# Patient Record
Sex: MALE | Race: WHITE | Employment: OTHER | URBAN - METROPOLITAN AREA
[De-identification: names, ages, dates, MRNs, and addresses within clinical notes are randomized per-mention and may not be internally consistent; named-entity substitution may affect disease eponyms.]

---

## 2021-12-07 ENCOUNTER — APPOINTMENT (OUTPATIENT)
Dept: GENERAL RADIOLOGY | Age: 56
DRG: 177 | End: 2021-12-07
Payer: MEDICARE

## 2021-12-07 ENCOUNTER — APPOINTMENT (OUTPATIENT)
Dept: NUCLEAR MEDICINE | Age: 56
DRG: 177 | End: 2021-12-07
Payer: MEDICARE

## 2021-12-07 ENCOUNTER — HOSPITAL ENCOUNTER (INPATIENT)
Age: 56
LOS: 5 days | Discharge: LEFT BEFORE DISCHARGE | DRG: 177 | End: 2021-12-18
Attending: EMERGENCY MEDICINE | Admitting: FAMILY MEDICINE
Payer: MEDICARE

## 2021-12-07 DIAGNOSIS — R79.89 ELEVATED BRAIN NATRIURETIC PEPTIDE (BNP) LEVEL: ICD-10-CM

## 2021-12-07 DIAGNOSIS — R77.8 ELEVATED TROPONIN: ICD-10-CM

## 2021-12-07 DIAGNOSIS — R07.9 CHEST PAIN, UNSPECIFIED TYPE: Primary | ICD-10-CM

## 2021-12-07 DIAGNOSIS — R79.89 ELEVATED D-DIMER: ICD-10-CM

## 2021-12-07 LAB
-: NORMAL
ANION GAP SERPL CALCULATED.3IONS-SCNC: 9 MMOL/L (ref 9–17)
BNP INTERPRETATION: ABNORMAL
BUN BLDV-MCNC: 35 MG/DL (ref 6–20)
BUN/CREAT BLD: 18 (ref 9–20)
CALCIUM SERPL-MCNC: 8.3 MG/DL (ref 8.6–10.4)
CHLORIDE BLD-SCNC: 107 MMOL/L (ref 98–107)
CO2: 21 MMOL/L (ref 20–31)
CREAT SERPL-MCNC: 1.99 MG/DL (ref 0.7–1.2)
D-DIMER QUANTITATIVE: 1.19 MG/L FEU (ref 0–0.59)
EKG ATRIAL RATE: 87 BPM
EKG Q-T INTERVAL: 476 MS
EKG QRS DURATION: 174 MS
EKG QTC CALCULATION (BAZETT): 524 MS
EKG R AXIS: -102 DEGREES
EKG T AXIS: 87 DEGREES
EKG VENTRICULAR RATE: 73 BPM
FERRITIN: 700 UG/L (ref 30–400)
FIBRINOGEN: 438 MG/DL (ref 179–518)
GFR AFRICAN AMERICAN: 42 ML/MIN
GFR NON-AFRICAN AMERICAN: 35 ML/MIN
GFR SERPL CREATININE-BSD FRML MDRD: ABNORMAL ML/MIN/{1.73_M2}
GFR SERPL CREATININE-BSD FRML MDRD: ABNORMAL ML/MIN/{1.73_M2}
GLUCOSE BLD-MCNC: 84 MG/DL (ref 70–99)
LACTATE DEHYDROGENASE: 264 U/L (ref 135–225)
LACTIC ACID: 1 MMOL/L (ref 0.5–2.2)
POTASSIUM SERPL-SCNC: 3.9 MMOL/L (ref 3.7–5.3)
PRO-BNP: ABNORMAL PG/ML
REASON FOR REJECTION: NORMAL
SODIUM BLD-SCNC: 137 MMOL/L (ref 135–144)
TROPONIN INTERP: ABNORMAL
TROPONIN INTERP: ABNORMAL
TROPONIN T: ABNORMAL NG/ML
TROPONIN T: ABNORMAL NG/ML
TROPONIN, HIGH SENSITIVITY: 63 NG/L (ref 0–22)
TROPONIN, HIGH SENSITIVITY: 69 NG/L (ref 0–22)
ZZ NTE CLEAN UP: ORDERED TEST: NORMAL
ZZ NTE WITH NAME CLEAN UP: SPECIMEN SOURCE: NORMAL

## 2021-12-07 PROCEDURE — 78580 LUNG PERFUSION IMAGING: CPT

## 2021-12-07 PROCEDURE — 83615 LACTATE (LD) (LDH) ENZYME: CPT

## 2021-12-07 PROCEDURE — 3430000000 HC RX DIAGNOSTIC RADIOPHARMACEUTICAL: Performed by: EMERGENCY MEDICINE

## 2021-12-07 PROCEDURE — 84484 ASSAY OF TROPONIN QUANT: CPT

## 2021-12-07 PROCEDURE — 71045 X-RAY EXAM CHEST 1 VIEW: CPT

## 2021-12-07 PROCEDURE — 85379 FIBRIN DEGRADATION QUANT: CPT

## 2021-12-07 PROCEDURE — 6370000000 HC RX 637 (ALT 250 FOR IP): Performed by: EMERGENCY MEDICINE

## 2021-12-07 PROCEDURE — 99285 EMERGENCY DEPT VISIT HI MDM: CPT

## 2021-12-07 PROCEDURE — 83880 ASSAY OF NATRIURETIC PEPTIDE: CPT

## 2021-12-07 PROCEDURE — 83605 ASSAY OF LACTIC ACID: CPT

## 2021-12-07 PROCEDURE — 84145 PROCALCITONIN (PCT): CPT

## 2021-12-07 PROCEDURE — 96376 TX/PRO/DX INJ SAME DRUG ADON: CPT

## 2021-12-07 PROCEDURE — 96374 THER/PROPH/DIAG INJ IV PUSH: CPT

## 2021-12-07 PROCEDURE — 96375 TX/PRO/DX INJ NEW DRUG ADDON: CPT

## 2021-12-07 PROCEDURE — 99222 1ST HOSP IP/OBS MODERATE 55: CPT | Performed by: NURSE PRACTITIONER

## 2021-12-07 PROCEDURE — 6360000002 HC RX W HCPCS: Performed by: EMERGENCY MEDICINE

## 2021-12-07 PROCEDURE — 85384 FIBRINOGEN ACTIVITY: CPT

## 2021-12-07 PROCEDURE — 80048 BASIC METABOLIC PNL TOTAL CA: CPT

## 2021-12-07 PROCEDURE — 82728 ASSAY OF FERRITIN: CPT

## 2021-12-07 PROCEDURE — A9540 TC99M MAA: HCPCS | Performed by: EMERGENCY MEDICINE

## 2021-12-07 PROCEDURE — 93005 ELECTROCARDIOGRAM TRACING: CPT | Performed by: EMERGENCY MEDICINE

## 2021-12-07 RX ORDER — DEXAMETHASONE SODIUM PHOSPHATE 10 MG/ML
10 INJECTION, SOLUTION INTRAMUSCULAR; INTRAVENOUS ONCE
Status: COMPLETED | OUTPATIENT
Start: 2021-12-07 | End: 2021-12-07

## 2021-12-07 RX ORDER — MORPHINE SULFATE 4 MG/ML
4 INJECTION, SOLUTION INTRAMUSCULAR; INTRAVENOUS ONCE
Status: DISCONTINUED | OUTPATIENT
Start: 2021-12-07 | End: 2021-12-07

## 2021-12-07 RX ORDER — CLOPIDOGREL BISULFATE 75 MG/1
75 TABLET ORAL ONCE
Status: DISCONTINUED | OUTPATIENT
Start: 2021-12-07 | End: 2021-12-08

## 2021-12-07 RX ORDER — DIPHENHYDRAMINE HCL 25 MG
50 TABLET ORAL ONCE
Status: COMPLETED | OUTPATIENT
Start: 2021-12-07 | End: 2021-12-07

## 2021-12-07 RX ORDER — MORPHINE SULFATE 4 MG/ML
4 INJECTION, SOLUTION INTRAMUSCULAR; INTRAVENOUS ONCE
Status: COMPLETED | OUTPATIENT
Start: 2021-12-07 | End: 2021-12-07

## 2021-12-07 RX ORDER — MORPHINE SULFATE 4 MG/ML
4 INJECTION, SOLUTION INTRAMUSCULAR; INTRAVENOUS ONCE
Status: COMPLETED | OUTPATIENT
Start: 2021-12-07 | End: 2021-12-08

## 2021-12-07 RX ADMIN — MORPHINE SULFATE 4 MG: 4 INJECTION INTRAVENOUS at 21:14

## 2021-12-07 RX ADMIN — DIPHENHYDRAMINE HCL 50 MG: 25 TABLET ORAL at 20:21

## 2021-12-07 RX ADMIN — MORPHINE SULFATE 4 MG: 4 INJECTION INTRAVENOUS at 19:03

## 2021-12-07 RX ADMIN — Medication 7.5 MILLICURIE: at 22:15

## 2021-12-07 RX ADMIN — DEXAMETHASONE SODIUM PHOSPHATE 10 MG: 10 INJECTION, SOLUTION INTRAMUSCULAR; INTRAVENOUS at 21:14

## 2021-12-07 ASSESSMENT — ENCOUNTER SYMPTOMS
ABDOMINAL PAIN: 0
VOMITING: 0
NAUSEA: 1
SHORTNESS OF BREATH: 1

## 2021-12-07 ASSESSMENT — PAIN SCALES - GENERAL
PAINLEVEL_OUTOF10: 9
PAINLEVEL_OUTOF10: 8
PAINLEVEL_OUTOF10: 9

## 2021-12-07 ASSESSMENT — PAIN DESCRIPTION - ORIENTATION: ORIENTATION: LEFT;MID

## 2021-12-07 ASSESSMENT — PAIN DESCRIPTION - DESCRIPTORS: DESCRIPTORS: PRESSURE

## 2021-12-07 ASSESSMENT — PAIN DESCRIPTION - LOCATION: LOCATION: CHEST

## 2021-12-07 NOTE — ED NOTES
Writer to bedside to administer morphine IM. Pt informed that the house supervisor will be coming to start his IV with US when he is available, but uncertain of how long that will be. Pt refuses for morphine to be administered IM because it will hurt. Pt asks writer \"can't you just start the IV now? \" Previous IV attempt unsuccessful. Writer unable to visualize or palpate any other veins for IV insertion - pt aware. Will await house supervisor to attempt IV insertion.       Catrachita Rdz RN  12/07/21 [id number]

## 2021-12-07 NOTE — ED NOTES
Pt removed self from telemetry. Refuses to leave in place. Pt states he is allergic to the adhesive.       Yuni Salazar RN  12/07/21 1609

## 2021-12-07 NOTE — ED NOTES
Pt arrives ambulatory via EMS with c/o chest pain for >7 days and being unable to catch his breath for the past 4-5 days. Pt reports he has been admitted to [facility] for the last 10 days, was tested for COVID this morning and told he is positive. Pt states after that they would not tell him what was going on, he felt disrespected so he \"ripped out my IV\" and left AMA. Pt reports prior to admission, he was quarantined with his girlfriend in the motel room they stay in and that she was diagnosed 3 weeks ago with COVID. Reports after leaving [facility] AMA, he took a bus to a mall, was sitting on a bench when he was approached by security. Pt states  told him he didn't look like he was feeling well and called 911 - pt states he didn't have a choice in the matter, he was just brought to the hospital. When asked, pt states \"of course I want medical treatment\". Pt c/o mid to left sided chest pressure for >7 days. Also c/o pain in the back of his neck and back of his left shoulder - denies injury to those sites. C/o intermittently feeling as though he cannot catch his breath over past 4 days. Right arm and leg weakness present from previous CVA. Pt able to speak clearly, in full continuous sentences without difficulty or change in SpO2 level.       Елена Andrade RN  12/07/21 Micheal Billings RN  12/07/21 6706

## 2021-12-07 NOTE — ED NOTES
Bed: 12  Expected date:   Expected time:   Means of arrival:   Comments:  squad Luverne Gowers, RN  12/07/21 3765

## 2021-12-07 NOTE — ED PROVIDER NOTES
Occupational History    Not on file   Tobacco Use    Smoking status: Not on file    Smokeless tobacco: Not on file   Substance and Sexual Activity    Alcohol use: Not on file    Drug use: Not on file    Sexual activity: Not on file   Other Topics Concern    Not on file   Social History Narrative    Not on file     Social Determinants of Health     Financial Resource Strain:     Difficulty of Paying Living Expenses: Not on file   Food Insecurity:     Worried About Running Out of Food in the Last Year: Not on file    [place] of Food in the Last Year: Not on file   Transportation Needs:     Lack of Transportation (Medical): Not on file    Lack of Transportation (Non-Medical): Not on file   Physical Activity:     Days of Exercise per Week: Not on file    Minutes of Exercise per Session: Not on file   Stress:     Feeling of Stress : Not on file   Social Connections:     Frequency of Communication with Friends and Family: Not on file    Frequency of Social Gatherings with Friends and Family: Not on file    Attends Orthodoxy Services: Not on file    Active Member of [address] [place] [address] or Organizations: Not on file    Attends Club or Organization Meetings: Not on file    Marital Status: Not on file   Intimate Partner Violence:     Fear of Current or Ex-Partner: Not on file    Emotionally Abused: Not on file    Physically Abused: Not on file    Sexually Abused: Not on file   Housing Stability:     Unable to Pay for Housing in the Last Year: Not on file    Number of [place] in the Last Year: Not on file    Unstable Housing in the Last Year: Not on file       History reviewed. No pertinent family history. Allergies:    Asa [aspirin], Nsaids, and Oxycodone    Home Medications:  Prior to Admission medications    Not on File       REVIEW OF SYSTEMS    (2-9 systems for level 4, 10 or more for level 5)    Review of Systems   Constitutional: Negative for diaphoresis.    Respiratory: Positive for shortness of breath. Cardiovascular: Positive for chest pain. Negative for palpitations and leg swelling. Gastrointestinal: Positive for nausea. Negative for abdominal pain and vomiting. Genitourinary: Negative for flank pain. Musculoskeletal: Positive for arthralgias and neck pain. Neurological: Positive for dizziness and light-headedness. Negative for syncope and weakness. Hematological: Does not bruise/bleed easily. PHYSICAL EXAM   (up to 7 for level 4, 8 or more for level 5)    VITALS:   Vitals:    12/07/21 1550 12/07/21 1608 12/07/21 1842 12/07/21 2109   BP: (!) 132/97   (!) 139/111   Pulse: 70   60   Resp:    21   Temp:       TempSrc:       SpO2:  97%  97%   Weight:   170 lb (77.1 kg)    Height:   5' 10\" (1.778 m)        Physical Exam  Vitals and nursing note reviewed. Constitutional:       General: He is not in acute distress. Appearance: He is well-developed. He is not diaphoretic. HENT:      Head: Normocephalic and atraumatic. Eyes:      Conjunctiva/sclera: Conjunctivae normal.   Cardiovascular:      Rate and Rhythm: Normal rate and regular rhythm. Heart sounds: Normal heart sounds. Pulmonary:      Effort: Pulmonary effort is normal. No respiratory distress. Breath sounds: Normal breath sounds. No wheezing, rhonchi or rales. Abdominal:      General: There is no distension. Palpations: Abdomen is soft. Tenderness: There is no abdominal tenderness. Musculoskeletal:         General: Normal range of motion. Cervical back: Normal range of motion. Right lower leg: Edema present. Left lower leg: Edema present. Skin:     General: Skin is warm and dry. Coloration: Skin is not pale. Neurological:      General: No focal deficit present. Mental Status: He is alert.    Psychiatric:         Behavior: Behavior normal.         DIFFERENTIAL  DIAGNOSIS   PLAN (LABS / IMAGING / EKG):  Orders Placed This Encounter   Procedures    COVID-19, Rapid    XR CHEST 1 VIEW    NM LUNG SCAN PERFUSION ONLY    SPECIMEN REJECTION    Basic Metabolic Panel    Brain Natriuretic Peptide    D-Dimer, Quantitative    Ferritin    Fibrinogen    Lactic Acid    Lactate Dehydrogenase    Procalcitonin    Troponin    Troponin    Telemetry monitoring - continuous duration    Insert indwelling urinary catheter    Inpatient consult to Cardiology    Inpatient consult to Hospitalist    Pacer Interrogate    EKG 12 Lead    Insert peripheral IV       MEDICATIONS ORDERED:  Orders Placed This Encounter   Medications    DISCONTD: morphine sulfate (PF) injection 4 mg    DISCONTD: morphine sulfate (PF) injection 4 mg    morphine sulfate (PF) injection 4 mg    diphenhydrAMINE (BENADRYL) tablet 50 mg    dexamethasone (PF) (DECADRON) injection 10 mg    morphine sulfate (PF) injection 4 mg    technetium albumin aggregated (MAA) solution 7.5 millicurie    morphine sulfate (PF) injection 4 mg    clopidogrel (PLAVIX) tablet 75 mg     DIAGNOSTIC RESULTS / EMERGENCYDEPARTMENT COURSE / MDM   LABS:  Labs Reviewed   BASIC METABOLIC PANEL - Abnormal; Notable for the following components:       Result Value    BUN 35 (*)     CREATININE 1.99 (*)     Calcium 8.3 (*)     GFR Non- 35 (*)     GFR  42 (*)     All other components within normal limits   BRAIN NATRIURETIC PEPTIDE - Abnormal; Notable for the following components:    Pro-BNP 22,281 (*)     All other components within normal limits   D-DIMER, QUANTITATIVE - Abnormal; Notable for the following components:    D-Dimer, Quant 1.19 (*)     All other components within normal limits   FERRITIN - Abnormal; Notable for the following components:    Ferritin 700 (*)     All other components within normal limits   LACTATE DEHYDROGENASE - Abnormal; Notable for the following components:     (*)     All other components within normal limits   TROPONIN - Abnormal; Notable for the following components: Troponin, High Sensitivity 63 (*)     All other components within normal limits   TROPONIN - Abnormal; Notable for the following components:    Troponin, High Sensitivity 69 (*)     All other components within normal limits   COVID-19, RAPID   SPECIMEN REJECTION   FIBRINOGEN   LACTIC ACID   PROCALCITONIN       RADIOLOGY:  NM LUNG SCAN PERFUSION ONLY    Result Date: 12/7/2021  EXAMINATION: NUCLEAR MEDICINE PERFUSION SCAN. 12/7/2021 TECHNIQUE: Ventilation not performed as part of COVID-19 safety precautions. 7.5 millicuries of Tc 09J MAA was administered intravenously prior to planar imaging of the lungs in multiple projections. COMPARISON: Chest radiograph December 7, 2021. HISTORY: ORDERING SYSTEM PROVIDED HISTORY: CP SOB COVID+ +ddimer borederline GFR TECHNOLOGIST PROVIDED HISTORY: CP SOB COVID+ +ddimer borederline GFR Reason for Exam: CAD, SOB, elevated d dimer, Covid + ? Acuity: Unknown Type of Exam: Unknown FINDINGS: PERFUSION: Distribution of radiotracer is homogeneous. No segmental defects identified. CHEST RADIOGRAPH: No focal areas of consolidation or significant effusions on recent chest radiograph. Low Probability for Pulmonary Embolus. XR CHEST 1 VIEW    Result Date: 12/7/2021  EXAMINATION: ONE XRAY VIEW OF THE CHEST 12/7/2021 4:07 pm COMPARISON: None. HISTORY: ORDERING SYSTEM PROVIDED HISTORY: CP/SOB known covid + TECHNOLOGIST PROVIDED HISTORY: CP/SOB known covid + Reason for Exam: CP/SOB covid + Acuity: Unknown Type of Exam: Unknown FINDINGS: Left-sided subclavian approach AICD with atrial and ventricular leads; midline sternotomy wires and clips. Overlying ECG monitor leads. Enlarged cardiac silhouette. Mediastinal structures midline with some elongation thoracic aorta. Mild cephalization of blood flow but no Kerley lines. No clear-cut GGO radiographically. Slight blunting left costophrenic angle but no sizable pleural effusion. No pneumothorax. DJD spine. No prior study available. No definite pneumonia. Slight blunting left CP angle. Cardiomegaly; venous hypertension without radiographic CHF. EKG    EKG Interpretation    Interpreted by emergency department physician    Rhythm: paced  Rate: normal  Axis: normal  Ectopy: premature ventricular contractions (infrequent)  Conduction: QTc 524  ST Segments: no acute change  T Waves: inversion in  v2 and aVl  Q Waves: none    Clinical Impression: non-specific EKG    All EKG's are interpreted by the Emergency Department Physician whoeither signs or Co-signs this chart in the absence of a cardiologist.    EMERGENCY DEPARTMENT COURSE:  ED Course as of 12/07/21 2318   Tue Dec 07, 2021   1638 Patient difficult IV. Had initially ordered IV morphine at request due to pain. However he does not have an IV. Switch the medication to IM. Patient states that he does not want the IM shot and will wait for the IV. [AO]   1703 XR CHEST 1 VIEW [AO]   1817 Patient frustrated that multiple nurses have attempted IV draws. He is requesting that I place an IV. I explained that it would be more difficult for me that our experience nurses to place an IV. He is even offering his ankles and feet. Explained that we will try with the nurse who can do ultrasound-guided.   Again offered to switch IV to IM morphine, patient politely declined at this time. [AO]   1951 Lactic Acid: 1.0 [AO]   3308 Basic Metabolic Panel(!):    Glucose 84   BUN 35(!)   Creatinine 1.99(!)   Bun/Cre Ratio 18   Calcium 8.3(!)   Sodium 137   Potassium 3.9   Chloride 107   CO2 21   Anion Gap 9   GFR Non- 35(!)   GFR  42(!)   GFR Comment        GFR Staging NOT REPORTED [AO]   1954 Lactate Dehydrogenase(!):    (!) [AO]   2016 D-Dimer, Quant(!): 1.19 [AO]   2016 Fibrinogen: 438 [AO]   2018 Pt allergic to adhesive and scratching self from adhesive pads, requesting something for itching  [AO]   2039 Ferritin(!): 700 [AO]   2039 Troponin, High Sensitivity(!!): 63 [AO]   2039 Pro-BNP(!): 22,281 [AO]   2056 Pt allergic to ASA [AO]   2158 Troponin, High Sensitivity(!!): 69 [AO]   2258 Spoke to Dr. Connor Mills. No indication for heparin right now. Give plavix. Do not have nurse call him with elevated troponins last there is a significant change [AO]      ED Course User Index  [AO] Maicol Dawson,        MDM  Number of Diagnoses or Management Options  Chest pain, unspecified type  Elevated brain natriuretic peptide (BNP) level  Elevated d-dimer  Elevated troponin  Diagnosis management comments: 41-year-old male with significant cardiac past medical history presents emergency department chest pain shortness of breath. Initial evaluation he is no acute distress. Hypertension noted but other vital signs stable. Heart regular rate and rhythm, lungs clear. No peripheral edema. EKG shows paced rhythm with some PVCs. Patient states that he was recently hospitalized at [facility] for the past 10 days and left AMA this morning because he found out that he was Covid positive and did not like the way he is being treated. However I cannot see any of this in care everywhere. I even had our hot call both [facility] as well as [facility] and there is no record of him. On his care everywhere the only thing I can see are different facilities up in [place] and this mostly contains a home medication list and internal medicine doctors names. Patient is adamant that he was at [facility].  He states he also has known history of chronic kidney disease and believes his creatinine is normally around 1.4 however states that when he left [facility] that his creatinine was 2.6. He does not know what his baseline troponin is or what his baseline BNP is. Here his troponins went from 63-69. Requiring multiple doses of morphine. Requesting something to drink.   Spoke with cardiology, will admit to hospitalist.  Patient unaware of any of the names of his specialist that he is seen in the past.  Cards: History of prior A. fib, V. tach/V. Fib with pacemaker AICD in place, known coronary artery disease, PCI with stent placement, thoracic aortic aneurysm with endograft placed at [facility] multiple years ago. Troponins elevated and trending up from 63-69. BNP significantly elevated. Is on Bumex at home from one of the medication list that I have seen. Allergic to aspirin so cannot provide this. Spoke to Dr. Ally Crawford reviewed case. Recommended loading with Plavix. No indication for heparin at this time. Unfortunately unable to get CTA to adequately assess the status of his endograft due to renal function. However has intact and equal distal pulses with no significant hypertension that would be concerning for an abrupt dissection. Patient is unaware of the make and model of his pacemaker/defibrillator so we are unable to interrogate unfortunately. Upon review of care everywhere I can see her medication list from [place] [id number] as patient is from [place]. There are multiple cardiac medications, multiple psychiatric medications, multiple controlled substances as well. His OARRS score is 590. Most recent narcotic prescription that I can see is from September of this year in [place]. Of note patient is allergic to adhesive and had a skin reaction to the telemetry pads causing him to scratch himself bloody. Was given Decadron as well as Benadryl for this. Pulm: Supposedly Covid positive, rapid here will be sent. Chest x-ray did not reveal any pneumonia or fluid overload. BNP elevated, however this does not correlate with physical exam.  Not hypoxic. Not tachypneic. VQ scan low probability for pulmonary embolism  Renal: History of chronic kidney disease, patient states that baseline creatinine is 1.4. Today is 2. Borderline GFR which is why chose not to do CT with contrast.  Given IV fluids conservatively. ID: Inflammatory markers sent to assess status of COVID-19.   Patient states that he is positive, will reconfirm this year upon admission         Amount and/or Complexity of Data Reviewed  Clinical lab tests: ordered and reviewed  Tests in the radiology section of CPT®: ordered and reviewed  Review and summarize past medical records: yes  Discuss the patient with other providers: yes  Independent visualization of images, tracings, or specimens: yes    Patient Progress  Patient progress: stable      PROCEDURES:  Procedures     CONSULTS:  IP CONSULT TO CARDIOLOGY  IP CONSULT TO HOSPITALIST    CRITICAL CARE:  NONE    FINAL IMPRESSION     1. Chest pain, unspecified type    2. Elevated troponin    3. Elevated brain natriuretic peptide (BNP) level    4. Elevated d-dimer      DISPOSITION / PLAN   DISPOSITION Decision To Admit 12/07/2021 10:15:28 PM    ADMIT    Cleopatra Mauro April DO  Emergency Medicine Physician    (Please note that portions of this note were completed with a voice recognition program.  Efforts were made to edit the dictations but occasionally words are mis-transcribed.)        Ynes Aleman 1721,   12/07/21 4880

## 2021-12-08 ENCOUNTER — APPOINTMENT (OUTPATIENT)
Dept: ULTRASOUND IMAGING | Age: 56
DRG: 177 | End: 2021-12-08
Payer: MEDICARE

## 2021-12-08 PROBLEM — I25.2 HISTORY OF MI (MYOCARDIAL INFARCTION): Status: ACTIVE | Noted: 2021-12-08

## 2021-12-08 PROBLEM — Z95.810 AICD (AUTOMATIC CARDIOVERTER/DEFIBRILLATOR) PRESENT: Status: ACTIVE | Noted: 2021-12-08

## 2021-12-08 PROBLEM — N18.30 STAGE 3 CHRONIC KIDNEY DISEASE (HCC): Status: ACTIVE | Noted: 2021-12-08

## 2021-12-08 PROBLEM — I21.4 NSTEMI (NON-ST ELEVATED MYOCARDIAL INFARCTION) (HCC): Status: ACTIVE | Noted: 2021-12-08

## 2021-12-08 PROBLEM — I50.22 CHRONIC SYSTOLIC HEART FAILURE (HCC): Status: ACTIVE | Noted: 2021-12-08

## 2021-12-08 PROBLEM — Z95.1 HX OF CABG: Status: ACTIVE | Noted: 2021-12-08

## 2021-12-08 PROBLEM — R07.89 OTHER CHEST PAIN: Status: ACTIVE | Noted: 2021-12-08

## 2021-12-08 PROBLEM — R79.89 ELEVATED BRAIN NATRIURETIC PEPTIDE (BNP) LEVEL: Status: ACTIVE | Noted: 2021-12-08

## 2021-12-08 PROBLEM — N17.9 AKI (ACUTE KIDNEY INJURY) (HCC): Status: ACTIVE | Noted: 2021-12-08

## 2021-12-08 PROBLEM — U07.1 COVID-19 VIRUS INFECTION: Status: ACTIVE | Noted: 2021-12-08

## 2021-12-08 PROBLEM — R77.8 ELEVATED TROPONIN: Status: ACTIVE | Noted: 2021-12-08

## 2021-12-08 LAB
ANION GAP SERPL CALCULATED.3IONS-SCNC: 12 MMOL/L (ref 9–17)
BUN BLDV-MCNC: 37 MG/DL (ref 6–20)
BUN/CREAT BLD: 16 (ref 9–20)
CALCIUM SERPL-MCNC: 8.3 MG/DL (ref 8.6–10.4)
CHLORIDE BLD-SCNC: 107 MMOL/L (ref 98–107)
CO2: 19 MMOL/L (ref 20–31)
CREAT SERPL-MCNC: 2.26 MG/DL (ref 0.7–1.2)
GFR AFRICAN AMERICAN: 37 ML/MIN
GFR NON-AFRICAN AMERICAN: 30 ML/MIN
GFR SERPL CREATININE-BSD FRML MDRD: ABNORMAL ML/MIN/{1.73_M2}
GFR SERPL CREATININE-BSD FRML MDRD: ABNORMAL ML/MIN/{1.73_M2}
GLUCOSE BLD-MCNC: 177 MG/DL (ref 70–99)
MAGNESIUM: 1.8 MG/DL (ref 1.6–2.6)
POTASSIUM SERPL-SCNC: 4.4 MMOL/L (ref 3.7–5.3)
PROCALCITONIN: 0.09 NG/ML
SARS-COV-2, RAPID: DETECTED
SODIUM BLD-SCNC: 138 MMOL/L (ref 135–144)
SPECIMEN DESCRIPTION: ABNORMAL
TROPONIN INTERP: ABNORMAL
TROPONIN INTERP: ABNORMAL
TROPONIN T: ABNORMAL NG/ML
TROPONIN T: ABNORMAL NG/ML
TROPONIN, HIGH SENSITIVITY: 41 NG/L (ref 0–22)
TROPONIN, HIGH SENSITIVITY: 62 NG/L (ref 0–22)

## 2021-12-08 PROCEDURE — 2580000003 HC RX 258: Performed by: NURSE PRACTITIONER

## 2021-12-08 PROCEDURE — 6370000000 HC RX 637 (ALT 250 FOR IP): Performed by: INTERNAL MEDICINE

## 2021-12-08 PROCEDURE — 83735 ASSAY OF MAGNESIUM: CPT

## 2021-12-08 PROCEDURE — 84484 ASSAY OF TROPONIN QUANT: CPT

## 2021-12-08 PROCEDURE — 6360000002 HC RX W HCPCS: Performed by: INTERNAL MEDICINE

## 2021-12-08 PROCEDURE — 99223 1ST HOSP IP/OBS HIGH 75: CPT | Performed by: INTERNAL MEDICINE

## 2021-12-08 PROCEDURE — 76770 US EXAM ABDO BACK WALL COMP: CPT

## 2021-12-08 PROCEDURE — G0378 HOSPITAL OBSERVATION PER HR: HCPCS

## 2021-12-08 PROCEDURE — 6370000000 HC RX 637 (ALT 250 FOR IP): Performed by: NURSE PRACTITIONER

## 2021-12-08 PROCEDURE — 96376 TX/PRO/DX INJ SAME DRUG ADON: CPT

## 2021-12-08 PROCEDURE — 87635 SARS-COV-2 COVID-19 AMP PRB: CPT

## 2021-12-08 PROCEDURE — 96372 THER/PROPH/DIAG INJ SC/IM: CPT

## 2021-12-08 PROCEDURE — 6370000000 HC RX 637 (ALT 250 FOR IP): Performed by: EMERGENCY MEDICINE

## 2021-12-08 PROCEDURE — 36415 COLL VENOUS BLD VENIPUNCTURE: CPT

## 2021-12-08 PROCEDURE — 6360000002 HC RX W HCPCS: Performed by: EMERGENCY MEDICINE

## 2021-12-08 PROCEDURE — 80048 BASIC METABOLIC PNL TOTAL CA: CPT

## 2021-12-08 RX ORDER — SODIUM POLYSTYRENE SULFONATE 15 G/60ML
15 SUSPENSION ORAL; RECTAL
Status: ACTIVE | OUTPATIENT
Start: 2021-12-08 | End: 2021-12-08

## 2021-12-08 RX ORDER — DIPHENHYDRAMINE HCL 25 MG
50 TABLET ORAL EVERY 6 HOURS PRN
Status: DISCONTINUED | OUTPATIENT
Start: 2021-12-08 | End: 2021-12-09

## 2021-12-08 RX ORDER — POTASSIUM CHLORIDE 7.45 MG/ML
10 INJECTION INTRAVENOUS PRN
Status: DISCONTINUED | OUTPATIENT
Start: 2021-12-08 | End: 2021-12-19 | Stop reason: HOSPADM

## 2021-12-08 RX ORDER — MORPHINE SULFATE 2 MG/ML
2 INJECTION, SOLUTION INTRAMUSCULAR; INTRAVENOUS
Status: DISCONTINUED | OUTPATIENT
Start: 2021-12-08 | End: 2021-12-11

## 2021-12-08 RX ORDER — MORPHINE SULFATE 4 MG/ML
4 INJECTION, SOLUTION INTRAMUSCULAR; INTRAVENOUS
Status: DISCONTINUED | OUTPATIENT
Start: 2021-12-08 | End: 2021-12-11

## 2021-12-08 RX ORDER — ATORVASTATIN CALCIUM 40 MG/1
40 TABLET, FILM COATED ORAL NIGHTLY
Status: DISCONTINUED | OUTPATIENT
Start: 2021-12-08 | End: 2021-12-19 | Stop reason: HOSPADM

## 2021-12-08 RX ORDER — CLOPIDOGREL BISULFATE 75 MG/1
75 TABLET ORAL DAILY
Status: DISCONTINUED | OUTPATIENT
Start: 2021-12-08 | End: 2021-12-19 | Stop reason: HOSPADM

## 2021-12-08 RX ORDER — FUROSEMIDE 20 MG/1
20 TABLET ORAL PRN
Status: ON HOLD | COMMUNITY
End: 2021-12-17 | Stop reason: HOSPADM

## 2021-12-08 RX ORDER — ONDANSETRON 4 MG/1
4 TABLET, ORALLY DISINTEGRATING ORAL EVERY 8 HOURS PRN
Status: DISCONTINUED | OUTPATIENT
Start: 2021-12-08 | End: 2021-12-19 | Stop reason: HOSPADM

## 2021-12-08 RX ORDER — TAMSULOSIN HYDROCHLORIDE 0.4 MG/1
0.4 CAPSULE ORAL DAILY
COMMUNITY

## 2021-12-08 RX ORDER — POTASSIUM CHLORIDE 20 MEQ/1
40 TABLET, EXTENDED RELEASE ORAL PRN
Status: DISCONTINUED | OUTPATIENT
Start: 2021-12-08 | End: 2021-12-19 | Stop reason: HOSPADM

## 2021-12-08 RX ORDER — SODIUM CHLORIDE 9 MG/ML
INJECTION, SOLUTION INTRAVENOUS CONTINUOUS
Status: DISCONTINUED | OUTPATIENT
Start: 2021-12-08 | End: 2021-12-08

## 2021-12-08 RX ORDER — TAMSULOSIN HYDROCHLORIDE 0.4 MG/1
0.4 CAPSULE ORAL DAILY
Status: DISCONTINUED | OUTPATIENT
Start: 2021-12-08 | End: 2021-12-19 | Stop reason: HOSPADM

## 2021-12-08 RX ORDER — MAGNESIUM SULFATE 1 G/100ML
1000 INJECTION INTRAVENOUS PRN
Status: DISCONTINUED | OUTPATIENT
Start: 2021-12-08 | End: 2021-12-19 | Stop reason: HOSPADM

## 2021-12-08 RX ORDER — NITROGLYCERIN 0.4 MG/1
0.4 TABLET SUBLINGUAL EVERY 5 MIN PRN
Status: DISCONTINUED | OUTPATIENT
Start: 2021-12-08 | End: 2021-12-19 | Stop reason: HOSPADM

## 2021-12-08 RX ORDER — ONDANSETRON 2 MG/ML
4 INJECTION INTRAMUSCULAR; INTRAVENOUS EVERY 6 HOURS PRN
Status: DISCONTINUED | OUTPATIENT
Start: 2021-12-08 | End: 2021-12-19 | Stop reason: HOSPADM

## 2021-12-08 RX ORDER — AMLODIPINE BESYLATE 10 MG/1
0.5 TABLET ORAL DAILY
Status: ON HOLD | COMMUNITY
End: 2021-12-17 | Stop reason: HOSPADM

## 2021-12-08 RX ORDER — DIPHENHYDRAMINE HCL 25 MG
25 TABLET ORAL EVERY 6 HOURS PRN
Status: DISCONTINUED | OUTPATIENT
Start: 2021-12-08 | End: 2021-12-08 | Stop reason: SDUPTHER

## 2021-12-08 RX ORDER — SODIUM POLYSTYRENE SULFONATE 15 G/60ML
30 SUSPENSION ORAL; RECTAL
Status: ACTIVE | OUTPATIENT
Start: 2021-12-08 | End: 2021-12-08

## 2021-12-08 RX ORDER — ATORVASTATIN CALCIUM 40 MG/1
1 TABLET, FILM COATED ORAL DAILY
Status: ON HOLD | COMMUNITY
End: 2022-01-13 | Stop reason: HOSPADM

## 2021-12-08 RX ORDER — CARVEDILOL 6.25 MG/1
1 TABLET ORAL 2 TIMES DAILY
Status: ON HOLD | COMMUNITY
End: 2021-12-17 | Stop reason: HOSPADM

## 2021-12-08 RX ORDER — CARVEDILOL 3.12 MG/1
3.12 TABLET ORAL 2 TIMES DAILY WITH MEALS
Status: COMPLETED | OUTPATIENT
Start: 2021-12-08 | End: 2021-12-13

## 2021-12-08 RX ORDER — CLOPIDOGREL BISULFATE 75 MG/1
1 TABLET ORAL DAILY
COMMUNITY

## 2021-12-08 RX ADMIN — DIPHENHYDRAMINE HCL 25 MG: 25 TABLET ORAL at 02:46

## 2021-12-08 RX ADMIN — ATORVASTATIN CALCIUM 40 MG: 40 TABLET, FILM COATED ORAL at 23:42

## 2021-12-08 RX ADMIN — MORPHINE SULFATE 4 MG: 4 INJECTION, SOLUTION INTRAMUSCULAR; INTRAVENOUS at 20:32

## 2021-12-08 RX ADMIN — CLOPIDOGREL BISULFATE 75 MG: 75 TABLET ORAL at 14:23

## 2021-12-08 RX ADMIN — MORPHINE SULFATE 4 MG: 4 INJECTION INTRAVENOUS at 00:29

## 2021-12-08 RX ADMIN — SODIUM CHLORIDE: 9 INJECTION, SOLUTION INTRAVENOUS at 02:13

## 2021-12-08 RX ADMIN — APIXABAN 5 MG: 5 TABLET, FILM COATED ORAL at 16:13

## 2021-12-08 RX ADMIN — MORPHINE SULFATE 4 MG: 4 INJECTION, SOLUTION INTRAMUSCULAR; INTRAVENOUS at 16:13

## 2021-12-08 RX ADMIN — CARVEDILOL 3.12 MG: 3.12 TABLET, FILM COATED ORAL at 18:17

## 2021-12-08 RX ADMIN — TAMSULOSIN HYDROCHLORIDE 0.4 MG: 0.4 CAPSULE ORAL at 23:42

## 2021-12-08 RX ADMIN — ENOXAPARIN SODIUM 80 MG: 100 INJECTION SUBCUTANEOUS at 23:42

## 2021-12-08 RX ADMIN — MORPHINE SULFATE 4 MG: 4 INJECTION, SOLUTION INTRAMUSCULAR; INTRAVENOUS at 18:17

## 2021-12-08 RX ADMIN — DIPHENHYDRAMINE HCL 50 MG: 25 TABLET ORAL at 14:23

## 2021-12-08 RX ADMIN — MORPHINE SULFATE 4 MG: 4 INJECTION, SOLUTION INTRAMUSCULAR; INTRAVENOUS at 23:42

## 2021-12-08 ASSESSMENT — PAIN SCALES - GENERAL
PAINLEVEL_OUTOF10: 9

## 2021-12-08 ASSESSMENT — ENCOUNTER SYMPTOMS
NAUSEA: 0
SHORTNESS OF BREATH: 1
SORE THROAT: 0
VOMITING: 0
SINUS PRESSURE: 0
COUGH: 0

## 2021-12-08 NOTE — H&P
[facility]  Office: [address], DO, Indy Riojas, DO, Jonna Soto, DO, Rambo Carolina Piedra, DO, Laura Diaz MD, Colton Evans MD, Beatrice Chun MD, Jarrell Lerner MD, Sharee Nur MD, Sarah Mccoy MD, Misael Tate MD, Kevin Tijerina, DO, Antonio Baires, DO, Jadiel Payan MD,  Alex Monte, DO, Marty Kevin MD, Aurora Townsend MD, Anish Kirkland MD, Kojo Murray MD, Chapin Wakefield MD, Laura Oviedo MD, Fabiana Rudolph MD, Alferd Felty, CNP, [facility], CNP, Karen Wright, CNP, Clau Owen, CNS, Juan Ring, CNP, Kasandra Holbrook, CNP, Zenai Bangura, CNP, Ethyl Mercury, CNP, Shabnam Schilling, CNP, Lenard Abreu PA-C, Susana Love, [facility] ([place], [place]), Dinesh Campbell, CNP, Mony Hooper, CNP, Gaudencio Ken, CNP, Mikki Rainey, CNP, Tasia Ramirez, CNP, Mary Rogers, CNP, Jose Mason, CNP         [facility]    HISTORY AND PHYSICAL EXAMINATION            Date:   12/8/2021  Patient name:  Miguel Casas  Date of admission:  12/7/2021  3:24 PM  MRN:   [id number]  Account:  [de-identified]  YOB: 1965  PCP:    No primary care provider on file. Room:   [place] [address]  Code Status:    Full Code    Chief Complaint:     CP  Was at [facility] for 10 days left AMA this morning    History Obtained From:     patient, electronic medical record    History of Present Illness:     Miguel Casas is a 63 y.o. Non- / non  male who presents with Chest Pain (pt reports onset at least 1 week ago. has been admitted to [facility] for past 10 days, left AMA from there this afternoon.) and Shortness of Breath (ongoing and unchanged x past 4-5 days.)   and is admitted to the hospital for the management of JACKSON (acute kidney injury) ([address] 9year-old male presented to the emergency room with 1 week history of chest pain. He states that chest pain was initially intermittent but has been constant for the past 2 to 3 days.   He does admit to a nonproductive cough.  No body aches or fevers. He states that he was at the [facility] for the past 10 days and tested positive for Covid this morning. He states he left AMA this morning. There is no record of his visit at the [facility] in care everywhere. Patient states that he does have a history of CAD. He has a history of AICD and defibrillator in place. He states he has a history of MI. He states that he \" has been bouncing around\" and has not had a PCP or cardiologist in the past 3 years. Most recent cardiologist was Dr. Yaneth Long in [place] 3 years ago. According to ER records he told staff that he is homeless. VQ scan is low probability for pulmonary embolus. There is concern for JACKSON. BUN is 35. Creatinine 1.99. There were no previous labs available for comparison in EHR or in care everywhere. proBNP is over 22,000. Covid is negative. Ferritin is elevated at 700. , troponin 63 followed by 69. Past Medical History:     Past Medical History:   Diagnosis Date    AICD (automatic cardioverter/defibrillator) present     Myocardial infarction [facility])         Past Surgical History:     History reviewed. No pertinent surgical history. Medications Prior to Admission:     Prior to Admission medications    Not on File        Allergies:     Asa [aspirin], Nsaids, and Oxycodone    Social History:     Tobacco:    reports that he has been smoking. He does not have any smokeless tobacco history on file. Alcohol:      reports previous alcohol use. Drug Use:  reports previous drug use. Family History:     Family History   Problem Relation Age of Onset   [facility] Cancer Mother     Diabetes Father     Heart Disease Father     Diabetes Paternal Grandmother        Review of Systems:     Positive and Negative as described in HPI. Review of Systems   Constitutional: Negative for activity change, fatigue and fever. HENT: Negative for congestion, sinus pressure and sore throat. Respiratory: Positive for shortness of breath. Negative for cough. Cardiovascular: Positive for chest pain. Negative for palpitations. Gastrointestinal: Negative for nausea and vomiting. Musculoskeletal: Positive for neck pain. Negative for myalgias and neck stiffness. Neurological: Positive for dizziness and light-headedness. Negative for headaches. Physical Exam:   BP (!) 129/96   Pulse 70   Temp 99 °F (37.2 °C) (Oral)   Resp 16   Ht 5' 10\" (1.778 m)   Wt 170 lb (77.1 kg)   SpO2 96%   BMI 24.39 kg/m²   Temp (24hrs), Av °F (37.2 °C), Min:99 °F (37.2 °C), Max:99 °F (37.2 °C)    No results for input(s): POCGLU in the last 72 hours. Intake/Output Summary (Last 24 hours) at 2021 0651  Last data filed at 2021 0038  Gross per 24 hour   Intake --   Output 525 ml   Net -525 ml       Physical Exam  Constitutional:       Appearance: He is not ill-appearing or toxic-appearing. HENT:      Right Ear: External ear normal.      Left Ear: External ear normal.   Eyes:      General:         Right eye: No discharge. Left eye: No discharge. Conjunctiva/sclera: Conjunctivae normal.   Cardiovascular:      Rate and Rhythm: Normal rate and regular rhythm. Pulmonary:      Effort: No respiratory distress. Breath sounds: Normal breath sounds. Abdominal:      General: There is no distension. Palpations: Abdomen is soft. Tenderness: There is no abdominal tenderness. Musculoskeletal:      Right lower leg: Edema present. Left lower leg: Edema present. Skin:     General: Skin is warm and dry. Neurological:      General: No focal deficit present. Mental Status: He is alert and oriented to person, place, and time.    Psychiatric:         Mood and Affect: Mood normal.         Behavior: Behavior normal.         Investigations:      Laboratory Testing:  Recent Results (from the past 24 hour(s))   EKG 12 Lead    Collection Time: 21  3:30 PM   Result Value Ref Range    Ventricular Rate 73 BPM    Atrial Rate 87 BPM    QRS Duration 174 ms    Q-T Interval 476 ms    QTc Calculation (Bazett) 524 ms    R Axis -102 degrees    T Axis 87 degrees   SPECIMEN REJECTION    Collection Time: 12/07/21  6:28 PM   Result Value Ref Range    Specimen Source . BLOOD     Ordered Test DIME FIB LAC CDP BMP BNP FERI LD TROPI     Reason for Rejection Unable to perform testing: Specimen hemolyzed.      - NOT REPORTED    Basic Metabolic Panel    Collection Time: 12/07/21  6:46 PM   Result Value Ref Range    Glucose 84 70 - 99 mg/dL    BUN 35 (H) 6 - 20 mg/dL    CREATININE 1.99 (H) 0.70 - 1.20 mg/dL    Bun/Cre Ratio 18 9 - 20    Calcium 8.3 (L) 8.6 - 10.4 mg/dL    Sodium 137 135 - 144 mmol/L    Potassium 3.9 3.7 - 5.3 mmol/L    Chloride 107 98 - 107 mmol/L    CO2 21 20 - 31 mmol/L    Anion Gap 9 9 - 17 mmol/L    GFR Non-African American 35 (L) >60 mL/min    GFR  42 (L) >60 mL/min    GFR Comment          GFR Staging NOT REPORTED    Brain Natriuretic Peptide    Collection Time: 12/07/21  6:46 PM   Result Value Ref Range    Pro-BNP 22,281 (H) <300 pg/mL    BNP Interpretation NOT REPORTED    D-Dimer, Quantitative    Collection Time: 12/07/21  6:46 PM   Result Value Ref Range    D-Dimer, Quant 1.19 (H) 0.00 - 0.59 mg/L FEU   Ferritin    Collection Time: 12/07/21  6:46 PM   Result Value Ref Range    Ferritin 700 (H) 30 - 400 ug/L   Fibrinogen    Collection Time: 12/07/21  6:46 PM   Result Value Ref Range    Fibrinogen 438 179 - 518 mg/dL   Lactic Acid    Collection Time: 12/07/21  6:46 PM   Result Value Ref Range    Lactic Acid 1.0 0.5 - 2.2 mmol/L   Lactate Dehydrogenase    Collection Time: 12/07/21  6:46 PM   Result Value Ref Range     (H) 135 - 225 U/L   Troponin    Collection Time: 12/07/21  6:46 PM   Result Value Ref Range    Troponin, High Sensitivity 63 (HH) 0 - 22 ng/L    Troponin T NOT REPORTED <0.03 ng/mL    Troponin Interp NOT REPORTED    Procalcitonin    Collection Time: 12/07/21  6:53 PM   Result Value Ref Range    Procalcitonin 0.09 (H) <0.09 ng/mL   Troponin    Collection Time: 12/07/21  9:05 PM   Result Value Ref Range    Troponin, High Sensitivity 69 (HH) 0 - 22 ng/L    Troponin T NOT REPORTED <0.03 ng/mL    Troponin Interp NOT REPORTED    COVID-19, Rapid    Collection Time: 12/08/21 12:45 AM    Specimen: Nasopharyngeal Swab   Result Value Ref Range    Specimen Description . NASOPHARYNGEAL SWAB     SARS-CoV-2, Rapid DETECTED (A) Not Detected   Troponin    Collection Time: 12/08/21  2:45 AM   Result Value Ref Range    Troponin, High Sensitivity 62 (HH) 0 - 22 ng/L    Troponin T NOT REPORTED <0.03 ng/mL    Troponin Interp NOT REPORTED        Imaging/Diagnostics:  XR CHEST 1 VIEW    Result Date: 12/7/2021  No prior study available. No definite pneumonia. Slight blunting left CP angle. Cardiomegaly; venous hypertension without radiographic CHF. Assessment :      Hospital Problems           Last Modified POA    * (Principal) JACKSON (acute kidney injury) ([facility] [address] 12/8/2021 Yes    Other chest pain 12/8/2021 Yes    AICD (automatic cardioverter/defibrillator) present 12/8/2021 Yes    History of MI (myocardial infarction) 12/8/2021 Yes    Elevated brain natriuretic peptide (BNP) level 12/8/2021 Yes          Plan:     Patient status observation in the  Progressive Unit/Step down    Observation admission on telemetry  Cardiology consult  Serial troponins  Echo  Hold IV fluids due to elevated BNP  Monitor renal function closely  Avoid nephrotoxic agents  Trend labs  DVT prophylaxis    Consultations:   IP CONSULT TO CARDIOLOGY  IP CONSULT TO HOSPITALIST  IP CONSULT TO CARDIOLOGY      VERONICA AMEZCUA CNP  12/8/2021  6:51 AM    Copy sent to Dr. Baez Rutledge primary care provider on file.

## 2021-12-08 NOTE — H&P
[facility]  Office: [address], DO, Kyliedelano Iverson, DO, Jenice Duane, DO, Tyree Castaneda [facility], DO, Mateo Smith MD, Prince Mckeon MD, Paul Vance MD, Dayron Ramires MD, Leighann Toscano MD, Shreyas Santoyo MD, Stacia Ramírez MD, Suni Caballero, DO, Vernell Rodríguez, DO, Ariana Lucia MD,  Ehlam Madsen DO, Rigoberto Murray MD, Tisha Murillo MD, Adrianne Cadena MD, Luis Bosch MD, Beatrice Alfaro MD, Franchesca Mccabe MD, Pennie Becerra MD, Anayeli Carballo [facility], [facility]ie, CNP, Sallie Orr, CNP, Tanda Lesches, CNS, Jessy Clements, CNP, Snow Duque, CNP, Lakeisha Cooper, CNP, Marilou Barkley, CNP, Delbert Aase, CNP, Chip Brock PA-C, Vivi Potts, [facility], Gael Sanchez, CNP, Nasrin Mendoza, CNP, Sumi Dove, CNP, Trent Strange, CNP, Abeba Gibson, [facility], Queen Gustavonce, [facility], Maryanne Andres, [address]    HISTORY AND PHYSICAL EXAMINATION            Date:   12/8/2021  Patient name:  Bautista James  Date of admission:  12/7/2021  3:24 PM  MRN:   [id number]  Account:  [de-identified]  YOB: 1965  PCP:    No primary care provider on file. Room:   [place] [address]  Code Status:    Full Code    Chief Complaint:     Chief Complaint   Patient presents with    Chest Pain     pt reports onset at least 1 week ago. has been admitted to [facility] for past 10 days, left AMA from there this afternoon.  Shortness of Breath     ongoing and unchanged x past 4-5 days. History Obtained From:     patient, electronic medical record    History of Present Illness:     Bautista James is a 63 y.o.  gentleman who presented to [facility] on 12/7/2021 for evaluation of chest pain and feeling unwell. Patient reports that he left [facility] approximately 3 days ago after being treated poorly. He states he was discovered to be COVID-19 positive during that time and was placed in isolation.   He states that staff is treating him poorly. He decided to leave [address] It appears that he was therefore chest pain and feeling unwell as well. He is homeless and has been bouncing between hotels. However, he cannot afford this. He states that he [place] the mall where security noted that he looked unwell. He was subsequently transferred to our facility for further evaluation and treatment. He states that he has a history of coronary artery disease status post CABG (twice - 2011, 2018), prior CVA, solitary kidney, and BPH. He notes he is on carvedilol, Eliquis, Plavix, Flomax. He states that he has not taken his medications in about 3 days. He states that he is having a significant bout of chest pain which is centrally located on his chest.  He states that this is constant. Relieved with nitro but abruptly returns. He is not requiring supplemental oxygen. He is very anxious on exam and reports that he is significantly itchy as well. He is requesting IV Benadryl and IV narcotics. He is very fixated on receiving IV narcotics. Troponin noted to be elevated in the emergency department assess his creatinine. He notes that his baseline creatinine is 1.4. He will be admitted for further work-up and treatment of elevated troponin along with JACKSON. Past Medical History:     Past Medical History:   Diagnosis Date    AICD (automatic cardioverter/defibrillator) present     CKD (chronic kidney disease), stage III (HCC)     [facility] ([facility] [address]     S/P CABG (coronary artery bypass graft)     x2 separate occasions    Solitary kidney, congenital         Past Surgical History:     Past Surgical History:   Procedure Laterality Date    CORONARY ARTERY BYPASS GRAFT      CORONARY ARTERY BYPASS GRAFT          Medications Prior to Admission:     Prior to Admission medications    Not on File        Allergies:     Asa [aspirin], Nsaids, and Oxycodone    Social History:     Tobacco:    reports that he has been smoking. He has been smoking about 0.25 packs per day. He has never used smokeless tobacco.  Alcohol:      reports previous alcohol use. Drug Use:  reports previous drug use. Family History:     Family History   Problem Relation Age of Onset   [facility] Cancer Mother     Diabetes Father     Heart Disease Father     Diabetes Paternal Grandmother        Review of Systems:     Positive and Negative as described in HPI. CONSTITUTIONAL: Reports a generalized feeling of unwellness; negative for fevers, chills, sweats, fatigue, weight loss  HEENT:  negative for vision, hearing changes, runny nose, throat pain  RESPIRATORY:  negative for shortness of breath, cough, congestion, wheezing  CARDIOVASCULAR: Reports centrally located chest pain; negative for palpitations  GASTROINTESTINAL: negative for nausea, vomiting, diarrhea, constipation, change in bowel habits, abdominal pain    GENITOURINARY:  negative for difficulty of urination, burning with urination, frequency   INTEGUMENT:  negative for rash, skin lesions, easy bruising   HEMATOLOGIC/LYMPHATIC:  negative for swelling/edema   ALLERGIC/IMMUNOLOGIC:  negative for urticaria , itching  ENDOCRINE:  negative increase in drinking, increase in urination, hot or cold intolerance  MUSCULOSKELETAL:  negative joint pains, muscle aches, swelling of joints  NEUROLOGICAL:  negative for headaches, dizziness, lightheadedness, numbness, pain, tingling extremities  BEHAVIOR/PSYCH:  negative for depression, anxiety    Physical Exam:   BP (!) 153/102   Pulse 70   Temp 99 °F (37.2 °C) (Oral)   Resp 16   Ht 5' 10\" (1.778 m)   Wt 170 lb (77.1 kg)   SpO2 96%   BMI 24.39 kg/m²   No data recorded. No results for input(s): POCGLU in the last 72 hours.     Intake/Output Summary (Last 24 hours) at 12/8/2021 1836  Last data filed at 12/8/2021 0038  Gross per 24 hour   Intake --   Output 525 ml   Net -525 ml     General Appearance:  alert, well appearing, disheveled, and in no acute distress  Mental status: oriented to person, place, and time  Head:  normocephalic, atraumatic  Eye: no icterus, redness, pupils equal and reactive, extraocular eye movements intact, conjunctiva clear  Ear: normal external ear, no discharge, hearing intact  Nose:  no drainage noted  Mouth: mucous membranes moist  Neck: supple, no carotid bruits, thyroid not palpable  Lungs: Bilateral equal air entry, clear to auscultation, no wheezing, rales or rhonchi, normal effort  Cardiovascular: normal rate, regular rhythm, no murmur, gallop, rub.   Abdomen: Soft, nontender, nondistended, normal bowel sounds, no hepatomegaly or splenomegaly  Neurologic: There are no new focal motor or sensory deficits, normal muscle tone and bulk, no abnormal sensation, normal speech, cranial nerves II through XII grossly intact  Skin: No gross lesions, rashes, bruising or bleeding on exposed skin area, well-healed cicartrix on anterior chest wall  Extremities:  peripheral pulses palpable, no pedal edema or calf pain with palpation  Psych: anxious affect     Investigations:      Laboratory Testing:  Recent Results (from the past 24 hour(s))   Basic Metabolic Panel    Collection Time: 12/07/21  6:46 PM   Result Value Ref Range    Glucose 84 70 - 99 mg/dL    BUN 35 (H) 6 - 20 mg/dL    CREATININE 1.99 (H) 0.70 - 1.20 mg/dL    Bun/Cre Ratio 18 9 - 20    Calcium 8.3 (L) 8.6 - 10.4 mg/dL    Sodium 137 135 - 144 mmol/L    Potassium 3.9 3.7 - 5.3 mmol/L    Chloride 107 98 - 107 mmol/L    CO2 21 20 - 31 mmol/L    Anion Gap 9 9 - 17 mmol/L    GFR Non-African American 35 (L) >60 mL/min    GFR  42 (L) >60 mL/min    GFR Comment          GFR Staging NOT REPORTED    Brain Natriuretic Peptide    Collection Time: 12/07/21  6:46 PM   Result Value Ref Range    Pro-BNP 22,281 (H) <300 pg/mL    BNP Interpretation NOT REPORTED    D-Dimer, Quantitative    Collection Time: 12/07/21  6:46 PM   Result Value Ref Range    D-Dimer, Quant 1.19 (H) 0.00 - 0.59 mg/L FEU   Ferritin    Collection Time: 12/07/21  6:46 PM   Result Value Ref Range    Ferritin 700 (H) 30 - 400 ug/L   Fibrinogen    Collection Time: 12/07/21  6:46 PM   Result Value Ref Range    Fibrinogen 438 179 - 518 mg/dL   Lactic Acid    Collection Time: 12/07/21  6:46 PM   Result Value Ref Range    Lactic Acid 1.0 0.5 - 2.2 mmol/L   Lactate Dehydrogenase    Collection Time: 12/07/21  6:46 PM   Result Value Ref Range     (H) 135 - 225 U/L   Troponin    Collection Time: 12/07/21  6:46 PM   Result Value Ref Range    Troponin, High Sensitivity 63 (HH) 0 - 22 ng/L    Troponin T NOT REPORTED <0.03 ng/mL    Troponin Interp NOT REPORTED    Procalcitonin    Collection Time: 12/07/21  6:53 PM   Result Value Ref Range    Procalcitonin 0.09 (H) <0.09 ng/mL   Troponin    Collection Time: 12/07/21  9:05 PM   Result Value Ref Range    Troponin, High Sensitivity 69 (HH) 0 - 22 ng/L    Troponin T NOT REPORTED <0.03 ng/mL    Troponin Interp NOT REPORTED    COVID-19, Rapid    Collection Time: 12/08/21 12:45 AM    Specimen: Nasopharyngeal Swab   Result Value Ref Range    Specimen Description . NASOPHARYNGEAL SWAB     SARS-CoV-2, Rapid DETECTED (A) Not Detected   Troponin    Collection Time: 12/08/21  2:45 AM   Result Value Ref Range    Troponin, High Sensitivity 62 (HH) 0 - 22 ng/L    Troponin T NOT REPORTED <0.03 ng/mL    Troponin Interp NOT REPORTED    Basic Metabolic Panel    Collection Time: 12/08/21  8:18 AM   Result Value Ref Range    Glucose 177 (H) 70 - 99 mg/dL    BUN 37 (H) 6 - 20 mg/dL    CREATININE 2.26 (H) 0.70 - 1.20 mg/dL    Bun/Cre Ratio 16 9 - 20    Calcium 8.3 (L) 8.6 - 10.4 mg/dL    Sodium 138 135 - 144 mmol/L    Potassium 4.4 3.7 - 5.3 mmol/L    Chloride 107 98 - 107 mmol/L    CO2 19 (L) 20 - 31 mmol/L    Anion Gap 12 9 - 17 mmol/L    GFR Non-African American 30 (L) >60 mL/min    GFR  37 (L) >60 mL/min    GFR Comment          GFR Staging NOT REPORTED    MAGNESIUM    Collection Time: 12/08/21  8:18 AM   Result Value Ref Range    Magnesium 1.8 1.6 - 2.6 mg/dL   Troponin    Collection Time: 12/08/21  8:18 AM   Result Value Ref Range    Troponin, High Sensitivity 41 (H) 0 - 22 ng/L    Troponin T NOT REPORTED <0.03 ng/mL    Troponin Interp NOT REPORTED        Imaging/Diagnostics:  NM LUNG SCAN PERFUSION ONLY    Result Date: 12/7/2021  Low Probability for Pulmonary Embolus. XR CHEST 1 VIEW    Result Date: 12/7/2021  No prior study available. No definite pneumonia. Slight blunting left CP angle. Cardiomegaly; venous hypertension without radiographic CHF. US RETROPERITONEAL COMPLETE    Result Date: 12/8/2021  1. Nonvisualization of the right kidney. 2. Mild-to-moderate left-sided hydronephrosis. 3. Exam limited due to patient's bowel gas and body habitus. 4. Nonvisualization of ureteral jets. Urinary bladder grossly unremarkable. Assessment :      Hospital Problems           Last Modified POA    * (Principal) NSTEMI (non-ST elevated myocardial infarction) ([address] 12/8/2021 Yes    JACKSON (acute kidney injury) ([address] 12/8/2021 Yes    Elevated troponin 12/8/2021 Yes    AICD (automatic cardioverter/defibrillator) present 12/8/2021 Yes    History of MI (myocardial infarction) 12/8/2021 Yes    Elevated brain natriuretic peptide (BNP) level 12/8/2021 Yes    Chronic systolic heart failure ([address] 12/8/2021 Yes    Hx of CABG 12/8/2021 Yes    Stage 3 chronic kidney disease ([address] 12/8/2021 Yes    COVID-19 virus infection 12/8/2021 Yes          Plan:     Patient status inpatient in the Progressive Unit/Step down    Admit to [facility]  Patient [facility] and underwent JOYCE on 12/3/2021. EF was noted to be 25-30% with reduced right ventricle, mild-mod MV and TV. Elevated troponin - await cardiology evaluation. Stop Eliquis. Start therapeutic lovenox until cardiology evaluates. CAD s/p 2x CABG - Restart Plavix and Eliquis. Start lipitior. Continue carvedilol.   Morphine for pain control. Patient states that he is intolerant of Ranexa. HFrEF - JOYCE performed earlier this month. On BB. Can't do ace/arb due to CKD/solitary kidney. Would benefit from aldactone. Uncertain as to what his baseline EF is. JACKSON on CKD in the setting of a solitary kidney - Nephrology consult. Continue IVF, as I believe the patient is currently volume down as he has been homeless. Monitor respiratory status given his HFrEF. Check renal ultrasound, CK, uric acid. Recent COVID-19 infection - not hypoxic. Need to obtain records, as he may be beyond the 10 day window. Check CRP, ferritin, LDH for completeness. Maintain droplet plus isolation at present. No novel therapies to begin. Need to obtain records from [address] They are unable to be obtained in the \"care everywhere\" tab. Will call to have records faxed in AM.  Benadryl for itching  Complex case. Need records. Consultations:   IP CONSULT TO CARDIOLOGY  IP CONSULT TO HOSPITALIST  IP CONSULT TO CARDIOLOGY  IP CONSULT TO NEPHROLOGY    Patient is admitted as inpatient status because of co-morbidities listed above, severity of signs and symptoms as outlined, requirement for current medical therapies and most importantly because of direct risk to patient if care not provided in a hospital setting. Expected length of stay > 48 hours. Wili Cao DO  12/8/2021  6:36 PM    Copy sent to Dr. Jus Castellon primary care provider on file.

## 2021-12-08 NOTE — ED NOTES
Pt is calling out many times for medications. I am trying to calm pt down. No new orders.       Landen Kent RN  12/08/21 5240

## 2021-12-08 NOTE — ED NOTES
Pt educated on the need for IV fluids and monitor. He states \"I dont feel good\"  He is reporting chest, neck pain and \"rash\" which appears to be scabs that he is picking with his fingers. Message sent to jacobo Palomino.       Dax Keller RN  12/08/21 2855

## 2021-12-08 NOTE — ED NOTES
Pt is anxious and crying,  I notified him the doctor would be down but there is a lot of sick people. I asked him if there was anything I can do for him. I offered him a warm blanket, ice pack, or a drink.      Nicki Zambrano RN  12/08/21 2455

## 2021-12-08 NOTE — PROGRESS NOTES
The patient is a known patient to [facility] cardiology and was seen by them yesterday at [facility].  I asked that the consult please be changed to them. Thank you.     VERONICA Bacon - CNP

## 2021-12-09 ENCOUNTER — APPOINTMENT (OUTPATIENT)
Dept: CT IMAGING | Age: 56
DRG: 177 | End: 2021-12-09
Payer: MEDICARE

## 2021-12-09 PROBLEM — N13.30 HYDRONEPHROSIS OF LEFT KIDNEY: Status: ACTIVE | Noted: 2021-12-09

## 2021-12-09 PROBLEM — R33.8 ACUTE URINARY RETENTION: Status: ACTIVE | Noted: 2021-12-09

## 2021-12-09 PROBLEM — B19.20 HEPATITIS C: Status: ACTIVE | Noted: 2021-12-09

## 2021-12-09 LAB
ABSOLUTE EOS #: 0 K/UL (ref 0–0.4)
ABSOLUTE IMMATURE GRANULOCYTE: 0 K/UL (ref 0–0.3)
ABSOLUTE LYMPH #: 0.39 K/UL (ref 1–4.8)
ABSOLUTE MONO #: 0.34 K/UL (ref 0.2–0.8)
ALBUMIN SERPL-MCNC: 3.4 G/DL (ref 3.5–5.2)
ALBUMIN/GLOBULIN RATIO: ABNORMAL (ref 1–2.5)
ALP BLD-CCNC: 89 U/L (ref 40–129)
ALT SERPL-CCNC: 43 U/L (ref 5–41)
ANION GAP SERPL CALCULATED.3IONS-SCNC: 13 MMOL/L (ref 9–17)
AST SERPL-CCNC: 30 U/L
BASOPHILS # BLD: 0 %
BASOPHILS ABSOLUTE: 0 K/UL (ref 0–0.2)
BILIRUB SERPL-MCNC: 0.29 MG/DL (ref 0.3–1.2)
BNP INTERPRETATION: ABNORMAL
BUN BLDV-MCNC: 42 MG/DL (ref 6–20)
BUN/CREAT BLD: 18 (ref 9–20)
C-REACTIVE PROTEIN: 3.3 MG/L (ref 0–5)
CALCIUM SERPL-MCNC: 8.5 MG/DL (ref 8.6–10.4)
CHLORIDE BLD-SCNC: 103 MMOL/L (ref 98–107)
CHOLESTEROL/HDL RATIO: 3.4
CHOLESTEROL: 161 MG/DL
CO2: 21 MMOL/L (ref 20–31)
CREAT SERPL-MCNC: 2.29 MG/DL (ref 0.7–1.2)
CREATININE URINE: 95.6 MG/DL (ref 39–259)
DIFFERENTIAL TYPE: ABNORMAL
EKG ATRIAL RATE: 75 BPM
EKG Q-T INTERVAL: 460 MS
EKG QRS DURATION: 172 MS
EKG QTC CALCULATION (BAZETT): 534 MS
EKG R AXIS: 172 DEGREES
EKG T AXIS: 16 DEGREES
EKG VENTRICULAR RATE: 81 BPM
EOSINOPHILS RELATIVE PERCENT: 0 % (ref 1–4)
FREE KAPPA/LAMBDA RATIO: 1.53 (ref 0.26–1.65)
GFR AFRICAN AMERICAN: 36 ML/MIN
GFR NON-AFRICAN AMERICAN: 30 ML/MIN
GFR SERPL CREATININE-BSD FRML MDRD: ABNORMAL ML/MIN/{1.73_M2}
GFR SERPL CREATININE-BSD FRML MDRD: ABNORMAL ML/MIN/{1.73_M2}
GLUCOSE BLD-MCNC: 107 MG/DL (ref 70–99)
HAV IGM SER IA-ACNC: NONREACTIVE
HCT VFR BLD CALC: 34.7 % (ref 40.7–50.3)
HDLC SERPL-MCNC: 48 MG/DL
HEMOGLOBIN: 10.9 G/DL (ref 13–17)
HEPATITIS B CORE IGM ANTIBODY: NONREACTIVE
HEPATITIS B SURFACE ANTIGEN: NONREACTIVE
HEPATITIS C ANTIBODY: REACTIVE
IMMATURE GRANULOCYTES: 0 %
INR BLD: 1.1
KAPPA FREE LIGHT CHAINS QNT: 8.68 MG/DL (ref 0.37–1.94)
LAMBDA FREE LIGHT CHAINS QNT: 5.66 MG/DL (ref 0.57–2.63)
LDL CHOLESTEROL: 96 MG/DL (ref 0–130)
LYMPHOCYTES # BLD: 8 % (ref 24–44)
MCH RBC QN AUTO: 31 PG (ref 25.2–33.5)
MCHC RBC AUTO-ENTMCNC: 31.4 G/DL (ref 28.4–34.8)
MCV RBC AUTO: 98.6 FL (ref 82.6–102.9)
MONOCYTES # BLD: 7 % (ref 1–7)
NRBC AUTOMATED: 0 PER 100 WBC
PDW BLD-RTO: 16.5 % (ref 11.8–14.4)
PLATELET # BLD: 185 K/UL (ref 138–453)
PLATELET ESTIMATE: ABNORMAL
PMV BLD AUTO: 10.3 FL (ref 8.1–13.5)
POTASSIUM SERPL-SCNC: 4.2 MMOL/L (ref 3.7–5.3)
PRO-BNP: ABNORMAL PG/ML
PROTHROMBIN TIME: 14.3 SEC (ref 11.5–14.2)
RBC # BLD: 3.52 M/UL (ref 4.21–5.77)
RBC # BLD: ABNORMAL 10*6/UL
SEG NEUTROPHILS: 85 % (ref 36–66)
SEGMENTED NEUTROPHILS ABSOLUTE COUNT: 4.17 K/UL (ref 1.8–7.7)
SODIUM BLD-SCNC: 137 MMOL/L (ref 135–144)
SODIUM,UR: <20 MMOL/L
TOTAL PROTEIN, URINE: 100 MG/DL
TOTAL PROTEIN: 6.3 G/DL (ref 6.4–8.3)
TRIGL SERPL-MCNC: 83 MG/DL
VLDLC SERPL CALC-MCNC: NORMAL MG/DL (ref 1–30)
WBC # BLD: 4.9 K/UL (ref 3.5–11.3)
WBC # BLD: ABNORMAL 10*3/UL

## 2021-12-09 PROCEDURE — 6370000000 HC RX 637 (ALT 250 FOR IP): Performed by: NURSE PRACTITIONER

## 2021-12-09 PROCEDURE — 6370000000 HC RX 637 (ALT 250 FOR IP): Performed by: INTERNAL MEDICINE

## 2021-12-09 PROCEDURE — 86140 C-REACTIVE PROTEIN: CPT

## 2021-12-09 PROCEDURE — 96375 TX/PRO/DX INJ NEW DRUG ADDON: CPT

## 2021-12-09 PROCEDURE — 93005 ELECTROCARDIOGRAM TRACING: CPT | Performed by: NURSE PRACTITIONER

## 2021-12-09 PROCEDURE — 51798 US URINE CAPACITY MEASURE: CPT

## 2021-12-09 PROCEDURE — 86334 IMMUNOFIX E-PHORESIS SERUM: CPT

## 2021-12-09 PROCEDURE — 6360000002 HC RX W HCPCS: Performed by: NURSE PRACTITIONER

## 2021-12-09 PROCEDURE — G0378 HOSPITAL OBSERVATION PER HR: HCPCS

## 2021-12-09 PROCEDURE — 83880 ASSAY OF NATRIURETIC PEPTIDE: CPT

## 2021-12-09 PROCEDURE — 6360000002 HC RX W HCPCS: Performed by: INTERNAL MEDICINE

## 2021-12-09 PROCEDURE — 99233 SBSQ HOSP IP/OBS HIGH 50: CPT | Performed by: INTERNAL MEDICINE

## 2021-12-09 PROCEDURE — 96372 THER/PROPH/DIAG INJ SC/IM: CPT

## 2021-12-09 PROCEDURE — 83883 ASSAY NEPHELOMETRY NOT SPEC: CPT

## 2021-12-09 PROCEDURE — 80061 LIPID PANEL: CPT

## 2021-12-09 PROCEDURE — 84156 ASSAY OF PROTEIN URINE: CPT

## 2021-12-09 PROCEDURE — 80074 ACUTE HEPATITIS PANEL: CPT

## 2021-12-09 PROCEDURE — 82570 ASSAY OF URINE CREATININE: CPT

## 2021-12-09 PROCEDURE — 36415 COLL VENOUS BLD VENIPUNCTURE: CPT

## 2021-12-09 PROCEDURE — 84300 ASSAY OF URINE SODIUM: CPT

## 2021-12-09 PROCEDURE — 85025 COMPLETE CBC W/AUTO DIFF WBC: CPT

## 2021-12-09 PROCEDURE — 96376 TX/PRO/DX INJ SAME DRUG ADON: CPT

## 2021-12-09 PROCEDURE — 85610 PROTHROMBIN TIME: CPT

## 2021-12-09 PROCEDURE — 80053 COMPREHEN METABOLIC PANEL: CPT

## 2021-12-09 PROCEDURE — 74176 CT ABD & PELVIS W/O CONTRAST: CPT

## 2021-12-09 RX ORDER — BENZOCAINE/MENTHOL 6 MG-10 MG
LOZENGE MUCOUS MEMBRANE 3 TIMES DAILY PRN
Status: DISCONTINUED | OUTPATIENT
Start: 2021-12-09 | End: 2021-12-19 | Stop reason: HOSPADM

## 2021-12-09 RX ORDER — FUROSEMIDE 10 MG/ML
40 INJECTION INTRAMUSCULAR; INTRAVENOUS 2 TIMES DAILY
Status: DISCONTINUED | OUTPATIENT
Start: 2021-12-09 | End: 2021-12-09

## 2021-12-09 RX ORDER — FUROSEMIDE 10 MG/ML
40 INJECTION INTRAMUSCULAR; INTRAVENOUS DAILY
Status: DISCONTINUED | OUTPATIENT
Start: 2021-12-09 | End: 2021-12-14

## 2021-12-09 RX ORDER — DIPHENHYDRAMINE HYDROCHLORIDE 50 MG/ML
25 INJECTION INTRAMUSCULAR; INTRAVENOUS EVERY 6 HOURS PRN
Status: DISCONTINUED | OUTPATIENT
Start: 2021-12-09 | End: 2021-12-18

## 2021-12-09 RX ADMIN — ENOXAPARIN SODIUM 80 MG: 100 INJECTION SUBCUTANEOUS at 08:39

## 2021-12-09 RX ADMIN — ENOXAPARIN SODIUM 80 MG: 100 INJECTION SUBCUTANEOUS at 20:30

## 2021-12-09 RX ADMIN — CARVEDILOL 3.12 MG: 3.12 TABLET, FILM COATED ORAL at 17:15

## 2021-12-09 RX ADMIN — DIPHENHYDRAMINE HCL 50 MG: 25 TABLET ORAL at 00:41

## 2021-12-09 RX ADMIN — ONDANSETRON 4 MG: 2 INJECTION INTRAMUSCULAR; INTRAVENOUS at 20:55

## 2021-12-09 RX ADMIN — MORPHINE SULFATE 4 MG: 4 INJECTION, SOLUTION INTRAMUSCULAR; INTRAVENOUS at 06:22

## 2021-12-09 RX ADMIN — CARVEDILOL 3.12 MG: 3.12 TABLET, FILM COATED ORAL at 08:39

## 2021-12-09 RX ADMIN — ATORVASTATIN CALCIUM 40 MG: 40 TABLET, FILM COATED ORAL at 20:29

## 2021-12-09 RX ADMIN — MORPHINE SULFATE 4 MG: 4 INJECTION, SOLUTION INTRAMUSCULAR; INTRAVENOUS at 10:53

## 2021-12-09 RX ADMIN — MORPHINE SULFATE 4 MG: 4 INJECTION, SOLUTION INTRAMUSCULAR; INTRAVENOUS at 02:00

## 2021-12-09 RX ADMIN — MORPHINE SULFATE 4 MG: 4 INJECTION, SOLUTION INTRAMUSCULAR; INTRAVENOUS at 20:29

## 2021-12-09 RX ADMIN — DIPHENHYDRAMINE HYDROCHLORIDE 25 MG: 50 INJECTION, SOLUTION INTRAMUSCULAR; INTRAVENOUS at 17:15

## 2021-12-09 RX ADMIN — MORPHINE SULFATE 4 MG: 4 INJECTION, SOLUTION INTRAMUSCULAR; INTRAVENOUS at 17:15

## 2021-12-09 RX ADMIN — MORPHINE SULFATE 4 MG: 4 INJECTION, SOLUTION INTRAMUSCULAR; INTRAVENOUS at 13:16

## 2021-12-09 RX ADMIN — FUROSEMIDE 40 MG: 10 INJECTION, SOLUTION INTRAMUSCULAR; INTRAVENOUS at 10:53

## 2021-12-09 RX ADMIN — CLOPIDOGREL BISULFATE 75 MG: 75 TABLET ORAL at 08:39

## 2021-12-09 RX ADMIN — MORPHINE SULFATE 4 MG: 4 INJECTION, SOLUTION INTRAMUSCULAR; INTRAVENOUS at 23:40

## 2021-12-09 RX ADMIN — MORPHINE SULFATE 4 MG: 4 INJECTION, SOLUTION INTRAMUSCULAR; INTRAVENOUS at 15:36

## 2021-12-09 RX ADMIN — TAMSULOSIN HYDROCHLORIDE 0.4 MG: 0.4 CAPSULE ORAL at 08:39

## 2021-12-09 RX ADMIN — MORPHINE SULFATE 4 MG: 4 INJECTION, SOLUTION INTRAMUSCULAR; INTRAVENOUS at 08:39

## 2021-12-09 ASSESSMENT — PAIN SCALES - GENERAL
PAINLEVEL_OUTOF10: 9
PAINLEVEL_OUTOF10: 10
PAINLEVEL_OUTOF10: 10
PAINLEVEL_OUTOF10: 8
PAINLEVEL_OUTOF10: 9
PAINLEVEL_OUTOF10: 8
PAINLEVEL_OUTOF10: 9
PAINLEVEL_OUTOF10: 8
PAINLEVEL_OUTOF10: 9

## 2021-12-09 ASSESSMENT — PAIN DESCRIPTION - DESCRIPTORS
DESCRIPTORS: PRESSURE
DESCRIPTORS: PRESSURE

## 2021-12-09 ASSESSMENT — PAIN DESCRIPTION - ORIENTATION
ORIENTATION: LEFT

## 2021-12-09 NOTE — CARE COORDINATION
Case Management Initial Discharge Plan  Antonio Vega,         Readmission Risk              Risk of Unplanned Readmission:  0             Met with:patient to discuss discharge plans. Information verified: address, contacts, phone number, , insurance Yes  PCP: No primary care provider on file. Date of last visit: na     Insurance Provider: Aliza Sidhu and medicaid [place]     Discharge Planning  Current Residence:  Homeless   Living Arrangements:  Alone       Patient is homeless   Support Systems:  None       Current Services PTA:  None   Agency: none      Patient able to perform ADL's:Independent  DME in home:  None   DME used to aid ambulation prior to admission:   None   DME used during admission:  None     Potential Assistance Needed:  Other (Comment) (housing)    Pharmacy: none    Potential Assistance Purchasing Medications:  Yes  Does patient want to participate in local refill/ meds to beds program?  No    Patient agreeable to home care: No  [place] of choice provided:  n/a      Type of Home Care Services:  None  Patient expects to be discharged to:   home     Prior SNF/Rehab Placement and Facility: none   Agreeable to SNF/Rehab: No  [place] of choice provided: n/a   Evaluation: no    Expected Discharge date:       Follow Up Appointment: Best Day/ Time:   any     Transportation provider: unsure   Transportation arrangements needed for discharge: Yes    Discharge Plan:   Met with patient to complete a discharge plan of care. Patient is homeless. Was staying at [facility]. Phone is [phone]. He stated his belongings are still there. The  stated they would hold him. He can no longer afford the motel itself. Discussed 211. Medicaid ambulette back to motel vs shelter guide here. He became quite angry with writer stating he is not going back to shelter. He does not want to go to motel to collect his belongings and expects them to be delivered to [facility].  Did offer to call brenda from room ( have  connect to [facility] ) but he refused that also. Unsure what final plan will be as patient is unwilling to discuss his situation. Explained avoiding the plan of care is not the answer. Offered again 80 and social work to come see patient and he again declined.  Will let patient rest for today and attempt to see again tomorrow to assist.      Electronically signed by Aldair Soto RN on 12/9/21 at 4:50 PM EST

## 2021-12-09 NOTE — PROGRESS NOTES
Transitions of Care Pharmacy Service   Medication Review    The patient's list of current home medications has been reviewed. Source(s) of information: patient    Based on information provided by the above source(s), no changes to the patient's home medication list were necessary. Other Notes Patient medication list updated per patient - unable to verify strengths with a pharmacy. Patient states he has been in and out of hospitals for the past few months in multiple states and usually fills his medications at the hospital before discharge, unable to state which hospital he was most recently at            Please feel free to call me with any questions about this encounter. Thank you. This note will be reviewed and co-signed by the [facility] Pharmacist. The pharmacist will review inpatient orders and contact the physician about any discrepancies. Rebekah Terry, pharmacy technician  [facility]  Phone:  [phone]  Fax: [phone]      Electronically signed by Rebekah Terry on 12/9/2021 at 3:26 PM       Prior to Admission medications    Medication Sig Start Date End Date Taking?  Authorizing Provider   clopidogrel (PLAVIX) 75 MG tablet Take 1 tablet by mouth daily       atorvastatin (LIPITOR) 40 MG tablet Take 1 tablet by mouth daily       tamsulosin (FLOMAX) 0.4 MG capsule Take 0.4 mg by mouth daily       apixaban (ELIQUIS) 5 MG TABS tablet Take 5 mg by mouth 2 times daily       furosemide (LASIX) 20 MG tablet Take 20 mg by mouth as needed (swelling)        amLODIPine (NORVASC) 10 MG tablet Take 0.5 tablets by mouth daily       carvedilol (COREG) 6.25 MG tablet Take 1 tablet by mouth 2 times daily

## 2021-12-09 NOTE — CONSULTS
Grzegorz Dyson  Urology Consultation    Patient:  Rocio Avalos  MRN: [id number]  YOB: 1965    CHIEF COMPLAINT:  Urinary retention, hydronephrosis    HISTORY OF PRESENT ILLNESS:   The patient is a 63 y.o. male who presents with Chest pain and shortness of breath, patient wasn't [facility] for 10 days and he left AGAINST MEDICAL ADVICE  The patient is being managed for severe chest pain and shortness of breath, now Covid +2 days ago documented  CT imaging demonstrates hydronephrosis without obstructing calculus    Bladder scan demonstrates a high postvoid residual greater than 300. The patient has a complicated urologic history, he previously was under the care for urology at [facility], and he has had multiple procedures. He is very adamant against any intervention or attempt at inserting a catheter or cystoscopy or stent placement    The patient has refused Card catheter  Patient's old records, notes and chart reviewed and summarized above. Past Medical History:    Past Medical History:   Diagnosis Date    AICD (automatic cardioverter/defibrillator) present     CKD (chronic kidney disease), stage III (HCC)     [facility] infarction ([facility]     S/P CABG (coronary artery bypass graft)     x2 separate occasions    Solitary kidney, congenital        Past Surgical History:    Past Surgical History:   Procedure Laterality Date    CORONARY ARTERY BYPASS GRAFT      CORONARY ARTERY BYPASS GRAFT       Previous  surgery:  All records at [facility] in [facility]     Medications:    Scheduled Meds:   furosemide  40 mg IntraVENous Daily    atorvastatin  40 mg Oral Nightly    carvedilol  3.125 mg Oral BID WC    clopidogrel  75 mg Oral Daily    [Held by provider] apixaban  5 mg Oral BID    enoxaparin  1 mg/kg SubCUTAneous BID    tamsulosin  0.4 mg Oral Daily     Continuous Infusions:  PRN Meds:.hydrocortisone-aloe, ondansetron **OR** ondansetron, magnesium hydroxide, potassium chloride **OR** potassium alternative oral replacement **OR** potassium chloride, potassium chloride, magnesium sulfate, nitroGLYCERIN, perflutren lipid microspheres, morphine **OR** morphine, diphenhydrAMINE    Allergies:  Asa [aspirin], Nsaids, and Oxycodone    Social History:    Social History     Socioeconomic History    Marital status: Single     Spouse name: Not on file    Number of children: Not on file    Years of education: Not on file    Highest education level: Not on file   Occupational History    Not on file   Tobacco Use    Smoking status: Current Every Day Smoker     Packs/day: 0.25    Smokeless tobacco: Never Used   Substance and Sexual Activity    Alcohol use: Not Currently     Comment: rarely    Drug use: Not Currently     Comment: not in over 27 years    Sexual activity: Not on file   Other Topics Concern    Not on file   Social History Narrative    Not on file     Social Determinants of Health     Financial Resource Strain:     Difficulty of Paying Living Expenses: Not on file   Food Insecurity:     Worried About Running Out of Food in the Last Year: Not on file    [place] of Food in the Last Year: Not on file   Transportation Needs:     Lack of Transportation (Medical): Not on file    Lack of Transportation (Non-Medical):  Not on file   Physical Activity:     Days of Exercise per Week: Not on file    Minutes of Exercise per Session: Not on file   Stress:     Feeling of Stress : Not on file   Social Connections:     Frequency of Communication with Friends and Family: Not on file    Frequency of Social Gatherings with Friends and Family: Not on file    Attends Christian Services: Not on file    Active Member of Clubs or Organizations: Not on file    Attends Club or Organization Meetings: Not on file    Marital Status: Not on file   Intimate Partner Violence:     Fear of Current or Ex-Partner: Not on file    Emotionally Abused: Not on file   Janki Bland Physically Abused: Not on file    Sexually Abused: Not on file   Housing Stability:     Unable to Pay for Housing in the Last Year: Not on file    Number of Places Lived in the Last Year: Not on file    Unstable Housing in the Last Year: Not on file       Family History:    Family History   Problem Relation Age of Onset    Cancer Mother     Diabetes Father     Heart Disease Father     Diabetes Paternal Grandmother      Previous Urologic Family history: none    REVIEW OF SYSTEMS:  Constitutional: negative  Eyes: negative  Respiratory: COVID-19 positive  Cardiovascular: negative  Gastrointestinal: negative  Genitourinary: see HPI  Musculoskeletal: negative  Skin: negative   Neurological: negative  Hematological/Lymphatic: negative  Psychological: negative    Physical Exam:    This a 63 y.o. male   Patient Vitals for the past 24 hrs:   BP Temp Temp src Pulse Resp SpO2   12/09/21 1139 (!) 138/90 98.2 °F (36.8 °C) Oral 72 18 97 %   12/09/21 0729 (!) 140/85 98.1 °F (36.7 °C) Oral 87 18 96 %   12/09/21 0345 (!) 161/102 97.9 °F (36.6 °C) -- 73 18 93 %   12/09/21 0009 (!) 147/121 98.1 °F (36.7 °C) Oral 87 14 96 %   12/08/21 1426 (!) 153/102 -- -- -- -- --     Constitutional: Patient in no acute distress; Neuro: alert and oriented to person place and time. Psych: Mood and affect normal.  Skin: Normal  Lungs: Respiratory effort normal  Cardiovascular:  Normal peripheral pulses  Abdomen: Soft, non-tender, non-distended with no CVA, flank pain, hepatosplenomegaly or hernia. Kidneys normal.  Bladder non-tender and not distended.   Lymphatics: no palpable lymphadenopathy  Penis normal and circumcised  Urethral meatus normal  Scrotal exam normal  Bladder scan revealed 300 mL residual patient refused Card catheter  LABS:  Recent Labs     12/09/21  0557   WBC 4.9   HGB 10.9*   HCT 34.7*   MCV 98.6        Recent Labs     12/07/21  1846 12/08/21  0818 12/09/21  0557    138 137   K 3.9 4.4 4.2    107 103 CO2 21 19* 21   BUN 35* 37* 42*   CREATININE 1.99* 2.26* 2.29*     No results found for: PSA    Additional Lab/culture results:    Urinalysis: No results for input(s): COLORU, PHUR, LABCAST, WBCUA, RBCUA, MUCUS, TRICHOMONAS, YEAST, BACTERIA, CLARITYU, SPECGRAV, LEUKOCYTESUR, UROBILINOGEN, BILIRUBINUR, BLOODU in the last 72 hours. Invalid input(s): NITRATE, GLUCOSEUKETONESUAMORPHOUS     -----------------------------------------------------------------  Imaging Results:    Assessment and Plan   Impression: Hydronephrosis, urinary retention, patient refusing any urologic intervention at this time  Patient Active Problem List   Diagnosis    [facility]    Other chest pain    AICD (automatic cardioverter/defibrillator) present    History of MI (myocardial infarction)    Elevated brain natriuretic peptide (BNP) level    Elevated troponin    [facility]STEM[facility] (non-ST elevated [facility] infarction) [facility]    Chronic systolic heart failure (HCC)    Hx of CABG    Stage 3 chronic kidney disease ([facility]    COVID-19 virus infection       Plan:  We clearly explained to the patient our concern about the findings on the CT scan and we strongly advised him to seek a follow-up with his urologist upon discharge    Natan Ramsey MD  12:57 PM 12/9/2021

## 2021-12-09 NOTE — CONSULTS
NEPHROLOGY     REASON FOR CONSULT:     JACKSON (BUN/Creat 2.29           with baseline creatinine   unknown             )    Risk Factors for JACKSON:  Left hydronephrosis and solitary kidney  CHF  Underlying CKD stage III    HISTORY OF PRESENTING ILLNESS                 This is a 63 y.o. male who presented to [facility] on seventh December for evaluation of chest pain and shortness of breath. Patient also reports not feeling well. Patient states that he left [facility] approximately 3 days ago after being treated poorly. He was admitted with COVID-19 positivity and was placed on isolation. At the staff was not treating him right he decided to leave [address] The patient is homeless and bounces between hotels. After he left the hospital he went to the mall when the security noted that he looked unwell. Complained of chest pain as well as left flank pain and shortness of breath. Patient has a history of CABG in the past with ejection ration 25%. He reported no history of fever cough or hemoptysis. Initial assessment in the ER showed stable vitals. Investigations revealed elevated creatinine/troponin. Covid testing was positive. He was placed in isolation. Nephrology was consultation. Patient baseline creatinine is unknown. He has congenital absence of right kidney. Ultrasound on this admission shows left hydronephrosis. Patient stated that he follows up with urologist at [facility] and had multiple surgical procedures to his ureter. Details unknown. No prior history of prostate problem or cancers. Urine output decent does not have any Card catheter in place.   No history of contrast exposure  No history of hypotension  No history of NSAID/ACE/ARB/nephrotoxic agents  No history suggestive of obstruction  No history of nausea/vomiting/diarrhoea  Present history of JACKSON in past  No history of recurrent UTI infection          PAST MEDICAL HISTORY Diagnosis Date    AICD (automatic cardioverter/defibrillator) present     CKD (chronic kidney disease), stage III (HCC)     [facility] ([facility]     S/P CABG (coronary artery bypass graft)     x2 separate occasions    Solitary kidney, congenital        PAST SURGICAL HISTORY          Procedure Laterality Date    CORONARY ARTERY BYPASS GRAFT      CORONARY ARTERY BYPASS GRAFT         MEDICATIONS     Home Meds:                Medications Prior to Admission: tamsulosin (FLOMAX) 0.4 MG capsule, Take 0.4 mg by mouth daily  apixaban (ELIQUIS) 5 MG TABS tablet, Take 5 mg by mouth 2 times daily  furosemide (LASIX) 20 MG tablet, Take 20 mg by mouth 2 times daily  amLODIPine (NORVASC) 10 MG tablet, Take 0.5 tablets by mouth daily  clopidogrel (PLAVIX) 75 MG tablet, Take 1 tablet by mouth daily  atorvastatin (LIPITOR) 40 MG tablet, Take 1 tablet by mouth daily  carvedilol (COREG) 6.25 MG tablet, Take 1 tablet by mouth 2 times daily  Scheduled Meds:    furosemide  40 mg IntraVENous Daily    atorvastatin  40 mg Oral Nightly    carvedilol  3.125 mg Oral BID WC    clopidogrel  75 mg Oral Daily    [Held by provider] apixaban  5 mg Oral BID    enoxaparin  1 mg/kg SubCUTAneous BID    tamsulosin  0.4 mg Oral Daily     Continuous Infusions:   PRN Meds:  hydrocortisone-aloe, ondansetron **OR** ondansetron, magnesium hydroxide, potassium chloride **OR** potassium alternative oral replacement **OR** potassium chloride, potassium chloride, magnesium sulfate, nitroGLYCERIN, perflutren lipid microspheres, morphine **OR** morphine, diphenhydrAMINE    ALLERGY     Asa [aspirin], Nsaids, and Oxycodone       SOCIAL HISTORY     Social History     Socioeconomic History    Marital status: Single     Spouse name: Not on file    Number of children: Not on file    Years of education: Not on file    Highest education level: Not on file   Occupational History    Not on file   Tobacco Use    Smoking status: Current Every Day Smoker     Packs/day: 0.25    Smokeless tobacco: Never Used   Substance and Sexual Activity    Alcohol use: Not Currently     Comment: rarely    Drug use: Not Currently     Comment: not in over 27 years    Sexual activity: Not on file   Other Topics Concern    Not on file   Social History Narrative    Not on file     Social Determinants of Health     Financial Resource Strain:     Difficulty of Paying Living Expenses: Not on file   Food Insecurity:     Worried About Running Out of Food in the Last Year: Not on file    [place] of Food in the Last Year: Not on file   Transportation Needs:     Lack of Transportation (Medical): Not on file    Lack of Transportation (Non-Medical):  Not on file   Physical Activity:     Days of Exercise per Week: Not on file    Minutes of Exercise per Session: Not on file   Stress:     Feeling of Stress : Not on file   Social Connections:     Frequency of Communication with Friends and Family: Not on file    Frequency of Social Gatherings with Friends and Family: Not on file    Attends Episcopalian Services: Not on file    Active Member of [address] [place] [address] or Organizations: Not on file    Attends Club or Organization Meetings: Not on file    Marital Status: Not on file   Intimate Partner Violence:     Fear of Current or Ex-Partner: Not on file    Emotionally Abused: Not on file    Physically Abused: Not on file    Sexually Abused: Not on file   Housing Stability:     Unable to Pay for Housing in the Last Year: Not on file    Number of [place] in the Last Year: Not on file    Unstable Housing in the Last Year: Not on file       FAMILY HISTORY     Family History   Problem Relation Age of Onset    Cancer Mother     Diabetes Father     Heart Disease Father     Diabetes Paternal Grandmother           REVIEW OF SYSTEM     Constitutional: No asthenia/weight loss/anorexia    HEENT : No epistaxis/visual blurriness/rhinorrhoea/sorethroat/trauma  Cardiovascular: Present chest pain//SOB  Respiratory: No cough/fever/SOB/Wheezing    Gastrointestinal: No abdominal pain/nausea/vomiting/diarrhoea/constipation  Genitourinary: No dysuria/pyuria/hematuria/incomplete emptying of bladder  Musculoskeletal:  No gait disturbance/weakness or joint complaints  Integumentary: No rash or pruritis. Neurological: No headache/diplopia/change in muscle strength/numbness or tingling. No change in gait, balance, coordination, mood, affect, memory, mentation, behavior. Psychiatric: No anxiety/depression. Endocrine: No temperature intolerance. No excessive thirst, fluid intake, or urination. No tremor. Hematologic/Lymphatic: No abnormal bruising or bleeding, blood clots or swolle lymph nodes. Allergic/Immunologic: No nasal congestion or hives      PHYSICAL EXAM     Vitals:    12/08/21 1426 12/09/21 0009 12/09/21 0345 12/09/21 0729   BP: (!) 153/102 (!) 147/121 (!) 161/102 (!) 140/85   Pulse:  87 73 87   Resp:  14 18 18   Temp:  98.1 °F (36.7 °C) 97.9 °F (36.6 °C) 98.1 °F (36.7 °C)   TempSrc:  Oral  Oral   SpO2:  96% 93% 96%   Weight:       Height:         24HR INTAKE/OUTPUT:      Intake/Output Summary (Last 24 hours) at 12/9/2021 0848  Last data filed at 12/9/2021 0537  Gross per 24 hour   Intake --   Output 300 ml   Net -300 ml       General appearance:Awake, alert, in no acute distress  Skin: warm and dry, no rash or erythema  Eyes: conjunctivae normal and sclera anicteric  ENT: no thrush no pharyngeal congestion  orodental hygiene   Neck: No JVD, Lymphadenopathty or thyromegaly  Respiratory: vesicular breath sounds, reduced air entry  Cardiovascular: S1 S2 normal,no gallop or organic murmur. No carotid bruit  Abdomen:Non tender/non distended. Bowel sounds present  Extremities: No Cyanosis or Clubbing, present lower extremity edema  Neurological:Alert and oriented. No abnormalities of mood, affect, memory, mentation, or behavior are noted    INVESTIGATIONS      CBC:   Recent Labs     12/09/21  0557   WBC 4.9 RBC 3.52*   HGB 10.9*   HCT 34.7*   MCV 98.6   MCH 31.0   MCHC 31.4   RDW 16.5*      MPV 10.3      BMP:   Recent Labs     12/07/21  1846 12/08/21  0818 12/09/21  0557    138 137   K 3.9 4.4 4.2    107 103   CO2 21 19* 21   BUN 35* 37* 42*   CREATININE 1.99* 2.26* 2.29*   GLUCOSE 84 177* 107*   CALCIUM 8.3* 8.3* 8.5*        Phosphorus:  No results for input(s): PHOS in the last 72 hours. Magnesium:   Recent Labs     12/08/21  0818   MG 1.8     Albumin:   Recent Labs     12/09/21  0557   LABALBU 3.4*       Radiology:  Reviewed as available. ASSESSMENT     #1 acute kidney injury nonoliguric secondary to unilateral hydronephrosis left in a solitary kidney completed further by decompensated systolic congestive heart failure -plateau  Creatinine peaked to 2.29  #2 CKD stage III. Left solitary kidney because of congenital absence of right kidney. Baseline creatinine yet to be established. #3 Admitted with chest pain/shortness of breath secondary to NSTEMI/CHF  #4 COVID-19 infection with no pneumonia or hypoxemia  #5 left hydronephrosis. Sees urologist at [facility] states that he had multiple surgeries to his left ureter  #6 history of CABG with ejection 25 to 30%  #7 history of hypertension     PLAN:     #1 fluid/salt restriction  #2 IV Lasix daily  #3 urology consultation  #4 check postvoid residual.  Card insertion at the discretion of urologist  #5 urine studies/serologies as ordered  #6 we will procure records from [facility]       Thank you for the consultation. Please do not hesitate to call with questions. This note is created with the assistance of a speech-recognition program. While intending to generate a document that actually reflects the content of the visit, no guarantees can be provided that every mistake has been identified and corrected by editing.     Tamara Larkin MD MD, [facility] Jean[facility] Vizcarra, FACP   12/9/2021 8:48 AM    [facility]

## 2021-12-09 NOTE — PROGRESS NOTES
[facility]  Office: [address], DO, Nick Paulson, DO, Aubree Cedeno, DO, Dwight Piedra, DO, Nicci Ivey MD, Nataly Bennett MD, Yolie Lobo MD, Aguila Mckinley MD, Sukhdev Patiño MD, Vida Demarco MD, Lynda Chua MD, Kamala Trujillo, DO, Griselda Wolfe, DO, Dennis Ellis MD,  Meli Boyle DO, Haritha Tanner MD, Brayan Tiwari MD, Albertina Simpson MD, Kaylynn Dixon MD, Brittney Watt MD, Raissa Prater MD, Leonela Leon MD, Arlene Magallon, [facility], [facility], [facility], Jaylyn Reyes, [facility], Wood Lott, CNS, Juan Antonio Logan, CNP, Johana Levin, CNP, Danna Lynne, CNP, Darien Ramsay, CNP, Chan Branch, CNP, Tracy Peralta PA-C, Jean-Claude Fu, [facility], Neo Huerta, CNP, Maria Luisa Fernandes, CNP, Martha Tovar, CNP, Pat Blevins, CNP, Alexa Wise, CNP, Hi Andres, [facility], Juan David Britton [id number]    Progress Note    12/9/2021    4:22 PM    Name:   Gabriel Ohara  MRN:     [id number]     Acct:      [de-identified]   Room:   [address] [place] [address] Day:  0  Admit Date:  12/7/2021  3:24 PM    PCP:   No primary care provider on file. Code Status:  Full Code    Subjective:     C/C:   Chief Complaint   Patient presents with    Chest Pain     pt reports onset at least 1 week ago. has been admitted to [facility] for past 10 days, left AMA from there this afternoon.  Shortness of Breath     ongoing and unchanged x past 4-5 days. Interval History Status: improved. Patient seen and examined this afternoon. No acute events overnight. Chest pain is improving. States that he has an intolerance to Isordil as well secondary to headache. States he follow-up with a cardiologist in [facility]. Having elevated postvoid residuals. Admits to orthopnea. Brief History:     Gabriel Ohara is a 63 y.o.  gentleman who presented to [place] [address] on 12/7/2021 for evaluation of chest pain and feeling unwell.   Patient reports that he left [facility] approximately 3 days ago after being treated poorly. He states he was discovered to be COVID-19 positive during that time and was placed in isolation. He states that staff is treating him poorly. He decided to leave [address] It appears that he was therefore chest pain and feeling unwell as well. He is homeless and has been bouncing between hotels. However, he cannot afford this. He states that he went to the mall where security noted that he looked unwell. He was subsequently transferred to our facility for further evaluation and treatment. He states that he has a history of coronary artery disease status post CABG (twice - 2011, 2018), prior CVA, solitary kidney, and BPH. He notes he is on carvedilol, Eliquis, Plavix, Flomax. He states that he has not taken his medications in about 3 days. He states that he is having a significant bout of chest pain which is centrally located on his chest.  He states that this is constant. Relieved with nitro but abruptly returns. He is not requiring supplemental oxygen. He is very anxious on exam and reports that he is significantly itchy as well. He is requesting IV Benadryl and IV narcotics. He is very fixated on receiving IV narcotics. Troponin noted to be elevated in the emergency department assess his creatinine. He notes that his baseline creatinine is 1.4. He will be admitted for further work-up and treatment of elevated troponin along with JACKSON.     Review of Systems:     Constitutional:  negative for chills, fevers, sweats  Respiratory:  negative for cough, dyspnea on exertion, shortness of breath, wheezing  Cardiovascular: Reports orthopnea; negative for chest pain, chest pressure/discomfort, lower extremity edema, palpitations  Gastrointestinal:  negative for abdominal pain, constipation, diarrhea, nausea, vomiting  Neurological:  negative for dizziness, headache  Integument: Reports slight improvement of itching    Medications: Allergies: Allergies   Allergen Reactions    Nsaids Swelling    Asa [Aspirin] Swelling    Oxycodone Hives       Current Meds:   Scheduled Meds:    furosemide  40 mg IntraVENous Daily    atorvastatin  40 mg Oral Nightly    carvedilol  3.125 mg Oral BID WC    clopidogrel  75 mg Oral Daily    [Held by provider] apixaban  5 mg Oral BID    enoxaparin  1 mg/kg SubCUTAneous BID    tamsulosin  0.4 mg Oral Daily     Continuous Infusions:   PRN Meds: hydrocortisone-aloe, ondansetron **OR** ondansetron, magnesium hydroxide, potassium chloride **OR** potassium alternative oral replacement **OR** potassium chloride, potassium chloride, magnesium sulfate, nitroGLYCERIN, perflutren lipid microspheres, morphine **OR** morphine, diphenhydrAMINE    Data:     Past Medical History:   has a past medical history of AICD (automatic cardioverter/defibrillator) present, CKD (chronic kidney disease), stage III ([address] Myocardial infarction ([address] S/P CABG (coronary artery bypass graft), and Solitary kidney, congenital.    Social History:   reports that he has been smoking. He has been smoking about 0.25 packs per day. He has never used smokeless tobacco. He reports previous alcohol use. He reports previous drug use. Family History:   Family History   Problem Relation Age of Onset   [place] Cancer Mother     Diabetes Father     Heart Disease Father     Diabetes Paternal Grandmother        Vitals:  BP (!) 138/90   Pulse 72   Temp 98.2 °F (36.8 °C) (Oral)   Resp 18   Ht 5' 10\" (1.778 m)   Wt 170 lb (77.1 kg)   SpO2 97%   BMI 24.39 kg/m²   Temp (24hrs), Av.1 °F (36.7 °C), Min:97.9 °F (36.6 °C), Max:98.2 °F (36.8 °C)    No results for input(s): POCGLU in the last 72 hours. I/O (24Hr):     Intake/Output Summary (Last 24 hours) at 2021 1622  Last data filed at 2021 1539  Gross per 24 hour   Intake --   Output 1200 ml   Net -1200 ml       Labs:  Hematology:  Recent Labs 12/07/21 1846 12/09/21  0557   WBC  --  4.9   RBC  --  3.52*   HGB  --  10.9*   HCT  --  34.7*   MCV  --  98.6   MCH  --  31.0   MCHC  --  31.4   RDW  --  16.5*   PLT  --  185   MPV  --  10.3   CRP  --  3.3   INR  --  1.1   DDIMER 1.19*  --      Chemistry:  Recent Labs     12/07/21  1846 12/07/21  1846 12/07/21  2105 12/08/21  0245 12/08/21 0818 12/09/21  0557     --   --   --  138 137   K 3.9  --   --   --  4.4 4.2     --   --   --  107 103   CO2 21  --   --   --  19* 21   GLUCOSE 84  --   --   --  177* 107*   BUN 35*  --   --   --  37* 42*   CREATININE 1.99*  --   --   --  2.26* 2.29*   MG  --   --   --   --  1.8  --    ANIONGAP 9  --   --   --  12 13   LABGLOM 35*  --   --   --  30* 30*   GFRAA 42*  --   --   --  37* 36*   CALCIUM 8.3*  --   --   --  8.3* 8.5*   PROBNP 22,281*  --   --   --   --  33,623*   TROPHS 63*   < > 69* 62* 41*  --     < > = values in this interval not displayed. Recent Labs     12/07/21 1846 12/09/21  0557   PROT  --  6.3*   LABALBU  --  3.4*   AST  --  30   ALT  --  43*   *  --    ALKPHOS  --  89   BILITOT  --  0.29*   CHOL  --  161   HDL  --  48   LDLCHOLESTEROL  --  96   CHOLHDLRATIO  --  3.4   TRIG  --  83   VLDL  --  NOT REPORTED     ABG:No results found for: POCPH, PHART, PH, POCPCO2, NZL6ERN, PCO2, POCPO2, PO2ART, PO2, POCHCO3, HAJ6HET, HCO3, NBEA, PBEA, BEART, BE, THGBART, THB, BNH7DQP, BTCR9SDB, V5KRSGCT, O2SAT, FIO2  No results found for: SPECIAL  No results found for: CULTURE    Radiology:  CT ABDOMEN PELVIS WO CONTRAST Additional Contrast? None    Result Date: 12/9/2021  1. Left-sided hydronephrosis/hydroureter with uroepithelial thickening and small amount of air seen within the collecting system. No obstructing stone or gross mass. Left UVJ stricture cannot be excluded. Correlation with urinalysis is suggested as underlying infectious process cannot be excluded. 2. 4 mm nonobstructing calculus left kidney. 3. Cardiomegaly.  4. Presumed post injection changes are seen involving the left anterior abdominal wall. 5. Body wall anasarca. 6. Small amount of free fluid. 7. Sigmoid diverticulosis. NM LUNG SCAN PERFUSION ONLY    Result Date: 12/7/2021  Low Probability for Pulmonary Embolus. XR CHEST 1 VIEW    Result Date: 12/7/2021  No prior study available. No definite pneumonia. Slight blunting left CP angle. Cardiomegaly; venous hypertension without radiographic CHF. US RETROPERITONEAL COMPLETE    Result Date: 12/8/2021  1. Nonvisualization of the right kidney. 2. Mild-to-moderate left-sided hydronephrosis. 3. Exam limited due to patient's bowel gas and body habitus. 4. Nonvisualization of ureteral jets. Urinary bladder grossly unremarkable.        Physical Examination:        General appearance:  alert, cooperative and no distress, somewhat disheveled appearing  gentleman lying supine in bed  Mental Status:  oriented to person, place and time and normal affect  Lungs: Globally diminished lung sounds, normal effort, no wheezing, rales, rhonchi  Heart:  regular rate and rhythm, no murmur  Abdomen:  soft, nontender, nondistended, normal bowel sounds, no masses, hepatomegaly, splenomegaly  Extremities:  no edema, redness, tenderness in the calves  Skin:  no gross lesions, rashes, induration, well-healed cicatrix on the anterior chest wall    Assessment:        Hospital Problems           Last Modified POA    * (Principal) NSTEMI (non-ST elevated myocardial infarction) [address] 12/8/2021 Yes    JACKSON (acute kidney injury) ([address] 12/8/2021 Yes    Elevated troponin 12/8/2021 Yes    Hydronephrosis of left kidney 12/9/2021 Yes    Acute urinary retention 12/9/2021 Yes    AICD (automatic cardioverter/defibrillator) present 12/8/2021 Yes    History of MI (myocardial infarction) 12/8/2021 Yes    Elevated brain natriuretic peptide (BNP) level 12/8/2021 Yes    Chronic systolic heart failure ([address] 12/8/2021 Yes    Hx of CABG 12/8/2021 Yes    Stage 3 chronic kidney disease ([facility] 12/8/2021 Yes    COVID-19 virus infection 12/8/2021 Yes    Hepatitis C 12/9/2021 Yes          Plan:        Angina/elevated troponin-continue Lovenox at therapeutic dosing until cardiology evaluates. Continue morphine for pain control. Patient reports that he is intolerant to Ranexa and nitrates  Heart failure with reduced ejection fraction-agree with giving IV Lasix at this time secondary to elevated BNP and orthopnea. EF noted be 25 to 30% with a reduced right ventricle ejection fraction, mild to moderate MV and TV on JOYCE earlier this month. Would benefit from Aldactone. Coronary artery disease status post CABG (two separate times) -continue Plavix. Continue therapeutic Lovenox. Restart Lipitor. Continue carvedilol. JACKSON on CKD-appreciate nephrology input. Agree with Lasix. Left-sided hydronephrosis/urinary retention-urology consulted. May need cystoscopy during this hospital stay. Hepatitis C antibody positive - uncertain as to the chronicity of this. Will discuss further with patient. Homelessness - CM assisting, but patient is not cooperating. COVID-19 infection - not requiring supplemental oxygen. Positive test on 12/7. Isolate through 12/17. Hold off on any novel therapies. Appreciate assistance of unit secretary in obtaining patient's records. Currently obtaining [facility] and [facility].     33 Lisa Paez DO  12/9/2021  4:22 PM

## 2021-12-09 NOTE — PROGRESS NOTES
Writer spoke with Dr. Sunshine Wolff regarding plan of care. Orders received to have CT of abdomen and pelvis without contrast and to notify urology with results; he would also like notified with Post void residual results.

## 2021-12-09 NOTE — PROGRESS NOTES
Dr. Nestor Cullen notified regarding results of CT Abd/pelvis and results of PVR. No new orders at this time. Dr. Adonay Acosta also notified as well. Patient explained the need for possible daniel catheter or straight caths if patient cannot empty bladder on his own. Patient refusing daniel placement if indicated. Both nephrology and urology notified of that as well.

## 2021-12-09 NOTE — PROGRESS NOTES
Patient admitted to room 1012 from ER. Patient appears very anxious asking for Morphine and scratching. Pt oriented to room and call light.

## 2021-12-09 NOTE — PLAN OF CARE
Problem: Gas Exchange - Impaired  Goal: Absence of hypoxia  Outcome: Ongoing     Problem: Breathing Pattern - Ineffective  Goal: Ability to achieve and maintain a regular respiratory rate  Outcome: Ongoing     Problem: Isolation Precautions - Risk of Spread of Infection  Goal: Prevent transmission of infection  Outcome: Ongoing     Problem: Risk for Fluid Volume Deficit  Goal: Maintain normal heart rhythm  Outcome: Ongoing     Problem: FLUID AND ELECTROLYTE IMBALANCE  Goal: Fluid and electrolyte balance are achieved/maintained  Outcome: Ongoing

## 2021-12-10 LAB
ABSOLUTE EOS #: 0.03 K/UL (ref 0–0.4)
ABSOLUTE IMMATURE GRANULOCYTE: 0 K/UL (ref 0–0.3)
ABSOLUTE LYMPH #: 0.6 K/UL (ref 1–4.8)
ABSOLUTE MONO #: 0.16 K/UL (ref 0.2–0.8)
ANION GAP SERPL CALCULATED.3IONS-SCNC: 13 MMOL/L (ref 9–17)
BASOPHILS # BLD: 0 %
BASOPHILS ABSOLUTE: 0 K/UL (ref 0–0.2)
BUN BLDV-MCNC: 47 MG/DL (ref 6–20)
BUN/CREAT BLD: 20 (ref 9–20)
CALCIUM SERPL-MCNC: 8.2 MG/DL (ref 8.6–10.4)
CHLORIDE BLD-SCNC: 105 MMOL/L (ref 98–107)
CO2: 21 MMOL/L (ref 20–31)
CREAT SERPL-MCNC: 2.34 MG/DL (ref 0.7–1.2)
DIFFERENTIAL TYPE: ABNORMAL
EOSINOPHILS RELATIVE PERCENT: 1 % (ref 1–4)
GFR AFRICAN AMERICAN: 35 ML/MIN
GFR NON-AFRICAN AMERICAN: 29 ML/MIN
GFR SERPL CREATININE-BSD FRML MDRD: ABNORMAL ML/MIN/{1.73_M2}
GFR SERPL CREATININE-BSD FRML MDRD: ABNORMAL ML/MIN/{1.73_M2}
GLUCOSE BLD-MCNC: 86 MG/DL (ref 70–99)
HCT VFR BLD CALC: 35.6 % (ref 40.7–50.3)
HEMOGLOBIN: 10.7 G/DL (ref 13–17)
IMMATURE GRANULOCYTES: 0 %
LYMPHOCYTES # BLD: 23 % (ref 24–44)
MCH RBC QN AUTO: 30.6 PG (ref 25.2–33.5)
MCHC RBC AUTO-ENTMCNC: 30.1 G/DL (ref 28–38)
MCV RBC AUTO: 101.7 FL (ref 82.6–102.9)
MONOCYTES # BLD: 6 % (ref 1–7)
NRBC AUTOMATED: ABNORMAL PER 100 WBC
PDW BLD-RTO: 17.1 % (ref 11.8–14.4)
PLATELET # BLD: 157 K/UL (ref 138–453)
PLATELET ESTIMATE: ABNORMAL
PMV BLD AUTO: 10.4 FL (ref 8.1–13.5)
POTASSIUM SERPL-SCNC: 4.3 MMOL/L (ref 3.7–5.3)
RBC # BLD: 3.5 M/UL (ref 4.21–5.77)
RBC # BLD: ABNORMAL 10*6/UL
SEG NEUTROPHILS: 70 % (ref 36–66)
SEGMENTED NEUTROPHILS ABSOLUTE COUNT: 1.81 K/UL (ref 1.8–7.7)
SODIUM BLD-SCNC: 139 MMOL/L (ref 135–144)
WBC # BLD: 2.6 K/UL (ref 3.5–11.3)
WBC # BLD: ABNORMAL 10*3/UL

## 2021-12-10 PROCEDURE — 94761 N-INVAS EAR/PLS OXIMETRY MLT: CPT

## 2021-12-10 PROCEDURE — G0378 HOSPITAL OBSERVATION PER HR: HCPCS

## 2021-12-10 PROCEDURE — 93320 DOPPLER ECHO COMPLETE: CPT

## 2021-12-10 PROCEDURE — 93325 DOPPLER ECHO COLOR FLOW MAPG: CPT

## 2021-12-10 PROCEDURE — 36415 COLL VENOUS BLD VENIPUNCTURE: CPT

## 2021-12-10 PROCEDURE — 93308 TTE F-UP OR LMTD: CPT

## 2021-12-10 PROCEDURE — 85025 COMPLETE CBC W/AUTO DIFF WBC: CPT

## 2021-12-10 PROCEDURE — 6370000000 HC RX 637 (ALT 250 FOR IP): Performed by: INTERNAL MEDICINE

## 2021-12-10 PROCEDURE — 6360000002 HC RX W HCPCS: Performed by: INTERNAL MEDICINE

## 2021-12-10 PROCEDURE — 96376 TX/PRO/DX INJ SAME DRUG ADON: CPT

## 2021-12-10 PROCEDURE — 80048 BASIC METABOLIC PNL TOTAL CA: CPT

## 2021-12-10 PROCEDURE — 2700000000 HC OXYGEN THERAPY PER DAY

## 2021-12-10 PROCEDURE — 99233 SBSQ HOSP IP/OBS HIGH 50: CPT | Performed by: INTERNAL MEDICINE

## 2021-12-10 PROCEDURE — 96372 THER/PROPH/DIAG INJ SC/IM: CPT

## 2021-12-10 RX ORDER — ALBUTEROL SULFATE 2.5 MG/3ML
2.5 SOLUTION RESPIRATORY (INHALATION) 2 TIMES DAILY
Status: DISCONTINUED | OUTPATIENT
Start: 2021-12-11 | End: 2021-12-16

## 2021-12-10 RX ORDER — ALBUTEROL SULFATE 90 UG/1
2 AEROSOL, METERED RESPIRATORY (INHALATION) EVERY 6 HOURS PRN
Status: DISCONTINUED | OUTPATIENT
Start: 2021-12-10 | End: 2021-12-19 | Stop reason: HOSPADM

## 2021-12-10 RX ADMIN — DIPHENHYDRAMINE HYDROCHLORIDE 25 MG: 50 INJECTION, SOLUTION INTRAMUSCULAR; INTRAVENOUS at 04:02

## 2021-12-10 RX ADMIN — MORPHINE SULFATE 4 MG: 4 INJECTION, SOLUTION INTRAMUSCULAR; INTRAVENOUS at 10:07

## 2021-12-10 RX ADMIN — MORPHINE SULFATE 4 MG: 4 INJECTION, SOLUTION INTRAMUSCULAR; INTRAVENOUS at 22:59

## 2021-12-10 RX ADMIN — MORPHINE SULFATE 4 MG: 4 INJECTION, SOLUTION INTRAMUSCULAR; INTRAVENOUS at 13:18

## 2021-12-10 RX ADMIN — MORPHINE SULFATE 4 MG: 4 INJECTION, SOLUTION INTRAMUSCULAR; INTRAVENOUS at 16:04

## 2021-12-10 RX ADMIN — DIPHENHYDRAMINE HYDROCHLORIDE 25 MG: 50 INJECTION, SOLUTION INTRAMUSCULAR; INTRAVENOUS at 16:04

## 2021-12-10 RX ADMIN — DIPHENHYDRAMINE HYDROCHLORIDE 25 MG: 50 INJECTION, SOLUTION INTRAMUSCULAR; INTRAVENOUS at 22:59

## 2021-12-10 RX ADMIN — ENOXAPARIN SODIUM 80 MG: 100 INJECTION SUBCUTANEOUS at 20:57

## 2021-12-10 RX ADMIN — CARVEDILOL 3.12 MG: 3.12 TABLET, FILM COATED ORAL at 18:15

## 2021-12-10 RX ADMIN — TAMSULOSIN HYDROCHLORIDE 0.4 MG: 0.4 CAPSULE ORAL at 07:54

## 2021-12-10 RX ADMIN — ENOXAPARIN SODIUM 80 MG: 100 INJECTION SUBCUTANEOUS at 07:55

## 2021-12-10 RX ADMIN — MORPHINE SULFATE 4 MG: 4 INJECTION, SOLUTION INTRAMUSCULAR; INTRAVENOUS at 03:06

## 2021-12-10 RX ADMIN — MORPHINE SULFATE 4 MG: 4 INJECTION, SOLUTION INTRAMUSCULAR; INTRAVENOUS at 21:06

## 2021-12-10 RX ADMIN — MORPHINE SULFATE 4 MG: 4 INJECTION, SOLUTION INTRAMUSCULAR; INTRAVENOUS at 07:56

## 2021-12-10 RX ADMIN — DIPHENHYDRAMINE HYDROCHLORIDE 25 MG: 50 INJECTION, SOLUTION INTRAMUSCULAR; INTRAVENOUS at 10:07

## 2021-12-10 RX ADMIN — CLOPIDOGREL BISULFATE 75 MG: 75 TABLET ORAL at 07:55

## 2021-12-10 RX ADMIN — FUROSEMIDE 40 MG: 10 INJECTION, SOLUTION INTRAMUSCULAR; INTRAVENOUS at 07:56

## 2021-12-10 RX ADMIN — CARVEDILOL 3.12 MG: 3.12 TABLET, FILM COATED ORAL at 07:55

## 2021-12-10 RX ADMIN — MORPHINE SULFATE 4 MG: 4 INJECTION, SOLUTION INTRAMUSCULAR; INTRAVENOUS at 18:16

## 2021-12-10 ASSESSMENT — PAIN SCALES - GENERAL
PAINLEVEL_OUTOF10: 9
PAINLEVEL_OUTOF10: 7
PAINLEVEL_OUTOF10: 8
PAINLEVEL_OUTOF10: 9
PAINLEVEL_OUTOF10: 8
PAINLEVEL_OUTOF10: 9
PAINLEVEL_OUTOF10: 8

## 2021-12-10 ASSESSMENT — PAIN DESCRIPTION - LOCATION
LOCATION: CHEST

## 2021-12-10 ASSESSMENT — PAIN DESCRIPTION - ORIENTATION: ORIENTATION: MID

## 2021-12-10 ASSESSMENT — PAIN DESCRIPTION - DESCRIPTORS
DESCRIPTORS: PRESSURE

## 2021-12-10 NOTE — PROGRESS NOTES
NEPHROLOGY     REASON FOR CONSULT:     JACKSON (BUN/Creat 2.29           with baseline creatinine   unknown             )    Risk Factors for JACKSON:  Left hydronephrosis and solitary kidney  CHF  Underlying CKD stage III    HISTORY OF PRESENTING ILLNESS        Patient feels better today with supportive care. He was diagnosed with a congenitally absent right kidney at age 1 when in [place] and spent a significant period of time in hospital as a young child. There is a bifid collecting system and what sounds like chronic hydronephrosis in the patient's solitary left kidney. Creatinine appeared to be about 2.8 mg/dl back on 111/30/2021 at [facility] then improved some by discharge it appears to about 2.2. He states he does have chronic kidney disease, although baseline unclear, but current creatinine may be about his new baseline. He is voiding on his own. Straight cath of the bladder was attempted but unsuccessful at admission, and the patient he states he has chronic urethral scarring issues. Labs are reviewed today. He denies any chest pain and shortness of breath and swelling are improving with IV Lasix ordered daily. This is a 63 y.o. male who presented to [facility] on 12/7/2021 for evaluation of chest pain and shortness of breath. Patient states that he left [facility] approximately 3 days ago after being treated poorly. He was admitted with COVID-19 positivity and was placed on isolation. At the staff was not treating him right he decided to leave [address] The patient is homeless and bounces between hotels. After he left the hospital, he went to the mall when the security noted that he looked unwell. Complained of chest pain as well as left flank pain and shortness of breath. Patient has a history of CABG in the past with an ejection fraction 25%. He reported no history of fever cough or hemoptysis.   Chest x-ray showed some venous congestion and costophrenic angle blunting. Initial assessment in the ER showed stable vitals. Investigations revealed elevated creatinine/troponin. COVID testing was positive. He was placed in isolation. Nephrology was consulted. Patient's baseline creatinine is unknown. He has congenital absence of right kidney. Ultrasound on this admission shows left hydronephrosis. Patient stated that he followed previously with a Urologist at [facility] who now practices in [place]. Details unknown. No prior history of prostate problem or cancers. No history of contrast exposure  No history of hypotension  No history of NSAID/ACE/ARB/nephrotoxic agents. He is very rigid about avoiding NSAIDS. Julieta Stands   No history suggestive of obstruction  No history of nausea/vomiting/diarrhoea     No documented history of recurrent Urinary tract infection    Positive history of atrial fibrillation and paced rhythm ongoing          PAST MEDICAL HISTORY         Diagnosis Date    AICD (automatic cardioverter/defibrillator) present     CKD (chronic kidney disease), stage III (HCC)     My[facility] infarction [address]     S/P CABG (coronary artery bypass graft)     x2 separate occasions    Solitary kidney, congenital        PAST SURGICAL HISTORY          Procedure Laterality Date    CORONARY ARTERY BYPASS GRAFT      CORONARY ARTERY BYPASS GRAFT         MEDICATIONS     Home Meds:                Medications Prior to Admission: clopidogrel (PLAVIX) 75 MG tablet, Take 1 tablet by mouth daily  atorvastatin (LIPITOR) 40 MG tablet, Take 1 tablet by mouth daily  tamsulosin (FLOMAX) 0.4 MG capsule, Take 0.4 mg by mouth daily  apixaban (ELIQUIS) 5 MG TABS tablet, Take 5 mg by mouth 2 times daily  furosemide (LASIX) 20 MG tablet, Take 20 mg by mouth as needed (swelling)   amLODIPine (NORVASC) 10 MG tablet, Take 0.5 tablets by mouth daily  carvedilol (COREG) 6.25 MG tablet, Take 1 tablet by mouth 2 times daily  Scheduled Meds:    furosemide  40 mg IntraVENous Daily    atorvastatin  40 mg Oral Nightly    carvedilol  3.125 mg Oral BID WC    clopidogrel  75 mg Oral Daily    [Held by provider] apixaban  5 mg Oral BID    enoxaparin  1 mg/kg SubCUTAneous BID    tamsulosin  0.4 mg Oral Daily     Continuous Infusions:   PRN Meds:  hydrocortisone-aloe, diphenhydrAMINE, ondansetron **OR** ondansetron, magnesium hydroxide, potassium chloride **OR** potassium alternative oral replacement **OR** potassium chloride, potassium chloride, magnesium sulfate, nitroGLYCERIN, perflutren lipid microspheres, morphine **OR** morphine    ALLERGY     Nsaids, Asa [aspirin], and Oxycodone       SOCIAL HISTORY     Social History     Socioeconomic History    Marital status: Single     Spouse name: Not on file    Number of children: Not on file    Years of education: Not on file    Highest education level: Not on file   Occupational History    Not on file   Tobacco Use    Smoking status: Current Every Day Smoker     Packs/day: 0.25    Smokeless tobacco: Never Used   Substance and Sexual Activity    Alcohol use: Not Currently     Comment: rarely    Drug use: Not Currently     Comment: not in over 27 years    Sexual activity: Not on file   Other Topics Concern    Not on file   Social History Narrative    Not on file     Social Determinants of Health     Financial Resource Strain:     Difficulty of Paying Living Expenses: Not on file   Food Insecurity:     Worried About Running Out of Food in the Last Year: Not on file    [place] of Food in the Last Year: Not on file   Transportation Needs:     Lack of Transportation (Medical): Not on file    Lack of Transportation (Non-Medical):  Not on file   Physical Activity:     Days of Exercise per Week: Not on file    Minutes of Exercise per Session: Not on file   Stress:     Feeling of Stress : Not on file   Social Connections:     Frequency of Communication with Friends and Family: Not on file    Frequency of Social Gatherings with Friends and Family: Not on file    Attends Yazidism Services: Not on file    Active Member of Clubs or Organizations: Not on file    Attends Club or Organization Meetings: Not on file    Marital Status: Not on file   Intimate Partner Violence:     Fear of Current or Ex-Partner: Not on file    Emotionally Abused: Not on file    Physically Abused: Not on file    Sexually Abused: Not on file   Housing Stability:     Unable to Pay for Housing in the Last Year: Not on file    Number of [place] in the Last Year: Not on file    Unstable Housing in the Last Year: Not on file       FAMILY HISTORY     Family History   Problem Relation Age of Onset    Cancer Mother     Diabetes Father     Heart Disease Father     Diabetes Paternal Grandmother           REVIEW OF SYSTEM     Constitutional: No asthenia/weight loss/anorexia    HEENT : No epistaxis/visual blurriness/rhinorrhoea/sorethroat/trauma  Cardiovascular: No chest pain and improving SOB and no significant orthopnea  Respiratory: No cough/fever/ or wheezing    Gastrointestinal: No abdominal pain/nausea/vomiting/diarrhea/constipation  Genitourinary: See HPI  Musculoskeletal:  No gait disturbance/weakness or joint complaints  Integumentary: He tends to dig at his skin and has overt bleeding as a result  Neurological: No headache/double vision/change in muscle strength/numbness or tingling. No change in gait, balance, coordination, mood, affect, memory, mentation, behavior. Psychiatric: Positive history of anxiety  Endocrine: No temperature intolerance. No excessive thirst, fluid intake, or urination. No tremor.        PHYSICAL EXAM     Vitals:    12/10/21 0354 12/10/21 0524 12/10/21 0752 12/10/21 0847   BP: 124/89  126/89 130/83   Pulse: 75  69 70   Resp: 16  18 20   Temp: 98.2 °F (36.8 °C)  99 °F (37.2 °C) 98.2 °F (36.8 °C)   TempSrc: Oral      SpO2: 98%  99% 100%   Weight:  170 lb 9.6 oz (77.4 kg)     Height: 24HR INTAKE/OUTPUT:      Intake/Output Summary (Last 24 hours) at 12/10/2021 1157  Last data filed at 12/10/2021 9741  Gross per 24 hour   Intake --   Output 1650 ml   Net -1650 ml       General appearance:Awake, alert, in no acute distress  Skin: warm and dry, positive bleeding over right posterior lower arm from self scratching/digging and other area of his skin with various scabbing/stage of healing  Eyes: conjunctivae normal and sclera anicteric  ENT: no thrush, moist mucus membranes    Neck: No JVD, trachea is midline and no accessory muscle use  Respiratory: clear bilaterally and without wheezes or rales or rhonchi Cardiovascular: S1 S2 normal,no gallop or organic murmur. No carotid bruit  Abdomen:Non tender/non distended. Bowel sounds present  Extremities: No Cyanosis or Clubbing, present lower extremity edema  Neurological:Alert and oriented. No abnormalities of mood, affect, memory, mentation, or behavior are noted    INVESTIGATIONS      CBC:   Recent Labs     12/09/21  0557 12/10/21  0603   WBC 4.9 2.6*   RBC 3.52* 3.50*   HGB 10.9* 10.7*   HCT 34.7* 35.6*   MCV 98.6 101.7   MCH 31.0 30.6   MCHC 31.4 30.1   RDW 16.5* 17.1*    157   MPV 10.3 10.4      BMP:   Recent Labs     12/08/21  0818 12/09/21  0557 12/10/21  0603    137 139   K 4.4 4.2 4.3    103 105   CO2 19* 21 21   BUN 37* 42* 47*   CREATININE 2.26* 2.29* 2.34*   GLUCOSE 177* 107* 86   CALCIUM 8.3* 8.5* 8.2*        Phosphorus:  No results for input(s): PHOS in the last 72 hours. Magnesium:   Recent Labs     12/08/21  0818   MG 1.8     Albumin:   Recent Labs     12/09/21  0557   LABALBU 3.4*       Radiology:  Reviewed as available. ASSESSMENT     1. Previous history of recent acute kidney injury nonoliguric secondary to unilateral hydronephrosis left in a solitary kidney completed further by decompensated systolic congestive heart failure.   11/30/21  Creatinine at [address] was about 2.8 mg/dl  2 Chronic kidney disease stage 4 likely. Left solitary kidney because of congenital absence of right kidney. Baseline creatinine likely about his current creatinine. #3 Admitted with chest pain/shortness of breath secondary to NSTEMI/CHF  #4 COVID-19 infection with no pneumonia or hypoxemia, in isolation through 12/17/21  5. Left hydronephrosis in a congenitally solitary kidney  6. Ischemic cardiomyopathy with a history of CABG with ejection 25 to 30%  7. Essential hypertension  8. Proteinuria about 1 gram a day based on the random urine protein to creatinine ratio  9. History of atrial fibrillation  10. History of PPM, currently in a paced rhythm  11. Normal serum free light chain ratio     PLAN:     1. Fluid/salt restriction  2. Continue Lasix 40 mg IV daily  3. Follow Urology recommendations    4. Follow labs as ordered        Thank you for the consultation. Please do not hesitate to call with questions. This note is created with the assistance of a speech-recognition program. While intending to generate a document that actually reflects the content of the visit, no guarantees can be provided that every mistake has been identified and corrected by editing.     Maryan Montiel MD   12/10/2021 11:57 AM    [facility]

## 2021-12-10 NOTE — PLAN OF CARE
Problem: Falls - Risk of:  Goal: Will remain free from falls  Description: Will remain free from falls  Outcome: Ongoing  Note: Hourly rounding. Bed locked in lowest position. Call light within reach. Side rails up x2.  Will continue to monitor

## 2021-12-10 NOTE — PLAN OF CARE
Problem: Airway Clearance - Ineffective  Goal: Achieve or maintain patent airway  Outcome: Ongoing  Note: Pt maintained patent airway for duration of shft     Problem: Gas Exchange - Impaired  Goal: Absence of hypoxia  Outcome: Ongoing  Note: Patient was absent of hypoxia for duration of my shift  Goal: Promote optimal lung function  Outcome: Ongoing  Note: Optimal lung function promoted through deep breathing and coughing exercises     Problem: Breathing Pattern - Ineffective  Goal: Ability to achieve and maintain a regular respiratory rate  Outcome: Ongoing  Note: Patient has remained in appropriate range for respiratory rate throughout my shift     Problem:  Body Temperature -  Risk of, Imbalanced  Goal: Ability to maintain a body temperature within defined limits  Outcome: Ongoing  Note: Ryan body temperature has been slightly elevated during my shift  Goal: Will regain or maintain usual level of consciousness  Outcome: Ongoing  Note: Aliyah Machuca has remained at an appropriate level of consciousness for my entire shift  Goal: Complications related to the disease process, condition or treatment will be avoided or minimized  Outcome: Ongoing  Note: Complications related to COVID-19 and the patients NSTEMI have been minimized throughout this shift     Problem: Isolation Precautions - Risk of Spread of Infection  Goal: Prevent transmission of infection  Outcome: Ongoing  Note: Precautions to prevent transmission of infection has been followed by all members of the team and includes appropriate use of PPE and handwashing     Problem: Nutrition Deficits  Goal: Optimize nutritional status  Outcome: Ongoing  Note: Patient has had a appropriate diet and appetite throughout shift     Problem: Risk for Fluid Volume Deficit  Goal: Maintain normal heart rhythm  Outcome: Ongoing  Goal: Maintain absence of muscle cramping  Outcome: Ongoing  Note: Pt has not had any cramps throughout shift  Goal: Maintain normal serum potassium, sodium, calcium, phosphorus, and pH  Outcome: Ongoing  Note: Pt has had interventions to return to an appropriate electrolyte balance     Problem: Loneliness or Risk for Loneliness  Goal: Demonstrate positive use of time alone when socialization is not possible  Outcome: Ongoing     Problem: Fatigue  Goal: Verbalize increase energy and improved vitality  Outcome: Ongoing     Problem: Patient Education: Go to Patient Education Activity  Goal: Patient/Family Education  Outcome: Ongoing     Problem: OXYGENATION/RESPIRATORY FUNCTION  Goal: Patient will maintain patent airway  Outcome: Ongoing  Goal: Patient will achieve/maintain normal respiratory rate/effort  Description: Respiratory rate and effort will be within normal limits for the patient  Outcome: Ongoing     Problem: HEMODYNAMIC STATUS  Goal: Patient has stable vital signs and fluid balance  Outcome: Ongoing     Problem: FLUID AND ELECTROLYTE IMBALANCE  Goal: Fluid and electrolyte balance are achieved/maintained  Outcome: Ongoing     Problem: ACTIVITY INTOLERANCE/IMPAIRED MOBILITY  Goal: Mobility/activity is maintained at optimum level for patient  Outcome: Ongoing     Problem: Cardiac:  Goal: Ability to maintain an adequate cardiac output will improve  Description: Ability to maintain an adequate cardiac output will improve  Outcome: Ongoing  Goal: Hemodynamic stability will improve  Description: Hemodynamic stability will improve  Outcome: Ongoing     Problem: Fluid Volume:  Goal: Ability to achieve and maintain adequate urine output will improve  Description: Ability to achieve and maintain adequate urine output will improve  Outcome: Ongoing     Problem: Respiratory:  Goal: Respiratory status will improve  Description: Respiratory status will improve  Outcome: Ongoing     Problem: Falls - Risk of:  Goal: Will remain free from falls  Description: Will remain free from falls  Outcome: Ongoing  Note: Pt remained free of falls for shift  Goal: Absence of physical injury  Description: Absence of physical injury  Outcome: Ongoing

## 2021-12-10 NOTE — PROGRESS NOTES
nausea, vomiting  Neurological:  negative for dizziness, headache  Integument: Reports slight improvement of itching    Medications: Allergies: Allergies   Allergen Reactions    Nsaids Swelling    Asa [Aspirin] Swelling    Oxycodone Hives       Current Meds:   Scheduled Meds:    furosemide  40 mg IntraVENous Daily    atorvastatin  40 mg Oral Nightly    carvedilol  3.125 mg Oral BID WC    clopidogrel  75 mg Oral Daily    [Held by provider] apixaban  5 mg Oral BID    enoxaparin  1 mg/kg SubCUTAneous BID    tamsulosin  0.4 mg Oral Daily     Continuous Infusions:   PRN Meds: hydrocortisone-aloe, diphenhydrAMINE, ondansetron **OR** ondansetron, magnesium hydroxide, potassium chloride **OR** potassium alternative oral replacement **OR** potassium chloride, potassium chloride, magnesium sulfate, nitroGLYCERIN, perflutren lipid microspheres, morphine **OR** morphine    Data:     Past Medical History:   has a past medical history of AICD (automatic cardioverter/defibrillator) present, CKD (chronic kidney disease), stage III ([address] Myocardial infarction ([facility] [address] [facility] S/P CABG (coronary artery bypass graft), and Solitary kidney, congenital.    Social History:   reports that he has been smoking. He has been smoking about 0.25 packs per day. He has never used smokeless tobacco. He reports previous alcohol use. He reports previous drug use. Family History:   Family History   Problem Relation Age of Onset   [place] [facility] Mother     Diabetes Father     Heart Disease Father     Diabetes Paternal Grandmother        Vitals:  /83   Pulse 70   Temp 98.2 °F (36.8 °C)   Resp 20   Ht 5' 10\" (1.778 m)   Wt 170 lb 9.6 oz (77.4 kg)   SpO2 100%   BMI 24.48 kg/m²   Temp (24hrs), Av.6 °F (37 °C), Min:98.2 °F (36.8 °C), Max:99 °F (37.2 °C)    No results for input(s): POCGLU in the last 72 hours. I/O (24Hr):     Intake/Output Summary (Last 24 hours) at 12/10/2021 1015  Last data filed at 12/10/2021 0811  Gross per 24 hour   Intake --   Output 1800 ml   Net -1800 ml       Labs:  Hematology:  Recent Labs     12/07/21 1846 12/09/21  0557 12/10/21  0603   WBC  --  4.9 2.6*   RBC  --  3.52* 3.50*   HGB  --  10.9* 10.7*   HCT  --  34.7* 35.6*   MCV  --  98.6 101.7   MCH  --  31.0 30.6   MCHC  --  31.4 30.1   RDW  --  16.5* 17.1*   PLT  --  185 157   MPV  --  10.3 10.4   CRP  --  3.3  --    INR  --  1.1  --    DDIMER 1.19*  --   --      Chemistry:  Recent Labs     12/07/21 1846 12/07/21 1846 12/07/21 2105 12/08/21 0245 12/08/21 0818 12/09/21  0557 12/10/21  0603      < >  --   --  138 137 139   K 3.9   < >  --   --  4.4 4.2 4.3      < >  --   --  107 103 105   CO2 21   < >  --   --  19* 21 21   GLUCOSE 84   < >  --   --  177* 107* 86   BUN 35*   < >  --   --  37* 42* 47*   CREATININE 1.99*   < >  --   --  2.26* 2.29* 2.34*   MG  --   --   --   --  1.8  --   --    ANIONGAP 9   < >  --   --  12 13 13   LABGLOM 35*   < >  --   --  30* 30* 29*   GFRAA 42*   < >  --   --  37* 36* 35*   CALCIUM 8.3*   < >  --   --  8.3* 8.5* 8.2*   PROBNP 22,281*  --   --   --   --  38,655*  --    TROPHS 63*   < > 69* 62* 41*  --   --     < > = values in this interval not displayed. Recent Labs     12/07/21 1846 12/09/21 0557   PROT  --  6.3*   LABALBU  --  3.4*   AST  --  30   ALT  --  43*   *  --    ALKPHOS  --  89   BILITOT  --  0.29*   CHOL  --  161   HDL  --  48   LDLCHOLESTEROL  --  96   CHOLHDLRATIO  --  3.4   TRIG  --  83   VLDL  --  NOT REPORTED     ABG:No results found for: POCPH, PHART, PH, POCPCO2, JKU3BBC, PCO2, POCPO2, PO2ART, PO2, POCHCO3, ZQL1SAI, HCO3, NBEA, PBEA, BEART, BE, THGBART, THB, KRV7AUW, PUNI8BBP, M3MLEFRJ, O2SAT, FIO2  No results found for: SPECIAL  No results found for: CULTURE    Radiology:  CT ABDOMEN PELVIS WO CONTRAST Additional Contrast? None    Result Date: 12/9/2021  1.  Left-sided hydronephrosis/hydroureter with uroepithelial thickening and small amount of air seen within the collecting system. No obstructing stone or gross mass. Left UVJ stricture cannot be excluded. Correlation with urinalysis is suggested as underlying infectious process cannot be excluded. 2. 4 mm nonobstructing calculus left kidney. 3. Cardiomegaly. 4. Presumed post injection changes are seen involving the left anterior abdominal wall. 5. Body wall anasarca. 6. Small amount of free fluid. 7. Sigmoid diverticulosis. NM LUNG SCAN PERFUSION ONLY    Result Date: 12/7/2021  Low Probability for Pulmonary Embolus. XR CHEST 1 VIEW    Result Date: 12/7/2021  No prior study available. No definite pneumonia. Slight blunting left CP angle. Cardiomegaly; venous hypertension without radiographic CHF. US RETROPERITONEAL COMPLETE    Result Date: 12/8/2021  1. Nonvisualization of the right kidney. 2. Mild-to-moderate left-sided hydronephrosis. 3. Exam limited due to patient's bowel gas and body habitus. 4. Nonvisualization of ureteral jets. Urinary bladder grossly unremarkable.        Physical Examination:        General appearance:  alert, cooperative and no distress, somewhat disheveled appearing  gentleman lying supine in bed  Mental Status:  oriented to person, place and time and normal affect  Lungs: Globally diminished lung sounds, normal effort, no wheezing, rales, rhonchi  Heart:  regular rate and rhythm, no murmur  Abdomen:  soft, nontender, nondistended, normal bowel sounds, no masses, hepatomegaly, splenomegaly  Extremities:  no edema, redness, tenderness in the calves  Skin:  no gross lesions, rashes, induration, well-healed cicatrix on the anterior chest wall    Assessment:        Hospital Problems           Last Modified POA    * (Principal) NSTEMI (non-ST elevated myocardial infarction) ([address] 12/8/2021 Yes    JACKSON (acute kidney injury) ([address] 12/8/2021 Yes    Elevated troponin 12/8/2021 Yes    Hydronephrosis of left kidney 12/9/2021 Yes    Acute urinary retention 12/9/2021 Yes    AICD (automatic Hold off on Decadron, as I believe his hypoxia secondary to volume overload and sleep-related breathing disorder. Bridget Marsh Positive test on 12/7. Isolate through 12/17. Hold off on any novel therapies. Patient's records have been loaded into our system. Actively looking through.     33 Lisa Paez DO  12/10/2021  10:15 AM

## 2021-12-10 NOTE — PROGRESS NOTES
Writer spoke with Dr. Traci Cai regarding new consult. Patient was previously at [facility] and had signed out AMA. Dr. Traci Cai was consulted but patient had refused all care. Writer informed that Dr. Traci Cai would not follow patient and to have internal medicine consult Dr. Adelia fontaine. Dr. Lori Lyles phoned unit and spoke with writer. Stated we would see patient unless there was another cardiology group that would be able to see the patient sooner. New orders received for 2D echo. Nursing supervisor informed.

## 2021-12-11 LAB
-: NORMAL
ABSOLUTE EOS #: 0.02 K/UL (ref 0–0.4)
ABSOLUTE IMMATURE GRANULOCYTE: 0 K/UL (ref 0–0.3)
ABSOLUTE LYMPH #: 0.25 K/UL (ref 1–4.8)
ABSOLUTE MONO #: 0.15 K/UL (ref 0.2–0.8)
AMORPHOUS: NORMAL
ANION GAP SERPL CALCULATED.3IONS-SCNC: 13 MMOL/L (ref 9–17)
BACTERIA: NORMAL
BASOPHILS # BLD: 1 %
BASOPHILS ABSOLUTE: 0.02 K/UL (ref 0–0.2)
BILIRUBIN URINE: NEGATIVE
BNP INTERPRETATION: ABNORMAL
BUN BLDV-MCNC: 43 MG/DL (ref 6–20)
BUN/CREAT BLD: 19 (ref 9–20)
C-REACTIVE PROTEIN: 33.2 MG/L (ref 0–5)
CALCIUM SERPL-MCNC: 7.9 MG/DL (ref 8.6–10.4)
CASTS UA: NORMAL /LPF
CHLORIDE BLD-SCNC: 105 MMOL/L (ref 98–107)
CO2: 23 MMOL/L (ref 20–31)
COLOR: YELLOW
COMMENT UA: ABNORMAL
CREAT SERPL-MCNC: 2.27 MG/DL (ref 0.7–1.2)
CRYSTALS, UA: NORMAL /HPF
DIFFERENTIAL TYPE: ABNORMAL
EOSINOPHILS RELATIVE PERCENT: 1 % (ref 1–4)
EPITHELIAL CELLS UA: NORMAL /HPF (ref 0–5)
GFR AFRICAN AMERICAN: 36 ML/MIN
GFR NON-AFRICAN AMERICAN: 30 ML/MIN
GFR SERPL CREATININE-BSD FRML MDRD: ABNORMAL ML/MIN/{1.73_M2}
GFR SERPL CREATININE-BSD FRML MDRD: ABNORMAL ML/MIN/{1.73_M2}
GLUCOSE BLD-MCNC: 85 MG/DL (ref 70–99)
GLUCOSE URINE: NEGATIVE
HCT VFR BLD CALC: 33.2 % (ref 40.7–50.3)
HEMOGLOBIN: 10.4 G/DL (ref 13–17)
IMMATURE GRANULOCYTES: 0 %
KETONES, URINE: NEGATIVE
LEUKOCYTE ESTERASE, URINE: NEGATIVE
LYMPHOCYTES # BLD: 13 % (ref 24–44)
MAGNESIUM: 1.8 MG/DL (ref 1.6–2.6)
MCH RBC QN AUTO: 31.3 PG (ref 25.2–33.5)
MCHC RBC AUTO-ENTMCNC: 31.3 G/DL (ref 28.4–34.8)
MCV RBC AUTO: 100 FL (ref 82.6–102.9)
MONOCYTES # BLD: 8 % (ref 1–7)
MUCUS: NORMAL
NITRITE, URINE: NEGATIVE
NRBC AUTOMATED: 0 PER 100 WBC
OTHER OBSERVATIONS UA: NORMAL
PATHOLOGIST: NORMAL
PDW BLD-RTO: 16.5 % (ref 11.8–14.4)
PH UA: 7 (ref 5–8)
PHOSPHORUS: 3.3 MG/DL (ref 2.5–4.5)
PLATELET # BLD: 123 K/UL (ref 138–453)
PLATELET ESTIMATE: ABNORMAL
PMV BLD AUTO: 10.2 FL (ref 8.1–13.5)
POTASSIUM SERPL-SCNC: 3.8 MMOL/L (ref 3.7–5.3)
PRO-BNP: ABNORMAL PG/ML
PROCALCITONIN: 0.1 NG/ML
PROTEIN UA: ABNORMAL
RBC # BLD: 3.32 M/UL (ref 4.21–5.77)
RBC # BLD: ABNORMAL 10*6/UL
RBC UA: NORMAL /HPF (ref 0–2)
RENAL EPITHELIAL, UA: NORMAL /HPF
SEG NEUTROPHILS: 77 % (ref 36–66)
SEGMENTED NEUTROPHILS ABSOLUTE COUNT: 1.46 K/UL (ref 1.8–7.7)
SERUM IFX INTERP: NORMAL
SODIUM BLD-SCNC: 141 MMOL/L (ref 135–144)
SPECIFIC GRAVITY UA: 1.01 (ref 1–1.03)
TRICHOMONAS: NORMAL
TURBIDITY: CLEAR
URINE HGB: ABNORMAL
UROBILINOGEN, URINE: NORMAL
WBC # BLD: 1.9 K/UL (ref 3.5–11.3)
WBC # BLD: ABNORMAL 10*3/UL
WBC UA: NORMAL /HPF (ref 0–5)
YEAST: NORMAL

## 2021-12-11 PROCEDURE — 86140 C-REACTIVE PROTEIN: CPT

## 2021-12-11 PROCEDURE — 6360000002 HC RX W HCPCS: Performed by: INTERNAL MEDICINE

## 2021-12-11 PROCEDURE — 83880 ASSAY OF NATRIURETIC PEPTIDE: CPT

## 2021-12-11 PROCEDURE — 96376 TX/PRO/DX INJ SAME DRUG ADON: CPT

## 2021-12-11 PROCEDURE — 99233 SBSQ HOSP IP/OBS HIGH 50: CPT | Performed by: INTERNAL MEDICINE

## 2021-12-11 PROCEDURE — 6370000000 HC RX 637 (ALT 250 FOR IP): Performed by: INTERNAL MEDICINE

## 2021-12-11 PROCEDURE — 85025 COMPLETE CBC W/AUTO DIFF WBC: CPT

## 2021-12-11 PROCEDURE — 83735 ASSAY OF MAGNESIUM: CPT

## 2021-12-11 PROCEDURE — 2580000003 HC RX 258: Performed by: UROLOGY

## 2021-12-11 PROCEDURE — 87522 HEPATITIS C REVRS TRNSCRPJ: CPT

## 2021-12-11 PROCEDURE — G0378 HOSPITAL OBSERVATION PER HR: HCPCS

## 2021-12-11 PROCEDURE — 51798 US URINE CAPACITY MEASURE: CPT

## 2021-12-11 PROCEDURE — 84145 PROCALCITONIN (PCT): CPT

## 2021-12-11 PROCEDURE — 96372 THER/PROPH/DIAG INJ SC/IM: CPT

## 2021-12-11 PROCEDURE — 36415 COLL VENOUS BLD VENIPUNCTURE: CPT

## 2021-12-11 PROCEDURE — 80048 BASIC METABOLIC PNL TOTAL CA: CPT

## 2021-12-11 PROCEDURE — 6360000002 HC RX W HCPCS: Performed by: UROLOGY

## 2021-12-11 PROCEDURE — 84100 ASSAY OF PHOSPHORUS: CPT

## 2021-12-11 PROCEDURE — 81001 URINALYSIS AUTO W/SCOPE: CPT

## 2021-12-11 RX ORDER — DEXAMETHASONE 4 MG/1
6 TABLET ORAL DAILY
Status: DISCONTINUED | OUTPATIENT
Start: 2021-12-11 | End: 2021-12-19 | Stop reason: HOSPADM

## 2021-12-11 RX ORDER — SODIUM CHLORIDE 9 MG/ML
25 INJECTION, SOLUTION INTRAVENOUS PRN
Status: DISCONTINUED | OUTPATIENT
Start: 2021-12-11 | End: 2021-12-19 | Stop reason: HOSPADM

## 2021-12-11 RX ORDER — OXYCODONE HYDROCHLORIDE 15 MG/1
15 TABLET ORAL
Status: DISCONTINUED | OUTPATIENT
Start: 2021-12-11 | End: 2021-12-12

## 2021-12-11 RX ADMIN — MORPHINE SULFATE 4 MG: 4 INJECTION, SOLUTION INTRAMUSCULAR; INTRAVENOUS at 05:28

## 2021-12-11 RX ADMIN — MORPHINE SULFATE 4 MG: 4 INJECTION, SOLUTION INTRAMUSCULAR; INTRAVENOUS at 03:12

## 2021-12-11 RX ADMIN — OXYCODONE HYDROCHLORIDE 15 MG: 15 TABLET ORAL at 22:55

## 2021-12-11 RX ADMIN — TAMSULOSIN HYDROCHLORIDE 0.4 MG: 0.4 CAPSULE ORAL at 09:03

## 2021-12-11 RX ADMIN — CARVEDILOL 3.12 MG: 3.12 TABLET, FILM COATED ORAL at 16:26

## 2021-12-11 RX ADMIN — CARVEDILOL 3.12 MG: 3.12 TABLET, FILM COATED ORAL at 09:03

## 2021-12-11 RX ADMIN — MORPHINE SULFATE 2 MG: 2 INJECTION, SOLUTION INTRAMUSCULAR; INTRAVENOUS at 16:26

## 2021-12-11 RX ADMIN — ENOXAPARIN SODIUM 80 MG: 100 INJECTION SUBCUTANEOUS at 09:04

## 2021-12-11 RX ADMIN — DIPHENHYDRAMINE HYDROCHLORIDE 25 MG: 50 INJECTION, SOLUTION INTRAMUSCULAR; INTRAVENOUS at 23:13

## 2021-12-11 RX ADMIN — MORPHINE SULFATE 4 MG: 4 INJECTION, SOLUTION INTRAMUSCULAR; INTRAVENOUS at 01:10

## 2021-12-11 RX ADMIN — CEFTRIAXONE SODIUM 1000 MG: 1 INJECTION, POWDER, FOR SOLUTION INTRAMUSCULAR; INTRAVENOUS at 00:31

## 2021-12-11 RX ADMIN — CLOPIDOGREL BISULFATE 75 MG: 75 TABLET ORAL at 09:03

## 2021-12-11 RX ADMIN — MORPHINE SULFATE 2 MG: 2 INJECTION, SOLUTION INTRAMUSCULAR; INTRAVENOUS at 12:08

## 2021-12-11 RX ADMIN — MORPHINE SULFATE 2 MG: 2 INJECTION, SOLUTION INTRAMUSCULAR; INTRAVENOUS at 14:20

## 2021-12-11 RX ADMIN — DIPHENHYDRAMINE HYDROCHLORIDE 25 MG: 50 INJECTION, SOLUTION INTRAMUSCULAR; INTRAVENOUS at 12:25

## 2021-12-11 RX ADMIN — MORPHINE SULFATE 2 MG: 2 INJECTION, SOLUTION INTRAMUSCULAR; INTRAVENOUS at 09:10

## 2021-12-11 RX ADMIN — DIPHENHYDRAMINE HYDROCHLORIDE 25 MG: 50 INJECTION, SOLUTION INTRAMUSCULAR; INTRAVENOUS at 05:29

## 2021-12-11 RX ADMIN — FUROSEMIDE 40 MG: 10 INJECTION, SOLUTION INTRAMUSCULAR; INTRAVENOUS at 09:03

## 2021-12-11 RX ADMIN — SODIUM CHLORIDE 25 ML: 9 INJECTION, SOLUTION INTRAVENOUS at 00:29

## 2021-12-11 ASSESSMENT — PAIN DESCRIPTION - PAIN TYPE
TYPE: ACUTE PAIN
TYPE: ACUTE PAIN

## 2021-12-11 ASSESSMENT — PAIN SCALES - GENERAL
PAINLEVEL_OUTOF10: 8
PAINLEVEL_OUTOF10: 9
PAINLEVEL_OUTOF10: 4
PAINLEVEL_OUTOF10: 9
PAINLEVEL_OUTOF10: 10
PAINLEVEL_OUTOF10: 7
PAINLEVEL_OUTOF10: 6
PAINLEVEL_OUTOF10: 8
PAINLEVEL_OUTOF10: 7

## 2021-12-11 ASSESSMENT — PAIN DESCRIPTION - PROGRESSION
CLINICAL_PROGRESSION: NOT CHANGED
CLINICAL_PROGRESSION: NOT CHANGED

## 2021-12-11 ASSESSMENT — PAIN DESCRIPTION - DESCRIPTORS
DESCRIPTORS: ACHING;PRESSURE
DESCRIPTORS: ACHING;PRESSURE

## 2021-12-11 ASSESSMENT — PAIN DESCRIPTION - LOCATION
LOCATION: CHEST
LOCATION: CHEST

## 2021-12-11 ASSESSMENT — PAIN DESCRIPTION - ORIENTATION
ORIENTATION: MID
ORIENTATION: MID

## 2021-12-11 ASSESSMENT — PAIN DESCRIPTION - FREQUENCY
FREQUENCY: CONTINUOUS
FREQUENCY: CONTINUOUS

## 2021-12-11 ASSESSMENT — PAIN DESCRIPTION - ONSET: ONSET: ON-GOING

## 2021-12-11 NOTE — FLOWSHEET NOTE
Pt given Incentive spirometer, educated on use et benefits, encouraged to prone, states he is unable to lay on stomach, encouraged to turn side-to-side and reposition freq.  Shrugged shoulders and stated \"whatever\" writer plans to continue to offer encouragement

## 2021-12-11 NOTE — PROGRESS NOTES
[facility]  Office: [address], DO, Patel Izabella, DO, Rigoberto Iraheta, DO, Isiah Simon Blood, DO, Nathanael Quiroz MD, Balta Lopez MD, Kirk Iglesias MD, Hanny Cope MD, Erich Umana MD, Padmini Murrell MD, Shabnam Wick MD, Starr Magallon, DO, Em Rhoades, DO, Jarad Sotelo MD,  Katiuska Zamora, DO, Sushma Frederick MD, Angelo Gallegos MD, Myla Munoz MD, George Ulloa MD, Opal Johns MD, Rebecca Cunningham MD, Kishor Roberts MD, Daisha Tran [facility], [facility], CNP, Wayne Ross, CNP, Kary Roberts, CNS, Era Castro, CNP, Rachid Burkett, CNP, Griselda Solares, CNP, Devyn Luevano, CNP, Beverly Stallworth, CNP, Eda Fonseca PA-C, Charmel Osgood, [facility], Cheryl Pena, CNP, Zhanna Tinoco, CNP, Hattie Zamora, CNP, Tiney Schilder, CNP, Heather Carpenter CNP, Marlyne Osgood, CNP, [facility]    Progress Note    12/11/2021    5:14 PM    Name:   Callie Silva  MRN:     [id number]     Acct:      [de-identified]   Room:   [address] [place] [address] Day:  0  Admit Date:  12/7/2021  3:24 PM    PCP:   No primary care provider on file. Code Status:  Full Code    Subjective:     C/C:   Chief Complaint   Patient presents with    Chest Pain     pt reports onset at least 1 week ago. has been admitted to [facility] for past 10 days, left AMA from there this afternoon.  Shortness of Breath     ongoing and unchanged x past 4-5 days. Interval History Status: improved. Patient seen and examined this afternoon. No acute events overnight. Requesting methadone IV pain medications. Becoming more hypoxic. Been aggressive with staff at times. Vitals are stable. Brief History:     Callie Silva is a 63 y.o.  gentleman who [facility] for evaluation of chest pain and feeling unwell. Patient reports that he left [facility] approximately 3 days ago after being treated poorly.   He states he was discovered to be COVID-19 positive during that time and was placed in isolation. He states that staff is treating him poorly. He decided to leave [address] It appears that he was therefore chest pain and feeling unwell as well. He is homeless and has been bouncing between hotels. However, he cannot afford this. He states that he [place] the mall where security noted that he looked unwell. He was subsequently transferred to our facility for further evaluation and treatment. He states that he has a history of coronary artery disease status post CABG (twice - 2011, 2018), prior CVA, solitary kidney, and BPH. He notes he is on carvedilol, Eliquis, Plavix, Flomax. He states that he has not taken his medications in about 3 days. He states that he is having a significant bout of chest pain which is centrally located on his chest.  He states that this is constant. Relieved with nitro but abruptly returns. He is not requiring supplemental oxygen. He is very anxious on exam and reports that he is significantly itchy as well. He is requesting IV Benadryl and IV narcotics. He is very fixated on receiving IV narcotics. Troponin noted to be elevated in the emergency department assess his creatinine. He notes that his baseline creatinine is 1.4. He will be admitted for further work-up and treatment of elevated troponin along with JACKSON. Review of Systems:     Constitutional:  negative for chills, fevers, sweats  Respiratory:  negative for cough, dyspnea on exertion, shortness of breath, wheezing  Cardiovascular: Reports orthopnea; reports persistent chest pain which is centrally located. Negative for lower extremity edema, palpitations  Gastrointestinal:  negative for abdominal pain, constipation, diarrhea, nausea, vomiting  Neurological:  negative for dizziness, headache  Integument: Reports slight improvement of itching    Medications: Allergies:     Allergies   Allergen Reactions  Nsaids Swelling    Asa [Aspirin] Swelling    Oxycodone Hives       Current Meds:   Scheduled Meds:    albuterol  2.5 mg Nebulization BID    furosemide  40 mg IntraVENous Daily    atorvastatin  40 mg Oral Nightly    carvedilol  3.125 mg Oral BID WC    clopidogrel  75 mg Oral Daily    [Held by provider] apixaban  5 mg Oral BID    enoxaparin  1 mg/kg SubCUTAneous BID    tamsulosin  0.4 mg Oral Daily     Continuous Infusions:    sodium chloride Stopped (21 0139)     PRN Meds: sodium chloride, albuterol sulfate HFA, hydrocortisone-aloe, diphenhydrAMINE, ondansetron **OR** ondansetron, magnesium hydroxide, potassium chloride **OR** potassium alternative oral replacement **OR** potassium chloride, potassium chloride, magnesium sulfate, nitroGLYCERIN, perflutren lipid microspheres, morphine **OR** morphine    Data:     Past Medical History:   has a past medical history of AICD (automatic cardioverter/defibrillator) present, CKD (chronic kidney disease), stage III ([address] Myocardial infarction ([address] S/P CABG (coronary artery bypass graft), and Solitary kidney, congenital.    Social History:   reports that he has been smoking. He has been smoking about 0.25 packs per day. He has never used smokeless tobacco. He reports previous alcohol use. He reports previous drug use. Family History:   Family History   Problem Relation Age of Onset   [facility] Cancer Mother     Diabetes Father     Heart Disease Father     Diabetes Paternal Grandmother        Vitals:  /77   Pulse 70   Temp 99.7 °F (37.6 °C) (Oral)   Resp 16   Ht 5' 10\" (1.778 m)   Wt 175 lb 9.6 oz (79.7 kg)   SpO2 97%   BMI 25.20 kg/m²   Temp (24hrs), Av.7 °F (37.6 °C), Min:98.4 °F (36.9 °C), Max:102 °F (38.9 °C)    No results for input(s): POCGLU in the last 72 hours. I/O (24Hr):     Intake/Output Summary (Last 24 hours) at 2021 1714  Last data filed at 2021 1340  Gross per 24 hour   Intake 471.63 ml   Output 1420 ml   Net -948.37 ml       Labs:  Hematology:  Recent Labs     12/09/21  0557 12/10/21  0603 12/11/21  0658   WBC 4.9 2.6* 1.9*   RBC 3.52* 3.50* 3.32*   HGB 10.9* 10.7* 10.4*   HCT 34.7* 35.6* 33.2*   MCV 98.6 101.7 100.0   MCH 31.0 30.6 31.3   MCHC 31.4 30.1 31.3   RDW 16.5* 17.1* 16.5*    157 123*   MPV 10.3 10.4 10.2   CRP 3.3  --  33.2*   INR 1.1  --   --      Chemistry:  Recent Labs     12/09/21  0557 12/10/21  0603 12/11/21  0658    139 141   K 4.2 4.3 3.8    105 105   CO2 21 21 23   GLUCOSE 107* 86 85   BUN 42* 47* 43*   CREATININE 2.29* 2.34* 2.27*   MG  --   --  1.8   ANIONGAP 13 13 13   LABGLOM 30* 29* 30*   GFRAA 36* 35* 36*   CALCIUM 8.5* 8.2* 7.9*   PHOS  --   --  3.3   PROBNP 33,623*  --  23,071*     Recent Labs     12/09/21  0557   PROT 6.3*   LABALBU 3.4*   AST 30   ALT 43*   ALKPHOS 89   BILITOT 0.29*   CHOL 161   HDL 48   LDLCHOLESTEROL 96   CHOLHDLRATIO 3.4   TRIG 83   VLDL NOT REPORTED     ABG:No results found for: POCPH, PHART, PH, POCPCO2, PFA6RQN, PCO2, POCPO2, PO2ART, PO2, POCHCO3, CCH5ANB, HCO3, NBEA, PBEA, BEART, BE, THGBART, THB, CLT3YEA, YCMZ7KZS, Q7TEMVIG, O2SAT, FIO2  No results found for: SPECIAL  No results found for: CULTURE    Radiology:  CT ABDOMEN PELVIS WO CONTRAST Additional Contrast? None    Result Date: 12/9/2021  1. Left-sided hydronephrosis/hydroureter with uroepithelial thickening and small amount of air seen within the collecting system. No obstructing stone or gross mass. Left UVJ stricture cannot be excluded. Correlation with urinalysis is suggested as underlying infectious process cannot be excluded. 2. 4 mm nonobstructing calculus left kidney. 3. Cardiomegaly. 4. Presumed post injection changes are seen involving the left anterior abdominal wall. 5. Body wall anasarca. 6. Small amount of free fluid. 7. Sigmoid diverticulosis. NM LUNG SCAN PERFUSION ONLY    Result Date: 12/7/2021  Low Probability for Pulmonary Embolus.      XR CHEST 1 VIEW    Result Date: 12/7/2021  No prior study available. No definite pneumonia. Slight blunting left CP angle. Cardiomegaly; venous hypertension without radiographic CHF. US RETROPERITONEAL COMPLETE    Result Date: 12/8/2021  1. Nonvisualization of the right kidney. 2. Mild-to-moderate left-sided hydronephrosis. 3. Exam limited due to patient's bowel gas and body habitus. 4. Nonvisualization of ureteral jets. Urinary bladder grossly unremarkable. Physical Examination:        General appearance:  alert, cooperative and no distress, somewhat disheveled appearing  gentleman lying supine in bed  Mental Status:  oriented to person, place and time and normal affect  Lungs: Globally diminished lung sounds, normal effort, no wheezing, rales, rhonchi  Heart:  regular rate and rhythm, no murmur  Abdomen:  soft, nontender, nondistended, normal bowel sounds, no masses, hepatomegaly, splenomegaly  Extremities:  no edema, redness, tenderness in the calves  Skin:  no gross lesions, rashes, induration, well-healed cicatrix on the anterior chest wall    Assessment:        Hospital Problems           Last Modified POA    * (Principal) NSTEMI (non-ST elevated myocardial infarction) ([address] 12/8/2021 Yes    JACKSON (acute kidney injury) ([address] 12/8/2021 Yes    Elevated troponin 12/8/2021 Yes    Hydronephrosis of left kidney 12/9/2021 Yes    Acute urinary retention 12/9/2021 Yes    AICD (automatic cardioverter/defibrillator) present 12/8/2021 Yes    History of MI (myocardial infarction) 12/8/2021 Yes    Elevated brain natriuretic peptide (BNP) level 12/8/2021 Yes    Chronic systolic heart failure ([address] 12/8/2021 Yes    Hx of CABG 12/8/2021 Yes    Stage 3 chronic kidney disease ([address] 12/8/2021 Yes    COVID-19 virus infection 12/8/2021 Yes    Hepatitis C 12/9/2021 Yes          Plan:        Angina/elevated troponin-continue Lovenox at therapeutic dosing until cardiology evaluates. Change morphine to Dilaudid. Start Roxicodone. Alternate. Patient reports that he is intolerant to Ranexa and nitrates   He recently had a JOYCE at [facility] which revealed an EF of 25 to 30%. He did not have any evidence for endocarditis to explain his bilateral occipital infarcts. He previously has an AICD implanted 2 years ago. Heart failure with reduced ejection fraction-continue IV Lasix. Would benefit from Aldactone. Coronary artery disease status post CABG (two separate times) - continue Plavix. Likely needs further ischemia work-up. Continue therapeutic Lovenox. Continue Lipitor, carvedilol. Appreciate cardiology input. JACKSON on CKD-appreciate nephrology input. Agree with Lasix. COVID-19 infection - not initially requiring supplemental oxygen. Now requiring supplemental oxygen. CRP is uptrending. Start Decadron. Positive test on 12/7. Left-sided hydronephrosis/urinary retention-urology consulted. Appreciate input. Not wanting to pursue further work-up at this time. Monitor renal function. Cystoscopy if renal function worsens. Recent CVA, bilateral occipital lobes, status post TPA-at [facility].  Recent hemoptysis-EGD and colonoscopy were emergently performed [facility] to the hospital.  Hepatitis C antibody positive - patient does not know of this history. Will need followed up in the outpatient setting. Checking quant hep C  Homelessness - CM assisting, but patient is not cooperating. Patient's records have been loaded into our system. Actively looking through.     33 Lisa Paez DO  12/11/2021  5:14 PM

## 2021-12-11 NOTE — CONSULTS
Urology Consultation    Patient:  Anali Jay  MRN: [id number]  YOB: 1965    CHIEF COMPLAINT:  Left hydronephrosis    HISTORY OF PRESENT ILLNESS:   The patient is a 63 y.o. male with left hydro. He has a complex urologic history. He had a left ureteral re implant done at [facility] several years ago. He has a solitary left kidney. He recently tested positive for COVID 19. He was seen at [facility] last week, and left AMA. While there, he was seen by [facility], who did a cysto and left retrograde pyelogram.  They removed his stent because his system did not appear obstructed. He is not having any flank pain at this time. He feels like he is voiding well. Patient's old records, notes and chart reviewed and summarized above. Past Medical History:    Past Medical History:   Diagnosis Date    AICD (automatic cardioverter/defibrillator) present     CKD [facility]chronic kidney disease), stage III ([facility])     Myocardial infarction ([facility] [address]     S/P CABG (coronary artery bypass graft)     x2 separate occasions    Solitary kidney, congenital        Past Surgical History:    Past Surgical History:   Procedure Laterality Date    CORONARY ARTERY BYPASS GRAFT      CORONARY ARTERY BYPASS GRAFT       Previous  surgery: left ureteral re-implant.       Medications:    Scheduled Meds:   albuterol  2.5 mg Nebulization BID    furosemide  40 mg IntraVENous Daily    atorvastatin  40 mg Oral Nightly    carvedilol  3.125 mg Oral BID WC    clopidogrel  75 mg Oral Daily    [Held by provider] apixaban  5 mg Oral BID    enoxaparin  1 mg/kg SubCUTAneous BID    tamsulosin  0.4 mg Oral Daily     Continuous Infusions:   sodium chloride Stopped (12/11/21 0139)     PRN Meds:.sodium chloride, albuterol sulfate HFA, hydrocortisone-aloe, diphenhydrAMINE, ondansetron **OR** ondansetron, magnesium hydroxide, potassium chloride **OR** potassium alternative oral replacement **OR** potassium chloride, potassium chloride, magnesium sulfate, nitroGLYCERIN, perflutren lipid microspheres, morphine **OR** morphine    Allergies:  Nsaids, Asa [aspirin], and Oxycodone    Social History:    Social History     Socioeconomic History    Marital status: Single     Spouse name: Not on file    Number of children: Not on file    Years of education: Not on file    Highest education level: Not on file   Occupational History    Not on file   Tobacco Use    Smoking status: Current Every Day Smoker     Packs/day: 0.25    Smokeless tobacco: Never Used   Substance and Sexual Activity    Alcohol use: Not Currently     Comment: rarely    Drug use: Not Currently     Comment: not in over 27 years    Sexual activity: Not on file   Other Topics Concern    Not on file   Social History Narrative    Not on file     Social Determinants of Health     Financial Resource Strain:     Difficulty of Paying Living Expenses: Not on file   Food Insecurity:     Worried About Running Out of Food in the Last Year: Not on file    [place] of Food in the Last Year: Not on file   Transportation Needs:     Lack of Transportation (Medical): Not on file    Lack of Transportation (Non-Medical):  Not on file   Physical Activity:     Days of Exercise per Week: Not on file    Minutes of Exercise per Session: Not on file   Stress:     Feeling of Stress : Not on file   Social Connections:     Frequency of Communication with Friends and Family: Not on file    Frequency of Social Gatherings with Friends and Family: Not on file    Attends Cheondoism Services: Not on file    Active Member of Clubs or Organizations: Not on file    Attends Club or Organization Meetings: Not on file    Marital Status: Not on file   Intimate Partner Violence:     Fear of Current or Ex-Partner: Not on file    Emotionally Abused: Not on file    Physically Abused: Not on file    Sexually Abused: Not on file   Housing Stability:     Unable to Pay for Housing in the Last Year: Not on file  Number of Places Lived in the Last Year: Not on file    Unstable Housing in the Last Year: Not on file       Family History:    Family History   Problem Relation Age of Onset    Cancer Mother     Diabetes Father     Heart Disease Father     Diabetes Paternal Grandmother      Previous Urologic Family history: none    REVIEW OF SYSTEMS:  Constitutional: negative  Eyes: negative  Respiratory: cough, chest pain  Cardiovascular: negative  Gastrointestinal: negative  Genitourinary: see HPI  Musculoskeletal: negative  Skin: negative   Neurological: negative  Hematological/Lymphatic: negative  Psychological: negative    Physical Exam:    This a 63 y.o. male   Patient Vitals for the past 24 hrs:   BP Temp Temp src Pulse Resp SpO2 Weight   12/11/21 1249 126/80 99.5 °F (37.5 °C) Oral 73 16 96 % --   12/11/21 0739 129/87 99.7 °F (37.6 °C) Oral 70 16 95 % --   12/11/21 0410 (!) 144/89 98.4 °F (36.9 °C) Oral 74 18 (!) 85 % --   12/11/21 0000 (!) 140/89 99 °F (37.2 °C) Oral 73 16 94 % 175 lb 9.6 oz (79.7 kg)   12/10/21 2313 -- -- -- -- -- 98 % --   12/10/21 2014 114/64 102 °F (38.9 °C) Oral 75 18 92 % --   12/10/21 1601 125/82 99 °F (37.2 °C) Oral 70 16 97 % --     Constitutional: Patient in no acute distress; Neuro: alert and oriented to person place and time. Psych: Mood and affect normal.  Skin: Normal  Lungs: Respiratory effort normal  Cardiovascular:  Normal peripheral pulses  Abdomen: Soft, non-tender, non-distended with no CVA, flank pain, hepatosplenomegaly or hernia. Kidneys normal.  Bladder non-tender and not distended.   Lymphatics: no palpable lymphadenopathy  Penis normal and circumcised  Urethral meatus normal  LABS:  Recent Labs     12/09/21  0557 12/10/21  0603 12/11/21  0658   WBC 4.9 2.6* 1.9*   HGB 10.9* 10.7* 10.4*   HCT 34.7* 35.6* 33.2*   MCV 98.6 101.7 100.0    157 123*     Recent Labs     12/09/21  0557 12/10/21  0603 12/11/21  0658    139 141   K 4.2 4.3 3.8    105 105 CO2 21 21 23   PHOS  --   --  3.3   BUN 42* 47* 43*   CREATININE 2.29* 2.34* 2.27*     No results found for: PSA    Additional Lab/culture results:    Urinalysis:   Recent Labs     12/11/21  0005   COLORU Yellow   PHUR 7.0   WBCUA 0 TO 2   RBCUA 0 TO 2   MUCUS NOT REPORTED   TRICHOMONAS NOT REPORTED   YEAST NOT REPORTED   BACTERIA NOT REPORTED   SPECGRAV 1.015   LEUKOCYTESUR NEGATIVE   UROBILINOGEN Normal   BILIRUBINUR NEGATIVE        -----------------------------------------------------------------  Imaging Results:    CT images reviewed, hydro noted in solitary left kidney all the way down to the bladder. Assessment and Plan   Impression:    Patient Active Problem List   Diagnosis    JACKSON (acute kidney injury) ([facility] [address]    Other chest pain    AICD (automatic cardioverter/defibrillator) present    History of MI (myocardial infarction)    Elevated brain natriuretic peptide (BNP) level    Elevated troponin    NSTEMI (non-ST elevated myocardial infarction) ([address]    Chronic systolic heart failure ([address]    Hx of CABG    Stage 3 chronic kidney disease ([address]    COVID-19 virus infection    Hydronephrosis of left kidney    Acute urinary retention    Hepatitis C       Plan:     63year-old male admitted with COVID 19. He has chronic left hydro, and a solitary left kidney. He is not having any flank pain at this time. Retrograde done last week showed a non obstructed system, which drained contrast well. His stent was left out as a result. Will follow for now. Would replace stent if his renal function worsens or he develops severe flank pain.       Mak Cox MD  2:20 PM 12/11/2021

## 2021-12-11 NOTE — CONSULTS
Reason for Consult:  CP, elevated troponin  Requesting Physician: Josh Marr DO    CHIEF COMPLAINT:  Chest pain  History Obtained From:  patient, electronic medical record    HISTORY OF PRESENT ILLNESS:      The patient is a 63 y.o. male with significant past medical history of 7 MI about 15 years ago, 2 open heart surgery in 2018 by Dr. Roma Arboleda in [place], [place] and in 2010 Dr. Sunny Salas at the [facility], had 5 stents many years ago, ischemic cardiomyopathy,chronic a. Fib, cKD( born with one kidney ICD in [place], hepatitis who was at [place] last week for 7-10 days for chest pain then he left the hospital but did not  signed AMA but came to ER at [facility] with CP and was tested positive for COVID 19 on 2 liters, patient's cp is constant for many days and has been requiring morphine sulfate. And he smokes 1 pack of cigarette every 3 days. No ICD shocks. I called the patient's phone and obtain above information as well as the information available in the review of system over the phone. He sounded comfortable      Past Medical History:    Past Medical History:   Diagnosis Date    AICD (automatic cardioverter/defibrillator) present     CKD (chronic kidney disease), stage III (HCC)     Myocardial infarction ([facility] [address]     S/P CABG (coronary artery bypass graft)     x2 separate occasions    Solitary kidney, congenital      Past Surgical History:    Past Surgical History:   Procedure Laterality Date    CORONARY ARTERY BYPASS GRAFT      CORONARY ARTERY BYPASS GRAFT       Home Medications:  Prior to Admission medications    Medication Sig Start Date End Date Taking?  Authorizing Provider   clopidogrel (PLAVIX) 75 MG tablet Take 1 tablet by mouth daily   Yes Historical Provider, MD   atorvastatin (LIPITOR) 40 MG tablet Take 1 tablet by mouth daily   Yes Historical Provider, MD   tamsulosin (FLOMAX) 0.4 MG capsule Take 0.4 mg by mouth daily   Yes Historical Provider, MD   apixaban (ELIQUIS) 5 MG TABS tablet Take 5 mg by mouth 2 times daily   Yes Historical Provider, MD   furosemide (LASIX) 20 MG tablet Take 20 mg by mouth as needed (swelling)    Yes Historical Provider, MD   amLODIPine (NORVASC) 10 MG tablet Take 0.5 tablets by mouth daily    Historical Provider, MD   carvedilol (COREG) 6.25 MG tablet Take 1 tablet by mouth 2 times daily    Historical Provider, MD     Current Medications:    Current Facility-Administered Medications   Medication Dose Route Frequency Provider Last Rate Last Admin    0.9 % sodium chloride infusion  25 mL IntraVENous PRN Jhonatan Ceron MD   Stopped at 12/11/21 0139    albuterol sulfate  (90 Base) MCG/ACT inhaler 2 puff  2 puff Inhalation Q6H PRN Tuan Jewell DO        albuterol (PROVENTIL) nebulizer solution 2.5 mg  2.5 mg Nebulization BID Johnathan Jewell DO        hydrocortisone-aloe 1 % cream   Topical TID PRN Marium Jaffe APRN - JOCELYN        furosemide (LASIX) injection 40 mg  40 mg IntraVENous Daily Hever Mendoza MD   40 mg at 12/11/21 0903    diphenhydrAMINE (BENADRYL) injection 25 mg  25 mg IntraVENous Q6H PRN Tuan Jewell DO   25 mg at 12/11/21 1225    ondansetron (ZOFRAN-ODT) disintegrating tablet 4 mg  4 mg Oral Q8H PRN Milo Guess, APRN - CNP        Or    ondansetron [facility]) injection 4 mg  4 mg IntraVENous Q6H PRN Milo Guess, APRN - CNP   4 mg at 12/09/21 2055    magnesium hydroxide (MILK OF MAGNESIA) 400 MG/5ML suspension 30 mL  30 mL Oral Daily PRN Milo Guess, APRN - CNP        potassium chloride (KLOR-CON M) extended release tablet 40 mEq  40 mEq Oral PRN Milo Guess, APRN - CNP        Or    potassium bicarb-citric acid (EFFER-K) effervescent tablet 40 mEq  40 mEq Oral PRN Milo Guess, APRN - CNP        Or    potassium chloride 10 mEq/100 mL IVPB (Peripheral Line)  10 mEq IntraVENous PRN Milo Guess, APRN - CNP        potassium chloride 10 mEq/100 mL IVPB (Peripheral Line)  10 mEq IntraVENous PRN Aldona Lacy, APRN - CNP        magnesium sulfate 1000 mg in dextrose 5% 100 mL IVPB  1,000 mg IntraVENous PRN Aldona Lacy, APRN - CNP        atorvastatin (LIPITOR) tablet 40 mg  40 mg Oral Nightly Aldona Lacy, APRN - CNP   40 mg at 12/09/21 2029    nitroGLYCERIN (NITROSTAT) SL tablet 0.4 mg  0.4 mg SubLINGual Q5 Min PRN Aldona Lacy, APRN - CNP        perflutren lipid microspheres (DEFINITY) injection 1.65 mg  1.5 mL IntraVENous ONCE PRN Aldona Lacy, APRN - CNP        morphine (PF) injection 2 mg  2 mg IntraVENous Q2H PRN Dedrick Hanna Hauger, DO   2 mg at 12/11/21 1420    Or    morphine sulfate (PF) injection 4 mg  4 mg IntraVENous Q2H PRN Dedrick Hanna Hauger, DO   4 mg at 12/11/21 9833    carvedilol (COREG) tablet 3.125 mg  3.125 mg Oral BID  Dedrick Hanna Hauger, DO   3.125 mg at 12/11/21 3673    clopidogrel (PLAVIX) tablet 75 mg  75 mg Oral Daily Dedrick Hanna Hauger, DO   75 mg at 12/11/21 0454    [Held by provider] apixaban (ELIQUIS) tablet 5 mg  5 mg Oral BID Dedrick Hanna Hauger, DO   5 mg at 12/08/21 1613    enoxaparin (LOVENOX) injection 80 mg  1 mg/kg SubCUTAneous BID Dedrick Hanna Hauger, DO   80 mg at 12/11/21 1488    tamsulosin (FLOMAX) capsule 0.4 mg  0.4 mg Oral Daily Dedrick Hanna Hauger, DO   0.4 mg at 12/11/21 0190     Allergies:  Nsaids, Asa [aspirin], and Oxycodone    Social History:    Social History     Socioeconomic History    Marital status: Single     Spouse name: Not on file    Number of children: Not on file    Years of education: Not on file    Highest education level: Not on file   Occupational History    Not on file   Tobacco Use    Smoking status: Current Every Day Smoker     Packs/day: 0.25    Smokeless tobacco: Never Used   Substance and Sexual Activity    Alcohol use: Not Currently     Comment: rarely    Drug use: Not Currently     Comment: not in over 30 years    Sexual activity: Not on file   Other Topics Concern    Not on file   Social History Narrative    Not on file     Social Determinants of Health     Financial Resource Strain:     Difficulty of Paying Living Expenses: Not on file   Food Insecurity:     Worried About Running Out of Food in the Last Year: Not on file    [place]a of Food in the Last Year: Not on file   Transportation Needs:     Lack of Transportation (Medical): Not on file    Lack of Transportation (Non-Medical):  Not on file   Physical Activity:     Days of Exercise per Week: Not on file    Minutes of Exercise per Session: Not on file   Stress:     Feeling of Stress : Not on file   Social Connections:     Frequency of Communication with Friends and Family: Not on file    Frequency of Social Gatherings with Friends and Family: Not on file    Attends Congregation Services: Not on file    Active Member of [address] [place] [address] [employer] or Organizations: Not on file    Attends Club or Organization Meetings: Not on file    Marital Status: Not on file   Intimate Partner Violence:     Fear of Current or Ex-Partner: Not on file    Emotionally Abused: Not on file    Physically Abused: Not on file    Sexually Abused: Not on file   Housing Stability:     Unable to Pay for Housing in the Last Year: Not on file    Number of [place] in the Last Year: Not on file    Unstable Housing in the Last Year: Not on file     Family History:   Family History   Problem Relation Age of Onset    Cancer Mother     Diabetes Father     Heart Disease Father     Diabetes Paternal Grandmother        · REVIEW OF SYSTEMS   CONSTITUTIONAL: negative  EYES:  negative  HEENT:  negative  RESPIRATORY: positive for  dry cough and dyspnea   CARDIOVASCULAR:  positive for  chest pain, constant for several days  HEMATOLOGIC/LYMPHATIC:  negative  ALLERGIC/IMMUNOLOGIC:  negative  ENDOCRINE:  negative  MUSCULOSKELETAL:  negative  NEUROLOGICAL:  negative  BEHAVIOR/PSYCH:  negative    PHYSICAL EXAM: Vitals:    VITALS:  /80   Pulse 73   Temp 99.5 °F (37.5 °C) (Oral)   Resp 16   Ht 5' 10\" (1.778 m)   Wt 175 lb 9.6 oz (79.7 kg)   SpO2 96%   BMI 25.20 kg/m²   24HR INTAKE/OUTPUT:      Intake/Output Summary (Last 24 hours) at 12/11/2021 1510  Last data filed at 12/11/2021 1340  Gross per 24 hour   Intake 471.63 ml   Output 1420 ml   Net -948. 37 ml       CONSTITUTIONAL: Based on my impression by calling him over the phone he was awake, alert, cooperative, no apparent distress. Physical examination was deferred to preserve and personal protective equipment and to minimize spread of infection. DATA:   ECG:  Date: 12/9/2021  I have reviewed EKG with the following interpretation: Paced ventricular rhythm with underlying atrial fibrillation  ECHO: Date: 12/10/2021    Left ventricle is enlarged Global left ventricular systolic function is moderate to severely reduced Estimated ejection fraction is 25-30% . Mild aortic insufficiency. Moderate mitral regurgitation. Moderate tricuspid regurgitation. Moderate pulmonary hypertension. Estimated right ventricular systolic pressure is 59-53GNRJ. No pericardial effusion. Signature  Electronically signed by Nuria Johns(Sonographer) on 12/10/2021 01:58 PM  Electronically signed by Jayshree Sharpe(Interpreting physician) on 12/10/2021 06:18 PM  Report Status: [facility]      Cardiology Labs:No results for input(s): MYOGLOBIN, CKTOTAL, CKMB, CKMBINDEX, TROPHS, TROPONINT in the last 72 hours.   Warfarin PT/INR:  Lab Results   Component Value Date    PROTIME 14.3 12/09/2021    INR 1.1 12/09/2021     CBC:  Lab Results   Component Value Date    WBC 1.9 12/11/2021    RBC 3.32 12/11/2021    HGB 10.4 12/11/2021    HCT 33.2 12/11/2021    .0 12/11/2021    MCH 31.3 12/11/2021    MCHC 31.3 12/11/2021    RDW 16.5 12/11/2021     12/11/2021    MPV 10.2 12/11/2021     CMP:  Lab Results   Component Value Date     12/11/2021    K 3.8 12/11/2021     12/11/2021    CO2 23 12/11/2021    BUN 43 12/11/2021    CREATININE 2.27 12/11/2021    GFRAA 36 12/11/2021    LABGLOM 30 12/11/2021    GLUCOSE 85 12/11/2021    CALCIUM 7.9 12/11/2021     Magnesium:    Lab Results   Component Value Date    MG 1.8 12/11/2021     PTT:  No results found for: APTT, PTT  TSH:  No results found for: TSH  BNP:   Recent Labs     12/09/21  0557 12/11/21  0658   PROBNP 81,698* 23,071*     BMP:  Lab Results   Component Value Date     12/11/2021    K 3.8 12/11/2021     12/11/2021    CO2 23 12/11/2021    BUN 43 12/11/2021    LABALBU 3.4 12/09/2021    CREATININE 2.27 12/11/2021    CALCIUM 7.9 12/11/2021    GFRAA 36 12/11/2021    LABGLOM 30 12/11/2021    GLUCOSE 85 12/11/2021     LIVER PROFILE:  Recent Labs     12/09/21  0557   AST 30   ALT 43*   LABALBU 3.4*   ALKPHOS 89   BILITOT 0.29*   PROT 6.3*   ALBUMIN NOT REPORTED     FLP:    Lab Results   Component Value Date    CHOL 161 12/09/2021    TRIG 83 12/09/2021    HDL 48 12/09/2021    LDLCHOLESTEROL 96 12/09/2021             IMPRESSION  · Constant chest pain for the past several days, unlikely to be ischemic in nature  · History of coronary artery disease with multiple myocardial infarction to previous bypass surgery in 2010 and 2018 both done in [place] also history of multiple stenting done several years ago patient could not give me details  · Minimal troponin elevation most likely due to renal failure and COVID-19 infection  · Patient was at [facility] for several days/weeks left [address] and he was subsequently admitted to [facility] the next day  · COVID-19 infection, and 2 L of oxygen  · Chronic kidney disease, and hydronephrosis of the left kidney   · patient has 1 kidney  · Chronic systolic heart failure  · Chronic atrial fibrillation  · Ischemic cardiomyopathy with an ejection fraction of 25 to 30% on echocardiogram done 12/10/2021  · Post implantable cardioverter defibrillator  · History of hepatitis C  · History of noncompliance with medical care  · Patient was seen by her primary care cardiologist at [facility] last week and they declined to see the patient at this hospital and therefore I was asked to see him  Patient Active Problem List   Diagnosis    [facility]) [facility] [address]    Other chest pain    AICD (automatic cardioverter/defibrillator) present    History of MI (myocardial infarction)    Elevated brain natriuretic peptide (BNP) level    Elevated troponin    NSTEMI (non-ST elevated myocardial infarction) ([facility] [address]    Chronic systolic heart failure ([facility] [address]    Hx of CABG    Stage 3 chronic kidney disease ([facility] [address]    COVID-19 virus infection    Hydronephrosis of left kidney    Acute urinary retention    Hepatitis C           RECOMMENDATIONS:     Continue current medications, conservative cardiac management at this time. Continue apixaban. Management plan was discussed with patient and his nurse Kelsi. Thank you for this consultation. Electronically signed by Pam Wade MD on 12/11/2021 at 3:10 PM     CC: No primary care provider on file.

## 2021-12-11 NOTE — FLOWSHEET NOTE
In to give patient decadron, pt kept eyes closed , addressing writer with short answers, refused to take medication, states \"I'll take it later\"writer explained that medications have to taken in the presence of staff, states \"I don't care, leave it there\", writer updated Dr Abilio Betancourt, medication secured and re-timed, will ask oncoming nurse to offer

## 2021-12-11 NOTE — FLOWSHEET NOTE
Call received from Dr. Daisy Soto, updated on patient, labs and vital signs reviewed, states that patient can eat, has no anticipation of surgical interventions today, order received for 1 time PVR

## 2021-12-11 NOTE — RT PROTOCOL NOTE
RT Inhaler-Nebulizer Bronchodilator Protocol Note    There is a bronchodilator order in the chart from a provider indicating to follow the RT Bronchodilator Protocol and there is an Initiate RT Inhaler-Nebulizer Bronchodilator Protocol order as well (see protocol at bottom of note). CXR Findings:  No results found. The findings from the last RT Protocol Assessment were as follows:   History Pulmonary Disease: None or smoker <15 pack years (13 pack years)  Respiratory Pattern: Mild dyspnea at rest, irregular pattern, or RR 21-25 bpm  Breath Sounds: Slightly diminished and/or crackles  Cough: Strong, spontaneous, non-productive  Indication for Bronchodilator Therapy:    Bronchodilator Assessment Score: 6    Aerosolized bronchodilator medication orders have been revised according to the RT Inhaler-Nebulizer Bronchodilator Protocol below. Respiratory Therapist to perform RT Therapy Protocol Assessment initially then follow the protocol. Repeat RT Therapy Protocol Assessment PRN for score 0-3 or on second treatment, BID, and PRN for scores above 3. No Indications - adjust the frequency to every 6 hours PRN wheezing or bronchospasm, if no treatments needed after 48 hours then discontinue using Per Protocol order mode. If indication present, adjust the RT bronchodilator orders based on the Bronchodilator Assessment Score as indicated below. Use Inhaler orders unless patient has one or more of the following: on home nebulizer, not able to hold breath for 10 seconds, is not alert and oriented, cannot activate and use MDI correctly, or respiratory rate 25 breaths per minute or more, then use the equivalent nebulizer order(s) with same Frequency and PRN reasons based on the score. If a patient is on this medication at home then do not decrease Frequency below that used at home.     0-3 - enter or revise RT bronchodilator order(s) to equivalent RT Bronchodilator order with Frequency of every 4 hours PRN for wheezing or increased work of breathing using Per Protocol order mode. 4-6 - enter or revise RT Bronchodilator order(s) to two equivalent RT bronchodilator orders with one order with BID Frequency and one order with Frequency of every 4 hours PRN wheezing or increased work of breathing using Per Protocol order mode. 7-10 - enter or revise RT Bronchodilator order(s) to two equivalent RT bronchodilator orders with one order with TID Frequency and one order with Frequency of every 4 hours PRN wheezing or increased work of breathing using Per Protocol order mode. 11-13 - enter or revise RT Bronchodilator order(s) to one equivalent RT bronchodilator order with QID Frequency and an Albuterol order with Frequency of every 4 hours PRN wheezing or increased work of breathing using Per Protocol order mode. Greater than 13 - enter or revise RT Bronchodilator order(s) to one equivalent RT bronchodilator order with every 4 hours Frequency and an Albuterol order with Frequency of every 2 hours PRN wheezing or increased work of breathing using Per Protocol order mode. RT to enter RT Home Evaluation for COPD & MDI Assessment order using Per Protocol order mode.     Electronically signed by William Vick RCP on 12/10/2021 at 11:14 PM

## 2021-12-11 NOTE — PROGRESS NOTES
Nephrology Progress Note      SUBJECTIVE       Pt was seen and examined. No acute issues overnite. Stable hemodynamics . Patient complains of neck pain, back pain, chest pain. Serum creatinine is down to 2.3 today. He has a solitary left kidney (congenitally absent right kidney)  He has agreed to see urology. Record review show that he has issues with hydronephrosis in the solitary left kidney. Card catheter at one time was attempted but unsuccessful and patient mentioned possibility of chronic urethral scarring. Patient is COVID-19 positive  He denies any orthopnea at this time. From records cardiac echo shows ejection fraction less than 30% with a prior history of open heart surgery      OBJECTIVE      CURRENT TEMPERATURE:  Temp: 99.5 °F (37.5 °C)  MAXIMUM TEMPERATURE OVER 24HRS:  Temp (24hrs), Av.6 °F (37.6 °C), Min:98.4 °F (36.9 °C), Max:102 °F (38.9 °C)    CURRENT RESPIRATORY RATE:  Resp: 16  CURRENT PULSE:  Pulse: 73  CURRENT BLOOD PRESSURE:  BP: 126/80  24HR BLOOD PRESSURE RANGE:  Systolic (06SRL), GXP:554 , Min:114 , KRI:094   ; Diastolic (06DJA), VQO:66, Min:64, Max:89    24HR INTAKE/OUTPUT:    Intake/Output Summary (Last 24 hours) at 2021 1259  Last data filed at 2021 0547  Gross per 24 hour   Intake 51.63 ml   Output 620 ml   Net -568. 37 ml     WEIGHT :  Patient Vitals for the past 96 hrs (Last 3 readings):   Weight   21 0000 175 lb 9.6 oz (79.7 kg)   12/10/21 0524 170 lb 9.6 oz (77.4 kg)   21 1842 170 lb (77.1 kg)     PHYSICAL EXAM      GENERAL APPEARANCE:Awake, alert, in no acute distress  SKIN: warm and dry, no rash or erythema  EYES: conjunctivae normal and sclera anicteric  ENT: no thrush no pharyngeal congestion   NECK:   No JVD. No carotid bruits and no carotid lymphadenopathy . PULMONARY:  Somewhat diminished breath sounds at bases on auscultation, no ronchi . CADRDIOVASCULAR: S1 and S2 normal NO S3 and NO S4 . No rubs , no murmur.    ABDOMEN: soft nontender, bowel sounds present, no organomegaly, no ascites.      EXTREMITIES: + edema     CURRENT MEDICATIONS      0.9 % sodium chloride infusion, PRN  albuterol sulfate  (90 Base) MCG/ACT inhaler 2 puff, Q6H PRN  albuterol (PROVENTIL) nebulizer solution 2.5 mg, BID  hydrocortisone-aloe 1 % cream, TID PRN  furosemide (LASIX) injection 40 mg, Daily  diphenhydrAMINE (BENADRYL) injection 25 mg, Q6H PRN  ondansetron (ZOFRAN-ODT) disintegrating tablet 4 mg, Q8H PRN   Or  ondansetron (ZOFRAN) injection 4 mg, Q6H PRN  magnesium hydroxide (MILK OF MAGNESIA) 400 MG/5ML suspension 30 mL, Daily PRN  potassium chloride (KLOR-CON M) extended release tablet 40 mEq, PRN   Or  potassium bicarb-citric acid (EFFER-K) effervescent tablet 40 mEq, PRN   Or  potassium chloride 10 mEq/100 mL IVPB (Peripheral Line), PRN  potassium chloride 10 mEq/100 mL IVPB (Peripheral Line), PRN  magnesium sulfate 1000 mg in dextrose 5% 100 mL IVPB, PRN  atorvastatin (LIPITOR) tablet 40 mg, Nightly  nitroGLYCERIN (NITROSTAT) SL tablet 0.4 mg, Q5 Min PRN  perflutren lipid microspheres (DEFINITY) injection 1.65 mg, ONCE PRN  morphine (PF) injection 2 mg, Q2H PRN   Or  morphine sulfate (PF) injection 4 mg, Q2H PRN  carvedilol (COREG) tablet 3.125 mg, BID WC  clopidogrel (PLAVIX) tablet 75 mg, Daily  [Held by provider] apixaban (ELIQUIS) tablet 5 mg, BID  enoxaparin (LOVENOX) injection 80 mg, BID  tamsulosin (FLOMAX) capsule 0.4 mg, Daily          LABS      CBC:   Recent Labs     12/09/21  0557 12/10/21  0603 12/11/21  0658   WBC 4.9 2.6* 1.9*   RBC 3.52* 3.50* 3.32*   HGB 10.9* 10.7* 10.4*   HCT 34.7* 35.6* 33.2*   MCV 98.6 101.7 100.0   MCH 31.0 30.6 31.3   MCHC 31.4 30.1 31.3   RDW 16.5* 17.1* 16.5*    157 123*   MPV 10.3 10.4 10.2      BMP:   Recent Labs     12/09/21  0557 12/10/21  0603 12/11/21  0658    139 141   K 4.2 4.3 3.8    105 105   CO2 21 21 23   BUN 42* 47* 43*   CREATININE 2.29* 2.34* 2.27*   GLUCOSE 107* 86 85 CALCIUM 8.5* 8.2* 7.9*      PHOSPHORUS:    Recent Labs     12/11/21  0658   PHOS 3.3     MAGNESIUM:   Recent Labs     12/11/21  0658   MG 1.8     ALBUMIN:   Recent Labs     12/09/21  0557   LABALBU 3.4*     FERRITIN:    Lab Results   Component Value Date    FERRITIN 700 12/07/2021       SPEP:   Lab Results   Component Value Date    PROT 6.3 12/09/2021    PATH ELECTRONICALLY SIGNED. Ashli Roy M.D. 12/09/2021     UPEP: No results found for: TPU   HEPBSAG:  Lab Results   Component Value Date    HEPBSAG NONREACTIVE 12/09/2021     HEPCAB:  Lab Results   Component Value Date    HEPCAB REACTIVE 12/09/2021     URINE SODIUM:    Lab Results   Component Value Date    SHERMAN <20 12/09/2021      URINE CREATININE:    Lab Results   Component Value Date    LABCREA 95.6 12/09/2021     URINALYSIS:  U/A:   Lab Results   Component Value Date    NITRU NEGATIVE 12/11/2021    COLORU Yellow 12/11/2021    PHUR 7.0 12/11/2021    WBCUA 0 TO 2 12/11/2021    RBCUA 0 TO 2 12/11/2021    MUCUS NOT REPORTED 12/11/2021    TRICHOMONAS NOT REPORTED 12/11/2021    YEAST NOT REPORTED 12/11/2021    BACTERIA NOT REPORTED 12/11/2021    SPECGRAV 1.015 12/11/2021    LEUKOCYTESUR NEGATIVE 12/11/2021    UROBILINOGEN Normal 12/11/2021    BILIRUBINUR NEGATIVE 12/11/2021    GLUCOSEU NEGATIVE 12/11/2021    KETUA NEGATIVE 12/11/2021    AMORPHOUS NOT REPORTED 12/11/2021     Renal ultrasound:  Impression   1. Nonvisualization of the right kidney. 2. Mild-to-moderate left-sided hydronephrosis. 3. Exam limited due to patient's bowel gas and body habitus. 4. Nonvisualization of ureteral jets.  Urinary bladder grossly unremarkable. ASSESSMENT      1. Acute on chronic kidney injury secondary to possible obstruction and solitary left kidney complicated further by decompensated heart failure. Per patient his baseline creatinine is around 1.5.   2. HTN:  3. Congenital absence of right kidney  4.   History of ischemic cardiomyopathy with ejection fraction 25 to 30% with prior history of CABG. 5.  History of A. fib in the past, had pacemaker inserted one time. 6. COVID-19 infection    PLAN      1. Urology consultation requested, patient agrees. ?  Bladder scan   2. Continue diuretics for now  3. Follow up labs ordered. 4. Following along       Please do not hesitate to call with questions.     This note is created with the assistance of a speech-recognition program. While intending to generate a document that actually reflects the content of the visit, no guarantees can be provided that every mistake has been identified and corrected by editing    Electronically signed by Bam Bentley MD on 12/11/2021 at 12:59 PM

## 2021-12-12 LAB
ABSOLUTE EOS #: 0.03 K/UL (ref 0–0.4)
ABSOLUTE IMMATURE GRANULOCYTE: 0 K/UL (ref 0–0.3)
ABSOLUTE LYMPH #: 0.3 K/UL (ref 1–4.8)
ABSOLUTE MONO #: 0.15 K/UL (ref 0.2–0.8)
ANION GAP SERPL CALCULATED.3IONS-SCNC: 15 MMOL/L (ref 9–17)
BASOPHILS # BLD: 0 %
BASOPHILS ABSOLUTE: 0 K/UL (ref 0–0.2)
BUN BLDV-MCNC: 42 MG/DL (ref 6–20)
BUN/CREAT BLD: 20 (ref 9–20)
C-REACTIVE PROTEIN: 54.5 MG/L (ref 0–5)
CALCIUM SERPL-MCNC: 8 MG/DL (ref 8.6–10.4)
CHLORIDE BLD-SCNC: 101 MMOL/L (ref 98–107)
CO2: 21 MMOL/L (ref 20–31)
CREAT SERPL-MCNC: 2.13 MG/DL (ref 0.7–1.2)
DIFFERENTIAL TYPE: ABNORMAL
EOSINOPHILS RELATIVE PERCENT: 1 % (ref 1–4)
GFR AFRICAN AMERICAN: 39 ML/MIN
GFR NON-AFRICAN AMERICAN: 32 ML/MIN
GFR SERPL CREATININE-BSD FRML MDRD: ABNORMAL ML/MIN/{1.73_M2}
GFR SERPL CREATININE-BSD FRML MDRD: ABNORMAL ML/MIN/{1.73_M2}
GLUCOSE BLD-MCNC: 93 MG/DL (ref 70–99)
HCT VFR BLD CALC: 33.6 % (ref 40.7–50.3)
HEMOGLOBIN: 10.4 G/DL (ref 13–17)
IMMATURE GRANULOCYTES: 0 %
LYMPHOCYTES # BLD: 12 % (ref 24–44)
MCH RBC QN AUTO: 30.5 PG (ref 25.2–33.5)
MCHC RBC AUTO-ENTMCNC: 31 G/DL (ref 28.4–34.8)
MCV RBC AUTO: 98.5 FL (ref 82.6–102.9)
MONOCYTES # BLD: 6 % (ref 1–7)
NRBC AUTOMATED: 0 PER 100 WBC
PDW BLD-RTO: 16.5 % (ref 11.8–14.4)
PLATELET # BLD: 135 K/UL (ref 138–453)
PLATELET ESTIMATE: ABNORMAL
PMV BLD AUTO: 11 FL (ref 8.1–13.5)
POTASSIUM SERPL-SCNC: 3.7 MMOL/L (ref 3.7–5.3)
RBC # BLD: 3.41 M/UL (ref 4.21–5.77)
RBC # BLD: ABNORMAL 10*6/UL
SEG NEUTROPHILS: 81 % (ref 36–66)
SEGMENTED NEUTROPHILS ABSOLUTE COUNT: 2.02 K/UL (ref 1.8–7.7)
SODIUM BLD-SCNC: 137 MMOL/L (ref 135–144)
WBC # BLD: 2.5 K/UL (ref 3.5–11.3)
WBC # BLD: ABNORMAL 10*3/UL

## 2021-12-12 PROCEDURE — 86140 C-REACTIVE PROTEIN: CPT

## 2021-12-12 PROCEDURE — 6360000002 HC RX W HCPCS: Performed by: INTERNAL MEDICINE

## 2021-12-12 PROCEDURE — 85025 COMPLETE CBC W/AUTO DIFF WBC: CPT

## 2021-12-12 PROCEDURE — 6360000002 HC RX W HCPCS: Performed by: NURSE PRACTITIONER

## 2021-12-12 PROCEDURE — 80048 BASIC METABOLIC PNL TOTAL CA: CPT

## 2021-12-12 PROCEDURE — G0378 HOSPITAL OBSERVATION PER HR: HCPCS

## 2021-12-12 PROCEDURE — 86403 PARTICLE AGGLUT ANTBDY SCRN: CPT

## 2021-12-12 PROCEDURE — 96372 THER/PROPH/DIAG INJ SC/IM: CPT

## 2021-12-12 PROCEDURE — 96376 TX/PRO/DX INJ SAME DRUG ADON: CPT

## 2021-12-12 PROCEDURE — 82803 BLOOD GASES ANY COMBINATION: CPT

## 2021-12-12 PROCEDURE — 6370000000 HC RX 637 (ALT 250 FOR IP): Performed by: INTERNAL MEDICINE

## 2021-12-12 PROCEDURE — 87205 SMEAR GRAM STAIN: CPT

## 2021-12-12 PROCEDURE — 87150 DNA/RNA AMPLIFIED PROBE: CPT

## 2021-12-12 PROCEDURE — 99233 SBSQ HOSP IP/OBS HIGH 50: CPT | Performed by: INTERNAL MEDICINE

## 2021-12-12 PROCEDURE — 36415 COLL VENOUS BLD VENIPUNCTURE: CPT

## 2021-12-12 PROCEDURE — 87040 BLOOD CULTURE FOR BACTERIA: CPT

## 2021-12-12 PROCEDURE — 82374 ASSAY BLOOD CARBON DIOXIDE: CPT

## 2021-12-12 PROCEDURE — 36600 WITHDRAWAL OF ARTERIAL BLOOD: CPT

## 2021-12-12 RX ORDER — ACETAMINOPHEN 325 MG/1
650 TABLET ORAL EVERY 4 HOURS PRN
Status: DISCONTINUED | OUTPATIENT
Start: 2021-12-12 | End: 2021-12-19 | Stop reason: HOSPADM

## 2021-12-12 RX ADMIN — CARVEDILOL 3.12 MG: 3.12 TABLET, FILM COATED ORAL at 08:48

## 2021-12-12 RX ADMIN — TAMSULOSIN HYDROCHLORIDE 0.4 MG: 0.4 CAPSULE ORAL at 08:48

## 2021-12-12 RX ADMIN — OXYCODONE HYDROCHLORIDE 15 MG: 15 TABLET ORAL at 02:01

## 2021-12-12 RX ADMIN — HYDROMORPHONE HYDROCHLORIDE 0.5 MG: 1 INJECTION, SOLUTION INTRAMUSCULAR; INTRAVENOUS; SUBCUTANEOUS at 00:03

## 2021-12-12 RX ADMIN — CARVEDILOL 3.12 MG: 3.12 TABLET, FILM COATED ORAL at 16:30

## 2021-12-12 RX ADMIN — ACETAMINOPHEN 650 MG: 325 TABLET ORAL at 16:30

## 2021-12-12 RX ADMIN — CLOPIDOGREL BISULFATE 75 MG: 75 TABLET ORAL at 08:48

## 2021-12-12 RX ADMIN — FUROSEMIDE 40 MG: 10 INJECTION, SOLUTION INTRAMUSCULAR; INTRAVENOUS at 08:49

## 2021-12-12 RX ADMIN — HYDROMORPHONE HYDROCHLORIDE 0.5 MG: 1 INJECTION, SOLUTION INTRAMUSCULAR; INTRAVENOUS; SUBCUTANEOUS at 06:15

## 2021-12-12 RX ADMIN — DIPHENHYDRAMINE HYDROCHLORIDE 25 MG: 50 INJECTION, SOLUTION INTRAMUSCULAR; INTRAVENOUS at 08:49

## 2021-12-12 RX ADMIN — DIPHENHYDRAMINE HYDROCHLORIDE 25 MG: 50 INJECTION, SOLUTION INTRAMUSCULAR; INTRAVENOUS at 22:11

## 2021-12-12 RX ADMIN — HYDROMORPHONE HYDROCHLORIDE 0.5 MG: 1 INJECTION, SOLUTION INTRAMUSCULAR; INTRAVENOUS; SUBCUTANEOUS at 03:28

## 2021-12-12 RX ADMIN — ENOXAPARIN SODIUM 80 MG: 100 INJECTION SUBCUTANEOUS at 08:48

## 2021-12-12 RX ADMIN — OXYCODONE HYDROCHLORIDE 20 MG: 15 TABLET ORAL at 22:10

## 2021-12-12 RX ADMIN — HYDROMORPHONE HYDROCHLORIDE 0.5 MG: 1 INJECTION, SOLUTION INTRAMUSCULAR; INTRAVENOUS; SUBCUTANEOUS at 23:03

## 2021-12-12 ASSESSMENT — PAIN SCALES - GENERAL
PAINLEVEL_OUTOF10: 10
PAINLEVEL_OUTOF10: 10
PAINLEVEL_OUTOF10: 7
PAINLEVEL_OUTOF10: 7
PAINLEVEL_OUTOF10: 10
PAINLEVEL_OUTOF10: 5
PAINLEVEL_OUTOF10: 7
PAINLEVEL_OUTOF10: 10

## 2021-12-12 ASSESSMENT — PAIN - FUNCTIONAL ASSESSMENT: PAIN_FUNCTIONAL_ASSESSMENT: PREVENTS OR INTERFERES SOME ACTIVE ACTIVITIES AND ADLS

## 2021-12-12 ASSESSMENT — PAIN DESCRIPTION - PROGRESSION
CLINICAL_PROGRESSION: NOT CHANGED

## 2021-12-12 ASSESSMENT — PAIN DESCRIPTION - PAIN TYPE
TYPE: ACUTE PAIN

## 2021-12-12 ASSESSMENT — PAIN DESCRIPTION - LOCATION
LOCATION: CHEST
LOCATION: GENERALIZED
LOCATION: CHEST
LOCATION: CHEST

## 2021-12-12 ASSESSMENT — PAIN DESCRIPTION - DESCRIPTORS
DESCRIPTORS: ACHING
DESCRIPTORS: ACHING;DISCOMFORT
DESCRIPTORS: DISCOMFORT

## 2021-12-12 ASSESSMENT — PAIN DESCRIPTION - FREQUENCY
FREQUENCY: CONTINUOUS

## 2021-12-12 ASSESSMENT — PAIN DESCRIPTION - ONSET
ONSET: ON-GOING

## 2021-12-12 ASSESSMENT — PAIN DESCRIPTION - ORIENTATION: ORIENTATION: MID

## 2021-12-12 NOTE — PROGRESS NOTES
Base) MCG/ACT inhaler 2 puff, Q6H PRN  albuterol (PROVENTIL) nebulizer solution 2.5 mg, BID  hydrocortisone-aloe 1 % cream, TID PRN  furosemide (LASIX) injection 40 mg, Daily  diphenhydrAMINE (BENADRYL) injection 25 mg, Q6H PRN  ondansetron (ZOFRAN-ODT) disintegrating tablet 4 mg, Q8H PRN   Or  ondansetron (ZOFRAN) injection 4 mg, Q6H PRN  magnesium hydroxide (MILK OF MAGNESIA) 400 MG/5ML suspension 30 mL, Daily PRN  potassium chloride (KLOR-CON M) extended release tablet 40 mEq, PRN   Or  potassium bicarb-citric acid (EFFER-K) effervescent tablet 40 mEq, PRN   Or  potassium chloride 10 mEq/100 mL IVPB (Peripheral Line), PRN  potassium chloride 10 mEq/100 mL IVPB (Peripheral Line), PRN  magnesium sulfate 1000 mg in dextrose 5% 100 mL IVPB, PRN  atorvastatin (LIPITOR) tablet 40 mg, Nightly  nitroGLYCERIN (NITROSTAT) SL tablet 0.4 mg, Q5 Min PRN  perflutren lipid microspheres (DEFINITY) injection 1.65 mg, ONCE PRN  carvedilol (COREG) tablet 3.125 mg, BID WC  clopidogrel (PLAVIX) tablet 75 mg, Daily  [Held by provider] apixaban (ELIQUIS) tablet 5 mg, BID  enoxaparin (LOVENOX) injection 80 mg, BID  tamsulosin (FLOMAX) capsule 0.4 mg, Daily          LABS      CBC:   Recent Labs     12/10/21  0603 12/11/21  0658 12/12/21  0651   WBC 2.6* 1.9* 2.5*   RBC 3.50* 3.32* 3.41*   HGB 10.7* 10.4* 10.4*   HCT 35.6* 33.2* 33.6*   .7 100.0 98.5   MCH 30.6 31.3 30.5   MCHC 30.1 31.3 31.0   RDW 17.1* 16.5* 16.5*    123* 135*   MPV 10.4 10.2 11.0      BMP:   Recent Labs     12/10/21  0603 12/11/21  0658 12/12/21  0651    141 137   K 4.3 3.8 3.7    105 101   CO2 21 23 21   BUN 47* 43* 42*   CREATININE 2.34* 2.27* 2.13*   GLUCOSE 86 85 93   CALCIUM 8.2* 7.9* 8.0*      PHOSPHORUS:    Recent Labs     12/11/21  0658   PHOS 3.3     MAGNESIUM:   Recent Labs     12/11/21  0658   MG 1.8     FERRITIN:    Lab Results   Component Value Date    FERRITIN 700 12/07/2021       SPEP:   Lab Results   Component Value Date PROT 6.3 12/09/2021    PATH ELECTRONICALLY SIGNED. Donald Seaman M.D. 12/09/2021       HEPBSAG:  Lab Results   Component Value Date    HEPBSAG NONREACTIVE 12/09/2021     HEPCAB:  Lab Results   Component Value Date    HEPCAB REACTIVE 12/09/2021     URINE SODIUM:    Lab Results   Component Value Date    SHERMAN <20 12/09/2021      URINE CREATININE:    Lab Results   Component Value Date    LABCREA 95.6 12/09/2021     URINALYSIS:  U/A:   Lab Results   Component Value Date    NITRU NEGATIVE 12/11/2021    COLORU Yellow 12/11/2021    PHUR 7.0 12/11/2021    WBCUA 0 TO 2 12/11/2021    RBCUA 0 TO 2 12/11/2021    MUCUS NOT REPORTED 12/11/2021    TRICHOMONAS NOT REPORTED 12/11/2021    YEAST NOT REPORTED 12/11/2021    BACTERIA NOT REPORTED 12/11/2021    SPECGRAV 1.015 12/11/2021    LEUKOCYTESUR NEGATIVE 12/11/2021    UROBILINOGEN Normal 12/11/2021    BILIRUBINUR NEGATIVE 12/11/2021    GLUCOSEU NEGATIVE 12/11/2021    KETUA NEGATIVE 12/11/2021    AMORPHOUS NOT REPORTED 12/11/2021     Renal ultrasound:  Impression   1. Nonvisualization of the right kidney. 2. Mild-to-moderate left-sided hydronephrosis. 3. Exam limited due to patient's bowel gas and body habitus. 4. Nonvisualization of ureteral jets.  Urinary bladder grossly unremarkable. ASSESSMENT      1. Acute on chronic kidney injury secondary to possible obstruction(chronic hydro-)solitary left kidney complicated further by decompensated heart failure. Per patient his baseline creatinine is around 1.6 or thereabouts though unconfirmed  2. HTN:  3. Congenital absence of right kidney  4. History of ischemic cardiomyopathy with ejection fraction 25 to 30% with prior history of CABG. 5.  History of A. fib in the past, had pacemaker inserted one time. 6. COVID-19 infection    PLAN      1. Continue current meds. Urology following the patient. Creatinine has trended down.   Depending on how his creatinine trends are over the next day or 2 he may or may not require another cystoscopy with stent. 2.  Continue diuretics for now  3. Follow up labs ordered. 4. Following along       Please do not hesitate to call with questions.     This note is created with the assistance of a speech-recognition program. While intending to generate a document that actually reflects the content of the visit, no guarantees can be provided that every mistake has been identified and corrected by editing    Electronically signed by Cherry Hale MD on 12/12/2021 at 10:35 AM

## 2021-12-12 NOTE — PROGRESS NOTES
Section of Cardiology  Progress Note      Date:  12/12/2021  Patient: Joshua Orosco  Admission:  12/7/2021  3:24 PM  Admit DX: Elevated troponin [R77.8]  JACKSON (acute kidney injury) ([facility] [address] [N17.9]  Elevated brain natriuretic peptide (BNP) level [R79.89]  Elevated d-dimer [R79.89]  Chest pain, unspecified type [R07.9]  Age:  63 y. o., 1965     LOS: 0 days     Reason for evaluation:   Chest pain and CAD      SUBJECTIVE:     I attempted to call the patient but he did not answer the phone. Notes and labs reviewed. Per his RN, the patient has continuous complaints of chest pain and multiple other issues. He was demanding IV narcotics today and became physically and verbally aggressive with the staff and security was involved. He recently was in the hallway yelling at the staff despite his isolation status. .    OBJECTIVE:      EXAM:   Vitals:    VITALS:  /81   Pulse 71   Temp 99.9 °F (37.7 °C) (Oral)   Resp 16   Ht 5' 10\" (1.778 m)   Wt 175 lb 9.6 oz (79.7 kg)   SpO2 97%   BMI 25.20 kg/m²   24HR INTAKE/OUTPUT:    Intake/Output Summary (Last 24 hours) at 12/12/2021 1028  Last data filed at 12/12/2021 1010  Gross per 24 hour   Intake 660 ml   Output 800 ml   Net -140 ml     Full exam was deferred to conserve PPE and reduce spread of infection    Current Inpatient Medications:   dexamethasone  6 mg Oral Daily    albuterol  2.5 mg Nebulization BID    furosemide  40 mg IntraVENous Daily    atorvastatin  40 mg Oral Nightly    carvedilol  3.125 mg Oral BID     clopidogrel  75 mg Oral Daily    [Held by provider] apixaban  5 mg Oral BID    enoxaparin  1 mg/kg SubCUTAneous BID    tamsulosin  0.4 mg Oral Daily       IV Infusions (if any):   sodium chloride Stopped (12/11/21 0139)       Diagnostics:   Telemetry:Ventricular paced with underlying atrial fibrillation    Labs:   CBC:  Recent Labs     12/11/21  0658 12/12/21  0651   WBC 1.9* 2.5*   HGB 10.4* 10.4*   HCT 33.2* 33.6*   * 135* Magnesium:  Recent Labs     12/11/21  0658   MG 1.8     BMP:  Recent Labs     12/11/21  0658 12/12/21  0651    137   K 3.8 3.7   CALCIUM 7.9* 8.0*   CO2 23 21   BUN 43* 42*   CREATININE 2.27* 2.13*   LABGLOM 30* 32*   GLUCOSE 85 93     BNP:  Recent Labs     12/11/21  0658   PROBNP 23,071*     PT/INR:No results for input(s): PROTIME, INR in the last 72 hours. APTT:No results for input(s): APTT in the last 72 hours. CARDIAC ENZYMES:No results for input(s): MYOGLOBIN, CKTOTAL, CKMB, CKMBINDEX, TROPHS, TROPONINT in the last 72 hours. FASTING LIPID PANEL:  Lab Results   Component Value Date    HDL 48 12/09/2021    TRIG 83 12/09/2021     LIVER PROFILE:No results for input(s): AST, ALT, LABALBU, ALKPHOS, BILITOT, BILIDIR, IBILI, PROT, GLOB, ALBUMIN in the last 72 hours.      ASSESSMENT:    · Atypical constant chest pain for the past several days, unlikely to be ischemic in nature - reportedly unchanged  · History of coronary artery disease with multiple myocardial infarction to previous bypass surgery in 2010 and 2018 both done in [place] also history of multiple stenting done several years ago patient could not give me details  · Minimal troponin elevation most likely due to renal failure and COVID-19 infection  · Patient was at [facility] for several days/weeks left [address] and he was subsequently admitted to [facility] the next day  · COVID-19 infection, and 2 L of oxygen  · Chronic kidney disease, and hydronephrosis of the left kidney   · patient has 1 kidney  · Chronic systolic heart failure  · Chronic atrial fibrillation on Lovenox  · Ischemic cardiomyopathy with an ejection fraction of 25 to 30% on echocardiogram done 12/10/2021  · Post implantable cardioverter defibrillator  · History of hepatitis C  · History of noncompliance with medical care  · Patient was seen by her primary care cardiologist at [facility] last week and they declined to see the patient at this [facility] and therefore Dr. Charles Galeazzi was asked to see him  · Allergy to Aspirin    PLAN:  1. Continue current cardiac medications. 2. Conservative cardiac management at this time. 3. He is not on ACEI/ARB at this time until cleared by nephrology  Discussed with his RN and Dr. Charles Galeazzi.     Kali Bergeron, VERONICA - CNP

## 2021-12-12 NOTE — PROGRESS NOTES
Patient being verbally abusive to RN and pushed PCT. Patient angry and agitated and refuses to listen to safe. Security, nursing supervisor, and Dr Frannie Bravo to room with patient.

## 2021-12-12 NOTE — PROGRESS NOTES
Pt stating he will come into the [facility] if he does not get IV pain medication. . Pt has Covid and he knows he is not allowed in the [facility]. He does not want to wait until 6am when he'd be able to have it again. He is cussing at [facility] and demending IV pain medication. Pt is refusing Nitroglycerin and his other scheduled medications. He continually removes his telemetry. NP notified and no other IV pain medication ordered. Will notify security.

## 2021-12-12 NOTE — PROGRESS NOTES
12/7/2021 for evaluation of chest pain and feeling unwell. Patient reports that he left [facility] approximately 3 days ago after being treated poorly. He states he was discovered to be COVID-19 positive during that time and was placed in isolation. He states that staff is treating him poorly. He decided to leave [address] It appears that he was therefore chest pain and feeling unwell as well. He is homeless and has been bouncing between hotels. However, he cannot afford this. He states that he [place] the mall where security noted that he looked unwell. He was subsequently transferred to our facility for further evaluation and treatment. He states that he has a history of coronary artery disease status post CABG (twice - 2011, 2018), prior CVA, solitary kidney, and BPH. He notes he is on carvedilol, Eliquis, Plavix, Flomax. He states that he has not taken his medications in about 3 days. He states that he is having a significant bout of chest pain which is centrally located on his chest.  He states that this is constant. Relieved with nitro but abruptly returns. He is not requiring supplemental oxygen. He is very anxious on exam and reports that he is significantly itchy as well. He is requesting IV Benadryl and IV narcotics. He is very fixated on receiving IV narcotics. Troponin noted to be elevated in the emergency department assess his creatinine. He notes that his baseline creatinine is 1.4. He will be admitted for further work-up and treatment of elevated troponin along with JACKSON.     Review of Systems:     Constitutional:  negative for chills, fevers, sweats  Respiratory:  negative for cough, dyspnea on exertion, shortness of breath, wheezing  Cardiovascular: Reports persistent centrally located chest pain; Negative for lower extremity edema, palpitations  Gastrointestinal:  negative for abdominal pain, constipation, diarrhea, nausea, vomiting  Neurological: last 72 hours. I/O (24Hr): Intake/Output Summary (Last 24 hours) at 12/12/2021 1101  Last data filed at 12/12/2021 1010  Gross per 24 hour   Intake 660 ml   Output 800 ml   Net -140 ml       Labs:  Hematology:  Recent Labs     12/10/21  0603 12/11/21  0658 12/12/21  0651   WBC 2.6* 1.9* 2.5*   RBC 3.50* 3.32* 3.41*   HGB 10.7* 10.4* 10.4*   HCT 35.6* 33.2* 33.6*   .7 100.0 98.5   MCH 30.6 31.3 30.5   MCHC 30.1 31.3 31.0   RDW 17.1* 16.5* 16.5*    123* 135*   MPV 10.4 10.2 11.0   CRP  --  33.2*  --      Chemistry:  Recent Labs     12/10/21  0603 12/11/21  0658 12/12/21  0651    141 137   K 4.3 3.8 3.7    105 101   CO2 21 23 21   GLUCOSE 86 85 93   BUN 47* 43* 42*   CREATININE 2.34* 2.27* 2.13*   MG  --  1.8  --    ANIONGAP 13 13 15   LABGLOM 29* 30* 32*   GFRAA 35* 36* 39*   CALCIUM 8.2* 7.9* 8.0*   PHOS  --  3.3  --    PROBNP  --  23,071*  --      No results for input(s): PROT, LABALBU, LABA1C, K9NQOPC, A5APWPD, FT4, TSH, AST, ALT, LDH, GGT, ALKPHOS, LABGGT, BILITOT, BILIDIR, AMMONIA, AMYLASE, LIPASE, LACTATE, CHOL, HDL, LDLCHOLESTEROL, CHOLHDLRATIO, TRIG, VLDL, MJQ94FM, PHENYTOIN, PHENYF, URICACID, POCGLU in the last 72 hours. ABG:No results found for: POCPH, PHART, PH, POCPCO2, OWK3ASE, PCO2, POCPO2, PO2ART, PO2, POCHCO3, KYF5HQV, HCO3, NBEA, PBEA, BEART, BE, THGBART, THB, EWK9ERI, XVSG6VIC, F5WUSNFU, O2SAT, FIO2  No results found for: SPECIAL  No results found for: CULTURE    Radiology:  CT ABDOMEN PELVIS WO CONTRAST Additional Contrast? None    Result Date: 12/9/2021  1. Left-sided hydronephrosis/hydroureter with uroepithelial thickening and small amount of air seen within the collecting system. No obstructing stone or gross mass. Left UVJ stricture cannot be excluded. Correlation with urinalysis is suggested as underlying infectious process cannot be excluded. 2. 4 mm nonobstructing calculus left kidney. 3. Cardiomegaly.  4. Presumed post injection changes are seen involving virus infection 12/8/2021 Yes    Hepatitis C 12/9/2021 Yes          Plan:        Angina/elevated troponin-continue Lovenox at therapeutic dosing. Continue Dilaudid/[place]. Increase frequency. Patient reports that he is intolerant to Ranexa and isordil/imdur. He recently had a [facility]CE at [facility] which revealed an EF of 25 to 30%. He did not have any evidence for endocarditis to explain his bilateral occipital infarcts. He previously has an AICD implanted 2 years ago. Heart failure with reduced ejection fraction-continue IV Lasix. Would benefit from Aldactone. Nephrology assisting. Coronary artery disease status post CABG (two separate times) - continue Plavix. Likely needs further ischemia work-up. Continue therapeutic Lovenox. Continue Lipitor, carvedilol. Appreciate cardiology input. JACKSON on CKD-appreciate nephrology input. Agree with Lasix. COVID-19 infection - not initially requiring supplemental oxygen. Now intermittently on. Patient refusing steroids. CRP is uptrending. Positive test on 12/7. I am concerned he is in the early stages of his covid disease. Left-sided hydronephrosis/urinary retention-urology following. Not wanting to pursue further work-up at this time if his renal function is not worsening. Monitor renal function. Cystoscopy if renal function worsens. Recent CVA, bilateral occipital lobes, status post TPA-at [facility].  Recent hemoptysis-EGD and colonoscopy were emergently performed [facility] to the hospital.  Hepatitis C antibody positive - patient does not know of this history. Will need followed up in the outpatient setting. Checking quant hep C  Homelessness -  assisting, but patient is not cooperating. Patient's records have been loaded into our system. Actively looking through.     33 Lisa Paez DO  12/12/2021  11:01 AM

## 2021-12-12 NOTE — PROGRESS NOTES
63year-old male with chronic left hydro. Had ureteral re-implant many years ago in [address] [place] [address]. He had a cysto with retrograde and stent removal at [facility] last week. At that time, the left ureter was widely patent and drained well, with no evidence of obstructive uropathy. He left [facility] AMA. Cr continues to improve. U/A negative for infection. He is voiding, and PVRs low enough that he does not want a daniel. Currently being treated for COVID 19.

## 2021-12-12 NOTE — PROGRESS NOTES
RN asked me to evaluate patient. Apparently he has been requesting IV pain medication. He is Covid positive and keeps coming out of his room. Security has been called twice for that reason. He has Roxicodone IR ordered every 3 hours as needed which she had at 11 PM and 2 AM.  He has Dilaudid ordered every 6 hours as needed which he got at midnight. Apparently prior to this he was on morphine more frequently. I explained to the patient that we are trying to wean him off IV pain medication as we are in the process of discharge planning. I did give him 1 time 0.5 dose of Dilaudid. He is aware that he will not receive any additional IV pain medications aside what has been previously ordered. He was also advised of the importance of staying in his room for patient and staff safety due to his Covid status. Patient is agreeable.

## 2021-12-12 NOTE — PLAN OF CARE
Problem: Pain:  Goal: Pain level will decrease  Description: Pain level will decrease  Outcome: Ongoing     Problem: Pain:  Goal: Control of acute pain  Description: Control of acute pain  Outcome: Ongoing     Problem: Pain:  Goal: Control of chronic pain  Description: Control of chronic pain  Outcome: Ongoing     Pt medicated with pain medication prn. Assessed all pain characteristics including level, type, location, frequency, and onset. Non-pharmacologic interventions offered to pt as well. Pt states pain is tolerable at this time.  Will continue to monitor

## 2021-12-12 NOTE — PROGRESS NOTES
Pt called out for pain medication. Writer brought oral Oxycodone IR as was ordered. Pt became extremely angry and starting cussing. He stated that  \"the Dr told him he did not have to take oral pain medication because it doesn't work\". He states he Farrel Sukhi wants IV pain medication\". Writer explained that the Dr changed his pain medication orders and he can only have the IV pain medication as breakthrough and he has to take the oral medication first. Pt became even more angry and cussing. Nursing supervisor went to talk to pt and pt came into hallway and refused to go back in his room. Security was called to make pt go into room. MARIA GUADALUPE Kraus notified. Pt finally agreed to take oral pain med. He refused all his scheduled medications.

## 2021-12-13 PROBLEM — I25.119 CORONARY ARTERY DISEASE INVOLVING NATIVE CORONARY ARTERY OF NATIVE HEART WITH ANGINA PECTORIS (HCC): Status: ACTIVE | Noted: 2021-12-13

## 2021-12-13 LAB
ABSOLUTE EOS #: 0 K/UL (ref 0–0.44)
ABSOLUTE IMMATURE GRANULOCYTE: 0 K/UL (ref 0–0.3)
ABSOLUTE LYMPH #: 0.24 K/UL (ref 1.1–3.7)
ABSOLUTE MONO #: 0.17 K/UL (ref 0.1–1.2)
ALLEN TEST: ABNORMAL
ANION GAP SERPL CALCULATED.3IONS-SCNC: 16 MMOL/L (ref 9–17)
BASOPHILS # BLD: 0 % (ref 0–2)
BASOPHILS ABSOLUTE: 0 K/UL (ref 0–0.2)
BUN BLDV-MCNC: 42 MG/DL (ref 6–20)
BUN/CREAT BLD: 20 (ref 9–20)
CALCIUM SERPL-MCNC: 8 MG/DL (ref 8.6–10.4)
CHLORIDE BLD-SCNC: 102 MMOL/L (ref 98–107)
CO2: 21 MMOL/L (ref 20–31)
CREAT SERPL-MCNC: 2.14 MG/DL (ref 0.7–1.2)
DIFFERENTIAL TYPE: ABNORMAL
EOSINOPHILS RELATIVE PERCENT: 0 % (ref 1–4)
FIO2: 21
GFR AFRICAN AMERICAN: 39 ML/MIN
GFR NON-AFRICAN AMERICAN: 32 ML/MIN
GFR SERPL CREATININE-BSD FRML MDRD: ABNORMAL ML/MIN/{1.73_M2}
GFR SERPL CREATININE-BSD FRML MDRD: ABNORMAL ML/MIN/{1.73_M2}
GLUCOSE BLD-MCNC: 115 MG/DL (ref 70–99)
HCT VFR BLD CALC: 33.7 % (ref 40.7–50.3)
HEMOGLOBIN: 10.8 G/DL (ref 13–17)
IMMATURE GRANULOCYTES: 0 %
LACTIC ACID, WHOLE BLOOD: NORMAL MMOL/L (ref 0.7–2.1)
LACTIC ACID: 1.2 MMOL/L (ref 0.5–2.2)
LYMPHOCYTES # BLD: 7 % (ref 24–43)
MCH RBC QN AUTO: 30.3 PG (ref 25.2–33.5)
MCHC RBC AUTO-ENTMCNC: 32 G/DL (ref 28.4–34.8)
MCV RBC AUTO: 94.7 FL (ref 82.6–102.9)
MODE: ABNORMAL
MONOCYTES # BLD: 5 % (ref 3–12)
NEGATIVE BASE EXCESS, ART: ABNORMAL (ref 0–2)
NRBC AUTOMATED: 0 PER 100 WBC
O2 DEVICE/FLOW/%: ABNORMAL
PATIENT TEMP: ABNORMAL
PDW BLD-RTO: 16.3 % (ref 11.8–14.4)
PLATELET # BLD: 167 K/UL (ref 138–453)
PLATELET ESTIMATE: ABNORMAL
PMV BLD AUTO: 11.1 FL (ref 8.1–13.5)
POC HCO3: 27.7 MMOL/L (ref 21–28)
POC O2 SATURATION: 90 % (ref 94–98)
POC PCO2 TEMP: ABNORMAL MM HG
POC PCO2: 40.6 MM HG (ref 35–48)
POC PH TEMP: ABNORMAL
POC PH: 7.44 (ref 7.35–7.45)
POC PO2 TEMP: ABNORMAL MM HG
POC PO2: 57.6 MM HG (ref 83–108)
POC TCO2: 28 MMOL/L (ref 22–30)
POSITIVE BASE EXCESS, ART: 3 (ref 0–3)
POTASSIUM SERPL-SCNC: 3.6 MMOL/L (ref 3.7–5.3)
RBC # BLD: 3.56 M/UL (ref 4.21–5.77)
RBC # BLD: ABNORMAL 10*6/UL
SAMPLE SITE: ABNORMAL
SEG NEUTROPHILS: 88 % (ref 36–65)
SEGMENTED NEUTROPHILS ABSOLUTE COUNT: 2.99 K/UL (ref 1.5–8.1)
SODIUM BLD-SCNC: 139 MMOL/L (ref 135–144)
TCO2 (CALC), ART: ABNORMAL MMOL/L (ref 22–29)
TROPONIN INTERP: ABNORMAL
TROPONIN T: ABNORMAL NG/ML
TROPONIN, HIGH SENSITIVITY: 73 NG/L (ref 0–22)
WBC # BLD: 3.4 K/UL (ref 3.5–11.3)
WBC # BLD: ABNORMAL 10*3/UL

## 2021-12-13 PROCEDURE — 87040 BLOOD CULTURE FOR BACTERIA: CPT

## 2021-12-13 PROCEDURE — 36415 COLL VENOUS BLD VENIPUNCTURE: CPT

## 2021-12-13 PROCEDURE — 99232 SBSQ HOSP IP/OBS MODERATE 35: CPT | Performed by: INTERNAL MEDICINE

## 2021-12-13 PROCEDURE — 6370000000 HC RX 637 (ALT 250 FOR IP): Performed by: INTERNAL MEDICINE

## 2021-12-13 PROCEDURE — G0378 HOSPITAL OBSERVATION PER HR: HCPCS

## 2021-12-13 PROCEDURE — 85025 COMPLETE CBC W/AUTO DIFF WBC: CPT

## 2021-12-13 PROCEDURE — 6360000002 HC RX W HCPCS: Performed by: INTERNAL MEDICINE

## 2021-12-13 PROCEDURE — 84484 ASSAY OF TROPONIN QUANT: CPT

## 2021-12-13 PROCEDURE — 6360000002 HC RX W HCPCS: Performed by: NURSE PRACTITIONER

## 2021-12-13 PROCEDURE — 96376 TX/PRO/DX INJ SAME DRUG ADON: CPT

## 2021-12-13 PROCEDURE — 80048 BASIC METABOLIC PNL TOTAL CA: CPT

## 2021-12-13 PROCEDURE — 6370000000 HC RX 637 (ALT 250 FOR IP): Performed by: NURSE PRACTITIONER

## 2021-12-13 PROCEDURE — 83605 ASSAY OF LACTIC ACID: CPT

## 2021-12-13 PROCEDURE — 96372 THER/PROPH/DIAG INJ SC/IM: CPT

## 2021-12-13 PROCEDURE — 6370000000 HC RX 637 (ALT 250 FOR IP): Performed by: STUDENT IN AN ORGANIZED HEALTH CARE EDUCATION/TRAINING PROGRAM

## 2021-12-13 PROCEDURE — 1200000000 HC SEMI PRIVATE

## 2021-12-13 PROCEDURE — 2060000000 HC ICU INTERMEDIATE R&B

## 2021-12-13 RX ORDER — POTASSIUM CHLORIDE 20 MEQ/1
20 TABLET, EXTENDED RELEASE ORAL ONCE
Status: COMPLETED | OUTPATIENT
Start: 2021-12-13 | End: 2021-12-13

## 2021-12-13 RX ORDER — METOPROLOL SUCCINATE 50 MG/1
50 TABLET, EXTENDED RELEASE ORAL DAILY
Status: DISCONTINUED | OUTPATIENT
Start: 2021-12-14 | End: 2021-12-19 | Stop reason: HOSPADM

## 2021-12-13 RX ADMIN — ATORVASTATIN CALCIUM 40 MG: 40 TABLET, FILM COATED ORAL at 21:05

## 2021-12-13 RX ADMIN — TAMSULOSIN HYDROCHLORIDE 0.4 MG: 0.4 CAPSULE ORAL at 07:56

## 2021-12-13 RX ADMIN — DIPHENHYDRAMINE HYDROCHLORIDE 25 MG: 50 INJECTION, SOLUTION INTRAMUSCULAR; INTRAVENOUS at 12:26

## 2021-12-13 RX ADMIN — DIPHENHYDRAMINE HYDROCHLORIDE 25 MG: 50 INJECTION, SOLUTION INTRAMUSCULAR; INTRAVENOUS at 21:00

## 2021-12-13 RX ADMIN — FUROSEMIDE 40 MG: 10 INJECTION, SOLUTION INTRAMUSCULAR; INTRAVENOUS at 07:57

## 2021-12-13 RX ADMIN — DIPHENHYDRAMINE HYDROCHLORIDE 25 MG: 50 INJECTION, SOLUTION INTRAMUSCULAR; INTRAVENOUS at 06:23

## 2021-12-13 RX ADMIN — ACETAMINOPHEN 650 MG: 325 TABLET ORAL at 21:46

## 2021-12-13 RX ADMIN — DEXAMETHASONE 6 MG: 4 TABLET ORAL at 07:56

## 2021-12-13 RX ADMIN — CARVEDILOL 3.12 MG: 3.12 TABLET, FILM COATED ORAL at 07:56

## 2021-12-13 RX ADMIN — ENOXAPARIN SODIUM 80 MG: 100 INJECTION SUBCUTANEOUS at 07:56

## 2021-12-13 RX ADMIN — OXYCODONE HYDROCHLORIDE 20 MG: 15 TABLET ORAL at 12:26

## 2021-12-13 RX ADMIN — OXYCODONE HYDROCHLORIDE 20 MG: 15 TABLET ORAL at 09:17

## 2021-12-13 RX ADMIN — HYDROMORPHONE HYDROCHLORIDE 0.5 MG: 1 INJECTION, SOLUTION INTRAMUSCULAR; INTRAVENOUS; SUBCUTANEOUS at 11:07

## 2021-12-13 RX ADMIN — CARVEDILOL 3.12 MG: 3.12 TABLET, FILM COATED ORAL at 16:47

## 2021-12-13 RX ADMIN — HYDROMORPHONE HYDROCHLORIDE 0.5 MG: 1 INJECTION, SOLUTION INTRAMUSCULAR; INTRAVENOUS; SUBCUTANEOUS at 16:47

## 2021-12-13 RX ADMIN — OXYCODONE HYDROCHLORIDE 20 MG: 15 TABLET ORAL at 05:24

## 2021-12-13 RX ADMIN — CLOPIDOGREL BISULFATE 75 MG: 75 TABLET ORAL at 07:56

## 2021-12-13 RX ADMIN — HYDROMORPHONE HYDROCHLORIDE 0.5 MG: 1 INJECTION, SOLUTION INTRAMUSCULAR; INTRAVENOUS; SUBCUTANEOUS at 06:22

## 2021-12-13 RX ADMIN — HYDROMORPHONE HYDROCHLORIDE 0.5 MG: 1 INJECTION, SOLUTION INTRAMUSCULAR; INTRAVENOUS; SUBCUTANEOUS at 20:59

## 2021-12-13 RX ADMIN — ONDANSETRON 4 MG: 2 INJECTION INTRAMUSCULAR; INTRAVENOUS at 20:59

## 2021-12-13 RX ADMIN — POTASSIUM CHLORIDE 20 MEQ: 1500 TABLET, EXTENDED RELEASE ORAL at 16:47

## 2021-12-13 ASSESSMENT — PAIN DESCRIPTION - PROGRESSION
CLINICAL_PROGRESSION: NOT CHANGED

## 2021-12-13 ASSESSMENT — PAIN SCALES - GENERAL
PAINLEVEL_OUTOF10: 8
PAINLEVEL_OUTOF10: 8
PAINLEVEL_OUTOF10: 9
PAINLEVEL_OUTOF10: 7
PAINLEVEL_OUTOF10: 8
PAINLEVEL_OUTOF10: 8
PAINLEVEL_OUTOF10: 10
PAINLEVEL_OUTOF10: 7
PAINLEVEL_OUTOF10: 8
PAINLEVEL_OUTOF10: 8

## 2021-12-13 ASSESSMENT — PAIN DESCRIPTION - FREQUENCY
FREQUENCY: CONTINUOUS

## 2021-12-13 ASSESSMENT — PAIN DESCRIPTION - DESCRIPTORS
DESCRIPTORS: ACHING;DISCOMFORT

## 2021-12-13 ASSESSMENT — PAIN DESCRIPTION - ONSET
ONSET: ON-GOING

## 2021-12-13 ASSESSMENT — PAIN DESCRIPTION - PAIN TYPE
TYPE: ACUTE PAIN;CHRONIC PAIN
TYPE: ACUTE PAIN
TYPE: ACUTE PAIN;CHRONIC PAIN
TYPE: ACUTE PAIN;CHRONIC PAIN
TYPE: CHRONIC PAIN
TYPE: ACUTE PAIN;CHRONIC PAIN

## 2021-12-13 ASSESSMENT — PAIN DESCRIPTION - LOCATION
LOCATION: GENERALIZED
LOCATION: GENERALIZED
LOCATION: CHEST;GENERALIZED
LOCATION: GENERALIZED
LOCATION: GENERALIZED

## 2021-12-13 ASSESSMENT — PAIN - FUNCTIONAL ASSESSMENT
PAIN_FUNCTIONAL_ASSESSMENT: PREVENTS OR INTERFERES SOME ACTIVE ACTIVITIES AND ADLS

## 2021-12-13 NOTE — PROGRESS NOTES
Patient continues to refuse to wear telemetry and continuous pulse ox. Writer educated patient on the importance of both and patient continued to refuse. Patient also refused linen change.

## 2021-12-13 NOTE — PROGRESS NOTES
Pt continues to refuse cont pulse ox, tele, and IS. Education given to pt regarding why each of these are ordered, and continues to refuse.

## 2021-12-13 NOTE — PLAN OF CARE
Problem: Airway Clearance - Ineffective  Goal: Achieve or maintain patent airway  12/13/2021 0011 by Edison Ngo RN  Outcome: Ongoing     Problem: Gas Exchange - Impaired  Goal: Absence of hypoxia  12/13/2021 0011 by Edison Ngo RN  Outcome: Ongoing     Problem: Gas Exchange - Impaired  Goal: Promote optimal lung function  12/13/2021 0011 by Edison Ngo RN  Outcome: Ongoing     Problem: Breathing Pattern - Ineffective  Goal: Ability to achieve and maintain a regular respiratory rate  12/13/2021 0011 by Edison Ngo RN  Outcome: Ongoing     Problem: Body Temperature -  Risk of, Imbalanced  Goal: Ability to maintain a body temperature within defined limits  12/13/2021 0011 by Edison Ngo RN  Outcome: Ongoing     Problem: Body Temperature -  Risk of, Imbalanced  Goal: Will regain or maintain usual level of consciousness  12/13/2021 0011 by Edison Ngo RN  Outcome: Ongoing     Problem:  Body Temperature -  Risk of, Imbalanced  Goal: Complications related to the disease process, condition or treatment will be avoided or minimized  12/13/2021 0011 by Edison Ngo RN  Outcome: Ongoing     Problem: Isolation Precautions - Risk of Spread of Infection  Goal: Prevent transmission of infection  Outcome: Ongoing     Problem: Nutrition Deficits  Goal: Optimize nutritional status  Outcome: Ongoing     Problem: Risk for Fluid Volume Deficit  Goal: Maintain normal heart rhythm  Outcome: Ongoing     Problem: Risk for Fluid Volume Deficit  Goal: Maintain absence of muscle cramping  Outcome: Ongoing     Problem: Risk for Fluid Volume Deficit  Goal: Maintain normal serum potassium, sodium, calcium, phosphorus, and pH  Outcome: Ongoing     Problem: Pain:  Goal: Pain level will decrease  Description: Pain level will decrease  Outcome: Ongoing     Problem: Pain:  Goal: Control of acute pain  Description: Control of acute pain  Outcome: Ongoing     Problem: Pain:  Goal: Control of chronic pain  Description: Control of chronic pain  Outcome: Ongoing     Problem: Falls - Risk of:  Goal: Will remain free from falls  Description: Will remain free from falls  Outcome: Ongoing     Problem: Falls - Risk of:  Goal: Absence of physical injury  Description: Absence of physical injury  Outcome: Ongoing

## 2021-12-13 NOTE — PROGRESS NOTES
Renal Progress Note    Patient :  Lenora Viramontes; 61 y.o. MRN# [id number]  Location:  1012/1012-02  Attending:  Nancy Spears DO  Admit Date:  12/7/2021   Hospital Day: 0      Subjective:     Patient was seen and examined. No new issues reported overnight. Urine output documented about 750 cc and x1 more in the last 24 hours. Patient is currently on Lasix 40 mg IV once a day. Urology on board. Creatinine seems to be plateauing at 2.1, creatinine 2.14 mg/dl today. Sodium 139, potassium 3.6, chloride 102, bicarb 21, BUN 42, calcium 8.0. Lactic acid 1.2. Currently on COVID-19 infection isolation. On IV dexamethasone. Patient has been refusing multiple medical management as documented in the charts and also has been having some behavioral issues.     Outpatient Medications:     Medications Prior to Admission: clopidogrel (PLAVIX) 75 MG tablet, Take 1 tablet by mouth daily  atorvastatin (LIPITOR) 40 MG tablet, Take 1 tablet by mouth daily  tamsulosin (FLOMAX) 0.4 MG capsule, Take 0.4 mg by mouth daily  apixaban (ELIQUIS) 5 MG TABS tablet, Take 5 mg by mouth 2 times daily  furosemide (LASIX) 20 MG tablet, Take 20 mg by mouth as needed (swelling)   amLODIPine (NORVASC) 10 MG tablet, Take 0.5 tablets by mouth daily  carvedilol (COREG) 6.25 MG tablet, Take 1 tablet by mouth 2 times daily    Current Medications:     Scheduled Meds:    dexamethasone  6 mg Oral Daily    albuterol  2.5 mg Nebulization BID    furosemide  40 mg IntraVENous Daily    atorvastatin  40 mg Oral Nightly    carvedilol  3.125 mg Oral BID WC    clopidogrel  75 mg Oral Daily    [Held by provider] apixaban  5 mg Oral BID    enoxaparin  1 mg/kg SubCUTAneous BID    tamsulosin  0.4 mg Oral Daily     Continuous Infusions:    sodium chloride Stopped (12/11/21 0139)     PRN Meds:  oxyCODONE, HYDROmorphone, acetaminophen, sodium chloride, albuterol sulfate HFA, hydrocortisone-aloe, diphenhydrAMINE, ondansetron **OR** ondansetron, magnesium hydroxide, potassium chloride **OR** potassium alternative oral replacement **OR** potassium chloride, potassium chloride, magnesium sulfate, nitroGLYCERIN, perflutren lipid microspheres    Input/Output:       I/O last 3 completed shifts: In: 240 [P.O.:240]  Out: 750 [Urine:750]. Patient Vitals for the past 96 hrs (Last 3 readings):   Weight   21 0000 175 lb 9.6 oz (79.7 kg)   12/10/21 0524 170 lb 9.6 oz (77.4 kg)       Vital Signs:   Temperature:  Temp: 100 °F (37.8 °C)  TMax:   Temp (24hrs), Av.4 °F (38 °C), Min:98.6 °F (37 °C), Max:102.1 °F (38.9 °C)    Respirations:  Resp: 18  Pulse:   Pulse: 73  BP:    BP: (!) 147/95  BP Range: Systolic (83WPA), VYX:773 , Min:111 , DEVEN:586       Diastolic (19ENZ), GPK:56, Min:64, Max:95      Physical Examination:     General:  AAO x 3, speaking in full sentences, no accessory muscle use. HEENT: Atraumatic, normocephalic, no throat congestion, moist mucosa. Eyes:   Pupils equal, round and reactive to light, EOMI. Neck:   Supple  Chest:   Bilateral vesicular breath sounds, no rales or wheezes. Cardiac:  S1 S2 RR, no murmurs, gallops or rubs. Abdomen: Soft, non-tender, no masses or organomegaly, BS audible. :   No suprapubic or flank tenderness. Neuro:  AAO x 3, No FND. SKIN:  No rashes, good skin turgor. Extremities:  Trace edema.     Labs:       Recent Labs     21  0658 21  0651 21  0536   WBC 1.9* 2.5* 3.4*   RBC 3.32* 3.41* 3.56*   HGB 10.4* 10.4* 10.8*   HCT 33.2* 33.6* 33.7*   .0 98.5 94.7   MCH 31.3 30.5 30.3   MCHC 31.3 31.0 32.0   RDW 16.5* 16.5* 16.3*   * 135* 167   MPV 10.2 11.0 11.1      BMP:   Recent Labs     21  0658 21  0651 21  0536    137 139   K 3.8 3.7 3.6*    101 102   CO2    BUN 43* 42* 42*   CREATININE 2.27* 2.13* 2.14*   GLUCOSE 85 93 115*   CALCIUM 7.9* 8.0* 8.0*      Phosphorus:     Recent Labs     21  0658   PHOS 3.3     Magnesium:    Recent Labs 12/11/21  0658   MG 1.8     SPEP:  Lab Results   Component Value Date    PROT 6.3 12/09/2021    PATH ELECTRONICALLY SIGNED. Viri Colvin M.D. 12/09/2021     Hep BsAg:         Lab Results   Component Value Date    HEPBSAG NONREACTIVE 12/09/2021     Hep C AB:          Lab Results   Component Value Date    HEPCAB REACTIVE 12/09/2021       Urinalysis/Chemistries:      Lab Results   Component Value Date    NITRU NEGATIVE 12/11/2021    COLORU Yellow 12/11/2021    PHUR 7.0 12/11/2021    WBCUA 0 TO 2 12/11/2021    RBCUA 0 TO 2 12/11/2021    MUCUS NOT REPORTED 12/11/2021    TRICHOMONAS NOT REPORTED 12/11/2021    YEAST NOT REPORTED 12/11/2021    BACTERIA NOT REPORTED 12/11/2021    SPECGRAV 1.015 12/11/2021    LEUKOCYTESUR NEGATIVE 12/11/2021    UROBILINOGEN Normal 12/11/2021    BILIRUBINUR NEGATIVE 12/11/2021    GLUCOSEU NEGATIVE 12/11/2021    KETUA NEGATIVE 12/11/2021    AMORPHOUS NOT REPORTED 12/11/2021     Urine Sodium:     Lab Results   Component Value Date    SHERMAN <20 12/09/2021      Urine Creatinine:     Lab Results   Component Value Date    LABCREA 95.6 12/09/2021     Radiology:     Reviewed. Assessment:     1. Acute Kidney Injury Nonoliguric likely secondary to possible obstruction, chronic hydro-along with history of solitary left kidney complicated further by decompensated heart failure. Baseline creatinine currently not available. 2.  COVID-19 infection. 3.  Congenital absence of right kidney. 4.  Left-sided hydronephrosis/urinary retention. 5.  Ischemic cardiomyopathy with EF of 2530% as per 2D echo done on 20-30% as per 2 D Echo done on 12/3/2021.  6.  Coronary disease with history of CABG. 7.  History of A. fib. 8.  Hypertension. 9.  History of CVA, bilateral occipital lobes status post TPA. 10.  Hep C antibody positive. 11.  Homelessness. 12.  History of urinary retention along with chronic left with history of stent placement and removal at the hospital December 21. Plan:   1.   Continue Lasix 40 mg I.V. once a day. 2.  Follow-up urology plan. Continue monitor renal function and depending might require intervention versus medical management. 3.  Covid-19 infection management as per primary. 4.  Monitor strict I's and O's and renal function. 5.  Potassium replacements ordered. 6.  Urinary retention management as per urology. 7.  BMP in AM.  8.  Will follow. Nutrition   Please ensure that patient is on a renal diet/TF. Avoid nephrotoxic drugs/contrast exposure. We will continue to follow along with you. Nagi Wagner MD  [facility]     This note is created with the assistance of a speech-recognition program. While intending to generate a document that actually reflects the content of the visit, no guarantees can be provided that every mistake has been identified and corrected by editing.

## 2021-12-13 NOTE — CARE COORDINATION
Discharge planning    Patient chart reviewed. Patient continues to be difficult and non compliant with plan of care. Writer did initial assessment and he refused any list for 211 and shelters. Did leave copy in patient chart for discharge in case he changes his mind. His belongings are at the [employer] and did offer to ambulette him there on discharge and he again refused. Offered to have  call the hotel in his room and he refused. [phone]    Patient admitted elevated troponin and on therapeutic dosing of lovenox. He was at [facility] and left AMA due to how he was treated. He did have JOYCE there and has AICD. Nephrology, cardiology and urology also following. Urology not wanting to pursue work up as his renal function is improving. He is positive for COVID. No therapy has been ordered as of yet but he states he walks around the city being homeless. He has been on room air since yesterday morning and sats are currently 92-96%. CARD 12/12  PLAN:  1. Continue current cardiac medications. 2. Conservative cardiac management at this time. 3. He is not on ACEI/ARB at this time until cleared by nephrology    NEPHRO 12/12  ASSESSMENT       1. Acute on chronic kidney injury secondary to possible obstruction(chronic hydro-)solitary left kidney complicated further by decompensated heart failure. Per patient his baseline creatinine is around 1.6 or thereabouts though unconfirmed  PLAN       1. Continue current meds. Urology following the patient. Creatinine has trended down. Depending on how his creatinine trends are over the next day or 2 he may or may not require another cystoscopy with stent.               2.  Continue diuretics for now

## 2021-12-13 NOTE — PROGRESS NOTES
Section of Cardiology  Progress Note      Date:  12/13/2021  Patient: Nayely Blancas  Admission:  12/7/2021  3:24 PM  Admit DX: Elevated troponin [R77.8]  JACKSON (acute kidney injury) ([facility] [address] [N17.9]  Elevated brain natriuretic peptide (BNP) level [R79.89]  Elevated d-dimer [R79.89]  Chest pain, unspecified type [R07.9]  Age:  61 y. o., 1965     LOS: 0 days     Reason for evaluation:   Chest pain and CAD      SUBJECTIVE:     Notes and labs reviewed  Called patient regarding current symptoms. Troponin was checked today for continued CP. It was 76 with his prior values being similar  He describes constant central sharp chest pain, sometimes 10/10, sometimes 4/10, responds to analgesia, doesn't go away. Worse with positional movement and inspiration and coughing. He also reports some palpitations.  Monitor shows afib with PVCs    OBJECTIVE:      EXAM:   Vitals:    VITALS:  /71   Pulse 70   Temp 99.7 °F (37.6 °C) (Oral)   Resp 18   Ht 5' 10\" (1.778 m)   Wt 175 lb 9.6 oz (79.7 kg)   SpO2 96%   BMI 25.20 kg/m²   24HR INTAKE/OUTPUT:      Intake/Output Summary (Last 24 hours) at 12/13/2021 1258  Last data filed at 12/13/2021 1232  Gross per 24 hour   Intake --   Output 1850 ml   Net -1850 ml     Full exam was deferred to conserve PPE and reduce spread of infection    Current Inpatient Medications:   potassium chloride  20 mEq Oral Once    dexamethasone  6 mg Oral Daily    albuterol  2.5 mg Nebulization BID    furosemide  40 mg IntraVENous Daily    atorvastatin  40 mg Oral Nightly    carvedilol  3.125 mg Oral BID WC    clopidogrel  75 mg Oral Daily    [Held by provider] apixaban  5 mg Oral BID    enoxaparin  1 mg/kg SubCUTAneous BID    tamsulosin  0.4 mg Oral Daily       IV Infusions (if any):   sodium chloride Stopped (12/11/21 0139)       Diagnostics:   Telemetry: Afib with PVCs    Labs:   CBC:  Recent Labs     12/12/21  0651 12/13/21  0536   WBC 2.5* 3.4*   HGB 10.4* 10.8*   HCT 33.6* 33.7*   PLT 135* 167     Magnesium:  Recent Labs     12/11/21  0658   MG 1.8     BMP:  Recent Labs     12/12/21  0651 12/13/21  0536    139   K 3.7 3.6*   CALCIUM 8.0* 8.0*   CO2 21 21   BUN 42* 42*   CREATININE 2.13* 2.14*   LABGLOM 32* 32*   GLUCOSE 93 115*     BNP:  Recent Labs     12/11/21  0658   PROBNP 23,071*     PT/INR:No results for input(s): PROTIME, INR in the last 72 hours. APTT:No results for input(s): APTT in the last 72 hours. CARDIAC ENZYMES:  Recent Labs     12/13/21  0536   TROPHS 73*   TROPONINT NOT REPORTED     FASTING LIPID PANEL:  Lab Results   Component Value Date    HDL 48 12/09/2021    TRIG 83 12/09/2021     LIVER PROFILE:No results for input(s): AST, ALT, LABALBU, ALKPHOS, BILITOT, BILIDIR, IBILI, PROT, GLOB, ALBUMIN in the last 72 hours.      ASSESSMENT:    · Atypical constant chest pain for the past several days, unlikely to be ischemic in nature - reportedly unchanged  · History of coronary artery disease with multiple myocardial infarction to previous bypass surgery in 2010 and 2018 both done in [place] also history of multiple stenting done several years ago patient could not give me details  · Minimal troponin elevation most likely due to renal failure and COVID-19 infection  · Patient was at [facility] for several days/weeks left [address] and he was subsequently admitted to [facility] the next day  · COVID-19 infection, and 2 L of oxygen  · Chronic kidney disease, and hydronephrosis of the left kidney   · patient has 1 kidney  · Chronic systolic heart failure  · Chronic atrial fibrillation on Lovenox  · Ischemic cardiomyopathy with an ejection fraction of 25 to 30% on echocardiogram done 12/10/2021  · Post implantable cardioverter defibrillator  · History of hepatitis C  · History of noncompliance with medical care  · Patient was seen by her primary care cardiologist at [facility] last week and they declined to see the patient at this hospital and therefore Dr. Marylen Heater was asked to see him  · Allergy to Aspirin    PLAN:  1. Continue current cardiac medications. 2. Conservative cardiac management at this time. 3. Will switch his coreg to Toprol-XL 50mg to allow BP room for initiation of GDMT as BP have been normal/low and for titration for PVC suppression  4.  He is not on ACEI/ARB at this time until cleared by nephrology    Discussed with nurse    Lucy Simmons MD

## 2021-12-13 NOTE — PROGRESS NOTES
[facility]  Office: [address], DO, Reina Colton, DO, Sadaf Craftwinston, DO, Oly Jose Piedra, DO, Laura Benitez MD, Ginger Gutiérrez MD, Natividad Carrillo MD, Shaheen Lynne MD, Mitra Mckeon MD, Kary Viramontes MD, Nithya Yoon MD, Geovanni Torres, DO, Violet Mchugh DO, Christine Kelly MD,  Billie Batres, DO, Rosy Schirmer, MD, Eunice Stevenson MD, Mariel Vora MD, Emily Roque MD, Meenakshi Sepulveda MD, Marin Molina MD, Velta Bernheim, MD, Irasema Cornejo [facility], [facility], CNP, Adis Arriola, CNP, Tyron Stratton, CNS, Nicolas Camara, CNP, Mora Nolen, CNP, Penelope Logan, CNP, Kate Salazar, CNP, Ildefonso Wynne CNP, Perry Ruff PA-C, Dorothea Rivera, CLAUDETTE, Isabella Arellano, CNP, Armida Staples, CNP, Monique Boyle, CNP, Priti Mejia, CNP, Ana Jean, CNP, Raquel Gillette, [facility], [facility] [place]    Progress Note    12/13/2021    3:14 PM    Name:   Kecia Dc  MRN:     [id number]     Acct:      [de-identified]   Room:   [address] [place] [address] Day:  0  Admit Date:  12/7/2021  3:24 PM    PCP:   No primary care provider on file. Code Status:  Full Code    Subjective:     C/C:   Chief Complaint   Patient presents with    Chest Pain     pt reports onset at least 1 week ago. has been admitted to [facility] for past 10 days, left AMA from there this afternoon.  Shortness of Breath     ongoing and unchanged x past 4-5 days. Interval History Status: improved. Patient seen and examined this afternoon. No acute events overnight. Patient being more pleasant with nursing staff today. Remains on supplemental oxygen. Denies shortness of breath. Continues to report chest pain/discomfort. Says that oral narcotics are not helping his chest pain. Febrile yesterday afternoon. Brief History:     Kecia Dc is a 63 y.o.   gentleman who presented to [place] [address] on 12/7/2021 for evaluation of chest pain and feeling unwell. Patient reports that he left [facility] approximately 3 days ago after being treated poorly. He states he was discovered to be COVID-19 positive during that time and was placed in isolation. He states that staff is treating him poorly. He decided to leave [address] It appears that he was therefore chest pain and feeling unwell as well. He is homeless and has been bouncing between hotels. However, he cannot afford this. He states that he [place] the mall where security noted that he looked unwell. He was subsequently transferred to our facility for further evaluation and treatment. He states that he has a history of coronary artery disease status post CABG (twice - 2011, 2018), prior CVA, solitary kidney, and BPH. He notes he is on carvedilol, Eliquis, Plavix, Flomax. He states that he has not taken his medications in about 3 days. He states that he is having a significant bout of chest pain which is centrally located on his chest.  He states that this is constant. Relieved with nitro but abruptly returns. He is not requiring supplemental oxygen. He is very anxious on exam and reports that he is significantly itchy as well. He is requesting IV Benadryl and IV narcotics. He is very fixated on receiving IV narcotics. Troponin noted to be elevated in the emergency department assess his creatinine. He notes that his baseline creatinine is 1.4. He will be admitted for further work-up and treatment of elevated troponin along with JACKSON.     Review of Systems:     Constitutional:  negative for chills, fevers, sweats  Respiratory:  negative for cough, dyspnea on exertion, shortness of breath, wheezing  Cardiovascular: Reports persistent centrally located chest pain; Negative for lower extremity edema, palpitations  Gastrointestinal:  negative for abdominal pain, constipation, diarrhea, nausea, vomiting  Neurological:  negative for dizziness, headache  Integument: Reports slight improvement of itching    Medications: Allergies: Allergies   Allergen Reactions    Nsaids Swelling    Asa [Aspirin] Swelling    Oxycodone Hives       Current Meds:   Scheduled Meds:    potassium chloride  20 mEq Oral Once    [START ON 2021] metoprolol succinate  50 mg Oral Daily    dexamethasone  6 mg Oral Daily    albuterol  2.5 mg Nebulization BID    furosemide  40 mg IntraVENous Daily    atorvastatin  40 mg Oral Nightly    carvedilol  3.125 mg Oral BID WC    clopidogrel  75 mg Oral Daily    [Held by provider] apixaban  5 mg Oral BID    enoxaparin  1 mg/kg SubCUTAneous BID    tamsulosin  0.4 mg Oral Daily     Continuous Infusions:    sodium chloride Stopped (21 0139)     PRN Meds: oxyCODONE, HYDROmorphone, acetaminophen, sodium chloride, albuterol sulfate HFA, hydrocortisone-aloe, diphenhydrAMINE, ondansetron **OR** ondansetron, magnesium hydroxide, potassium chloride **OR** potassium alternative oral replacement **OR** potassium chloride, potassium chloride, magnesium sulfate, nitroGLYCERIN, perflutren lipid microspheres    Data:     Past Medical History:   has a past medical history of AICD (automatic cardioverter/defibrillator) present, CKD (chronic kidney disease), stage III ([address] Myocardial infarction ([facility] S/P CABG (coronary artery bypass graft), and Solitary kidney, congenital.    Social History:   reports that he has been smoking. He has been smoking about 0.25 packs per day. He has never used smokeless tobacco. He reports previous alcohol use. He reports previous drug use.      Family History:   Family History   Problem Relation Age of Onset   [facility] Cancer Mother     Diabetes Father     Heart Disease Father     Diabetes Paternal Grandmother        Vitals:  /71   Pulse 70   Temp 99.7 °F (37.6 °C) (Oral)   Resp 18   Ht 5' 10\" (1.778 m)   Wt 175 lb 9.6 oz (79.7 kg)   SpO2 96%   BMI 25.20 kg/m²   Temp (24hrs), Av.2 °F (37.9 °C), Min:98.6 °F (37 °C), Max:102.1 °F (38.9 °C)    No results for input(s): POCGLU in the last 72 hours. I/O (24Hr): Intake/Output Summary (Last 24 hours) at 12/13/2021 1514  Last data filed at 12/13/2021 1232  Gross per 24 hour   Intake --   Output 1100 ml   Net -1100 ml       Labs:  Hematology:  Recent Labs     12/11/21  0658 12/12/21  0651 12/13/21  0536   WBC 1.9* 2.5* 3.4*   RBC 3.32* 3.41* 3.56*   HGB 10.4* 10.4* 10.8*   HCT 33.2* 33.6* 33.7*   .0 98.5 94.7   MCH 31.3 30.5 30.3   MCHC 31.3 31.0 32.0   RDW 16.5* 16.5* 16.3*   * 135* 167   MPV 10.2 11.0 11.1   CRP 33.2* 54.5*  --      Chemistry:  Recent Labs     12/11/21  0658 12/12/21  0651 12/13/21  0536    137 139   K 3.8 3.7 3.6*    101 102   CO2 23 21 21   GLUCOSE 85 93 115*   BUN 43* 42* 42*   CREATININE 2.27* 2.13* 2.14*   MG 1.8  --   --    ANIONGAP 13 15 16   LABGLOM 30* 32* 32*   GFRAA 36* 39* 39*   CALCIUM 7.9* 8.0* 8.0*   PHOS 3.3  --   --    PROBNP 23,071*  --   --    TROPHS  --   --  73*   LACTACIDWB  --   --  NOT REPORTED     No results for input(s): PROT, LABALBU, LABA1C, M0GMBWZ, X3QMKVB, FT4, TSH, AST, ALT, LDH, GGT, ALKPHOS, LABGGT, BILITOT, BILIDIR, AMMONIA, AMYLASE, LIPASE, LACTATE, CHOL, HDL, LDLCHOLESTEROL, CHOLHDLRATIO, TRIG, VLDL, UOX89QG, PHENYTOIN, PHENYF, URICACID, POCGLU in the last 72 hours. ABG:  Lab Results   Component Value Date    POCPH 7.441 12/12/2021    POCPCO2 40.6 12/12/2021    POCPO2 57.6 12/12/2021    POCHCO3 27.7 12/12/2021    NBEA NOT REPORTED 12/12/2021    PBEA 3 12/12/2021    ALP8LPR NOT REPORTED 12/12/2021    LORT1CCH 90 12/12/2021    FIO2 21.0 12/12/2021     Lab Results   Component Value Date/Time    SPECIAL RT FOOT 10ML 12/13/2021 05:35 AM     Lab Results   Component Value Date/Time    CULTURE NO GROWTH <24 HRS 12/13/2021 05:35 AM       Radiology:  CT ABDOMEN PELVIS WO CONTRAST Additional Contrast? None    Result Date: 12/9/2021  1.  Left-sided hydronephrosis/hydroureter with uroepithelial thickening and small amount of air seen within the collecting system. No obstructing stone or gross mass. Left UVJ stricture cannot be excluded. Correlation with urinalysis is suggested as underlying infectious process cannot be excluded. 2. 4 mm nonobstructing calculus left kidney. 3. Cardiomegaly. 4. Presumed post injection changes are seen involving the left anterior abdominal wall. 5. Body wall anasarca. 6. Small amount of free fluid. 7. Sigmoid diverticulosis. NM LUNG SCAN PERFUSION ONLY    Result Date: 12/7/2021  Low Probability for Pulmonary Embolus. XR CHEST 1 VIEW    Result Date: 12/7/2021  No prior study available. No definite pneumonia. Slight blunting left CP angle. Cardiomegaly; venous hypertension without radiographic CHF. US RETROPERITONEAL COMPLETE    Result Date: 12/8/2021  1. Nonvisualization of the right kidney. 2. Mild-to-moderate left-sided hydronephrosis. 3. Exam limited due to patient's bowel gas and body habitus. 4. Nonvisualization of ureteral jets. Urinary bladder grossly unremarkable.        Physical Examination:        General appearance:  alert, cooperative and no distress, somewhat disheveled appearing  sitting up in bed  Mental Status:  oriented to person, place and time and normal affect  Lungs: Globally diminished lung sounds, normal effort, no wheezing, rales, rhonchi  Heart:  regular rate and rhythm, no murmur  Abdomen:  soft, nontender, nondistended, normal bowel sounds, no masses, hepatomegaly, splenomegaly  Extremities:  no edema, redness, tenderness in the calves  Skin:  no gross lesions, rashes, induration, well-healed cicatrix on the anterior chest wall    Assessment:        Hospital Problems           Last Modified POA    * (Principal) NSTEMI (non-ST elevated myocardial infarction) ([facility] [address] 12/8/2021 Yes    JACKSON (acute kidney injury) ([address] 12/8/2021 Yes    Elevated troponin 12/8/2021 Yes    Hydronephrosis of left kidney 12/9/2021 Yes    Acute urinary retention 12/9/2021 Yes    AICD (automatic cardioverter/defibrillator) present 12/8/2021 Yes    History of MI (myocardial infarction) 12/8/2021 Yes    Elevated brain natriuretic peptide (BNP) level 12/8/2021 Yes    Chronic systolic heart failure ([facility] [address] 12/8/2021 Yes    Hx of CABG 12/8/2021 Yes    Stage 3 chronic kidney disease ([facility] [address] 12/8/2021 Yes    COVID-19 virus infection 12/8/2021 Yes    Hepatitis C 12/9/2021 Yes          Plan:        Angina/elevated troponin-continue Lovenox at therapeutic dosing. Continue Dilaudid/[place]. Wean narcotics. Reports headache with imdur/isordil, palpitations with ranexa   Recent JOYCE at [facility] which revealed an EF of 25 to 30%. He did not have any evidence for endocarditis to explain his bilateral occipital infarcts. He previously has an AICD implanted 2 years ago. Heart failure with reduced ejection fraction-continue IV Lasix. Would benefit from Aldactone. Nephrology assisting. Coronary artery disease status post CABG (two separate times) - continue Plavix. Cardiology recommending conservative care. Change lovenox to home eliquis. Continue Lipitor, carvedilol. JACKSON on CKD-appreciate nephrology input. Agree with Lasix. COVID-19 infection - not initially requiring supplemental oxygen. Now intermittently on. Patient initially refusing steroids. CRP is uptrending. Check again tomorrow. Positive test on 12/7. I am concerned he is in the early stages of his covid disease. Left-sided hydronephrosis/urinary retention-urology following. Not wanting to pursue further work-up at this time if his renal function is not worsening. Monitor renal function. Cystoscopy if renal function worsens. Recent CVA, bilateral occipital lobes, status post TPA-at [facility].  Recent hemoptysis-EGD and colonoscopy were emergently performed [facility] to the hospital.  Hepatitis C antibody positive - patient does not know of this history. Quant pending.  Will need o/p f/u  Homelessness - CM assisting, but patient is not cooperating. Reviewed [facility] records  Change to inpatient status    Disposition: Difficult case. Patient reporting significant chest pain. However, all consultants are recommending conservative care. Furthermore, patient reports intolerance to isordil/imdur (headache) and ranexa (palpitations), both which would be helpful in this situation. Ideally a heart cath would give us a good look at his coronaries and possible blockages. Wean narcotics in the meantime.     [facility],   12/13/2021  3:14 PM

## 2021-12-13 NOTE — FLOWSHEET NOTE
Called patient due to droplet plus status. Offered words of encouragement and offered spiritual care services. Patient declined.          12/13/21 1232   Encounter Summary   Services provided to: Patient   Referral/Consult From: Rounding   Support System Unknown   Continue Visiting   (12/13/21)   Complexity of Encounter Low   Length of Encounter 15 minutes   Routine   Type Initial   Assessment Approachable   Intervention Nurtured hope   Outcome Refused/declined

## 2021-12-14 ENCOUNTER — APPOINTMENT (OUTPATIENT)
Dept: CT IMAGING | Age: 56
DRG: 177 | End: 2021-12-14
Payer: MEDICARE

## 2021-12-14 PROBLEM — Q60.0 SOLITARY KIDNEY, CONGENITAL: Status: ACTIVE | Noted: 2021-12-14

## 2021-12-14 PROBLEM — Z59.00 HOMELESS SINGLE PERSON: Status: ACTIVE | Noted: 2021-12-14

## 2021-12-14 PROBLEM — R07.9 CHEST PAIN: Status: ACTIVE | Noted: 2021-12-08

## 2021-12-14 LAB
ABSOLUTE EOS #: 0 K/UL (ref 0–0.4)
ABSOLUTE IMMATURE GRANULOCYTE: 0 K/UL (ref 0–0.3)
ABSOLUTE LYMPH #: 0.31 K/UL (ref 1–4.8)
ABSOLUTE MONO #: 0.12 K/UL (ref 0.2–0.8)
ANION GAP SERPL CALCULATED.3IONS-SCNC: 13 MMOL/L (ref 9–17)
ANION GAP SERPL CALCULATED.3IONS-SCNC: 14 MMOL/L (ref 9–17)
BASOPHILS # BLD: 0 %
BASOPHILS ABSOLUTE: 0 K/UL (ref 0–0.2)
BUN BLDV-MCNC: 40 MG/DL (ref 6–20)
BUN BLDV-MCNC: 42 MG/DL (ref 6–20)
BUN/CREAT BLD: 17 (ref 9–20)
BUN/CREAT BLD: 18 (ref 9–20)
C-REACTIVE PROTEIN: 54.6 MG/L (ref 0–5)
CALCIUM SERPL-MCNC: 7.8 MG/DL (ref 8.6–10.4)
CALCIUM SERPL-MCNC: 8 MG/DL (ref 8.6–10.4)
CHLORIDE BLD-SCNC: 95 MMOL/L (ref 98–107)
CHLORIDE BLD-SCNC: 99 MMOL/L (ref 98–107)
CO2: 24 MMOL/L (ref 20–31)
CO2: 28 MMOL/L (ref 20–31)
CREAT SERPL-MCNC: 2.28 MG/DL (ref 0.7–1.2)
CREAT SERPL-MCNC: 2.31 MG/DL (ref 0.7–1.2)
CULTURE: ABNORMAL
DIFFERENTIAL TYPE: ABNORMAL
EOSINOPHILS RELATIVE PERCENT: 0 % (ref 1–4)
GFR AFRICAN AMERICAN: 36 ML/MIN
GFR AFRICAN AMERICAN: 36 ML/MIN
GFR NON-AFRICAN AMERICAN: 29 ML/MIN
GFR NON-AFRICAN AMERICAN: 30 ML/MIN
GFR SERPL CREATININE-BSD FRML MDRD: ABNORMAL ML/MIN/{1.73_M2}
GLUCOSE BLD-MCNC: 94 MG/DL (ref 70–99)
GLUCOSE BLD-MCNC: 96 MG/DL (ref 70–99)
HCT VFR BLD CALC: 32.5 % (ref 40.7–50.3)
HEMOGLOBIN: 10.3 G/DL (ref 13–17)
IMMATURE GRANULOCYTES: 0 %
LYMPHOCYTES # BLD: 10 % (ref 24–44)
Lab: ABNORMAL
MAGNESIUM: 1.7 MG/DL (ref 1.6–2.6)
MCH RBC QN AUTO: 30.7 PG (ref 25.2–33.5)
MCHC RBC AUTO-ENTMCNC: 31.7 G/DL (ref 28.4–34.8)
MCV RBC AUTO: 96.7 FL (ref 82.6–102.9)
MONOCYTES # BLD: 4 % (ref 1–7)
NRBC AUTOMATED: 0 PER 100 WBC
PDW BLD-RTO: 16.6 % (ref 11.8–14.4)
PLATELET # BLD: 157 K/UL (ref 138–453)
PLATELET ESTIMATE: ABNORMAL
PMV BLD AUTO: 11.1 FL (ref 8.1–13.5)
POTASSIUM SERPL-SCNC: 3.5 MMOL/L (ref 3.7–5.3)
POTASSIUM SERPL-SCNC: 3.7 MMOL/L (ref 3.7–5.3)
RBC # BLD: 3.36 M/UL (ref 4.21–5.77)
RBC # BLD: ABNORMAL 10*6/UL
SEG NEUTROPHILS: 86 % (ref 36–66)
SEGMENTED NEUTROPHILS ABSOLUTE COUNT: 2.67 K/UL (ref 1.8–7.7)
SODIUM BLD-SCNC: 136 MMOL/L (ref 135–144)
SODIUM BLD-SCNC: 137 MMOL/L (ref 135–144)
SPECIMEN DESCRIPTION: ABNORMAL
WBC # BLD: 3.1 K/UL (ref 3.5–11.3)
WBC # BLD: ABNORMAL 10*3/UL

## 2021-12-14 PROCEDURE — 86140 C-REACTIVE PROTEIN: CPT

## 2021-12-14 PROCEDURE — 99222 1ST HOSP IP/OBS MODERATE 55: CPT | Performed by: PSYCHIATRY & NEUROLOGY

## 2021-12-14 PROCEDURE — 6360000002 HC RX W HCPCS: Performed by: INTERNAL MEDICINE

## 2021-12-14 PROCEDURE — 36415 COLL VENOUS BLD VENIPUNCTURE: CPT

## 2021-12-14 PROCEDURE — 83735 ASSAY OF MAGNESIUM: CPT

## 2021-12-14 PROCEDURE — 2060000000 HC ICU INTERMEDIATE R&B

## 2021-12-14 PROCEDURE — 99232 SBSQ HOSP IP/OBS MODERATE 35: CPT | Performed by: FAMILY MEDICINE

## 2021-12-14 PROCEDURE — 6370000000 HC RX 637 (ALT 250 FOR IP): Performed by: INTERNAL MEDICINE

## 2021-12-14 PROCEDURE — 6370000000 HC RX 637 (ALT 250 FOR IP): Performed by: NURSE PRACTITIONER

## 2021-12-14 PROCEDURE — 4A03X5D MEASUREMENT OF ARTERIAL FLOW, INTRACRANIAL, EXTERNAL APPROACH: ICD-10-PCS | Performed by: STUDENT IN AN ORGANIZED HEALTH CARE EDUCATION/TRAINING PROGRAM

## 2021-12-14 PROCEDURE — 99291 CRITICAL CARE FIRST HOUR: CPT | Performed by: NURSE PRACTITIONER

## 2021-12-14 PROCEDURE — 85025 COMPLETE CBC W/AUTO DIFF WBC: CPT

## 2021-12-14 PROCEDURE — 80048 BASIC METABOLIC PNL TOTAL CA: CPT

## 2021-12-14 PROCEDURE — 6370000000 HC RX 637 (ALT 250 FOR IP): Performed by: FAMILY MEDICINE

## 2021-12-14 PROCEDURE — 82947 ASSAY GLUCOSE BLOOD QUANT: CPT

## 2021-12-14 PROCEDURE — 6370000000 HC RX 637 (ALT 250 FOR IP): Performed by: STUDENT IN AN ORGANIZED HEALTH CARE EDUCATION/TRAINING PROGRAM

## 2021-12-14 PROCEDURE — 2580000003 HC RX 258: Performed by: STUDENT IN AN ORGANIZED HEALTH CARE EDUCATION/TRAINING PROGRAM

## 2021-12-14 PROCEDURE — 1200000000 HC SEMI PRIVATE

## 2021-12-14 PROCEDURE — 70450 CT HEAD/BRAIN W/O DYE: CPT

## 2021-12-14 PROCEDURE — 87040 BLOOD CULTURE FOR BACTERIA: CPT

## 2021-12-14 RX ORDER — 0.9 % SODIUM CHLORIDE 0.9 %
1000 INTRAVENOUS SOLUTION INTRAVENOUS ONCE
Status: COMPLETED | OUTPATIENT
Start: 2021-12-14 | End: 2021-12-15

## 2021-12-14 RX ORDER — BUMETANIDE 1 MG/1
2 TABLET ORAL 2 TIMES DAILY
Status: DISCONTINUED | OUTPATIENT
Start: 2021-12-14 | End: 2021-12-19 | Stop reason: HOSPADM

## 2021-12-14 RX ORDER — GABAPENTIN 100 MG/1
100 CAPSULE ORAL 3 TIMES DAILY
Status: DISCONTINUED | OUTPATIENT
Start: 2021-12-14 | End: 2021-12-19 | Stop reason: HOSPADM

## 2021-12-14 RX ORDER — OXYCODONE HYDROCHLORIDE AND ACETAMINOPHEN 5; 325 MG/1; MG/1
1 TABLET ORAL EVERY 4 HOURS PRN
Status: DISCONTINUED | OUTPATIENT
Start: 2021-12-14 | End: 2021-12-19 | Stop reason: HOSPADM

## 2021-12-14 RX ORDER — 0.9 % SODIUM CHLORIDE 0.9 %
80 INTRAVENOUS SOLUTION INTRAVENOUS ONCE
Status: COMPLETED | OUTPATIENT
Start: 2021-12-15 | End: 2021-12-15

## 2021-12-14 RX ORDER — PANTOPRAZOLE SODIUM 40 MG/1
40 TABLET, DELAYED RELEASE ORAL
Status: DISCONTINUED | OUTPATIENT
Start: 2021-12-15 | End: 2021-12-19 | Stop reason: HOSPADM

## 2021-12-14 RX ORDER — SODIUM CHLORIDE 0.9 % (FLUSH) 0.9 %
10 SYRINGE (ML) INJECTION PRN
Status: DISCONTINUED | OUTPATIENT
Start: 2021-12-14 | End: 2021-12-19 | Stop reason: HOSPADM

## 2021-12-14 RX ADMIN — APIXABAN 5 MG: 5 TABLET, FILM COATED ORAL at 20:05

## 2021-12-14 RX ADMIN — BUMETANIDE 2 MG: 1 TABLET ORAL at 20:05

## 2021-12-14 RX ADMIN — TAMSULOSIN HYDROCHLORIDE 0.4 MG: 0.4 CAPSULE ORAL at 10:02

## 2021-12-14 RX ADMIN — DIPHENHYDRAMINE HYDROCHLORIDE 25 MG: 50 INJECTION, SOLUTION INTRAMUSCULAR; INTRAVENOUS at 13:15

## 2021-12-14 RX ADMIN — DIPHENHYDRAMINE HYDROCHLORIDE 25 MG: 50 INJECTION, SOLUTION INTRAMUSCULAR; INTRAVENOUS at 03:04

## 2021-12-14 RX ADMIN — ATORVASTATIN CALCIUM 40 MG: 40 TABLET, FILM COATED ORAL at 20:05

## 2021-12-14 RX ADMIN — APIXABAN 5 MG: 5 TABLET, FILM COATED ORAL at 10:03

## 2021-12-14 RX ADMIN — HYDROMORPHONE HYDROCHLORIDE 0.5 MG: 1 INJECTION, SOLUTION INTRAMUSCULAR; INTRAVENOUS; SUBCUTANEOUS at 01:19

## 2021-12-14 RX ADMIN — CLOPIDOGREL BISULFATE 75 MG: 75 TABLET ORAL at 10:03

## 2021-12-14 RX ADMIN — SODIUM CHLORIDE 1000 ML: 9 INJECTION, SOLUTION INTRAVENOUS at 23:38

## 2021-12-14 RX ADMIN — HYDROMORPHONE HYDROCHLORIDE 0.5 MG: 1 INJECTION, SOLUTION INTRAMUSCULAR; INTRAVENOUS; SUBCUTANEOUS at 12:54

## 2021-12-14 RX ADMIN — DEXAMETHASONE 6 MG: 4 TABLET ORAL at 10:02

## 2021-12-14 RX ADMIN — HYDROMORPHONE HYDROCHLORIDE 0.5 MG: 1 INJECTION, SOLUTION INTRAMUSCULAR; INTRAVENOUS; SUBCUTANEOUS at 05:55

## 2021-12-14 RX ADMIN — ACETAMINOPHEN 650 MG: 325 TABLET ORAL at 12:10

## 2021-12-14 RX ADMIN — FUROSEMIDE 40 MG: 10 INJECTION, SOLUTION INTRAMUSCULAR; INTRAVENOUS at 10:02

## 2021-12-14 RX ADMIN — ACETAMINOPHEN 650 MG: 325 TABLET ORAL at 01:19

## 2021-12-14 RX ADMIN — DIPHENHYDRAMINE HYDROCHLORIDE 25 MG: 50 INJECTION, SOLUTION INTRAMUSCULAR; INTRAVENOUS at 23:44

## 2021-12-14 RX ADMIN — METOPROLOL SUCCINATE 50 MG: 50 TABLET, EXTENDED RELEASE ORAL at 10:03

## 2021-12-14 ASSESSMENT — PAIN SCALES - GENERAL
PAINLEVEL_OUTOF10: 6
PAINLEVEL_OUTOF10: 0
PAINLEVEL_OUTOF10: 0
PAINLEVEL_OUTOF10: 9
PAINLEVEL_OUTOF10: 0
PAINLEVEL_OUTOF10: 0
PAINLEVEL_OUTOF10: 7
PAINLEVEL_OUTOF10: 0
PAINLEVEL_OUTOF10: 0
PAINLEVEL_OUTOF10: 9
PAINLEVEL_OUTOF10: 0

## 2021-12-14 ASSESSMENT — PAIN DESCRIPTION - ONSET: ONSET: ON-GOING

## 2021-12-14 ASSESSMENT — ENCOUNTER SYMPTOMS
COUGH: 0
SHORTNESS OF BREATH: 0
SINUS PRESSURE: 0
NAUSEA: 0
CONSTIPATION: 0
BACK PAIN: 0
WHEEZING: 0
VOMITING: 0
RHINORRHEA: 0
CHOKING: 0
VOICE CHANGE: 0
DIARRHEA: 0
CHEST TIGHTNESS: 0
ABDOMINAL PAIN: 0

## 2021-12-14 ASSESSMENT — PAIN DESCRIPTION - LOCATION
LOCATION: CHEST

## 2021-12-14 ASSESSMENT — PAIN DESCRIPTION - PAIN TYPE
TYPE: CHRONIC PAIN

## 2021-12-14 ASSESSMENT — PAIN DESCRIPTION - PROGRESSION: CLINICAL_PROGRESSION: NOT CHANGED

## 2021-12-14 ASSESSMENT — PAIN DESCRIPTION - DESCRIPTORS: DESCRIPTORS: ACHING;DISCOMFORT

## 2021-12-14 ASSESSMENT — PAIN DESCRIPTION - ORIENTATION
ORIENTATION: MID
ORIENTATION: MID

## 2021-12-14 ASSESSMENT — PAIN DESCRIPTION - FREQUENCY: FREQUENCY: CONTINUOUS

## 2021-12-14 ASSESSMENT — PAIN - FUNCTIONAL ASSESSMENT: PAIN_FUNCTIONAL_ASSESSMENT: PREVENTS OR INTERFERES SOME ACTIVE ACTIVITIES AND ADLS

## 2021-12-14 NOTE — PLAN OF CARE
Problem: Airway Clearance - Ineffective  Goal: Achieve or maintain patent airway  12/14/2021 0202 by Anatoly Ho RN  Outcome: Ongoing     Problem: Gas Exchange - Impaired  Goal: Absence of hypoxia  12/14/2021 0202 by Anatoly Ho RN  Outcome: Ongoing     Problem: Gas Exchange - Impaired  Goal: Promote optimal lung function  12/14/2021 0202 by Anatoly Ho RN  Outcome: Ongoing     Problem: Breathing Pattern - Ineffective  Goal: Ability to achieve and maintain a regular respiratory rate  12/14/2021 0202 by Anatoly Ho RN  Outcome: Ongoing     Problem: Body Temperature -  Risk of, Imbalanced  Goal: Ability to maintain a body temperature within defined limits  12/14/2021 0202 by Anatoly Ho RN  Outcome: Ongoing     Problem: Body Temperature -  Risk of, Imbalanced  Goal: Will regain or maintain usual level of consciousness  12/14/2021 0202 by Anatoly Ho RN  Outcome: Ongoing     Problem:  Body Temperature -  Risk of, Imbalanced  Goal: Complications related to the disease process, condition or treatment will be avoided or minimized  12/14/2021 0202 by Anatoly Ho RN  Outcome: Ongoing     Problem: Isolation Precautions - Risk of Spread of Infection  Goal: Prevent transmission of infection  12/14/2021 0202 by Anatoly Ho RN  Outcome: Ongoing     Problem: Nutrition Deficits  Goal: Optimize nutritional status  12/14/2021 0202 by Anatoly Ho RN  Outcome: Ongoing     Problem: Risk for Fluid Volume Deficit  Goal: Maintain normal heart rhythm  12/14/2021 0202 by Anatoly Ho RN  Outcome: Ongoing     Problem: Risk for Fluid Volume Deficit  Goal: Maintain absence of muscle cramping  12/14/2021 0202 by Anatoly Ho RN  Outcome: Ongoing     Problem: Risk for Fluid Volume Deficit  Goal: Maintain normal serum potassium, sodium, calcium, phosphorus, and pH  12/14/2021 0202 by Rajeev Lopez RN  Outcome: Ongoing     Problem: Loneliness or Risk for Loneliness  Goal: Demonstrate positive use of time alone when socialization is not possible  12/14/2021 0202 by Rajeev Lopez RN  Outcome: Ongoing     Problem: Fatigue  Goal: Verbalize increase energy and improved vitality  12/14/2021 0202 by Rajeev Lopez RN  Outcome: Ongoing     Problem: OXYGENATION/RESPIRATORY FUNCTION  Goal: Patient will maintain patent airway  12/14/2021 0202 by Rajeev Lopez RN  Outcome: Ongoing     Problem: OXYGENATION/RESPIRATORY FUNCTION  Goal: Patient will achieve/maintain normal respiratory rate/effort  Description: Respiratory rate and effort will be within normal limits for the patient  12/14/2021 0202 by Rajeev Lopez RN  Outcome: Ongoing     Problem: HEMODYNAMIC STATUS  Goal: Patient has stable vital signs and fluid balance  12/14/2021 0202 by Rajeev Lopez RN  Outcome: Ongoing     Problem: FLUID AND ELECTROLYTE IMBALANCE  Goal: Fluid and electrolyte balance are achieved/maintained  12/14/2021 0202 by Rajeev Lopez RN  Outcome: Ongoing     Problem: ACTIVITY INTOLERANCE/IMPAIRED MOBILITY  Goal: Mobility/activity is maintained at optimum level for patient  12/14/2021 0202 by Rajeev Lopez RN  Outcome: Ongoing     Problem: Cardiac:  Goal: Ability to maintain an adequate cardiac output will improve  Description: Ability to maintain an adequate cardiac output will improve  12/14/2021 0202 by Rajeev Lopez RN  Outcome: Ongoing     Problem: Cardiac:  Goal: Hemodynamic stability will improve  Description: Hemodynamic stability will improve  12/14/2021 0202 by Rajeev Lopez RN  Outcome: Ongoing     Problem: Respiratory:  Goal: Respiratory status will improve  Description: Respiratory status will improve  12/14/2021 0202 by Rajeev Lopez RN  Outcome: Ongoing     Problem: Falls - Risk of:  Goal: Will remain free from falls  Description: Will remain free from falls  12/14/2021 0202 by Jessica Sal RN  Outcome: Ongoing     Problem: Falls - Risk of:  Goal: Absence of physical injury  Description: Absence of physical injury  12/14/2021 0202 by Jessica Sal RN  Outcome: Ongoing     Problem: Pain:  Goal: Pain level will decrease  Description: Pain level will decrease  12/14/2021 0202 by Jessica Sal RN  Outcome: Ongoing     Problem: Pain:  Goal: Control of chronic pain  Description: Control of chronic pain  12/14/2021 0202 by Jessica Sal RN  Outcome: Ongoing

## 2021-12-14 NOTE — PLAN OF CARE
Problem: Airway Clearance - Ineffective  Goal: Achieve or maintain patent airway  12/14/2021 1100 by David Pressley RN  Outcome: Ongoing     Problem: Gas Exchange - Impaired  Goal: Absence of hypoxia  12/14/2021 1100 by David Pressley RN  Outcome: Ongoing     Problem: Gas Exchange - Impaired  Goal: Promote optimal lung function  12/14/2021 1100 by David Pressley RN  Outcome: Ongoing     Problem: Breathing Pattern - Ineffective  Goal: Ability to achieve and maintain a regular respiratory rate  12/14/2021 1100 by David Pressley RN  Outcome: Ongoing     Problem: Body Temperature -  Risk of, Imbalanced  Goal: Will regain or maintain usual level of consciousness  12/14/2021 1100 by David Pressley RN  Outcome: Ongoing     Problem:  Body Temperature -  Risk of, Imbalanced  Goal: Complications related to the disease process, condition or treatment will be avoided or minimized  12/14/2021 1100 by David Pressley RN  Outcome: Ongoing     Problem: Isolation Precautions - Risk of Spread of Infection  Goal: Prevent transmission of infection  12/14/2021 1100 by David Pressley RN  Outcome: Ongoing     Problem: Loneliness or Risk for Loneliness  Goal: Demonstrate positive use of time alone when socialization is not possible  12/14/2021 1100 by David Pressley RN  Outcome: Ongoing     Problem: Patient Education: Go to Patient Education Activity  Goal: Patient/Family Education  12/14/2021 1100 by David Pressley RN  Outcome: Ongoing     Problem: OXYGENATION/RESPIRATORY FUNCTION  Goal: Patient will maintain patent airway  12/14/2021 1100 by David Pressley RN  Outcome: Ongoing     Problem: OXYGENATION/RESPIRATORY FUNCTION  Goal: Patient will achieve/maintain normal respiratory rate/effort  Description: Respiratory rate and effort will be within normal limits for the patient  12/14/2021 1100 by David Pressley RN  Outcome: Ongoing     Problem: HEMODYNAMIC STATUS  Goal: Patient has stable vital signs and fluid balance  12/14/2021 1100 by David Pressley RN  Outcome: Ongoing

## 2021-12-14 NOTE — PROGRESS NOTES
Vero Zhang   Urology Progress Note            Subjective: Solitary functioning kidney, hydronephrosis with no ureteral obstruction    Patient Vitals for the past 24 hrs:   BP Temp Temp src Pulse Resp SpO2 Weight   12/14/21 1151 116/68 102.9 °F (39.4 °C) Oral 69 18 97 % --   12/14/21 0812 128/65 98.6 °F (37 °C) Oral 70 18 96 % --   12/14/21 0640 -- -- -- -- -- -- 172 lb 4.8 oz (78.2 kg)   12/14/21 0325 136/73 100.2 °F (37.9 °C) Oral 70 18 96 % --   12/14/21 0021 129/81 102 °F (38.9 °C) -- 69 18 91 % --   12/13/21 2146 -- 98 °F (36.7 °C) -- -- -- -- --   12/13/21 2123 129/78 101.3 °F (38.5 °C) -- 70 18 94 % --   12/13/21 1534 114/72 98.6 °F (37 °C) Oral 69 18 94 % --       Intake/Output Summary (Last 24 hours) at 12/14/2021 1412  Last data filed at 12/14/2021 0654  Gross per 24 hour   Intake 800 ml   Output 700 ml   Net 100 ml       Recent Labs     12/12/21  0651 12/13/21  0536 12/14/21  0550   WBC 2.5* 3.4* 3.1*   HGB 10.4* 10.8* 10.3*   HCT 33.6* 33.7* 32.5*   MCV 98.5 94.7 96.7   * 167 157     Recent Labs     12/12/21  0651 12/13/21  0536 12/14/21  0550    139 137   K 3.7 3.6* 3.7    102 99   CO2 21 21 24   BUN 42* 42* 40*   CREATININE 2.13* 2.14* 2.31*       No results for input(s): COLORU, PHUR, LABCAST, WBCUA, RBCUA, MUCUS, TRICHOMONAS, YEAST, BACTERIA, CLARITYU, SPECGRAV, LEUKOCYTESUR, UROBILINOGEN, BILIRUBINUR, BLOODU in the last 72 hours. Invalid input(s): NITRATE, GLUCOSEUKETONESUAMORPHOUS    Additional Lab/culture results:    Physical Exam: Patient alert, not in acute distress, patient had a complete urologic work-up that demonstrated no evidence of ureteral obstruction.     This was [facility] at [facility] he had a stent placed that was removed    Interval Imaging Findings:    Impression: Solitary kidney with chronic hydronephrosis  Patient Active Problem List   Diagnosis    [facility]SON [facility]) [facility] [address]    Other chest pain    AICD (automatic cardioverter/defibrillator) present    History of MI (myocardial infarction)    Elevated brain natriuretic peptide (BNP) level    Elevated troponin    NSTEMI (non-ST elevated myocardial infarction) (HCC)    Chronic systolic heart failure (HCC)    Hx of CABG    Stage 3 chronic kidney disease ([facility] [address]    COVID-19 virus infection    Hydronephrosis of left kidney    Acute urinary retention    Hepatitis C    Coronary artery disease involving native coronary artery of native heart with angina pectoris ([facility] [address]       Plan:  At the present time continue to monitor renal function reinsertion of ureteral stent could be considered in the future but there does not seem to be obstruction at least not at the present time    Ulysses Denson MD  2:12 PM 12/14/2021

## 2021-12-14 NOTE — PROGRESS NOTES
Renal Progress Note    Patient :  eKcia Dc; 61 y.o. MRN# [id number]  Location:  1012/1012-02  Attending:  Natividad Carrillo MD  Admit Date:  12/7/2021   Hospital Day: 1      Subjective:     Patient was seen and examined. No new issues reported overnight. Urine output documented as about 1.8 L in the last 24 hours. Patient is currently on Lasix 40 mg IV once a day. Urology on board. Creatinine did increase to 2.31 mg/dl; electrolytes okay. Patient mentioned that he had nausea and vomiting x1 since yesterday. Currently on COVID-19 infection isolation. On IV dexamethasone. Outpatient Medications:     Medications Prior to Admission: clopidogrel (PLAVIX) 75 MG tablet, Take 1 tablet by mouth daily  atorvastatin (LIPITOR) 40 MG tablet, Take 1 tablet by mouth daily  tamsulosin (FLOMAX) 0.4 MG capsule, Take 0.4 mg by mouth daily  apixaban (ELIQUIS) 5 MG TABS tablet, Take 5 mg by mouth 2 times daily  furosemide (LASIX) 20 MG tablet, Take 20 mg by mouth as needed (swelling)   amLODIPine (NORVASC) 10 MG tablet, Take 0.5 tablets by mouth daily  carvedilol (COREG) 6.25 MG tablet, Take 1 tablet by mouth 2 times daily    Current Medications:     Scheduled Meds:    metoprolol succinate  50 mg Oral Daily    apixaban  5 mg Oral BID    dexamethasone  6 mg Oral Daily    albuterol  2.5 mg Nebulization BID    furosemide  40 mg IntraVENous Daily    atorvastatin  40 mg Oral Nightly    clopidogrel  75 mg Oral Daily    tamsulosin  0.4 mg Oral Daily     Continuous Infusions:    sodium chloride Stopped (12/11/21 0139)     PRN Meds:  HYDROmorphone, acetaminophen, sodium chloride, albuterol sulfate HFA, hydrocortisone-aloe, diphenhydrAMINE, ondansetron **OR** ondansetron, magnesium hydroxide, potassium chloride **OR** potassium alternative oral replacement **OR** potassium chloride, potassium chloride, magnesium sulfate, nitroGLYCERIN, perflutren lipid microspheres    Input/Output:       I/O last 3 completed shifts:   In: 800 [P.O.:800]  Out: 1800 [Urine:1800]. Patient Vitals for the past 96 hrs (Last 3 readings):   Weight   21 0640 172 lb 4.8 oz (78.2 kg)   21 0000 175 lb 9.6 oz (79.7 kg)       Vital Signs:   Temperature:  Temp: 98.6 °F (37 °C)  TMax:   Temp (24hrs), Av.8 °F (37.7 °C), Min:98 °F (36.7 °C), Max:102 °F (38.9 °C)    Respirations:  Resp: 18  Pulse:   Pulse: 70  BP:    BP: 128/65  BP Range: Systolic (37VTD), KAYLYN:830 , Min:114 , JMR:702       Diastolic (23GSC), KKJ:10, Min:65, Max:81      Physical Examination:     General:  AAO x 3, speaking in full sentences, no accessory muscle use. HEENT: Atraumatic, normocephalic, no throat congestion, moist mucosa. Eyes:   Pupils equal, round and reactive to light, EOMI. Neck:   Supple  Chest:   Bilateral vesicular breath sounds, no rales or wheezes. Cardiac:  S1 S2 RR, no murmurs, gallops or rubs. Abdomen: Soft, non-tender, no masses or organomegaly, BS audible. :   No suprapubic or flank tenderness. Neuro:  AAO x 3, No FND. SKIN:  No rashes, good skin turgor. Extremities:  Trace edema. Labs:       Recent Labs     21  0651 21  0536 21  0550   WBC 2.5* 3.4* 3.1*   RBC 3.41* 3.56* 3.36*   HGB 10.4* 10.8* 10.3*   HCT 33.6* 33.7* 32.5*   MCV 98.5 94.7 96.7   MCH 30.5 30.3 30.7   MCHC 31.0 32.0 31.7   RDW 16.5* 16.3* 16.6*   * 167 157   MPV 11.0 11.1 11.1      BMP:   Recent Labs     21  0651 21  0536 21  0550    139 137   K 3.7 3.6* 3.7    102 99   CO2 21 21 24   BUN 42* 42* 40*   CREATININE 2.13* 2.14* 2.31*   GLUCOSE 93 115* 96   CALCIUM 8.0* 8.0* 7.8*      SPEP:  Lab Results   Component Value Date    PROT 6.3 2021    PATH ELECTRONICALLY SIGNED.  Anatoliy Bazan M.D. 2021     Hep BsAg:         Lab Results   Component Value Date    HEPBSAG NONREACTIVE 2021     Hep C AB:          Lab Results   Component Value Date    HEPCAB REACTIVE 2021       Urinalysis/Chemistries:      Lab Results   Component Value Date    NITRU NEGATIVE 12/11/2021    COLORU Yellow 12/11/2021    PHUR 7.0 12/11/2021    WBCUA 0 TO 2 12/11/2021    RBCUA 0 TO 2 12/11/2021    MUCUS NOT REPORTED 12/11/2021    TRICHOMONAS NOT REPORTED 12/11/2021    YEAST NOT REPORTED 12/11/2021    BACTERIA NOT REPORTED 12/11/2021    SPECGRAV 1.015 12/11/2021    LEUKOCYTESUR NEGATIVE 12/11/2021    UROBILINOGEN Normal 12/11/2021    BILIRUBINUR NEGATIVE 12/11/2021    GLUCOSEU NEGATIVE 12/11/2021    KETUA NEGATIVE 12/11/2021    AMORPHOUS NOT REPORTED 12/11/2021     Urine Sodium:     Lab Results   Component Value Date    SHERMAN <20 12/09/2021      Urine Creatinine:     Lab Results   Component Value Date    LABCREA 95.6 12/09/2021     Radiology:     Reviewed. Assessment:     1. Acute Kidney Injury Nonoliguric likely secondary to possible obstruction, chronic hydro-along with history of solitary left kidney complicated further by decompensated heart failure. Baseline creatinine currently not available. 2.  COVID-19 infection. 3.  Congenital absence of right kidney. 4.  Left-sided hydronephrosis/urinary retention. 5.  Ischemic cardiomyopathy with EF of 25-30% as per 2D echo done on 20-30% as per 2 D Echo done on 12/3/2021.  6.  Coronary disease with history of CABG. 7.  History of A. fib. 8.  Hypertension. 9.  History of CVA, bilateral occipital lobes status post TPA. 10.  Hep C antibody positive. 11.  Homelessness. 12.  History of urinary retention along with chronic left with history of stent placement and removal at the hospital December 21. Plan:   1. Continue Lasix 40 mg I.V. once a day. 2.  Follow-up urology plan, will likely require intervention as renal function did increased. 3.  Covid-19 infection management as per primary. 4.  Monitor strict I's and O's and renal function. 5.  BMP in AM.  6.  Will follow. Nutrition   Please ensure that patient is on a renal diet/TF. Avoid nephrotoxic drugs/contrast exposure.     We will continue to follow along with you. Nagi Singh MD  [facility]     This note is created with the assistance of a speech-recognition program. While intending to generate a document that actually reflects the content of the visit, no guarantees can be provided that every mistake has been identified and corrected by editing.

## 2021-12-14 NOTE — PROGRESS NOTES
Spoke to patient regarding his IV status. Patient's foot IV infiltrated earlier and removed. Physician notified and suggested PICC. Patient refused to respond when writer attempted to discuss with him. Dr. Kole Chamorro notified. Will continue to monitor.

## 2021-12-14 NOTE — PROGRESS NOTES
problems. *    Subjective : Interval History/Significant events :  12/14/21    Patient continues to complain of pain in his lower chest.  Patient also reports continues itching and he has been scratching with bleeding. Patient has been taking IV Dilaudid for pain control. He denies any breathing difficulty. He denies any radiation of pain. Pain is persistent for the last few days. Patient has been having intermittent fevers. T-max 102 °F.  He remains on 2 L of oxygen by nasal cannula. Vitals - Stable afebrile  Labs -creatinine 2.31, CRP worsened from 33.2 to 54.6. Leukopenia 3.1, low platelet 673, low hemoglobin 10.3. Blood culture positive for staph epidermidis  CT abdomen pelvis shows left sided hydronephrosis, 4 mm nonobstructing left kidney stone,  Nursing notes , Consults notes reviewed. Overnight events and updates discussed with Nursing staff . Background History:         Flo Mccain is 61 y.o. male  Who was admitted to the hospital on 12/7/2021 for treatment of NSTEMI (non-ST elevated myocardial infarction) ([facility] [address] Patient presented to hospital with chest pain for 1 week associated with shortness of breath for 4 to 5 days. Patient had initially went to [facility] 3 days before but left as he was not satisfied with the care over there  And left [address] Patient was at [address] [place] [address] recently with right-sided deficit and was given TPA. Work-up showed supratentorial bilateral cerebral infarcts, embolic in origin. He was also found to have A. fib and was given Eliquis. He also had hematemesis which required urgent EGD and did not show any source of bleed. Patient was put back on Plavix and Eliquis. Patient was found to have COVID-19 infection. Patient is homeless and had been bouncing between the hotels. Patient [place] the mall with security noticed him to be unwell and called EMS.  Patient has underlying history of CAD with CABG in 2011 and redo frequency, penile discharge and testicular pain. Musculoskeletal: Negative for back pain. Skin: Positive for rash. Neurological: Negative for dizziness, weakness, light-headedness and headaches. Hematological: Does not bruise/bleed easily. Psychiatric/Behavioral: Negative for agitation, behavioral problems, confusion, self-injury, sleep disturbance and suicidal ideas. Objective :      Current Vitals : Temp: 98.6 °F (37 °C),  Pulse: 70, Resp: 18, BP: 128/65, SpO2: 96 %  Last 24 Hrs Vitals   Patient Vitals for the past 24 hrs:   BP Temp Temp src Pulse Resp SpO2 Weight   12/14/21 0812 128/65 98.6 °F (37 °C) Oral 70 18 96 % --   12/14/21 0640 -- -- -- -- -- -- 172 lb 4.8 oz (78.2 kg)   12/14/21 0325 136/73 100.2 °F (37.9 °C) Oral 70 18 96 % --   12/14/21 0021 129/81 102 °F (38.9 °C) -- 69 18 91 % --   12/13/21 2146 -- 98 °F (36.7 °C) -- -- -- -- --   12/13/21 2123 129/78 101.3 °F (38.5 °C) -- 70 18 94 % --   12/13/21 1534 114/72 98.6 °F (37 °C) Oral 69 18 94 % --   12/13/21 1131 125/71 99.7 °F (37.6 °C) Oral 70 18 96 % --     Intake / output   12/13 0701 - 12/14 0700  In: 800 [P.O.:800]  Out: 1800 [Urine:1800]  Physical Exam:  Physical Exam  Vitals and nursing note reviewed. Constitutional:       General: He is not in acute distress. Appearance: He is not diaphoretic. HENT:      Head: Normocephalic and atraumatic. Nose:      Right Sinus: No maxillary sinus tenderness or frontal sinus tenderness. Left Sinus: No maxillary sinus tenderness or frontal sinus tenderness. Mouth/Throat:      Pharynx: No oropharyngeal exudate. Eyes:      General: No scleral icterus. Conjunctiva/sclera: Conjunctivae normal.      Pupils: Pupils are equal, round, and reactive to light. Neck:      Thyroid: No thyromegaly. Vascular: No JVD. Cardiovascular:      Rate and Rhythm: Normal rate and regular rhythm.       Pulses:           Dorsalis pedis pulses are 2+ on the right side and 2+ on the left side.      Heart sounds: Normal heart sounds. No murmur heard. Pulmonary:      Effort: Pulmonary effort is normal.      Breath sounds: Normal breath sounds. No wheezing or rales. Chest:      Comments: ICD in place. Sternotomy scar   Abdominal:      Palpations: Abdomen is soft. There is no mass. Tenderness: There is no abdominal tenderness. Musculoskeletal:      Cervical back: Full passive range of motion without pain and neck supple. Lymphadenopathy:      Head:      Right side of head: No submandibular adenopathy. Left side of head: No submandibular adenopathy. Cervical: No cervical adenopathy. Skin:     General: Skin is warm. Findings: Rash present. Rash is crusting. Comments: On lower end of sternum. Neurological:      Mental Status: He is alert and oriented to person, place, and time. Motor: No tremor. Psychiatric:         Behavior: Behavior is cooperative. Laboratory findings:    Recent Labs     12/12/21  0651 12/13/21  0536 12/14/21  0550   WBC 2.5* 3.4* 3.1*   HGB 10.4* 10.8* 10.3*   HCT 33.6* 33.7* 32.5*   * 167 157     Recent Labs     12/12/21  0651 12/13/21  0536 12/14/21  0550    139 137   K 3.7 3.6* 3.7    102 99   CO2 21 21 24   GLUCOSE 93 115* 96   BUN 42* 42* 40*   CREATININE 2.13* 2.14* 2.31*   CALCIUM 8.0* 8.0* 7.8*     No results for input(s): PROT, LABALBU, LABA1C, J3ITDDW, K9LAVAI, FT4, TSH, AST, ALT, LDH, GGT, ALKPHOS, BILITOT, BILIDIR, AMMONIA, AMYLASE, LIPASE, LACTATE, CHOL, HDL, LDLCHOLESTEROL, CHOLHDLRATIO, TRIG, VLDL, BNP, TROPONINI, CKTOTAL, CKMB, CKMBINDEX, RF, ANNALISE in the last 72 hours.        Specific Gravity, UA   Date Value Ref Range Status   12/11/2021 1.015 1.005 - 1.030 Final     Protein, UA   Date Value Ref Range Status   12/11/2021 1+ (A) NEGATIVE Final     RBC, UA   Date Value Ref Range Status   12/11/2021 0 TO 2 0 - 2 /HPF Final     Bacteria, UA   Date Value Ref Range Status   12/11/2021 NOT REPORTED None Final     Nitrite, Urine   Date Value Ref Range Status   12/11/2021 NEGATIVE NEGATIVE Final     WBC, UA   Date Value Ref Range Status   12/11/2021 0 TO 2 0 - 5 /HPF Final     Leukocyte Esterase, Urine   Date Value Ref Range Status   12/11/2021 NEGATIVE NEGATIVE Final       Imaging / Clinical Data :-   CT ABDOMEN PELVIS WO CONTRAST Additional Contrast? None    Result Date: 12/9/2021  1. Left-sided hydronephrosis/hydroureter with uroepithelial thickening and small amount of air seen within the collecting system. No obstructing stone or gross mass. Left UVJ stricture cannot be excluded. Correlation with urinalysis is suggested as underlying infectious process cannot be excluded. 2. 4 mm nonobstructing calculus left kidney. 3. Cardiomegaly. 4. Presumed post injection changes are seen involving the left anterior abdominal wall. 5. Body wall anasarca. 6. Small amount of free fluid. 7. Sigmoid diverticulosis. NM LUNG SCAN PERFUSION ONLY    Result Date: 12/7/2021  Low Probability for Pulmonary Embolus. XR CHEST 1 VIEW    Result Date: 12/7/2021  No prior study available. No definite pneumonia. Slight blunting left CP angle. Cardiomegaly; venous hypertension without radiographic CHF. US RETROPERITONEAL COMPLETE    Result Date: 12/8/2021  1. Nonvisualization of the right kidney. 2. Mild-to-moderate left-sided hydronephrosis. 3. Exam limited due to patient's bowel gas and body habitus. 4. Nonvisualization of ureteral jets. Urinary bladder grossly unremarkable. [facility] Dec  2, 2021.   Left Ventricle: Systolic function is severely decreased with an   ejection fraction of 25-30%.   Right Ventricle: Systolic function is mildly reduced. [employer] CRT-D   noted with no evidence of apparent vegetation or thrombus.  All 3 leads   visualized.   LA appendage:  Spontaneous echo contrast and prominent pectinate are   noted, however no evidence of organized thrombus     Aortic Valve:  The aortic valve is trileaflet. The leaflets exhibit   normal excursion. Previously repaired valve, evidence of repair on non   coronary cusp. There is mild regurgitation. There is no evidence of aortic   valve stenosis or thrombus or vegetation.   Mitral Valve: There is mild to moderate regurgitation. There is no   evidence of mitral valve stenosis.   Tricuspid Valve: There is mild to moderate regurgitation. There is no   evidence of tricuspid valve stenosis.   Pericardium: There is no pericardial effusion.   Aorta: There is grade I plaque in the transverse and descending aorta.   Interatrial septum:  No evidence of interatrial shunting      CT head 11/28/2021 at [address]: Multiple bilateral supratentorial cerebral infarctions of different ages with no acute intracranial disease process, intracranial hemorrhage or gross mass effect. Clinical Course : unchanged  Assessment and Plan  :        COVID-19 infection without pneumonia. Supportive care. Monitor fever. Chest pain -atypical likely secondary to rash concern for zoster infection. Recommend calamine, Neurontin. Cardiology recommendations noted. Conservative management at this time. Fever likely secondary to left hydronephrosis /pyelonephritis-continue antibiotics. follow urine culture sensitivity, blood culture. CAD s/p CABG and  multiple stents -Eliquis, Plavix. Chronic kidney disease -patient has solitary kidney. Not sure about baseline creatinine. Avoid nephrotoxic agents. Chronic systolic CHF due to ischemic cardiomyopathy -on Bumex. Left hydronephrosis -urology consult. Recent CVA -thromboembolic (bilateral supratentorial cerebral infarcts ) treated with T[facility] at [facility]  PAF -Eliquis 5 mg twice daily  Chronic hep C -   Homeless -   Gastritis-PPI    Stop IV narcotics. Percocet and Neurontin for pain control. Recommended calamine lotion but patient refused. No further work-up for chest pain. Continue medical management with Lipitor, Coreg, Bumex. Monitor temperature. Follow culture sensitivity. Continue to monitor vitals , Intake / output ,  Cell count , HGB , Kidney function, oxygenation  as indicated . Plan and updates discussed with patient ,  answers  explained to satisfaction.    Plan discussed with Staff Linda RN     (Please note that portions of this note were completed with a voice recognition program. Efforts were made to edit the dictations but occasionally words are mis-transcribed.)      Jaleel Gutiérrez MD  12/14/2021

## 2021-12-15 ENCOUNTER — APPOINTMENT (OUTPATIENT)
Dept: CT IMAGING | Age: 56
DRG: 177 | End: 2021-12-15
Payer: MEDICARE

## 2021-12-15 LAB
ABSOLUTE EOS #: 0 K/UL (ref 0–0.44)
ABSOLUTE EOS #: 0.07 K/UL (ref 0–0.4)
ABSOLUTE IMMATURE GRANULOCYTE: 0 K/UL (ref 0–0.3)
ABSOLUTE IMMATURE GRANULOCYTE: 0 K/UL (ref 0–0.3)
ABSOLUTE LYMPH #: 0.28 K/UL (ref 1–4.8)
ABSOLUTE LYMPH #: 0.29 K/UL (ref 1.1–3.7)
ABSOLUTE MONO #: 0.11 K/UL (ref 0.2–0.8)
ABSOLUTE MONO #: 0.13 K/UL (ref 0.1–1.2)
ANION GAP SERPL CALCULATED.3IONS-SCNC: 15 MMOL/L (ref 9–17)
BASOPHILS # BLD: 0 %
BASOPHILS # BLD: 0 % (ref 0–2)
BASOPHILS ABSOLUTE: 0 K/UL (ref 0–0.2)
BASOPHILS ABSOLUTE: 0 K/UL (ref 0–0.2)
BUN BLDV-MCNC: 41 MG/DL (ref 6–20)
BUN/CREAT BLD: 19 (ref 9–20)
CALCIUM SERPL-MCNC: 7.8 MG/DL (ref 8.6–10.4)
CHLORIDE BLD-SCNC: 100 MMOL/L (ref 98–107)
CO2: 22 MMOL/L (ref 20–31)
CREAT SERPL-MCNC: 2.2 MG/DL (ref 0.7–1.2)
DIFFERENTIAL TYPE: ABNORMAL
DIFFERENTIAL TYPE: ABNORMAL
DIRECT EXAM: NORMAL
EOSINOPHILS RELATIVE PERCENT: 0 % (ref 1–4)
EOSINOPHILS RELATIVE PERCENT: 2 % (ref 1–4)
GFR AFRICAN AMERICAN: 38 ML/MIN
GFR NON-AFRICAN AMERICAN: 31 ML/MIN
GFR SERPL CREATININE-BSD FRML MDRD: ABNORMAL ML/MIN/{1.73_M2}
GFR SERPL CREATININE-BSD FRML MDRD: ABNORMAL ML/MIN/{1.73_M2}
GLUCOSE BLD-MCNC: 91 MG/DL (ref 70–99)
GLUCOSE BLD-MCNC: 92 MG/DL (ref 75–110)
HCT VFR BLD CALC: 29.7 % (ref 40.7–50.3)
HCT VFR BLD CALC: 32.1 % (ref 40.7–50.3)
HEMOGLOBIN: 10.1 G/DL (ref 13–17)
HEMOGLOBIN: 9.5 G/DL (ref 13–17)
IMMATURE GRANULOCYTES: 0 %
IMMATURE GRANULOCYTES: 0 %
LYMPHOCYTES # BLD: 8 % (ref 24–44)
LYMPHOCYTES # BLD: 9 % (ref 24–43)
Lab: NORMAL
MCH RBC QN AUTO: 30.6 PG (ref 25.2–33.5)
MCH RBC QN AUTO: 30.6 PG (ref 25.2–33.5)
MCHC RBC AUTO-ENTMCNC: 31.5 G/DL (ref 28.4–34.8)
MCHC RBC AUTO-ENTMCNC: 32 G/DL (ref 28.4–34.8)
MCV RBC AUTO: 95.8 FL (ref 82.6–102.9)
MCV RBC AUTO: 97.3 FL (ref 82.6–102.9)
MONOCYTES # BLD: 3 % (ref 1–7)
MONOCYTES # BLD: 4 % (ref 3–12)
MORPHOLOGY: ABNORMAL
MORPHOLOGY: ABNORMAL
NRBC AUTOMATED: 0 PER 100 WBC
NRBC AUTOMATED: 0 PER 100 WBC
PDW BLD-RTO: 16.3 % (ref 11.8–14.4)
PDW BLD-RTO: 16.3 % (ref 11.8–14.4)
PLATELET # BLD: 129 K/UL (ref 138–453)
PLATELET # BLD: 156 K/UL (ref 138–453)
PLATELET ESTIMATE: ABNORMAL
PLATELET ESTIMATE: ABNORMAL
PMV BLD AUTO: 10.8 FL (ref 8.1–13.5)
PMV BLD AUTO: 10.9 FL (ref 8.1–13.5)
POTASSIUM SERPL-SCNC: 3 MMOL/L (ref 3.7–5.3)
RBC # BLD: 3.1 M/UL (ref 4.21–5.77)
RBC # BLD: 3.3 M/UL (ref 4.21–5.77)
RBC # BLD: ABNORMAL 10*6/UL
RBC # BLD: ABNORMAL 10*6/UL
SEG NEUTROPHILS: 87 % (ref 36–65)
SEG NEUTROPHILS: 87 % (ref 36–66)
SEGMENTED NEUTROPHILS ABSOLUTE COUNT: 2.78 K/UL (ref 1.5–8.1)
SEGMENTED NEUTROPHILS ABSOLUTE COUNT: 3.04 K/UL (ref 1.8–7.7)
SODIUM BLD-SCNC: 137 MMOL/L (ref 135–144)
SPECIMEN DESCRIPTION: NORMAL
WBC # BLD: 3.2 K/UL (ref 3.5–11.3)
WBC # BLD: 3.5 K/UL (ref 3.5–11.3)
WBC # BLD: ABNORMAL 10*3/UL
WBC # BLD: ABNORMAL 10*3/UL

## 2021-12-15 PROCEDURE — 2580000003 HC RX 258: Performed by: STUDENT IN AN ORGANIZED HEALTH CARE EDUCATION/TRAINING PROGRAM

## 2021-12-15 PROCEDURE — 70498 CT ANGIOGRAPHY NECK: CPT

## 2021-12-15 PROCEDURE — 99221 1ST HOSP IP/OBS SF/LOW 40: CPT | Performed by: PSYCHIATRY & NEUROLOGY

## 2021-12-15 PROCEDURE — 97530 THERAPEUTIC ACTIVITIES: CPT

## 2021-12-15 PROCEDURE — 6360000002 HC RX W HCPCS: Performed by: NURSE PRACTITIONER

## 2021-12-15 PROCEDURE — 2060000000 HC ICU INTERMEDIATE R&B

## 2021-12-15 PROCEDURE — 6370000000 HC RX 637 (ALT 250 FOR IP): Performed by: INTERNAL MEDICINE

## 2021-12-15 PROCEDURE — 99233 SBSQ HOSP IP/OBS HIGH 50: CPT | Performed by: FAMILY MEDICINE

## 2021-12-15 PROCEDURE — 36415 COLL VENOUS BLD VENIPUNCTURE: CPT

## 2021-12-15 PROCEDURE — 6370000000 HC RX 637 (ALT 250 FOR IP): Performed by: FAMILY MEDICINE

## 2021-12-15 PROCEDURE — 6360000004 HC RX CONTRAST MEDICATION: Performed by: PSYCHIATRY & NEUROLOGY

## 2021-12-15 PROCEDURE — 85025 COMPLETE CBC W/AUTO DIFF WBC: CPT

## 2021-12-15 PROCEDURE — 6370000000 HC RX 637 (ALT 250 FOR IP): Performed by: NURSE PRACTITIONER

## 2021-12-15 PROCEDURE — 6360000002 HC RX W HCPCS: Performed by: INTERNAL MEDICINE

## 2021-12-15 PROCEDURE — 80048 BASIC METABOLIC PNL TOTAL CA: CPT

## 2021-12-15 PROCEDURE — 2580000003 HC RX 258: Performed by: PSYCHIATRY & NEUROLOGY

## 2021-12-15 PROCEDURE — 97162 PT EVAL MOD COMPLEX 30 MIN: CPT

## 2021-12-15 RX ORDER — SODIUM CHLORIDE 9 MG/ML
INJECTION, SOLUTION INTRAVENOUS CONTINUOUS
Status: DISCONTINUED | OUTPATIENT
Start: 2021-12-15 | End: 2021-12-18

## 2021-12-15 RX ORDER — LOPERAMIDE HYDROCHLORIDE 2 MG/1
2 CAPSULE ORAL 4 TIMES DAILY PRN
Status: DISCONTINUED | OUTPATIENT
Start: 2021-12-15 | End: 2021-12-19 | Stop reason: HOSPADM

## 2021-12-15 RX ADMIN — POTASSIUM CHLORIDE 10 MEQ: 7.46 INJECTION, SOLUTION INTRAVENOUS at 18:39

## 2021-12-15 RX ADMIN — POTASSIUM CHLORIDE 10 MEQ: 7.46 INJECTION, SOLUTION INTRAVENOUS at 12:06

## 2021-12-15 RX ADMIN — PANTOPRAZOLE SODIUM 40 MG: 40 TABLET, DELAYED RELEASE ORAL at 06:01

## 2021-12-15 RX ADMIN — POTASSIUM CHLORIDE 10 MEQ: 7.46 INJECTION, SOLUTION INTRAVENOUS at 16:16

## 2021-12-15 RX ADMIN — POTASSIUM CHLORIDE 10 MEQ: 7.46 INJECTION, SOLUTION INTRAVENOUS at 09:54

## 2021-12-15 RX ADMIN — SODIUM CHLORIDE 80 ML: 0.9 INJECTION, SOLUTION INTRAVENOUS at 00:07

## 2021-12-15 RX ADMIN — DIPHENHYDRAMINE HYDROCHLORIDE 25 MG: 50 INJECTION, SOLUTION INTRAMUSCULAR; INTRAVENOUS at 18:21

## 2021-12-15 RX ADMIN — SODIUM CHLORIDE, PRESERVATIVE FREE 10 ML: 5 INJECTION INTRAVENOUS at 19:48

## 2021-12-15 RX ADMIN — ACETAMINOPHEN 650 MG: 325 TABLET ORAL at 00:17

## 2021-12-15 RX ADMIN — POTASSIUM CHLORIDE 10 MEQ: 7.46 INJECTION, SOLUTION INTRAVENOUS at 13:48

## 2021-12-15 RX ADMIN — APIXABAN 5 MG: 5 TABLET, FILM COATED ORAL at 19:48

## 2021-12-15 RX ADMIN — IOPAMIDOL 75 ML: 755 INJECTION, SOLUTION INTRAVENOUS at 00:07

## 2021-12-15 RX ADMIN — DIPHENHYDRAMINE HYDROCHLORIDE 25 MG: 50 INJECTION, SOLUTION INTRAMUSCULAR; INTRAVENOUS at 11:54

## 2021-12-15 RX ADMIN — POTASSIUM CHLORIDE 10 MEQ: 7.46 INJECTION, SOLUTION INTRAVENOUS at 21:54

## 2021-12-15 RX ADMIN — SODIUM CHLORIDE: 9 INJECTION, SOLUTION INTRAVENOUS at 03:00

## 2021-12-15 RX ADMIN — ONDANSETRON 4 MG: 2 INJECTION INTRAMUSCULAR; INTRAVENOUS at 15:51

## 2021-12-15 RX ADMIN — DIPHENHYDRAMINE HYDROCHLORIDE 25 MG: 50 INJECTION, SOLUTION INTRAMUSCULAR; INTRAVENOUS at 06:02

## 2021-12-15 RX ADMIN — DIPHENHYDRAMINE HYDROCHLORIDE 25 MG: 50 INJECTION, SOLUTION INTRAMUSCULAR; INTRAVENOUS at 22:06

## 2021-12-15 RX ADMIN — ATORVASTATIN CALCIUM 40 MG: 40 TABLET, FILM COATED ORAL at 19:47

## 2021-12-15 RX ADMIN — BUMETANIDE 2 MG: 1 TABLET ORAL at 19:47

## 2021-12-15 RX ADMIN — SODIUM CHLORIDE, PRESERVATIVE FREE 10 ML: 5 INJECTION INTRAVENOUS at 00:08

## 2021-12-15 ASSESSMENT — ENCOUNTER SYMPTOMS
BACK PAIN: 0
VOICE CHANGE: 0
SINUS PRESSURE: 0
CHOKING: 0
VOMITING: 0
CHEST TIGHTNESS: 0
WHEEZING: 0
SHORTNESS OF BREATH: 0
NAUSEA: 0
CONSTIPATION: 0
COUGH: 0
DIARRHEA: 0
ABDOMINAL PAIN: 0
RHINORRHEA: 0

## 2021-12-15 NOTE — PROGRESS NOTES
Physician Progress Note      Catherine Parsons  [facility] #:                  [id number]  :                       1965  ADMIT DATE:       2021 3:24 PM  DISCH DATE:  RESPONDING  PROVIDER #:        Rashmi Chris MD          QUERY TEXT:    Pt admitted with Covid. Pt noted to have low WBC, Elevated temp. If possible,   please document in the progress notes and discharge summary if you are   evaluating and /or treating any of the following: The medical record reflects the following:  Risk Factors: + Covid  , + blood culture  Clinical Indicators: Tmax 38.9, RR 16-18 , blood culture positive   Staphylococcus epidermidis, WBC  1.9 ;  2.5,  3.4  Treatment: 1g Rocephin x3 , daily labs  Options provided:  -- Sepsis, present on admission  -- Sepsis was ruled out  -- Other - I will add my own diagnosis  -- Disagree - Not applicable / Not valid  -- Disagree - Clinically unable to determine / Unknown  -- Refer to Clinical Documentation Reviewer    PROVIDER RESPONSE TEXT:    After further study, sepsis was ruled out for this patient.     Query created by: Félix Waggoner on 2021 9:23 AM      Electronically signed by:  Rashmi Chris MD 12/15/2021 1:52 PM

## 2021-12-15 NOTE — PROGRESS NOTES
Physical Therapy    DATE: 12/15/2021    NAME: Carolyn Mcarthur  MRN: [id number]   : 1965      Stroke alert overnight. Neurology has not yet evaluated this AM.  Per Discharge planners note-   CT negative. Neurology has been consulted      Will need new PT and OT evaluation due to changes in right arm and slurred speech. MRI cannot be done due to AICD. Entered room to find pt. Up on bedside commode, setting bed alarm off. CGA to stand and turn and get back in bed. Encouraged pt. To sit EOB, but he refused and returned to supine, SBA. Pt. Lenn All on left side, and states he always lays on left side. Edu. For changing positions and why impt. For lung expansion. Offered to assist pt. In getting to Rt. Side and he refused. States he \"hurts all over\" as reason for not working with PT. Pt. speaks few words and overall ignored PT. Allowed brief PROM of Rt. Leg while in L sidelying until pt. Resisted movement. LEs appear swollen. Max education provided as to impt. Of OOB and moving all joints to prevent stiffness and weakness. Pt. Did not respond. Bed alarm and pt. Instructed to call out if needing to get to commode. Unable to assess if Rt. Side weakness. Pt. Did not speak enough to assess if speech slurred. Will continue to assess.      Am Pac raw score-16  Treatment time: [id number]  Chase Altman, PT.

## 2021-12-15 NOTE — PROGRESS NOTES
Patient awake and on bedside commode. I asked him again to take his medications and he refused. He got back in bed and covered his face.

## 2021-12-15 NOTE — PROGRESS NOTES
Upon rounding during change of shift, patient lying in bed facing away from doorway, refusing to acknowledge presence of nursing staff. Patient continues to refuse telemetry monitoring, Gabapentin, PRN Percocet, IV restart including PICC line, midline, etc.  Will continue to monitor.

## 2021-12-15 NOTE — PROGRESS NOTES
Dr. Sameer Radford (neurovascular) paged & informed of results on CTA of head & neck. Orders received to repeat CTA without contrast 24 hours after previous CTA & normal saline to infuse at 75 ml/hr. Informed to notify neurology consult in the morning if EEG is required.

## 2021-12-15 NOTE — PLAN OF CARE
Problem: Airway Clearance - Ineffective  Goal: Achieve or maintain patent airway  Outcome: Ongoing     Problem: Gas Exchange - Impaired  Goal: Absence of hypoxia  Outcome: Ongoing     Problem: Breathing Pattern - Ineffective  Goal: Ability to achieve and maintain a regular respiratory rate  Outcome: Ongoing     Problem:  Body Temperature -  Risk of, Imbalanced  Goal: Ability to maintain a body temperature within defined limits  Outcome: Ongoing     Problem: Isolation Precautions - Risk of Spread of Infection  Goal: Prevent transmission of infection  Outcome: Ongoing     Problem: Risk for Fluid Volume Deficit  Goal: Maintain normal heart rhythm  Outcome: Ongoing     Problem: Loneliness or Risk for Loneliness  Goal: Demonstrate positive use of time alone when socialization is not possible  Outcome: Ongoing

## 2021-12-15 NOTE — PROGRESS NOTES
Dr Lopez Staff bedside for exam.   He commented that we did not need to do NIHS checks, but did not discontinue it.

## 2021-12-15 NOTE — PROGRESS NOTES
Occupational [address]  Occupational Therapy Not Seen Note    Patient not available for Occupational Therapy due to:    [] Testing:    [] Hemodialysis    [] Cancelled by RN:    []Refusal by Patient:    [] Surgery:     [] Intubation:     [] Pain Medication:    [] Sedation:     [] Spine Precautions :    [] Medical Instability:    [x] Other: 12/15: (CX) PT attempted Eval with pt this AM. Pt not willing to participate in Eval. OT will hold Eval until pt more willing to participate. Will continue to follow.     Fatoumata Snell, OT

## 2021-12-15 NOTE — VIRTUAL HEALTH
 [Held by provider] metoprolol succinate  50 mg Oral Daily    apixaban  5 mg Oral BID    dexamethasone  6 mg Oral Daily    albuterol  2.5 mg Nebulization BID    atorvastatin  40 mg Oral Nightly    clopidogrel  75 mg Oral Daily    tamsulosin  0.4 mg Oral Daily     Continuous Infusions:   sodium chloride Stopped (12/11/21 0139)     PRN Meds:.oxyCODONE-acetaminophen, acetaminophen, sodium chloride, albuterol sulfate HFA, hydrocortisone-aloe, diphenhydrAMINE, ondansetron **OR** ondansetron, magnesium hydroxide, potassium chloride **OR** potassium alternative oral replacement **OR** potassium chloride, potassium chloride, magnesium sulfate, nitroGLYCERIN, perflutren lipid microspheres  Past Medical History   has a past medical history of AICD (automatic cardioverter/defibrillator) present, CKD (chronic kidney disease), stage III ([facility] [address] Myocardial infarction ([facility] [address] S/P CABG (coronary artery bypass graft), and Solitary kidney, congenital.  Social History  Social History     Socioeconomic History    Marital status: Single     Spouse name: Not on file    Number of children: Not on file    Years of education: Not on file    Highest education level: Not on file   Occupational History    Not on file   Tobacco Use    Smoking status: Current Every Day Smoker     Packs/day: 0.25    Smokeless tobacco: Never Used   Substance and Sexual Activity    Alcohol use: Not Currently     Comment: rarely    Drug use: Not Currently     Comment: not in over 27 years    Sexual activity: Not on file   Other Topics Concern    Not on file   Social History Narrative    Not on file     Social Determinants of Health     Financial Resource Strain:     Difficulty of Paying Living Expenses: Not on file   Food Insecurity:     Worried About Running Out of Food in the Last Year: Not on file    [place] of Food in the Last Year: Not on file   Transportation Needs:     Lack of Transportation (Medical):  Not on file    Lack of Transportation (Non-Medical): Not on file   Physical Activity:     Days of Exercise per Week: Not on file    Minutes of Exercise per Session: Not on file   Stress:     Feeling of Stress : Not on file   Social Connections:     Frequency of Communication with Friends and Family: Not on file    Frequency of Social Gatherings with Friends and Family: Not on file    Attends [employer] Services: Not on file    Active Member of [address] [place] [address] [employer] or Organizations: Not on file    Attends Club or Organization Meetings: Not on file    Marital Status: Not on file   Intimate Partner Violence:     Fear of Current or Ex-Partner: Not on file    Emotionally Abused: Not on file    Physically Abused: Not on file    Sexually Abused: Not on file   Housing Stability:     Unable to Pay for Housing in the Last Year: Not on file    Number of [place] in the Last Year: Not on file    Unstable Housing in the Last Year: Not on file     Family History      Problem Relation Age of Onset    Cancer Mother     Diabetes Father     Heart Disease Father     Diabetes Paternal Grandmother        OBJECTIVE  Vitals:    21 2205   BP: 135/68   Pulse: 72   Resp: 18   Temp:    SpO2:         Patient not seen in person. NIHSS calculated based on ED MD report. R arm and leg severe drift, R face droop, mild dysarthria. NIH Stroke Scale:   1a  Level of consciousness: 0 - alert; keenly responsive   1b. LOC questions:  0 - answers both questions correctly   1c. LOC commands: 0 - performs both tasks correctly   2. Best Gaze: 0 - normal   3. Visual: 0 - no visual loss   4. Facial Palsy: 2 - partial paralysis (total or near total paralysis of the lower face)   5a. Motor left arm: 0 - no drift, limb holds 90 (or 45) degrees for full 10 seconds   5b. Motor right arm: 2 - some effort against gravity, limb cannot get to or maintain (if cued) 90 (or 45) degrees, drifts down to bed, but has some effort against gravity    6a.  Motor left le - no drift; leg holds 30 degree position for full 5 seconds   6b  Motor right le - some effort against gravity; leg falls to bed by 5 seconds but has some effort against gravity   7. Limb Ataxia: 0 - absent   8. Sensory: 0 - normal; no sensory loss   9. Best Language:  0 - no aphasia, normal   10. Dysarthria: 1 - mild to moderate, patient slurs at least some words and at worst, can be understood with some difficulty   11. Extinction and Inattention: 0 - no abnormality         Total:   7     Premorbid MRS: 0      Imaging:  Images were personally reviewed with CONY. AI used to review images including:  CT brain without contrast: no bleed. B/l occipital encephalomalacia    Assessment  59yo homeless male with pmh of smoker, drug seeking behavior, strokes (details unknown, no residual deficits), CAD s/p cabg, solitary kidney with ckd, hfref s/p aicd, on ac (eliauiqs and plavix, unclear what indication). Pt admitted 2021 for c/p and nstemi, found also to be covid positive. Code stroke called for acute R arm leg and face weakness and dysarthria. Exam improving per team. Ct shows b/l pca strokes, old. R/o acute stroke. Recommendations:  --no tpa, on ac  --stat cta head and neck w con, awake that cre is 2.3, baseline around this number. Benefit outweighs risk. If there is lvo consider mt.  --1L ns ivf bolus, continue 100cc/hr. Give bolus prior to cta.  --sbp < 220 x24h, then sbp <140  --mri brain wo con tomorrow  --workup and care as per neuro  --additional recs to follow. Addendum:  cta completed, reviewed in pacs. No lvo on imaging. Per nurse pt has persistent slurred speech and R arm spasm/contracture. Stroke cannot be fully ruled out, aicd is not mri compatible. recs updated:    --repeat ct head wo con 24h after the first ct head  --continue ns 75cc/hr, given hx of hf. sbp goals as above. --no mri sarkis at this time, hx aicd  --defer additional workup to neuro, consider sz workup.     Marcella Degroot telestroke    Discussed with nurse. At least 30 min of Telemedicine and time in conversation directly with ED staff and physician for the patient who is in imminent and life threatening deterioration without further treatment and evaluation. This Virtual Visit was conducted with patient's (and/or legal guardian's) consent, to provide telestroke consultation and necessary medical care. Time spent examining patient, reviewing the images personally, reviewing the chart, perform high complexity decision making and speaking with the nursing staff regarding recommendations    This is a Phone Consult, I have not seen the patient face to face, the telemedicine device was not utilized.     Rula Dunlap MD, PhD  Stroke, Neurocritical Care And/or [address] [place] [address] Stroke Network  [facility]  Electronically signed 12/14/2021 at 11:16 PM

## 2021-12-15 NOTE — PROGRESS NOTES
Dr. Florencia Springer, nephology, was bedside. Ordered 0.9NS lowered to 50ml/hr. Also to upate  on the contrast he received yesterday and to watch his CRT level the next few days.

## 2021-12-15 NOTE — PROGRESS NOTES
Dr. Marilyn Smith, paged & notified of neurovascular consult, and updated to patient's condition, labs, and that CT without contrast of head was ordered due to loss of IV. Orders received for CT of head & neck and 1 liter normal saline bolus. Patient's deficits remain unchanged. MRI of brain to be done tomorrow.

## 2021-12-15 NOTE — PROGRESS NOTES
212Physical Therapy    Facility/Department: [facility] PROGRESSIVE CARE  Initial Assessment    NAME: Miguel Casas  : 1965  MRN: [id number]    Date of Service: 12/15/2021    The patient is a 63 y.o. male who presents with Chest pain and shortness of breath, patient wasn't [facility] for 10 days and he left AGAINST MEDICAL ADVICE  The patient is being managed for severe chest pain and shortness of breath, now Covid +2 days ago documented  CT imaging demonstrates hydronephrosis without obstructing calculus     Bladder scan demonstrates a high postvoid residual greater than 300.     The patient has a complicated urologic history, he previously was under the care for urology at [facility], and he has had multiple procedures. Assessment   Assessment: Up on bedside commode, assisted back in bed. Encouraged pt. To sit EOB, but he refused and returned to supine, SBA. Pt. Syllizz Palmary on left side, and states he always lays on left side. Edu. For changing positions and why impt. For lung expansion. Offered to assist pt. In getting to Rt. Side and he refused. States he \"hurts all over\" as reason for not working with PT. Pt. speaks few words and overall ignored PT. Allowed brief PROM of Rt. Leg while in L sidelying until pt. Resisted movement. LEs appear swollen. Max education provided as to impt. Of OOB and moving all joints to prevent stiffness and weakness. Pt. Did not respond. Bed alarm and pt. Instructed to call out if needing to get to commode. Unable to assess if Rt. Side weakness. Pt. Did not speak enough to assess if speech slurred. Will continue to assess.   Prognosis: Good  Decision Making: Medium Complexity  PT Education: Goals; PT Role; Plan of Care  Patient Education: see ASSESSMENT  Barriers to Learning: questionable cognition  REQUIRES PT FOLLOW UP: Yes  Activity Tolerance  Activity Tolerance: Patient limited by cognitive status       Patient Diagnosis(es): The primary encounter diagnosis was Chest pain, unspecified type. Diagnoses of Elevated troponin, Elevated brain natriuretic peptide (BNP) level, and Elevated d-dimer were also pertinent to this visit. has a past medical history of AICD (automatic cardioverter/defibrillator) present, CKD (chronic kidney disease), stage III ([facility] [address] Myocardial infarction ([facility] [address] S/P CABG (coronary artery bypass graft), and Solitary kidney, congenital.   has a past surgical history that includes Coronary artery bypass graft and Coronary artery bypass graft. Restrictions  Restrictions/Precautions  Restrictions/Precautions: General Precautions, Fall Risk, Up as Tolerated  Position Activity Restriction  Other position/activity restrictions: bed alarm  Vision/Hearing        Subjective  General  Chart Reviewed: Yes  Patient assessed for rehabilitation services?: Yes  Family / Caregiver Present: No  Follows Commands: Within Functional Limits  Subjective  Subjective: Pt. says few words. Says some appropriate responses to questions to know he understands. Orientation     Social/Functional History  Social/Functional History  Additional Comments: Pt would not answer questions. His belongings are \"at [employer]\" per chart, and that he \"walks around the city being homeless. \"  Cognition   Cognition  Cognition Comment: Unable to assess;  appropriate responses to questions at times so pt. appears to understand. Most of time ignores who is in room and does not speak. Per PCT, he refused to have stool cleaned from his leg and foot.     Objective     Observation/Palpation  Posture: Fair  Observation: frail in appearance;  3L O2       Strength Other  Other: pt. would not comply with ROM/ strength assessment        Bed mobility  Sit to Supine: Stand by assistance  Transfers  Sit to Stand: Contact guard assistance; Minimal Assistance (pt. stood from commode and unsteady as turning to get back in bed)  Comment: Commode to bed transfer only this session. (Entered room at sound of bed alarm as pt. had gotten himself OOB and onto commode.)                 Plan   Plan  Times per week: 1-2x/day; 5-6days/wk  Current Treatment Recommendations: Strengthening, ROM, Balance Training, Functional Mobility Training, Transfer Training, Endurance Training, Wheelchair Mobility Training, Gait Training, Safety Education & Training, Home Exercise Program, Patient/Caregiver Education & Training  Safety Devices  Type of devices: All fall risk precautions in place, Gait belt, Bed alarm in place, Patient at risk for falls, Call light within reach, Left in bed, Nurse notified    G-Code       OutComes Score                                                  AM-PAC Score  AM-PAC Inpatient Mobility Raw Score : 16 (12/15/21 1150)  AM-PAC Inpatient T-Scale Score : 40.78 (12/15/21 1150)  Mobility Inpatient CMS 0-100% Score: 54.16 (12/15/21 1150)  Mobility Inpatient CMS G-Code Modifier : CK (12/15/21 1150)          Goals  Short term goals  Time Frame for Short term goals: 12 visits:  Short term goal 1: Pt. to be indep with bed mob. Short term goal 2: Pt to be indep with transfers with approp. AD and safe use of % of time  Short term goal 3: Pt. to amb. 50ft. with approp. AD and O2, indep  Short term goal 4: Pt. to tolerate 25+ min. of PT daily for ther ex/ gait/ balance/ functional mob. training/ energy conservation edu/ endurance training. Patient Goals   Patient goals : did not state       Therapy Time   Individual Concurrent Group Co-treatment   Time In 1105         Time Out 1138         Minutes 33              Treatment time:  23min.      Penelope Lubin, PT

## 2021-12-15 NOTE — SIGNIFICANT EVENT
[facility]  Office: [address], DO, Benito Sams, DO, James Saleh, DO, Bailey Piedra, DO, Anai Selby MD, Kayla Barrios MD, Candelaria Quezada MD, Cody Patel MD, Cheng Wiggins MD, Keegan Diez MD, Ariel Aldridge MD, Emelia Taylor, DO, Melly Mehta, DO, Mckenzie Kaur MD,  Zulma Javier DO, Fatemeh Nogueira MD, Hayden Thornton MD, Noris Blevins MD, Aishwarya Gonzalez MD, Tavon Dwyer MD, Vicky Vera MD, Liya Dailey MD, Benito Salazar, [facility], [facility], CNP, Eugene King, CNP, Nadeem Morel, CNS, Ant Gabriel, CNP, Rosalina Salazar, CNP, Milton Alberts, CNP, Moses Wright, CNP, Lanie Boateng, CNP, K[place]ash [place] PA-C, Lewis Harper, [facility], Verenice Perdomo, CNP, Emery Holm, CNP, Keegan Car, CNP, Benito Garrison, CNP, [facility], CNP, Lynn Rosenberg, CNP, Jovanny Magana. [address]   Nighttime In-House Nurse Practitioner      12/14/2021    10:14 PM    Name:   Colton Rainey  MRN:     [id number]     Acct:      [de-identified]   Room:   1012/1012-02  IP Day:  1  Admit Date:  12/7/2021  3:24 PM    PCP:   No primary care provider on file. Code Status:  Full Code    Called to the floor by staff to evaluate Colton Rainey in 1012/1012-02 at 10:14 PM with complaint of STROKE ALERT    Assessment/Plan:     RN reports that last known well time was around 9:20-9:30pm. He states the patient was incontinent of stool and subsequently was noted to have right sided weakness, facial droop and slurred speech. The patient is alert and oriented. He states he had a stroke 3-4 months ago that effected his right side, but that he had no residual deficit. He takes atorvastatin, eliquis and plavix. Creatinine 2.31. He has an aspirin allergy. Patient is COVID-19 positive. Blood glucose 92. HR 72, RR 18, /68 (86), spo2 92%.      NIH Stroke Scale/Score (NIHSS) from [employer]  on 12/14/2021    RESULT SUMMARY:  9 points  NIH Stroke Scale    INPUTS:  1A: Level of of tricuspid valve stenosis.   Pericardium: There is no pericardial effusion.   Aorta: There is grade I plaque in the transverse and descending aorta.   Interatrial septum:  No evidence of interatrial shunting     Echo 11/26/2021    Left Ventricle: Systolic function is moderately to severely decreased with an ejection fraction of 30-35%. Global hypokinesis. No LV thrombus is appreciated.   Mild-moderate to moderate mitral regurgitation.   Trileaflet aortic valve with nodular thickening on what would be the non coronary cusp. Closer to moderate aortic regurgitation in long-axis views.  Aortic valve looks suspicious for a bioprosthesis, potentially   freestyle AVR although none is reported.   Consider JOYCE given AR and consider blood cultures given reported CVA with focal abnormality on the non coronary cusp. It does not appear typical of vegetation, however, and is more on the Ao side of the valve. CRITICAL CARE:   Due to the high probability of sudden and clinically significant deterioration in the patient's condition, the patient required the highest level of my preparedness to intervene urgently. I provided critical care time including documentation time, medication orders and management, reevaluation, vital sign assessment, ordering and reviewing of lab tests and ordering and reviewing of radiological studies. Aggregate critical care time is >30 minutes including time during which I was engaged in work directly related to patient's care and did not include time spent treating of the patient simultaneously. Physical Examination:        General appearance:  alert, cooperative. Right side facial droop.    Mental Status:  oriented to person, place and time and normal affect  Lungs:  diminished to auscultation bilaterally, normal effort  Heart:  regular rate and rhythm, no murmur  Abdomen:  soft, nontender, nondistended, normal bowel sounds, no masses, hepatomegaly, splenomegaly  Extremities:  Right sided weakness in upper and lower extremity. Skin:  no gross lesions, rashes, induration    Problem List:        Hospital Problems           Last Modified POA    * (Principal) Atypical chest pain 12/14/2021 Yes    Elevated troponin 12/8/2021 Yes    Hydronephrosis of left kidney 12/9/2021 Yes    Acute urinary retention 12/9/2021 Yes    Chronic systolic heart failure ([address] 12/8/2021 Yes    Hx of CABG 12/8/2021 Yes    Stage 3 chronic kidney disease ([address] 12/8/2021 Yes    COVID-19 virus infection 12/8/2021 Yes    Hepatitis C 12/9/2021 Yes    JACKSON (acute kidney injury) ([facility] [address] 12/8/2021 Yes    AICD (automatic cardioverter/defibrillator) present 12/8/2021 Yes    History of MI (myocardial infarction) 12/8/2021 Yes    Elevated brain natriuretic peptide (BNP) level 12/8/2021 Yes    Coronary artery disease involving native coronary artery of native heart with angina pectoris ([facility] [address] 12/13/2021 Yes    Solitary kidney, congenital 12/14/2021 Yes    Homeless single person 12/14/2021 Yes                Medications: Allergies:     Allergies   Allergen Reactions    Nsaids Swelling    Asa [Aspirin] Swelling    Oxycodone Hives       Current Meds:   Scheduled Meds:    bumetanide  2 mg Oral BID    gabapentin  100 mg Oral TID    [START ON 12/15/2021] pantoprazole  40 mg Oral QAM AC    metoprolol succinate  50 mg Oral Daily    apixaban  5 mg Oral BID    dexamethasone  6 mg Oral Daily    albuterol  2.5 mg Nebulization BID    atorvastatin  40 mg Oral Nightly    clopidogrel  75 mg Oral Daily    tamsulosin  0.4 mg Oral Daily     Continuous Infusions:    sodium chloride Stopped (12/11/21 0139)     PRN Meds: oxyCODONE-acetaminophen, acetaminophen, sodium chloride, albuterol sulfate HFA, hydrocortisone-aloe, diphenhydrAMINE, ondansetron **OR** ondansetron, magnesium hydroxide, potassium chloride **OR** potassium alternative oral replacement **OR** potassium chloride, potassium chloride, magnesium sulfate, nitroGLYCERIN, perflutren lipid microspheres    Data:     Past Medical History:   has a past medical history of AICD (automatic cardioverter/defibrillator) present, CKD (chronic kidney disease), stage III ([facility] [address] Myocardial infarction ([facility] [address] S/P CABG (coronary artery bypass graft), and Solitary kidney, congenital.    Vitals:  /68   Pulse 72   Temp 98.4 °F (36.9 °C) (Oral)   Resp 18   Ht 5' 10\" (1.778 m)   Wt 172 lb 4.8 oz (78.2 kg)   SpO2 92%   BMI 24.72 kg/m²   Temp (24hrs), Av.4 °F (38 °C), Min:98.4 °F (36.9 °C), Max:102.9 °F (39.4 °C)    No results for input(s): POCGLU in the last 72 hours. I/O (24Hr): Intake/Output Summary (Last 24 hours) at 2021  Last data filed at 2021 203  Gross per 24 hour   Intake 800 ml   Output 2275 ml   Net -1475 ml       Labs:    Hematology:  Recent Labs     21  0651 21  0536 21  0550   WBC 2.5* 3.4* 3.1*   RBC 3.41* 3.56* 3.36*   HGB 10.4* 10.8* 10.3*   HCT 33.6* 33.7* 32.5*   MCV 98.5 94.7 96.7   MCH 30.5 30.3 30.7   MCHC 31.0 32.0 31.7   RDW 16.5* 16.3* 16.6*   * 167 157   MPV 11.0 11.1 11.1   SEGS 81* 88* 86*   LYMPHOPCT 12* 7* 10*   MONOPCT 6 5 4   EOSRELPCT 1 0* 0*   BASOPCT 0 0 0   CRP 54.5*  --  54.6*     Chemistry:  Recent Labs     21  0651 21  0536 21  0550    139 137   K 3.7 3.6* 3.7    102 99   CO2 21 21 24   GLUCOSE 93 115* 96   BUN 42* 42* 40*   CREATININE 2.13* 2.14* 2.31*   ANIONGAP 15 16 14   LABGLOM 32* 32* 29*   GFRAA 39* 39* 36*   CALCIUM 8.0* 8.0* 7.8*   TROPHS  --  73*  --    LACTA  --  1.2  --      No results for input(s): PROT, LABALBU, EAG, B6GXUIK, X9LUVZI, FT4, TSH, CORTISOL, PROLACTIN, AST, ALT, LDH, GGT, ALKPHOS, LABGGT, BILITOT, BILIDIR, AMMONIA, AMYLASE, LIPASE, LACTATE, CHOL, HDL, LDLCHOLESTEROL, CHOLHDLRATIO, TRIG, VLDL, PHENYTOIN, PHENYF, VALPROATE in the last 72 hours.   Glucose:No results for input(s): LABA1C, POCGLU, LABINSU in the last 72 hours. Lab Results   Component Value Date/Time    SPECIAL RT HAND,10ML 12/14/2021 04:08 PM     Lab Results   Component Value Date/Time    CULTURE NO GROWTH <24 HRS 12/14/2021 04:08 PM       Lab Results   Component Value Date    POCPH 7.441 12/12/2021    POCPCO2 40.6 12/12/2021    POCPO2 57.6 12/12/2021    POCHCO3 27.7 12/12/2021    NBEA NOT REPORTED 12/12/2021    PBEA 3 12/12/2021    FGH9TNU NOT REPORTED 12/12/2021    CUXT6MBH 90 12/12/2021    FIO2 21.0 12/12/2021       Radiology:    CT ABDOMEN PELVIS WO CONTRAST Additional Contrast? None    Result Date: 12/9/2021  1. Left-sided hydronephrosis/hydroureter with uroepithelial thickening and small amount of air seen within the collecting system. No obstructing stone or gross mass. Left UVJ stricture cannot be excluded. Correlation with urinalysis is suggested as underlying infectious process cannot be excluded. 2. 4 mm nonobstructing calculus left kidney. 3. Cardiomegaly. 4. Presumed post injection changes are seen involving the left anterior abdominal wall. 5. Body wall anasarca. 6. Small amount of free fluid. 7. Sigmoid diverticulosis. NM LUNG SCAN PERFUSION ONLY    Result Date: 12/7/2021  Low Probability for Pulmonary Embolus. US RETROPERITONEAL COMPLETE    Result Date: 12/8/2021  1. Nonvisualization of the right kidney. 2. Mild-to-moderate left-sided hydronephrosis. 3. Exam limited due to patient's bowel gas and body habitus. 4. Nonvisualization of ureteral jets. Urinary bladder grossly unremarkable.        Lillie Givens, APRN - CNP  12/14/2021  10:14 PM

## 2021-12-15 NOTE — CONSULTS
Neurology Consult Note        Reason for Consult:  Right sided weakness  Requesting Physician:  Dr Jayson Arias:  Right sided weakness    History Obtained From:  patient, electronic medical record       HISTORY OF PRESENT ILLNESS:    This is a 62 y/o WM with multiple chronic medical issues including CAD, s/p CABG, AICD placement, prior stroke and covid-19 infection who experienced new onset right sided weakness yesterday. He is an extremely limited historian and I cannot get much additional information from him but apparently a code stroke was called last night for right sided weakness. He has fairly symptomatic covid symptoms with fatigue and shortness of breath. Per nursing despite his reported weakness he is able to ambulate. I was able to get from him that his current weakness is the same side as he had with his prior stroke. His primary presenting symptom for this admission was chest pain and shortness of breath.       Past Medical History:        Diagnosis Date    AICD (automatic cardioverter/defibrillator) present     CKD (chronic kidney disease), stage III (HCC)     Myocardial infarction ([facility] [address]     S/P CABG (coronary artery bypass graft)     x2 separate occasions    Solitary kidney, congenital      Past Surgical History:        Procedure Laterality Date    CORONARY ARTERY BYPASS GRAFT      CORONARY ARTERY BYPASS GRAFT       Current Medications:   Current Facility-Administered Medications: 0.9 % sodium chloride infusion, , IntraVENous, Continuous  bumetanide (BUMEX) tablet 2 mg, 2 mg, Oral, BID  oxyCODONE-acetaminophen (PERCOCET) 5-325 MG per tablet 1 tablet, 1 tablet, Oral, Q4H PRN  gabapentin (NEURONTIN) capsule 100 mg, 100 mg, Oral, TID  pantoprazole (PROTONIX) tablet 40 mg, 40 mg, Oral, QAM AC  sodium chloride flush 0.9 % injection 10 mL, 10 mL, IntraVENous, PRN  [Held by provider] metoprolol succinate (TOPROL XL) extended release tablet 50 mg, 50 mg, Oral, Daily  apixaban (ELIQUIS) tablet 5 mg, 5 mg, Oral, BID  acetaminophen (TYLENOL) tablet 650 mg, 650 mg, Oral, Q4H PRN  0.9 % sodium chloride infusion, 25 mL, IntraVENous, PRN  dexamethasone (DECADRON) tablet 6 mg, 6 mg, Oral, Daily  albuterol sulfate  (90 Base) MCG/ACT inhaler 2 puff, 2 puff, Inhalation, Q6H PRN  albuterol (PROVENTIL) nebulizer solution 2.5 mg, 2.5 mg, Nebulization, BID  hydrocortisone-aloe 1 % cream, , Topical, TID PRN  diphenhydrAMINE (BENADRYL) injection 25 mg, 25 mg, IntraVENous, Q6H PRN  ondansetron (ZOFRAN-ODT) disintegrating tablet 4 mg, 4 mg, Oral, Q8H PRN **OR** ondansetron (ZOFRAN) injection 4 mg, 4 mg, IntraVENous, Q6H PRN  magnesium hydroxide (MILK OF MAGNESIA) 400 MG/5ML suspension 30 mL, 30 mL, Oral, Daily PRN  potassium chloride (KLOR-CON M) extended release tablet 40 mEq, 40 mEq, Oral, PRN **OR** potassium bicarb-citric acid (EFFER-K) effervescent tablet 40 mEq, 40 mEq, Oral, PRN **OR** potassium chloride 10 mEq/100 mL IVPB (Peripheral Line), 10 mEq, IntraVENous, PRN  potassium chloride 10 mEq/100 mL IVPB (Peripheral Line), 10 mEq, IntraVENous, PRN  magnesium sulfate 1000 mg in dextrose 5% 100 mL IVPB, 1,000 mg, IntraVENous, PRN  atorvastatin (LIPITOR) tablet 40 mg, 40 mg, Oral, Nightly  nitroGLYCERIN (NITROSTAT) SL tablet 0.4 mg, 0.4 mg, SubLINGual, Q5 Min PRN  perflutren lipid microspheres (DEFINITY) injection 1.65 mg, 1.5 mL, IntraVENous, ONCE PRN  clopidogrel (PLAVIX) tablet 75 mg, 75 mg, Oral, Daily  tamsulosin (FLOMAX) capsule 0.4 mg, 0.4 mg, Oral, Daily  Allergies:  Nsaids, Asa [aspirin], and Oxycodone    Social History:  TOBACCO:   reports that he has been smoking. He has been smoking about 0.25 packs per day. He has never used smokeless tobacco.  ETOH:   reports previous alcohol use. DRUGS:   reports previous drug use.         Family History:       Problem Relation Age of Onset    Cancer Mother     Diabetes Father     Heart Disease Father     Diabetes Paternal Grandmother        REVIEW OF SYSTEMS:  Review of systems not obtained due to patient factors - mental status    PHYSICAL EXAM:    Vitals:  /77   Pulse 70   Temp 99.5 °F (37.5 °C) (Oral)   Resp 16   Ht 5' 10\" (1.778 m)   Wt 172 lb 4.8 oz (78.2 kg)   SpO2 99%   BMI 24.72 kg/m²      General Exam:  Normal body habitus, appears generally ill, older than stated age    HEENT:  Normocephalic, atraumatic  Eyes: conjunctiva non-injected, sclera anicteric  Mucous membranes of normal color and hydration status  Dentition: poor      Neurologic exam:     Mental status: somnolent but will speak to me minimally and usually does not answer questions but does follow commands  No overt visuospatial neglect or gaze preference  Language: minimal with occasional one word answers    Cranial nerves:  Pupils equal round and reactive to light, no eyelid ptosis  Extraocular movements: appear conjugate  Face: with decreased right lower facial movement- appears functional/effort dependent  Hearing is intact to conversation  Tongue midline,    Motor exam:  Strength   (Right/Left)  Deltoids           4/5  Biceps             4/5  Triceps            4/5  Wrist ext          3/5  Finger ext        3/5  Hand intrin       3/5    Hip flex            3/5  Knee ext          3/5  Ankle dorsi       3/5      DTRs           (right/left)  1-2 throughout          DATA  Imaging, labs and notes reviewed    IMPRESSION:   This is a 62 y/o with a history of CAD, prior MI, CHF and active covid-19 infection who experienced right sided weakness. GIven some inconsistencies in his exam I am not convinced he has had a new stroke. This could be simply a recrudescence of prior stroke deficits in the setting of his current illness or it is some other functional/psychogenic process. Regardless, he is already on Eliquis and Plavix and his CTA is negative for any large vessel occlusion. His TTE did reveal a low EF.  For now would not do more than permissive HTN for another 24 hours and continuing his plavix and Elliquis. He cannot get an MRI        RECOMMENDATIONS:     1. Permissive HTN for another 24 hours  2. Continue Plavix and elliquis  3. Continue current medical management            Poli Cruz M.D.   Clinical Neurophysiologist  Neuromuscular Medicine

## 2021-12-15 NOTE — PROGRESS NOTES
Renal Progress Note    Patient :  Antonio Man; 61 y.o. MRN# [id number]  Location:  1012/1012-02  Attending:  Sangeetha Canales MD  Admit Date:  12/7/2021   Hospital Day: 2      Subjective:     Patient was seen and examined. Patient had a stroke alert called overnight due to symptoms of right-sided weakness along with dysarthria and did receive CTA head and neck with contrast overnight on 12/15/2021 which showed no flow-limiting arterial stenosis in the head and neck. Patchy groundglass infiltrates in the visualized lungs. Mediastinal lymphadenopathy. Creatinine did improve to 2.20 mg/dl today. Sodium 131, potassium 3.0, chloride 100, bicarb 22, BUN of 41, calcium 7.8. Urine output documented as about 1.7 L in the last 24 hours. Diuretics have been changed to Bumex 2 mg p.o. twice daily. Patient is currently on Lasix 40 mg IV once a day. Patient is solitary kidney with chronic hydronephrosis, urology on board. Currently no acute intervention planned by required reinsertion of ureteral stent in the future per urology. Patient has been refusing meds per nurse. Currently on COVID-19 infection isolation. On dexamethasone.   Outpatient Medications:     Medications Prior to Admission: clopidogrel (PLAVIX) 75 MG tablet, Take 1 tablet by mouth daily  atorvastatin (LIPITOR) 40 MG tablet, Take 1 tablet by mouth daily  tamsulosin (FLOMAX) 0.4 MG capsule, Take 0.4 mg by mouth daily  apixaban (ELIQUIS) 5 MG TABS tablet, Take 5 mg by mouth 2 times daily  furosemide (LASIX) 20 MG tablet, Take 20 mg by mouth as needed (swelling)   amLODIPine (NORVASC) 10 MG tablet, Take 0.5 tablets by mouth daily  carvedilol (COREG) 6.25 MG tablet, Take 1 tablet by mouth 2 times daily    Current Medications:     Scheduled Meds:    bumetanide  2 mg Oral BID    gabapentin  100 mg Oral TID    pantoprazole  40 mg Oral QAM AC    [Held by provider] metoprolol succinate  50 mg Oral Daily    apixaban  5 mg Oral BID    dexamethasone  6 mg Oral Daily    albuterol  2.5 mg Nebulization BID    atorvastatin  40 mg Oral Nightly    clopidogrel  75 mg Oral Daily    tamsulosin  0.4 mg Oral Daily     Continuous Infusions:    sodium chloride 75 mL/hr at 12/15/21 0300    sodium chloride Stopped (21 0139)     PRN Meds:  oxyCODONE-acetaminophen, sodium chloride flush, acetaminophen, sodium chloride, albuterol sulfate HFA, hydrocortisone-aloe, diphenhydrAMINE, ondansetron **OR** ondansetron, magnesium hydroxide, potassium chloride **OR** potassium alternative oral replacement **OR** potassium chloride, potassium chloride, magnesium sulfate, nitroGLYCERIN, perflutren lipid microspheres    Input/Output:       I/O last 3 completed shifts: In: 778.7 [I.V.:121.3; IV Piggyback:657.4]  Out: 4877 [DKANW:0167]. Patient Vitals for the past 96 hrs (Last 3 readings):   Weight   21 0640 172 lb 4.8 oz (78.2 kg)       Vital Signs:   Temperature:  Temp: 98.2 °F (36.8 °C)  TMax:   Temp (24hrs), Av.2 °F (37.9 °C), Min:98.2 °F (36.8 °C), Max:102.9 °F (39.4 °C)    Respirations:  Resp: 20  Pulse:   Pulse: 69  BP:    BP: 126/69  BP Range: Systolic (24RTG), JTR:376 , Min:111 , WLM:371       Diastolic (88NSG), DSL:64, Min:64, Max:98      Physical Examination:     General:  AAO x 3, speaking in full sentences, no accessory muscle use. HEENT: Atraumatic, normocephalic, no throat congestion, moist mucosa. Eyes:   Pupils equal, round and reactive to light, EOMI. Neck:   Supple  Chest:   Bilateral vesicular breath sounds, no rales or wheezes. Cardiac:  S1 S2 RR, no murmurs, gallops or rubs. Abdomen: Soft, non-tender, no masses or organomegaly, BS audible. :   No suprapubic or flank tenderness. Neuro:  AAO x 3, No FND. SKIN:  No rashes, good skin turgor. Extremities:  Trace edema.     Labs:       Recent Labs     21  0550 21  2234 12/15/21  0600   WBC 3.1* 3.5 3.2*   RBC 3.36* 3.30* 3.10*   HGB 10.3* 10.1* 9.5*   HCT 32.5* 32.1* 29.7*   MCV 96.7 97.3 95.8   MCH 30.7 30.6 30.6   MCHC 31.7 31.5 32.0   RDW 16.6* 16.3* 16.3*    156 129*   MPV 11.1 10.8 10.9      BMP:   Recent Labs     12/14/21  0550 12/14/21  2234 12/15/21  0600    136 137   K 3.7 3.5* 3.0*   CL 99 95* 100   CO2 24 28 22   BUN 40* 42* 41*   CREATININE 2.31* 2.28* 2.20*   GLUCOSE 96 94 91   CALCIUM 7.8* 8.0* 7.8*      SPEP:  Lab Results   Component Value Date    PROT 6.3 12/09/2021    PATH ELECTRONICALLY SIGNED. Nicolas Acosta M.D. 12/09/2021     Hep BsAg:         Lab Results   Component Value Date    HEPBSAG NONREACTIVE 12/09/2021     Hep C AB:          Lab Results   Component Value Date    HEPCAB REACTIVE 12/09/2021       Urinalysis/Chemistries:      Lab Results   Component Value Date    NITRU NEGATIVE 12/11/2021    COLORU Yellow 12/11/2021    PHUR 7.0 12/11/2021    WBCUA 0 TO 2 12/11/2021    RBCUA 0 TO 2 12/11/2021    MUCUS NOT REPORTED 12/11/2021    TRICHOMONAS NOT REPORTED 12/11/2021    YEAST NOT REPORTED 12/11/2021    BACTERIA NOT REPORTED 12/11/2021    SPECGRAV 1.015 12/11/2021    LEUKOCYTESUR NEGATIVE 12/11/2021    UROBILINOGEN Normal 12/11/2021    BILIRUBINUR NEGATIVE 12/11/2021    GLUCOSEU NEGATIVE 12/11/2021    KETUA NEGATIVE 12/11/2021    AMORPHOUS NOT REPORTED 12/11/2021     Urine Sodium:     Lab Results   Component Value Date    SHERMAN <20 12/09/2021      Urine Creatinine:     Lab Results   Component Value Date    LABCREA 95.6 12/09/2021     Radiology:     Reviewed. Assessment:     1. Acute Kidney Injury Nonoliguric likely secondary to possible obstruction, chronic hydro-along with history of solitary left kidney complicated further by decompensated heart failure. Baseline creatinine currently not available. Patient did receive repeat contrast on 12/15/2021 due to stroke alert, contrast might affect his renal function further, will continue to monitor. 2.  COVID-19 infection. 3.  Congenital absence of right kidney.   4.  Left-sided hydronephrosis/urinary retention. 5.  Ischemic cardiomyopathy with EF of 25-30% as per 2D echo done on 20-30% as per 2 D Echo done on 12/3/2021.  6.  Coronary disease with history of CABG. 7.  History of A. fib. 8.  Hypertension. 9.  History of CVA, bilateral occipital lobes status post TPA. 10.  Hep C antibody positive. 11.  Homelessness. 12.  History of urinary retention along with chronic left with history of stent placement and removal at the hospital December 21. Plan:   1. Okay to continue current Bumex. 2.  Patient did receive some IV fluids due to repeat contrast exposure, decrease IVF to 50 cc/hr NS. Will continue to monitor renal function. 3.  Follow-up urology plan, due to history of urine retention. 4.  Neurology on board due to stroke alert. 5.  Covid-19 infection management as per primary. 6.  Monitor strict I's and O's and renal function. 7.  BMP in AM.  8.  Will follow. Nutrition   Please ensure that patient is on a renal diet/TF. Avoid nephrotoxic drugs/contrast exposure. We will continue to follow along with you. Nagi Cunningham MD  [facility]     This note is created with the assistance of a speech-recognition program. While intending to generate a document that actually reflects the content of the visit, no guarantees can be provided that every mistake has been identified and corrected by editing.

## 2021-12-15 NOTE — PROGRESS NOTES
Writer went in to assess patient and give morning meds. Patient would not take meds and would not let me assess him at all. He was sleepy and only gave me nods and one word answers.

## 2021-12-15 NOTE — PROGRESS NOTES
Stroke alert called. Patient was incontinent of stool, and experiencing right sided weakness, right hand & arm clenched. NIHSS stroke scale = 9. Slurred speech with right facial droop. Blood sugar = 92. BP = 141/98 on left arm, 135/68 on right arm. In-house NP, Zacarias Palafox, arrived to assess patient. Orders received for CT of head without contrast, labs, neuro checks, & NIHSS. Awaiting results.

## 2021-12-15 NOTE — PROGRESS NOTES
[facility]  Office: [address], DO, Lorcaridad Din, DO, Brigido Acron, DO, Dominguez Smart Blood, DO, Hali Lozada MD, Hebert Juarez MD, Vida Freed MD, Ramón Decker MD, Zehra Alfaro MD, Adolfo Pena MD, Deirdre Burciaga MD, Dayo Parra, DO, Huey Waller, DO, Mago Gerber MD,  Ciro Bhatti, DO, Ji Brooks MD, Tila Duffy MD, Annabella Mcdonough MD, Wilbert Rocha MD, Allen Vazquez MD, Katiuska Sandoval MD, Cairdad Casas MD, Camelia Patricia, [facility], [facility], CNP, Lorena Weaver, CNP, Tina Perry, CNS, Vijay Rai, CNP, Adelia Alpers, CNP, William Bonilla, CNP, Eda Obrien, CNP, Amanda Lozano, CNP, Pratik Garcia PA-C, Zenia Aguayo, [facility], Dino Sung, CNP, Kwan Hendrix, CNP, Trang Olmstead, CNP, Jose Vizcaino, CNP, Ministerio Oliver, CNP, Ulices Tillman, CNP, [facility]      Daily Progress Note     Admit Date: 12/7/2021  Bed/Room No.  1012/1012-02  Admitting Physician : Vida Freed MD  Code Status :Full Code  Hospital Day:  LOS: 2 days   Chief Complaint:     Chief Complaint   Patient presents with    Chest Pain     pt reports onset at least 1 week ago. has been admitted to [facility] for past 10 days, left AMA from there this afternoon.  Shortness of Breath     ongoing and unchanged x past 4-5 days.      Principal Problem:    Chest pain  Active Problems:    Elevated troponin    Hydronephrosis of left kidney    Acute urinary retention    Chronic systolic heart failure (HCC)    Hx of CABG    Stage 3 chronic kidney disease (HCC)    COVID-19 virus infection    Hepatitis C    JACKSON (acute kidney injury) (HCC)    AICD (automatic cardioverter/defibrillator) present    History of MI (myocardial infarction)    Elevated brain natriuretic peptide (BNP) level    Coronary artery disease involving native coronary artery of native heart with angina pectoris ([facility] [address]    Solitary kidney, congenital    Homeless single person  Resolved Problems:    * No resolved hospital problems. *    Subjective : Interval History/Significant events :  12/15/21    Patient reports that he is not feeling good today. He is complaining of right-sided weakness. Patient also complains of nausea. Patient had rapid response last night due to acute onset right-sided weakness. CT head and neck was negative. CT head showed prior infarct without any new ischemia. Patient remains on supplemental oxygen 2 L/min. He is alert, oriented. Patient has poor eye contact and he is not answering many questions. Vitals - Stable afebrile  Labs -creatinine 2.31, CRP worsened from 33.2 to 54.6. Leukopenia 3.1, low platelet 603, low hemoglobin 10.3. Blood culture positive for staph epidermidis  CT abdomen pelvis shows left sided hydronephrosis, 4 mm nonobstructing left kidney stone,  Nursing notes , Consults notes reviewed. Overnight events and updates discussed with Nursing staff . Background History:         Darlene Mcintosh is 61 y.o. male  Who was admitted to the hospital on 12/7/2021 for treatment of Chest pain. Patient presented to hospital with chest pain for 1 week associated with shortness of breath for 4 to 5 days. Patient had initially went to [facility] 3 days before but left as he was not satisfied with the care over there  and left [address] Patient was admitted at [address] [place] [address] about 10 days before with right-sided deficit and was given TPA. Work-up showed supratentorial bilateral cerebral infarcts, embolic in origin. He was also found to have A. fib and was given Eliquis. Patient was started on Eliquis. He also had hematemesis which required urgent EGD and did not show any source of bleed except mild gastritis. Patient was put back on Plavix and Eliquis. Patient is homeless and had been bouncing between the hotels. Patient [place] the mall with security noticed him to be unwell and called EMS.     On initial evaluation patient was found to have COVID-19 infection. He was started on Decadron and oxygen supplement. Lab evaluation showed mild elevated troponin and elevated creatinine which seem to be chronic. Patient has underlying history of CAD with CABG in 2011 and redo CABG in 2018. He had run out of medication for 3 days. Patient was requesting IV narcotics in the emergency room. Initial evaluation showed creatinine 1.9, trop 69, proBNP 20,281 WBC 4.9 repeat showed drop to 2.6. Patient is a positive for COVID-19 infection. Work-up showed positive hep C. CT abdomen showed left-sided hydronephrosis, 4 mm nonobstructing left kidney stone, body well no cervical, sigmoid diverticulosis without diverticulitis. NM VQ scan showed low probability for PE. CXR showed cardiomegaly. Patient had positive blood culture for staph epidermidis 1 out of 2. On 12/14/2021 patient had a good onset right-sided weakness and hyporesponsiveness called. Stroke alert was called in due to [facility] 9.  Work-up showed negative CTA head and neck, CT head with chronic infarcts without any acute bleeding or infarct. PMH:  Past Medical History:   Diagnosis Date    AICD (automatic cardioverter/defibrillator) present     CKD (chronic kidney disease), stage III (HCC)     Myocardial infarction ([facility] [address]     S/P CABG (coronary artery bypass graft)     x2 separate occasions    Solitary kidney, congenital       Allergies:    Allergies   Allergen Reactions    Nsaids Swelling    Asa [Aspirin] Swelling    Oxycodone Hives      Medications :  bumetanide, 2 mg, Oral, BID  gabapentin, 100 mg, Oral, TID  pantoprazole, 40 mg, Oral, QAM AC  [Held by provider] metoprolol succinate, 50 mg, Oral, Daily  apixaban, 5 mg, Oral, BID  dexamethasone, 6 mg, Oral, Daily  albuterol, 2.5 mg, Nebulization, BID  atorvastatin, 40 mg, Oral, Nightly  clopidogrel, 75 mg, Oral, Daily  tamsulosin, 0.4 mg, Oral, Daily        Review of Systems   Review of Systems   Constitutional: Negative for activity is not in acute distress. Appearance: He is not diaphoretic. Interventions: Nasal cannula in place. HENT:      Head: Normocephalic and atraumatic. Nose:      Right Sinus: No maxillary sinus tenderness or frontal sinus tenderness. Left Sinus: No maxillary sinus tenderness or frontal sinus tenderness. Mouth/Throat:      Pharynx: No oropharyngeal exudate. Eyes:      General: No scleral icterus. Conjunctiva/sclera: Conjunctivae normal.      Pupils: Pupils are equal, round, and reactive to light. Neck:      Thyroid: No thyromegaly. Vascular: No JVD. Cardiovascular:      Rate and Rhythm: Normal rate and regular rhythm. Pulses:           Dorsalis pedis pulses are 2+ on the right side and 2+ on the left side. Heart sounds: Normal heart sounds. No murmur heard. Pulmonary:      Effort: Pulmonary effort is normal.      Breath sounds: Normal breath sounds. No wheezing or rales. Chest:      Comments: ICD in place. Sternotomy scar   Abdominal:      Palpations: Abdomen is soft. There is no mass. Tenderness: There is no abdominal tenderness. Musculoskeletal:      Cervical back: Full passive range of motion without pain and neck supple. Lymphadenopathy:      Head:      Right side of head: No submandibular adenopathy. Left side of head: No submandibular adenopathy. Cervical: No cervical adenopathy. Skin:     General: Skin is warm. Findings: Rash present. Rash is crusting. Comments: On lower end of sternum. Neurological:      Mental Status: He is alert and oriented to person, place, and time. Motor: No tremor. Comments: R sided weakness - effort limited. Facial asymmetry    Psychiatric:         Behavior: Behavior is cooperative.            Laboratory findings:    Recent Labs     12/14/21  0550 12/14/21  2234 12/15/21  0600   WBC 3.1* 3.5 3.2*   HGB 10.3* 10.1* 9.5*   HCT 32.5* 32.1* 29.7*    156 129*     Recent Labs 12/14/21  0550 12/14/21  2234 12/15/21  0600    136 137   K 3.7 3.5* 3.0*   CL 99 95* 100   CO2 24 28 22   GLUCOSE 96 94 91   BUN 40* 42* 41*   CREATININE 2.31* 2.28* 2.20*   MG  --  1.7  --    CALCIUM 7.8* 8.0* 7.8*     No results for input(s): PROT, LABALBU, LABA1C, E0KUTIU, H9ITJAW, FT4, TSH, AST, ALT, LDH, GGT, ALKPHOS, BILITOT, BILIDIR, AMMONIA, AMYLASE, LIPASE, LACTATE, CHOL, HDL, LDLCHOLESTEROL, CHOLHDLRATIO, TRIG, VLDL, BNP, TROPONINI, CKTOTAL, CKMB, CKMBINDEX, RF, ANNALISE in the last 72 hours. Specific Gravity, UA   Date Value Ref Range Status   12/11/2021 1.015 1.005 - 1.030 Final     Protein, UA   Date Value Ref Range Status   12/11/2021 1+ (A) NEGATIVE Final     RBC, UA   Date Value Ref Range Status   12/11/2021 0 TO 2 0 - 2 /HPF Final     Bacteria, UA   Date Value Ref Range Status   12/11/2021 NOT REPORTED None Final     Nitrite, Urine   Date Value Ref Range Status   12/11/2021 NEGATIVE NEGATIVE Final     WBC, UA   Date Value Ref Range Status   12/11/2021 0 TO 2 0 - 5 /HPF Final     Leukocyte Esterase, Urine   Date Value Ref Range Status   12/11/2021 NEGATIVE NEGATIVE Final       Imaging / Clinical Data :-   CT ABDOMEN PELVIS WO CONTRAST Additional Contrast? None    Result Date: 12/9/2021  1. Left-sided hydronephrosis/hydroureter with uroepithelial thickening and small amount of air seen within the collecting system. No obstructing stone or gross mass. Left UVJ stricture cannot be excluded. Correlation with urinalysis is suggested as underlying infectious process cannot be excluded. 2. 4 mm nonobstructing calculus left kidney. 3. Cardiomegaly. 4. Presumed post injection changes are seen involving the left anterior abdominal wall. 5. Body wall anasarca. 6. Small amount of free fluid. 7. Sigmoid diverticulosis. NM LUNG SCAN PERFUSION ONLY    Result Date: 12/7/2021  Low Probability for Pulmonary Embolus. XR CHEST 1 VIEW    Result Date: 12/7/2021  No prior study available.   No definite pneumonia. Slight blunting left CP angle. Cardiomegaly; venous hypertension without radiographic CHF. US RETROPERITONEAL COMPLETE    Result Date: 12/8/2021  1. Nonvisualization of the right kidney. 2. Mild-to-moderate left-sided hydronephrosis. 3. Exam limited due to patient's bowel gas and body habitus. 4. Nonvisualization of ureteral jets. Urinary bladder grossly unremarkable. [facility] Dec  2, 2021.   Left Ventricle: Systolic function is severely decreased with an   ejection fraction of 25-30%.   Right Ventricle: Systolic function is mildly reduced. [employer] CRT-D   noted with no evidence of apparent vegetation or thrombus.  All 3 leads   visualized.   LA appendage:  Spontaneous echo contrast and prominent pectinate are   noted, however no evidence of organized thrombus     Aortic Valve: The aortic valve is trileaflet. The leaflets exhibit   normal excursion. Previously repaired valve, evidence of repair on non   coronary cusp. There is mild regurgitation. There is no evidence of aortic   valve stenosis or thrombus or vegetation.   Mitral Valve: There is mild to moderate regurgitation. There is no   evidence of mitral valve stenosis.   Tricuspid Valve: There is mild to moderate regurgitation. There is no   evidence of tricuspid valve stenosis.   Pericardium: There is no pericardial effusion.   Aorta: There is grade I plaque in the transverse and descending aorta.   Interatrial septum:  No evidence of interatrial shunting      [facility]: Multiple bilateral supratentorial cerebral infarctions of different ages with no acute intracranial disease process, intracranial hemorrhage or gross mass effect. Clinical Course : unchanged  Assessment and Plan  :        COVID-19 infection without pneumonia. Supportive care. Monitor fever.   Droplet plus isolation  Chest pain -atypical likely secondary to rash concern for zoster infection. Recommend calamine, Neurontin. Cardiology recommendations noted. Conservative management at this time. Fever likely secondary to COVID-19 infection-conservative management. Left hydronephrosis ,   Urology following. No immediate plan for stent. Outpatient follow-up. CAD s/p CABG and  multiple stents -Eliquis, Plavix. Chronic kidney disease -patient has solitary kidney. Not sure about baseline creatinine. Avoid nephrotoxic agents. Chronic systolic CHF due to ischemic cardiomyopathy -on Bumex. Recent CVA -thromboembolic (bilateral supratentorial cerebral infarcts ) treated with [facility] at [facility].  Neurology consult for acute left-sided weakness worsening  PAF -Eliquis 5 mg twice daily  Chronic hep C -outpatient follow-up with GI  Homeless -   Gastritis-PPI      No further work-up for chest pain. Continue medical management with Lipitor, Coreg, Bumex. PT OT evaluation. Follow further neurology recommendations. Wean off oxygen  Social service for discharge arrangements  Psych evaluation         Continue to monitor vitals , Intake / output ,  Cell count , HGB , Kidney function, oxygenation  as indicated . Plan and updates discussed with patient ,  answers  explained to satisfaction.    Plan discussed with Staff Ramon Trevino RN     (Please note that portions of this note were completed with a voice recognition program. Efforts were made to edit the dictations but occasionally words are mis-transcribed.)      Teddy Porter MD  12/15/2021

## 2021-12-15 NOTE — CARE COORDINATION
Discharge planning    Chart reviewed. Spoke with night RN Osmar Vizcaino patient exhibiting s/s of possible CVA and tele stroke called. CT negative. Neurology has been consulted     Will need new PT and OT evaluation due to changes in right arm and slurred speech. MRI cannot be done due to AICD. Urology following and stent could be considered in future but at this time does not seem to be obstruction    Nephrology following for JACKSON and IV lasix daily.

## 2021-12-15 NOTE — PLAN OF CARE
Problem: Airway Clearance - Ineffective  Goal: Achieve or maintain patent airway  12/14/2021 1934 by Diamond Joseph RN  Outcome: Ongoing  Note: Assessed ability to cough & breathe without laboring. Assessed need for suctioning if needed. Encouraged cough & deep breathing. 12/14/2021 1100 by Maria Elena Rhodes RN  Outcome: Ongoing     Problem: Gas Exchange - Impaired  Goal: Absence of hypoxia  12/14/2021 1934 by Diamond Joseph RN  Outcome: Ongoing  Note: Assess breath sounds every shift and as needed. Assess oxygenation level & respiration rate. Encourage coughing & deep breathing. Encourage use of incentive spirometer. Assess cough & sputum. Administer oxygen as needed. 12/14/2021 1100 by Maria Elena Rhodes RN  Outcome: Ongoing  Goal: Promote optimal lung function  12/14/2021 1934 by Diamond Joseph RN  Outcome: Ongoing  12/14/2021 1100 by Maria Elena Rhodes RN  Outcome: Ongoing     Problem: Breathing Pattern - Ineffective  Goal: Ability to achieve and maintain a regular respiratory rate  12/14/2021 1934 by Diamond Joseph RN  Outcome: Ongoing  Note: Assess for adventitious breath sounds. Monitored SaO2 > 90%. Applied 02 per nasal cannula as needed. Elevated HOB to improve breathing as needed. 12/14/2021 1100 by Maria Elena Rhodes RN  Outcome: Ongoing     Problem: Body Temperature -  Risk of, Imbalanced  Goal: Ability to maintain a body temperature within defined limits  Outcome: Ongoing  Note: Assessed for fever. Provided appropriate intervention to regulate body temperature.    Goal: Will regain or maintain usual level of consciousness  12/14/2021 1934 by Diamond Joseph RN  Outcome: Ongoing  12/14/2021 1100 by Maria Elena Rhodes RN  Outcome: Ongoing  Goal: Complications related to the disease process, condition or treatment will be avoided or minimized  12/14/2021 1934 by Diamond Joseph RN  Outcome: Ongoing  12/14/2021 1100 by Maria Elena Rhodes RN  Outcome: Ongoing     Problem: Isolation Precautions - Risk of Spread of Infection  Goal: Prevent transmission of infection  12/14/2021 1934 by Go Montoya RN  Outcome: Ongoing  Note: Monitor for signs & symptoms of infection. Utilize standard precautions & proper handwashing. Communicate referral to infection control. 12/14/2021 1100 by Fredrick Cisneros RN  Outcome: Ongoing     Problem: Nutrition Deficits  Goal: Optimize nutritional status  Outcome: Ongoing  Note: Review diet daily and as needed with pt. Provide supplement nutrition as ordered by physician & educate pt. Review nutritional guidelines with pt. Problem: Risk for Fluid Volume Deficit  Goal: Maintain normal heart rhythm  Outcome: Ongoing  Note: Monitor vital signs at least every shift & as needed. Monitor intake & output at least every shift. Goal: Maintain absence of muscle cramping  Outcome: Ongoing  Goal: Maintain normal serum potassium, sodium, calcium, phosphorus, and pH  Outcome: Ongoing     Problem: Loneliness or Risk for Loneliness  Goal: Demonstrate positive use of time alone when socialization is not possible  12/14/2021 1934 by Go Montoya RN  Outcome: Ongoing  Note: Assess mood. Provide support & reassurance, & coping skills to aid with mood. Promote level of activity as pt is tolerable. 12/14/2021 1100 by Fredrick Cisneros RN  Outcome: Ongoing     Problem: Fatigue  Goal: Verbalize increase energy and improved vitality  Outcome: Ongoing     Problem: Patient Education: Go to Patient Education Activity  Goal: Patient/Family Education  12/14/2021 1934 by Go Montoya RN  Outcome: Ongoing  Note: Educated pt regarding reason for admission, treatment, medication. Provided oral and/or written instructions to treatment. Assessed level of verbal understanding of pt and/or family.    12/14/2021 1100 by Fredrick Cisneros RN  Outcome: Ongoing     Problem: OXYGENATION/RESPIRATORY FUNCTION  Goal: Patient will maintain patent airway  12/14/2021 1934 by Go Montoya RN  Outcome: Ongoing  12/14/2021 1100 by Layo Patterson RN  Outcome: Ongoing  Goal: Patient will achieve/maintain normal respiratory rate/effort  Description: Respiratory rate and effort will be within normal limits for the patient  12/14/2021 1934 by Los Hawthorne RN  Outcome: Ongoing  Note: Assess for adventitious breath sounds. Monitored SaO2 > 90%. Applied 02 per nasal cannula as needed. Elevated HOB to improve breathing as needed. 12/14/2021 1100 by Layo Patterson RN  Outcome: Ongoing     Problem: HEMODYNAMIC STATUS  Goal: Patient has stable vital signs and fluid balance  12/14/2021 1934 by Los Hawthorne RN  Outcome: Ongoing  12/14/2021 1100 by Layo Patterson RN  Outcome: Ongoing     Problem: FLUID AND ELECTROLYTE IMBALANCE  Goal: Fluid and electrolyte balance are achieved/maintained  Outcome: Ongoing  Note: Assess lab values. Replacement therapy provided as needed. Assessed respiratory status as needed. Problem: ACTIVITY INTOLERANCE/IMPAIRED MOBILITY  Goal: Mobility/activity is maintained at optimum level for patient  Outcome: Ongoing  Note: Assessed musculoskeletal functional level. Assessed ROM & ability to care for self.       Problem: Cardiac:  Goal: Ability to maintain an adequate cardiac output will improve  Description: Ability to maintain an adequate cardiac output will improve  Outcome: Ongoing  Goal: Hemodynamic stability will improve  Description: Hemodynamic stability will improve  Outcome: Ongoing     Problem: Fluid Volume:  Goal: Ability to achieve and maintain adequate urine output will improve  Description: Ability to achieve and maintain adequate urine output will improve  Outcome: Ongoing     Problem: Respiratory:  Goal: Respiratory status will improve  Description: Respiratory status will improve  Outcome: Ongoing     Problem: Falls - Risk of:  Goal: Will remain free from falls  Description: Will remain free from falls  Outcome: Ongoing  Note: Pt fall risk, fall band present, falling star, safety alarm activated and in use as needed. Hourly rounding performed. Pt encouraged to use call light. See Bravo Peng fall risk assessment. Goal: Absence of physical injury  Description: Absence of physical injury  Outcome: Ongoing  Note: Non-skid socks in place, up with assistance, bed in lowest position, bed exit & alarm as needed, provide toileting every 2 hours an d as needed. Problem: Pain:  Goal: Pain level will decrease  Description: Pain level will decrease  Outcome: Ongoing  Note: Monitoring pain with each assessment and prn. [place] 0-10 pain scale utilized. Non-pharmacological measures to be encouraged prior to pharmacological measures.     Goal: Control of acute pain  Description: Control of acute pain  Outcome: Ongoing  Goal: Control of chronic pain  Description: Control of chronic pain  Outcome: Ongoing

## 2021-12-16 ENCOUNTER — APPOINTMENT (OUTPATIENT)
Dept: CT IMAGING | Age: 56
DRG: 177 | End: 2021-12-16
Payer: MEDICARE

## 2021-12-16 LAB
ABSOLUTE EOS #: 0.07 K/UL (ref 0–0.4)
ABSOLUTE IMMATURE GRANULOCYTE: 0 K/UL (ref 0–0.3)
ABSOLUTE LYMPH #: 0.31 K/UL (ref 1–4.8)
ABSOLUTE MONO #: 0.14 K/UL (ref 0.2–0.8)
ANION GAP SERPL CALCULATED.3IONS-SCNC: 13 MMOL/L (ref 9–17)
BASOPHILS # BLD: 0 %
BASOPHILS ABSOLUTE: 0 K/UL (ref 0–0.2)
BUN BLDV-MCNC: 36 MG/DL (ref 6–20)
BUN/CREAT BLD: 19 (ref 9–20)
CALCIUM SERPL-MCNC: 7.8 MG/DL (ref 8.6–10.4)
CHLORIDE BLD-SCNC: 100 MMOL/L (ref 98–107)
CO2: 21 MMOL/L (ref 20–31)
CREAT SERPL-MCNC: 1.89 MG/DL (ref 0.7–1.2)
DIFFERENTIAL TYPE: ABNORMAL
EOSINOPHILS RELATIVE PERCENT: 2 % (ref 1–4)
GFR AFRICAN AMERICAN: 45 ML/MIN
GFR NON-AFRICAN AMERICAN: 37 ML/MIN
GFR SERPL CREATININE-BSD FRML MDRD: ABNORMAL ML/MIN/{1.73_M2}
GFR SERPL CREATININE-BSD FRML MDRD: ABNORMAL ML/MIN/{1.73_M2}
GLUCOSE BLD-MCNC: 88 MG/DL (ref 70–99)
HCT VFR BLD CALC: 31.9 % (ref 40.7–50.3)
HEMOGLOBIN: 10.1 G/DL (ref 13–17)
IMMATURE GRANULOCYTES: 0 %
LYMPHOCYTES # BLD: 9 % (ref 24–44)
MCH RBC QN AUTO: 30.2 PG (ref 25.2–33.5)
MCHC RBC AUTO-ENTMCNC: 31.7 G/DL (ref 28.4–34.8)
MCV RBC AUTO: 95.5 FL (ref 82.6–102.9)
MONOCYTES # BLD: 4 % (ref 1–7)
MORPHOLOGY: ABNORMAL
NRBC AUTOMATED: 0 PER 100 WBC
PDW BLD-RTO: 15.9 % (ref 11.8–14.4)
PLATELET # BLD: 144 K/UL (ref 138–453)
PLATELET ESTIMATE: ABNORMAL
PMV BLD AUTO: 11 FL (ref 8.1–13.5)
POTASSIUM SERPL-SCNC: 3.2 MMOL/L (ref 3.7–5.3)
RBC # BLD: 3.34 M/UL (ref 4.21–5.77)
RBC # BLD: ABNORMAL 10*6/UL
SEG NEUTROPHILS: 85 % (ref 36–66)
SEGMENTED NEUTROPHILS ABSOLUTE COUNT: 2.88 K/UL (ref 1.8–7.7)
SODIUM BLD-SCNC: 134 MMOL/L (ref 135–144)
WBC # BLD: 3.4 K/UL (ref 3.5–11.3)
WBC # BLD: ABNORMAL 10*3/UL

## 2021-12-16 PROCEDURE — 6360000002 HC RX W HCPCS: Performed by: INTERNAL MEDICINE

## 2021-12-16 PROCEDURE — 36415 COLL VENOUS BLD VENIPUNCTURE: CPT

## 2021-12-16 PROCEDURE — 70450 CT HEAD/BRAIN W/O DYE: CPT

## 2021-12-16 PROCEDURE — 6370000000 HC RX 637 (ALT 250 FOR IP): Performed by: NURSE PRACTITIONER

## 2021-12-16 PROCEDURE — 6360000002 HC RX W HCPCS: Performed by: NURSE PRACTITIONER

## 2021-12-16 PROCEDURE — 6370000000 HC RX 637 (ALT 250 FOR IP): Performed by: FAMILY MEDICINE

## 2021-12-16 PROCEDURE — 80048 BASIC METABOLIC PNL TOTAL CA: CPT

## 2021-12-16 PROCEDURE — 85025 COMPLETE CBC W/AUTO DIFF WBC: CPT

## 2021-12-16 PROCEDURE — 99232 SBSQ HOSP IP/OBS MODERATE 35: CPT | Performed by: FAMILY MEDICINE

## 2021-12-16 PROCEDURE — 6370000000 HC RX 637 (ALT 250 FOR IP): Performed by: INTERNAL MEDICINE

## 2021-12-16 PROCEDURE — 2060000000 HC ICU INTERMEDIATE R&B

## 2021-12-16 PROCEDURE — 2580000003 HC RX 258: Performed by: INTERNAL MEDICINE

## 2021-12-16 RX ORDER — LORAZEPAM 2 MG/ML
2 INJECTION INTRAMUSCULAR EVERY 6 HOURS PRN
Status: DISCONTINUED | OUTPATIENT
Start: 2021-12-16 | End: 2021-12-18

## 2021-12-16 RX ORDER — MORPHINE SULFATE 4 MG/ML
4 INJECTION, SOLUTION INTRAMUSCULAR; INTRAVENOUS ONCE
Status: COMPLETED | OUTPATIENT
Start: 2021-12-16 | End: 2021-12-16

## 2021-12-16 RX ADMIN — SODIUM CHLORIDE: 9 INJECTION, SOLUTION INTRAVENOUS at 04:11

## 2021-12-16 RX ADMIN — APIXABAN 5 MG: 5 TABLET, FILM COATED ORAL at 09:17

## 2021-12-16 RX ADMIN — TAMSULOSIN HYDROCHLORIDE 0.4 MG: 0.4 CAPSULE ORAL at 09:17

## 2021-12-16 RX ADMIN — DIPHENHYDRAMINE HYDROCHLORIDE 25 MG: 50 INJECTION, SOLUTION INTRAMUSCULAR; INTRAVENOUS at 10:03

## 2021-12-16 RX ADMIN — ATORVASTATIN CALCIUM 40 MG: 40 TABLET, FILM COATED ORAL at 21:12

## 2021-12-16 RX ADMIN — LOPERAMIDE HYDROCHLORIDE 2 MG: 2 CAPSULE ORAL at 10:59

## 2021-12-16 RX ADMIN — DEXAMETHASONE 6 MG: 4 TABLET ORAL at 09:17

## 2021-12-16 RX ADMIN — BUMETANIDE 2 MG: 1 TABLET ORAL at 09:17

## 2021-12-16 RX ADMIN — MORPHINE SULFATE 4 MG: 4 INJECTION INTRAVENOUS at 23:12

## 2021-12-16 RX ADMIN — POTASSIUM CHLORIDE 40 MEQ: 1500 TABLET, EXTENDED RELEASE ORAL at 10:59

## 2021-12-16 RX ADMIN — DIPHENHYDRAMINE HYDROCHLORIDE 25 MG: 50 INJECTION, SOLUTION INTRAMUSCULAR; INTRAVENOUS at 23:12

## 2021-12-16 RX ADMIN — CLOPIDOGREL BISULFATE 75 MG: 75 TABLET ORAL at 09:17

## 2021-12-16 RX ADMIN — LORAZEPAM 2 MG: 2 INJECTION INTRAMUSCULAR; INTRAVENOUS at 23:12

## 2021-12-16 RX ADMIN — APIXABAN 5 MG: 5 TABLET, FILM COATED ORAL at 21:12

## 2021-12-16 RX ADMIN — PANTOPRAZOLE SODIUM 40 MG: 40 TABLET, DELAYED RELEASE ORAL at 06:40

## 2021-12-16 RX ADMIN — DIPHENHYDRAMINE HYDROCHLORIDE 25 MG: 50 INJECTION, SOLUTION INTRAMUSCULAR; INTRAVENOUS at 16:01

## 2021-12-16 RX ADMIN — DIPHENHYDRAMINE HYDROCHLORIDE 25 MG: 50 INJECTION, SOLUTION INTRAMUSCULAR; INTRAVENOUS at 04:12

## 2021-12-16 ASSESSMENT — ENCOUNTER SYMPTOMS
CHEST TIGHTNESS: 0
COUGH: 0
SINUS PRESSURE: 0
DIARRHEA: 0
CHOKING: 0
SHORTNESS OF BREATH: 0
VOMITING: 0
RHINORRHEA: 0
ABDOMINAL PAIN: 0
BACK PAIN: 0
WHEEZING: 0
CONSTIPATION: 0
NAUSEA: 0
VOICE CHANGE: 0

## 2021-12-16 ASSESSMENT — PAIN SCALES - GENERAL: PAINLEVEL_OUTOF10: 10

## 2021-12-16 NOTE — PROGRESS NOTES
Patient refuses scheduled Gabapentin, \"I'm not taking any new pills! \" but accepts other scheduled night-time medication, including Eloquis. Patient informed of repeat CTA tonight around midnight. Patient nods, but does not respond.

## 2021-12-16 NOTE — PLAN OF CARE
Problem: Airway Clearance - Ineffective  Goal: Achieve or maintain patent airway  12/15/2021 2044 by Vaibhav Melendez RN  Outcome: Ongoing  Note: Assessed ability to cough & breathe without laboring. Assessed need for suctioning if needed. Encouraged cough & deep breathing. 12/15/2021 1137 by Aleksandra Germain RN  Outcome: Ongoing     Problem: Gas Exchange - Impaired  Goal: Absence of hypoxia  12/15/2021 2044 by Vaibhav Melendez RN  Outcome: Ongoing  Note: Assess breath sounds every shift and as needed. Assess oxygenation level & respiration rate. Encourage coughing & deep breathing. Encourage use of incentive spirometer. Assess cough & sputum. Administer oxygen as needed. 12/15/2021 1137 by Aleksandra Germain RN  Outcome: Ongoing  Goal: Promote optimal lung function  Outcome: Ongoing     Problem: Breathing Pattern - Ineffective  Goal: Ability to achieve and maintain a regular respiratory rate  12/15/2021 2044 by Vaibhav Melendez RN  Outcome: Ongoing  Note: Assess for adventitious breath sounds. Monitored SaO2 > 90%. Applied 02 per nasal cannula as needed. Elevated HOB to improve breathing as needed. 12/15/2021 1137 by Aleksandra Germain RN  Outcome: Ongoing     Problem: Body Temperature -  Risk of, Imbalanced  Goal: Ability to maintain a body temperature within defined limits  12/15/2021 2044 by Vaibhav Melendez RN  Outcome: Ongoing  Note: Assessed for fever. Provided appropriate intervention to regulate body temperature.    12/15/2021 1137 by Aleksandra Germain RN  Outcome: Ongoing  Goal: Will regain or maintain usual level of consciousness  Outcome: Ongoing  Goal: Complications related to the disease process, condition or treatment will be avoided or minimized  Outcome: Ongoing     Problem: Isolation Precautions - Risk of Spread of Infection  Goal: Prevent transmission of infection  12/15/2021 2044 by Vaibhav Melendez RN  Outcome: Ongoing  Note: Monitor for signs & symptoms of infection. Utilize standard precautions & proper handwashing. Communicate referral to infection control. 12/15/2021 1137 by Whitley Jones RN  Outcome: Ongoing     Problem: Nutrition Deficits  Goal: Optimize nutritional status  Outcome: Ongoing  Note: Review diet daily and as needed with pt. Provide supplement nutrition as ordered by physician & educate pt. Review nutritional guidelines with pt. Problem: Risk for Fluid Volume Deficit  Goal: Maintain normal heart rhythm  12/15/2021 2044 by Natasha Sanches RN  Outcome: Ongoing  Note: Monitor vital signs at least every shift & as needed. Monitor intake & output at least every shift. 12/15/2021 1137 by Whitley Jones RN  Outcome: Ongoing  Goal: Maintain absence of muscle cramping  Outcome: Ongoing  Goal: Maintain normal serum potassium, sodium, calcium, phosphorus, and pH  Outcome: Ongoing     Problem: Loneliness or Risk for Loneliness  Goal: Demonstrate positive use of time alone when socialization is not possible  12/15/2021 2044 by Natasha Sanches RN  Outcome: Ongoing  Note: Assess mood. Provide support & reassurance, & coping skills to aid with mood. Promote level of activity as pt is tolerable. 12/15/2021 1137 by Whitley Jones RN  Outcome: Ongoing     Problem: Fatigue  Goal: Verbalize increase energy and improved vitality  12/15/2021 2044 by Natasha Sanches RN  Outcome: Ongoing  12/15/2021 1137 by Whitley Jones RN  Outcome: Ongoing     Problem: Patient Education: Go to Patient Education Activity  Goal: Patient/Family Education  Outcome: Ongoing  Note: Educated pt regarding reason for admission, treatment, medication. Provided oral and/or written instructions to treatment. Assessed level of verbal understanding of pt and/or family.       Problem: OXYGENATION/RESPIRATORY FUNCTION  Goal: Patient will maintain patent airway  Outcome: Ongoing  Goal: Patient will achieve/maintain normal respiratory rate/effort  Description: Respiratory rate and effort will be within normal limits for the patient  Outcome: Ongoing     Problem: HEMODYNAMIC STATUS  Goal: Patient has stable vital signs and fluid balance  Outcome: Ongoing  Note: Monitor vital signs at least every shift & as needed. Monitor intake & output at least every shift. Problem: FLUID AND ELECTROLYTE IMBALANCE  Goal: Fluid and electrolyte balance are achieved/maintained  Outcome: Ongoing  Note: Assess lab values. Replacement therapy provided as needed. Assessed respiratory status as needed. Problem: ACTIVITY INTOLERANCE/IMPAIRED MOBILITY  Goal: Mobility/activity is maintained at optimum level for patient  Outcome: Ongoing  Note: Assessed musculoskeletal functional level. Assessed ROM & ability to care for self. Problem: Cardiac:  Goal: Ability to maintain an adequate cardiac output will improve  Description: Ability to maintain an adequate cardiac output will improve  Outcome: Ongoing  Note: Assess cardiac monitor, heart rate, & pulse. Assess for any arrhythmias. Assessed IV administration. O2 provided as needed. Intervention: Monitor signs and symptoms of cardiac arrhythmias  Note: Assess cardiac monitor, heart rate, & pulse. Assess for any arrhythmias. Assessed IV administration. O2 provided as needed. Goal: Hemodynamic stability will improve  Description: Hemodynamic stability will improve  Outcome: Ongoing  Note: Monitor vital signs at least every shift & as needed. Monitor intake & output at least every shift. Problem: Fluid Volume:  Goal: Ability to achieve and maintain adequate urine output will improve  Description: Ability to achieve and maintain adequate urine output will improve  Outcome: Ongoing  Note: Assess intake & output. Maintain hydration as needed. Provide perineal care. Assist with toileting every 2 hours & as needed. Assess for infection.       Problem: Respiratory:  Goal: Respiratory status will improve  Description: Respiratory status will improve  Outcome: Ongoing  Note: Assess for adventitious breath sounds. Monitored SaO2 > 90%. Applied 02 per nasal cannula as needed. Elevated HOB to improve breathing as needed. Problem: Falls - Risk of:  Goal: Will remain free from falls  Description: Will remain free from falls  Outcome: Ongoing  Note: Pt fall risk, fall band present, falling star, safety alarm activated and in use as needed. Hourly rounding performed. Pt encouraged to use call light. See Demetri Britt fall risk assessment. Goal: Absence of physical injury  Description: Absence of physical injury  Outcome: Ongoing  Note: Non-skid socks in place, up with assistance, bed in lowest position, bed exit & alarm as needed, provide toileting every 2 hours an d as needed. Problem: Pain:  Goal: Pain level will decrease  Description: Pain level will decrease  Outcome: Ongoing  Note: Monitoring pain with each assessment and prn. [place] 0-10 pain scale utilized. Non-pharmacological measures to be encouraged prior to pharmacological measures.     Goal: Control of acute pain  Description: Control of acute pain  Outcome: Ongoing  Goal: Control of chronic pain  Description: Control of chronic pain  Outcome: Ongoing

## 2021-12-16 NOTE — PROGRESS NOTES
Pt has outstanding [facility] consult, pt refused to sign consent form for telepsych

## 2021-12-16 NOTE — PROGRESS NOTES
Renal Progress Note    Patient :  Radha Couch; 61 y.o. MRN# [id number]  Location:  1012/1012-02  Attending:  Zenaida Mcgill MD  Admit Date:  12/7/2021   Hospital Day: 3      Subjective:     Patient was seen and examined. No new issues reported overnight except patient has been refusing medications and treatment off-and-on as per nursing notes. Permissive hypertension for another 24 hours per neurology. Serum creatinine did improve to 1.89 mg/dl today. Sodium 134, potassium 3.4, chloride 100, bicarb 21, BUN 26, calcium 7.8. Hemoglobin 10.1. Urine output documented about 1.6 L in the 24 hours. Diuretics have been changed to Bumex 2 mg p.o. twice daily. Patient is also receiving IV fluids 50 cc/hr normal saline. Patient is solitary kidney with chronic hydronephrosis, urology on board. Currently no acute intervention planned by required reinsertion of ureteral stent in the future per urology. Currently on COVID-19 infection isolation. On dexamethasone.   Outpatient Medications:     Medications Prior to Admission: clopidogrel (PLAVIX) 75 MG tablet, Take 1 tablet by mouth daily  atorvastatin (LIPITOR) 40 MG tablet, Take 1 tablet by mouth daily  tamsulosin (FLOMAX) 0.4 MG capsule, Take 0.4 mg by mouth daily  apixaban (ELIQUIS) 5 MG TABS tablet, Take 5 mg by mouth 2 times daily  furosemide (LASIX) 20 MG tablet, Take 20 mg by mouth as needed (swelling)   amLODIPine (NORVASC) 10 MG tablet, Take 0.5 tablets by mouth daily  carvedilol (COREG) 6.25 MG tablet, Take 1 tablet by mouth 2 times daily    Current Medications:     Scheduled Meds:    bumetanide  2 mg Oral BID    gabapentin  100 mg Oral TID    pantoprazole  40 mg Oral QAM AC    [Held by provider] metoprolol succinate  50 mg Oral Daily    apixaban  5 mg Oral BID    dexamethasone  6 mg Oral Daily    albuterol  2.5 mg Nebulization BID    atorvastatin  40 mg Oral Nightly    clopidogrel  75 mg Oral Daily    tamsulosin  0.4 mg Oral Daily     Continuous Infusions:    sodium chloride 50 mL/hr at 21 0411    sodium chloride Stopped (21 0139)     PRN Meds:  loperamide, oxyCODONE-acetaminophen, sodium chloride flush, acetaminophen, sodium chloride, albuterol sulfate HFA, hydrocortisone-aloe, diphenhydrAMINE, ondansetron **OR** ondansetron, magnesium hydroxide, potassium chloride **OR** potassium alternative oral replacement **OR** potassium chloride, potassium chloride, magnesium sulfate, nitroGLYCERIN, perflutren lipid microspheres    Input/Output:       I/O last 3 completed shifts: In: 891.6 [I.V.:891.6]  Out: 1600 [Urine:1600]. Patient Vitals for the past 96 hrs (Last 3 readings):   Weight   21 0506 172 lb 7 oz (78.2 kg)   21 0640 172 lb 4.8 oz (78.2 kg)       Vital Signs:   Temperature:  Temp: 98 °F (36.7 °C)  TMax:   Temp (24hrs), Av.6 °F (37 °C), Min:98 °F (36.7 °C), Max:99.5 °F (37.5 °C)    Respirations:  Resp: 20  Pulse:   Pulse: 70  BP:    BP: 120/87  BP Range: Systolic (83RGR), ZNN:285 , Min:120 , CWM:308       Diastolic (28CAV), IOQ:05, Min:76, Max:91      Physical Examination:     General:  Alert and awake but answers minimal questions. No accessory muscle use. HEENT: Atraumatic, normocephalic, no throat congestion, moist mucosa. Eyes:   Pupils equal, round and reactive to light, EOMI. Neck:   Supple  Chest:   Bilateral vesicular breath sounds, no rales or wheezes. Cardiac:  S1 S2 RR, no murmurs, gallops or rubs. Abdomen: Soft, non-tender, no masses or organomegaly, BS audible. :   No suprapubic or flank tenderness. Neuro:  Alert and awake but answers minimal questions. No acute distress. SKIN:  No rashes, good skin turgor. Extremities:  Trace edema.     Labs:       Recent Labs     21  2234 12/15/21  0600 21  0511   WBC 3.5 3.2* 3.4*   RBC 3.30* 3.10* 3.34*   HGB 10.1* 9.5* 10.1*   HCT 32.1* 29.7* 31.9*   MCV 97.3 95.8 95.5   MCH 30.6 30.6 30.2   MCHC 31.5 32.0 31.7   RDW 16.3* 16.3* 15.9*   PLT 156 129* 144   MPV 10.8 10.9 11.0      BMP:   Recent Labs     12/14/21  2234 12/15/21  0600 12/16/21  0511    137 134*   K 3.5* 3.0* 3.2*   CL 95* 100 100   CO2 28 22 21   BUN 42* 41* 36*   CREATININE 2.28* 2.20* 1.89*   GLUCOSE 94 91 88   CALCIUM 8.0* 7.8* 7.8*      SPEP:  Lab Results   Component Value Date    PROT 6.3 12/09/2021    PATH ELECTRONICALLY SIGNED. Alia Flores M.D. 12/09/2021     Hep BsAg:         Lab Results   Component Value Date    HEPBSAG NONREACTIVE 12/09/2021     Hep C AB:          Lab Results   Component Value Date    HEPCAB REACTIVE 12/09/2021       Urinalysis/Chemistries:      Lab Results   Component Value Date    NITRU NEGATIVE 12/11/2021    COLORU Yellow 12/11/2021    PHUR 7.0 12/11/2021    WBCUA 0 TO 2 12/11/2021    RBCUA 0 TO 2 12/11/2021    MUCUS NOT REPORTED 12/11/2021    TRICHOMONAS NOT REPORTED 12/11/2021    YEAST NOT REPORTED 12/11/2021    BACTERIA NOT REPORTED 12/11/2021    SPECGRAV 1.015 12/11/2021    LEUKOCYTESUR NEGATIVE 12/11/2021    UROBILINOGEN Normal 12/11/2021    BILIRUBINUR NEGATIVE 12/11/2021    GLUCOSEU NEGATIVE 12/11/2021    KETUA NEGATIVE 12/11/2021    AMORPHOUS NOT REPORTED 12/11/2021     Urine Sodium:     Lab Results   Component Value Date    SHERMAN <20 12/09/2021      Urine Creatinine:     Lab Results   Component Value Date    LABCREA 95.6 12/09/2021     Radiology:     Reviewed. Assessment:     1. Acute Kidney Injury Nonoliguric likely secondary to possible obstruction, chronic hydro-along with history of solitary left kidney complicated further by decompensated heart failure. Baseline creatinine currently not available. Patient did receive repeat contrast on 12/15/2021 due to stroke alert, contrast might affect his renal function further, will continue to monitor. 2.  COVID-19 infection. 3.  Congenital absence of right kidney. 4.  Left-sided hydronephrosis/urinary retention.   5.  Ischemic cardiomyopathy with EF of 25-30% as per 2D echo done on 20-30% as per 2 D Echo done on 12/3/2021.  6.  Coronary disease with history of CABG. 7.  History of A. fib. 8.  Hypertension. 9.  History of CVA, bilateral occipital lobes status post TPA. 10.  Hep C antibody positive. 11.  Homelessness. 12.  History of urinary retention along with chronic left with history of stent placement and removal at the hospital December 21. Plan:   1. Continue Bumex 2 mg twice a day. 2.  Continue background gentle hydration normal saline 50 cc an hour for today. 3.  Follow-up urology plan, due to history of urine retention. 4.  Neurology on board due to stroke alert. 5.  Covid-19 infection management as per primary. 6.  Monitor strict I's and O's and renal function. 7.  BMP in AM.  8.  Will follow. Nutrition   Please ensure that patient is on a renal diet/TF. Avoid nephrotoxic drugs/contrast exposure. We will continue to follow along with you. Nagi Singh MD  [facility]     This note is created with the assistance of a speech-recognition program. While intending to generate a document that actually reflects the content of the visit, no guarantees can be provided that every mistake has been identified and corrected by editing.

## 2021-12-16 NOTE — PROGRESS NOTES
Physical Therapy  DATE: 2021    NAME: Jimmy Brumfield  MRN: [id number]   : 1965    Patient not seen this date for Physical Therapy due to:      [] Cancel by RN or physician due to:    [] Hemodialysis    [] Critical Lab Value Level     [] Blood transfusion in progress    [] Acute or unstable cardiovascular status   _MAP < 55 or more than >115  _HR < 40 or > 130    [] Acute or unstable pulmonary status   -FiO2 > 60%   _RR < 5 or >40    _O2 sats < 85%    [] Strict Bedrest    [] Off Unit for surgery or procedure    [] Off Unit for testing       [] Pending imaging to R/O fracture    [x] Refusal by Patient: patient adamently refusing PT      [] Other      [] PT being discontinued at this time. Patient independent. No further needs. [] PT being discontinued at this time as the patient has been transferred to hospice care. No further needs.       Thomas Shea, PT

## 2021-12-16 NOTE — PROGRESS NOTES
[facility]  Office: [address], DO, Indy Riojas, DO, Jonna Soto, DO, Rambo Figueroa Blood, DO, Laura Diaz MD, Colton Evans MD, Beatrice Chun MD, Jarrell Lerner MD, Sharee Nur MD, Sarah Mccoy MD, Misael Tate MD, Kevin Tijerina, DO, Antonio Baires, DO, Jadiel Payan MD,  Alex Monte, DO, Marty Kevin MD, Aurora Townsend MD, Anish Kirkland MD, Kojo Murray MD, Chapin Wakefield MD, Laura Oviedo MD, Fabiana Rudolph MD, Alferd Felty, [facility], [facility] ([place], [place]), CNP, Karen Wrihgt, CNP, Clau Owen, CNS, Juan Ring, CNP, Kasandra Holbrook, CNP, Zenia Bangura, CNP, Ethyl Mercury, CNP, Shabnam Schilling, [facility], Lenard Abreu PA-C, Susana Love, [facility], Dinesh Campbell, CNP, Mony Hooper, CNP, Gaudencio Ken, CNP, Mikki Rainey, CNP, Tasia Ramirez, CNP, Mary Rogers, [facility], [facility]      Daily Progress Note     Admit Date: 12/7/2021  Bed/Room No.  1012/1012-02  Admitting Physician : Beatrice Chun MD  Code Status :Full Code  Hospital Day:  LOS: 3 days   Chief Complaint:     Chief Complaint   Patient presents with    Chest Pain     pt reports onset at least 1 week ago. has been admitted to [facility] for past 10 days, left AMA from there this afternoon.  Shortness of Breath     ongoing and unchanged x past 4-5 days.      Principal Problem:    Chest pain  Active Problems:    Elevated troponin    Hydronephrosis of left kidney    Acute urinary retention    Chronic systolic heart failure (HCC)    Hx of CABG    Stage 3 chronic kidney disease (HCC)    COVID-19 virus infection    Hepatitis C    JACKSON (acute kidney injury) (HCC)    AICD (automatic cardioverter/defibrillator) present    History of MI (myocardial infarction)    Elevated brain natriuretic peptide (BNP) level    Coronary artery disease involving native coronary artery of native heart with angina pectoris ([facility] [address]    Solitary kidney, congenital    Homeless single person    Acute right-sided weakness  Resolved Problems:    * No resolved hospital problems. *    Subjective : Interval History/Significant events :  12/16/21    Patient is uncooperative, not responding to verbal commands. He is agitated on repeated questions. Patient has been talking to the nursing staff this morning. He also has been up and about in the room. Patient remains on supplemental oxygen. Vitals - Stable afebrile  Labs -hyponatremia 134, potassium 3.2, creatinine 1.89, WBC 3.4. Nursing notes , Consults notes reviewed. Overnight events and updates discussed with Nursing staff . Background History:         Tanya Black is 61 y.o. male  Who was admitted to the hospital on 12/7/2021 for treatment of Chest pain. Patient presented to hospital with chest pain for 1 week associated with shortness of breath for 4 to 5 days. Patient had initially went to [facility] 3 days before but left as he was not satisfied with the care over there  and left [address] Patient was admitted at [address] [place] [address] about 10 days before with right-sided deficit and was given TPA. Work-up showed supratentorial bilateral cerebral infarcts, embolic in origin. He was also found to have A. fib and was given Eliquis. Patient was started on Eliquis. He also had hematemesis which required urgent EGD and did not show any source of bleed except mild gastritis. Patient was put back on Plavix and Eliquis. Patient is homeless and had been bouncing between the hotels. Patient [place] the mall with security noticed him to be unwell and called EMS. On initial evaluation patient was found to have COVID-19 infection. He was started on Decadron and oxygen supplement. Lab evaluation showed mild elevated troponin and elevated creatinine which seem to be chronic. Patient has underlying history of CAD with CABG in 2011 and redo CABG in 2018. He had run out of medication for 3 days.  Patient was requesting IV narcotics leg swelling. Gastrointestinal: Negative for abdominal pain, constipation, diarrhea, nausea and vomiting. Genitourinary: Negative for difficulty urinating, dysuria, frequency, penile discharge and testicular pain. Musculoskeletal: Negative for back pain. Skin: Positive for rash. Neurological: Positive for weakness. Negative for dizziness, light-headedness and headaches. Hematological: Does not bruise/bleed easily. Psychiatric/Behavioral: Positive for behavioral problems. Negative for agitation, confusion, self-injury, sleep disturbance and suicidal ideas. Objective :      Current Vitals : Temp: 98.8 °F (37.1 °C),  Pulse: 71, Resp: 21, BP: (!) 134/91, SpO2: 98 %  Last 24 Hrs Vitals   Patient Vitals for the past 24 hrs:   BP Temp Temp src Pulse Resp SpO2 Weight   12/16/21 0506 -- -- -- -- -- -- 172 lb 7 oz (78.2 kg)   12/16/21 0351 (!) 134/91 98.8 °F (37.1 °C) Oral 71 21 98 % --   12/16/21 0028 131/84 -- Temporal 82 -- -- --   12/15/21 2314 132/83 98.1 °F (36.7 °C) Oral 70 22 94 % --   12/15/21 1946 (!) 125/90 98.1 °F (36.7 °C) Oral 71 20 100 % --   12/15/21 1654 124/76 99 °F (37.2 °C) Oral 70 16 99 % --   12/15/21 1245 128/77 99.5 °F (37.5 °C) Oral 70 16 99 % --   12/15/21 0800 -- -- -- -- -- 94 % --   12/15/21 0741 126/69 98.2 °F (36.8 °C) Axillary 69 20 (!) 89 % --     Intake / output   12/15 0701 - 12/16 0700  In: 891.6 [I.V.:891.6]  Out: 1600 [Urine:1600]  Physical Exam:  Physical Exam  Vitals and nursing note reviewed. Constitutional:       General: He is not in acute distress. Appearance: He is not diaphoretic. Interventions: Nasal cannula in place. HENT:      Head: Normocephalic and atraumatic. Nose:      Right Sinus: No maxillary sinus tenderness or frontal sinus tenderness. Left Sinus: No maxillary sinus tenderness or frontal sinus tenderness. Mouth/Throat:      Pharynx: No oropharyngeal exudate. Eyes:      General: No scleral icterus.      Conjunctiva/sclera: Conjunctivae normal.      Pupils: Pupils are equal, round, and reactive to light. Neck:      Thyroid: No thyromegaly. Vascular: No JVD. Cardiovascular:      Rate and Rhythm: Normal rate and regular rhythm. Pulses:           Dorsalis pedis pulses are 2+ on the right side and 2+ on the left side. Heart sounds: Normal heart sounds. No murmur heard. Pulmonary:      Effort: Pulmonary effort is normal.      Breath sounds: Normal breath sounds. No wheezing or rales. Chest:      Comments: ICD in place. Sternotomy scar   Abdominal:      Palpations: Abdomen is soft. There is no mass. Tenderness: There is no abdominal tenderness. Musculoskeletal:      Cervical back: Full passive range of motion without pain and neck supple. Lymphadenopathy:      Head:      Right side of head: No submandibular adenopathy. Left side of head: No submandibular adenopathy. Cervical: No cervical adenopathy. Skin:     General: Skin is warm. Findings: Rash present. Rash is crusting. Comments: On lower end of sternum. Neurological:      Mental Status: He is alert and oriented to person, place, and time. Motor: No tremor. Comments: R sided weakness - effort limited. Facial asymmetry    Psychiatric:         Behavior: Behavior is cooperative.            Laboratory findings:    Recent Labs     12/14/21  2234 12/15/21  0600 12/16/21  0511   WBC 3.5 3.2* 3.4*   HGB 10.1* 9.5* 10.1*   HCT 32.1* 29.7* 31.9*    129* 144     Recent Labs     12/14/21  2234 12/15/21  0600 12/16/21  0511    137 134*   K 3.5* 3.0* 3.2*   CL 95* 100 100   CO2 28 22 21   GLUCOSE 94 91 88   BUN 42* 41* 36*   CREATININE 2.28* 2.20* 1.89*   MG 1.7  --   --    CALCIUM 8.0* 7.8* 7.8*     No results for input(s): PROT, LABALBU, LABA1C, W6ZNAVH, K8CHNDT, FT4, TSH, AST, ALT, LDH, GGT, ALKPHOS, BILITOT, BILIDIR, AMMONIA, AMYLASE, LIPASE, LACTATE, CHOL, HDL, LDLCHOLESTEROL, CHOLHDLRATIO, TRIG, VLDL, BNP, TROPONINI, CKTOTAL, CKMB, CKMBINDEX, RF, ANNALISE in the last 72 hours. Specific Gravity, UA   Date Value Ref Range Status   12/11/2021 1.015 1.005 - 1.030 Final     Protein, UA   Date Value Ref Range Status   12/11/2021 1+ (A) NEGATIVE Final     RBC, UA   Date Value Ref Range Status   12/11/2021 0 TO 2 0 - 2 /HPF Final     Bacteria, UA   Date Value Ref Range Status   12/11/2021 NOT REPORTED None Final     Nitrite, Urine   Date Value Ref Range Status   12/11/2021 NEGATIVE NEGATIVE Final     WBC, UA   Date Value Ref Range Status   12/11/2021 0 TO 2 0 - 5 /HPF Final     Leukocyte Esterase, Urine   Date Value Ref Range Status   12/11/2021 NEGATIVE NEGATIVE Final       Imaging / Clinical Data :-   CT ABDOMEN PELVIS WO CONTRAST Additional Contrast? None    Result Date: 12/9/2021  1. Left-sided hydronephrosis/hydroureter with uroepithelial thickening and small amount of air seen within the collecting system. No obstructing stone or gross mass. Left UVJ stricture cannot be excluded. Correlation with urinalysis is suggested as underlying infectious process cannot be excluded. 2. 4 mm nonobstructing calculus left kidney. 3. Cardiomegaly. 4. Presumed post injection changes are seen involving the left anterior abdominal wall. 5. Body wall anasarca. 6. Small amount of free fluid. 7. Sigmoid diverticulosis. NM LUNG SCAN PERFUSION ONLY    Result Date: 12/7/2021  Low Probability for Pulmonary Embolus. XR CHEST 1 VIEW    Result Date: 12/7/2021  No prior study available. No definite pneumonia. Slight blunting left CP angle. Cardiomegaly; venous hypertension without radiographic CHF. US RETROPERITONEAL COMPLETE    Result Date: 12/8/2021  1. Nonvisualization of the right kidney. 2. Mild-to-moderate left-sided hydronephrosis. 3. Exam limited due to patient's bowel gas and body habitus. 4. Nonvisualization of ureteral jets. Urinary bladder grossly unremarkable. [facility] Dec  2, 2021.     Left Ventricle: Systolic function is severely decreased with an   ejection fraction of 25-30%.   Right Ventricle: Systolic function is mildly reduced. [employer] CRT-D   noted with no evidence of apparent vegetation or thrombus.  All 3 leads   visualized.   LA appendage:  Spontaneous echo contrast and prominent pectinate are   noted, however no evidence of organized thrombus     Aortic Valve: The aortic valve is trileaflet. The leaflets exhibit   normal excursion. Previously repaired valve, evidence of repair on non   coronary cusp. There is mild regurgitation. There is no evidence of aortic   valve stenosis or thrombus or vegetation.   Mitral Valve: There is mild to moderate regurgitation. There is no   evidence of mitral valve stenosis.   Tricuspid Valve: There is mild to moderate regurgitation. There is no   evidence of tricuspid valve stenosis.   Pericardium: There is no pericardial effusion.   Aorta: There is grade I plaque in the transverse and descending aorta.   Interatrial septum:  No evidence of interatrial shunting      CT head 11/28/2021 at [address]: Multiple bilateral supratentorial cerebral infarctions of different ages with no acute intracranial disease process, intracranial hemorrhage or gross mass effect. Clinical Course : unchanged  Assessment and Plan  :        COVID-19 infection with pneumonia. Continue O2 support, dexamethasone 6 mg daily. Droplet plus isolation  Chest pain -atypical likely secondary to rash concern for zoster infection. Recommend calamine, Neurontin. Cardiology recommendations noted. Conservative management at this time. Fever likely secondary to COVID-19 infection-conservative management. Left hydronephrosis ,   Urology following. No immediate plan for stent. Outpatient follow-up. CAD s/p CABG and  multiple stents -Eliquis, Plavix. Chronic kidney disease -patient has solitary kidney.   Not sure about baseline

## 2021-12-16 NOTE — CARE COORDINATION
PEDRO Planing    Spoke with pt at bedside. Attempted to discuss next level of care such as rehab-nurse relays he is weak and has slurred speech. Pt is currently on 2l nc. He confirms he is homeless was staying at [facility] in [place] which is no longer secured but they are holding his belongings. He does not want to go to any shelters and if on 02 doubt any shelter will accept. Given his 02 needs and weakness explained best option would be to go to to rehab short term. Writer discussed option for physical rehab inpatient at a snf of his choice in network and accepting covid pts. Discussed the importance of participating in PT so insurance can make a determination on approval for rehab-see notes he had refused rehab. Asked pt why he did not want to participate in therapy he yells angrily  \"I'm in fucking pain, you people don't listen to shit! How do you expect me to do anything! I have chest pain, neck pain, back pain, flank pain nobody fucking cares around here you just want to kick me out on the streets! \"    Lissette Gilman will not tolerate swearing or aggressive behavior. Pt hides head under blanket. Again reiterated the importance of rehab. He relays \"people die in those nursing homes with covid\" assured that is not the case for everybody and our goal is to get him the care he needs. He then states \"Just let me fucking die then! \" Cindy Herrera asks if he suicidal or has a plan and he does not answer and turns away from writer with his head under blanket\"    He refused to sign consent for previous psych eval.     Discussed with LSW will send referrals to snfs in network and taking covid just in case he changes his mind.

## 2021-12-16 NOTE — PROGRESS NOTES
Patient take to CT transported via bed. Returned to room, attempted to take BP bilaterally. Patient refused BP in left arm, stating, \"I don't want to mess up the IV. \"  Bed alarm reactivated & side rails x2 raised for patient safety. Will monitor.

## 2021-12-16 NOTE — CARE COORDINATION
Social work: attempted x3 to call into room to discuss dc planning with patient, no answer. Will continue to try. If SNF, facilities accepting covid patients are: [address] [facility], [address] [facility] [address], [facility]  [facility] [place], [address] [place] [address].

## 2021-12-16 NOTE — PROGRESS NOTES
Pt upset and anxious and possible wants to leave AMA, pt upset that case management spoke with him about snf, [facility] and in to see pt, new order received, pt can have one time morphine dose and ativan prn

## 2021-12-16 NOTE — RT PROTOCOL NOTE
RT Inhaler-Nebulizer Bronchodilator Protocol Note    There is a bronchodilator order in the chart from a provider indicating to follow the RT Bronchodilator Protocol and there is an Initiate RT Inhaler-Nebulizer Bronchodilator Protocol order as well (see protocol at bottom of note). CXR Findings:  No results found. The findings from the last RT Protocol Assessment were as follows:   History Pulmonary Disease: None or smoker <15 pack years  Respiratory Pattern: Regular pattern and RR 12-20 bpm  Breath Sounds: Slightly diminished and/or crackles  Cough: Strong, spontaneous, non-productive  Indication for Bronchodilator Therapy:    Bronchodilator Assessment Score: 2    Aerosolized bronchodilator medication orders have been revised according to the RT Inhaler-Nebulizer Bronchodilator Protocol below. Respiratory Therapist to perform RT Therapy Protocol Assessment initially then follow the protocol. Repeat RT Therapy Protocol Assessment PRN for score 0-3 or on second treatment, BID, and PRN for scores above 3. No Indications - adjust the frequency to every 6 hours PRN wheezing or bronchospasm, if no treatments needed after 48 hours then discontinue using Per Protocol order mode. If indication present, adjust the RT bronchodilator orders based on the Bronchodilator Assessment Score as indicated below. Use Inhaler orders unless patient has one or more of the following: on home nebulizer, not able to hold breath for 10 seconds, is not alert and oriented, cannot activate and use MDI correctly, or respiratory rate 25 breaths per minute or more, then use the equivalent nebulizer order(s) with same Frequency and PRN reasons based on the score. If a patient is on this medication at home then do not decrease Frequency below that used at home.     0-3 - enter or revise RT bronchodilator order(s) to equivalent RT Bronchodilator order with Frequency of every 4 hours PRN for wheezing or increased work of breathing using Per Protocol order mode. 4-6 - enter or revise RT Bronchodilator order(s) to two equivalent RT bronchodilator orders with one order with BID Frequency and one order with Frequency of every 4 hours PRN wheezing or increased work of breathing using Per Protocol order mode. 7-10 - enter or revise RT Bronchodilator order(s) to two equivalent RT bronchodilator orders with one order with TID Frequency and one order with Frequency of every 4 hours PRN wheezing or increased work of breathing using Per Protocol order mode. 11-13 - enter or revise RT Bronchodilator order(s) to one equivalent RT bronchodilator order with QID Frequency and an Albuterol order with Frequency of every 4 hours PRN wheezing or increased work of breathing using Per Protocol order mode. Greater than 13 - enter or revise RT Bronchodilator order(s) to one equivalent RT bronchodilator order with every 4 hours Frequency and an Albuterol order with Frequency of every 2 hours PRN wheezing or increased work of breathing using Per Protocol order mode. RT to enter RT Home Evaluation for COPD & MDI Assessment order using Per Protocol order mode.     Electronically signed by Janette Torres RCP on 12/16/2021 at 6:07 PM

## 2021-12-17 LAB
ABSOLUTE EOS #: 0.11 K/UL (ref 0–0.44)
ABSOLUTE IMMATURE GRANULOCYTE: 0 K/UL (ref 0–0.3)
ABSOLUTE LYMPH #: 0.46 K/UL (ref 1.1–3.7)
ABSOLUTE MONO #: 0.11 K/UL (ref 0.1–1.2)
ANION GAP SERPL CALCULATED.3IONS-SCNC: 11 MMOL/L (ref 9–17)
BASOPHILS # BLD: 1 % (ref 0–2)
BASOPHILS ABSOLUTE: 0.04 K/UL (ref 0–0.2)
BUN BLDV-MCNC: 34 MG/DL (ref 6–20)
BUN/CREAT BLD: 19 (ref 9–20)
CALCIUM SERPL-MCNC: 7.9 MG/DL (ref 8.6–10.4)
CHLORIDE BLD-SCNC: 104 MMOL/L (ref 98–107)
CO2: 21 MMOL/L (ref 20–31)
CREAT SERPL-MCNC: 1.83 MG/DL (ref 0.7–1.2)
DIFFERENTIAL TYPE: ABNORMAL
EOSINOPHILS RELATIVE PERCENT: 3 % (ref 1–4)
GFR AFRICAN AMERICAN: 46 ML/MIN
GFR NON-AFRICAN AMERICAN: 38 ML/MIN
GFR SERPL CREATININE-BSD FRML MDRD: ABNORMAL ML/MIN/{1.73_M2}
GFR SERPL CREATININE-BSD FRML MDRD: ABNORMAL ML/MIN/{1.73_M2}
GLUCOSE BLD-MCNC: 115 MG/DL (ref 70–99)
HCT VFR BLD CALC: 29.7 % (ref 40.7–50.3)
HEMOGLOBIN: 9.6 G/DL (ref 13–17)
IMMATURE GRANULOCYTES: 0 %
LYMPHOCYTES # BLD: 13 % (ref 24–43)
MCH RBC QN AUTO: 30.4 PG (ref 25.2–33.5)
MCHC RBC AUTO-ENTMCNC: 32.3 G/DL (ref 28.4–34.8)
MCV RBC AUTO: 94 FL (ref 82.6–102.9)
MONOCYTES # BLD: 3 % (ref 3–12)
NRBC AUTOMATED: 0 PER 100 WBC
PDW BLD-RTO: 15.6 % (ref 11.8–14.4)
PLATELET # BLD: 149 K/UL (ref 138–453)
PLATELET ESTIMATE: ABNORMAL
PMV BLD AUTO: 10.9 FL (ref 8.1–13.5)
POTASSIUM SERPL-SCNC: 3.6 MMOL/L (ref 3.7–5.3)
RBC # BLD: 3.16 M/UL (ref 4.21–5.77)
RBC # BLD: ABNORMAL 10*6/UL
SEG NEUTROPHILS: 80 % (ref 36–65)
SEGMENTED NEUTROPHILS ABSOLUTE COUNT: 2.78 K/UL (ref 1.5–8.1)
SODIUM BLD-SCNC: 136 MMOL/L (ref 135–144)
WBC # BLD: 3.5 K/UL (ref 3.5–11.3)
WBC # BLD: ABNORMAL 10*3/UL

## 2021-12-17 PROCEDURE — 97116 GAIT TRAINING THERAPY: CPT

## 2021-12-17 PROCEDURE — 6370000000 HC RX 637 (ALT 250 FOR IP): Performed by: NURSE PRACTITIONER

## 2021-12-17 PROCEDURE — 6360000002 HC RX W HCPCS: Performed by: INTERNAL MEDICINE

## 2021-12-17 PROCEDURE — 6370000000 HC RX 637 (ALT 250 FOR IP): Performed by: INTERNAL MEDICINE

## 2021-12-17 PROCEDURE — 36415 COLL VENOUS BLD VENIPUNCTURE: CPT

## 2021-12-17 PROCEDURE — 2060000000 HC ICU INTERMEDIATE R&B

## 2021-12-17 PROCEDURE — 85025 COMPLETE CBC W/AUTO DIFF WBC: CPT

## 2021-12-17 PROCEDURE — 6370000000 HC RX 637 (ALT 250 FOR IP): Performed by: FAMILY MEDICINE

## 2021-12-17 PROCEDURE — 99232 SBSQ HOSP IP/OBS MODERATE 35: CPT | Performed by: FAMILY MEDICINE

## 2021-12-17 PROCEDURE — 80048 BASIC METABOLIC PNL TOTAL CA: CPT

## 2021-12-17 PROCEDURE — 6360000002 HC RX W HCPCS: Performed by: NURSE PRACTITIONER

## 2021-12-17 RX ORDER — METOPROLOL SUCCINATE 50 MG/1
50 TABLET, EXTENDED RELEASE ORAL DAILY
Qty: 30 TABLET | Refills: 3 | Status: ON HOLD
Start: 2021-12-18 | End: 2022-01-02 | Stop reason: HOSPADM

## 2021-12-17 RX ORDER — DEXAMETHASONE 6 MG/1
6 TABLET ORAL DAILY
Qty: 6 TABLET | Refills: 0 | Status: ON HOLD
Start: 2021-12-18 | End: 2021-12-27 | Stop reason: HOSPADM

## 2021-12-17 RX ORDER — GABAPENTIN 100 MG/1
100 CAPSULE ORAL 3 TIMES DAILY
Qty: 90 CAPSULE | Refills: 0 | Status: SHIPPED | OUTPATIENT
Start: 2021-12-17 | End: 2022-01-16

## 2021-12-17 RX ORDER — PANTOPRAZOLE SODIUM 40 MG/1
40 TABLET, DELAYED RELEASE ORAL
Qty: 30 TABLET | Refills: 3 | Status: SHIPPED | OUTPATIENT
Start: 2021-12-18

## 2021-12-17 RX ORDER — BUMETANIDE 2 MG/1
2 TABLET ORAL 2 TIMES DAILY
Qty: 30 TABLET | Refills: 3 | Status: SHIPPED | OUTPATIENT
Start: 2021-12-17

## 2021-12-17 RX ADMIN — APIXABAN 5 MG: 5 TABLET, FILM COATED ORAL at 09:46

## 2021-12-17 RX ADMIN — CLOPIDOGREL BISULFATE 75 MG: 75 TABLET ORAL at 09:45

## 2021-12-17 RX ADMIN — DEXAMETHASONE 6 MG: 4 TABLET ORAL at 09:46

## 2021-12-17 RX ADMIN — DIPHENHYDRAMINE HYDROCHLORIDE 25 MG: 50 INJECTION, SOLUTION INTRAMUSCULAR; INTRAVENOUS at 22:42

## 2021-12-17 RX ADMIN — DIPHENHYDRAMINE HYDROCHLORIDE 25 MG: 50 INJECTION, SOLUTION INTRAMUSCULAR; INTRAVENOUS at 05:19

## 2021-12-17 RX ADMIN — TAMSULOSIN HYDROCHLORIDE 0.4 MG: 0.4 CAPSULE ORAL at 09:45

## 2021-12-17 RX ADMIN — ATORVASTATIN CALCIUM 40 MG: 40 TABLET, FILM COATED ORAL at 20:59

## 2021-12-17 RX ADMIN — DIPHENHYDRAMINE HYDROCHLORIDE 25 MG: 50 INJECTION, SOLUTION INTRAMUSCULAR; INTRAVENOUS at 17:21

## 2021-12-17 RX ADMIN — PANTOPRAZOLE SODIUM 40 MG: 40 TABLET, DELAYED RELEASE ORAL at 05:21

## 2021-12-17 RX ADMIN — APIXABAN 5 MG: 5 TABLET, FILM COATED ORAL at 20:59

## 2021-12-17 RX ADMIN — LORAZEPAM 2 MG: 2 INJECTION INTRAMUSCULAR; INTRAVENOUS at 17:25

## 2021-12-17 RX ADMIN — LORAZEPAM 2 MG: 2 INJECTION INTRAMUSCULAR; INTRAVENOUS at 05:20

## 2021-12-17 RX ADMIN — LORAZEPAM 2 MG: 2 INJECTION INTRAMUSCULAR; INTRAVENOUS at 22:42

## 2021-12-17 ASSESSMENT — PAIN SCALES - GENERAL: PAINLEVEL_OUTOF10: 8

## 2021-12-17 ASSESSMENT — PAIN DESCRIPTION - PROGRESSION: CLINICAL_PROGRESSION: NOT CHANGED

## 2021-12-17 ASSESSMENT — PAIN DESCRIPTION - DESCRIPTORS: DESCRIPTORS: ACHING;DISCOMFORT

## 2021-12-17 ASSESSMENT — PAIN DESCRIPTION - FREQUENCY: FREQUENCY: CONTINUOUS

## 2021-12-17 ASSESSMENT — PAIN DESCRIPTION - ORIENTATION: ORIENTATION: RIGHT

## 2021-12-17 ASSESSMENT — PAIN DESCRIPTION - ONSET: ONSET: ON-GOING

## 2021-12-17 ASSESSMENT — PAIN DESCRIPTION - PAIN TYPE: TYPE: CHRONIC PAIN

## 2021-12-17 ASSESSMENT — PAIN - FUNCTIONAL ASSESSMENT: PAIN_FUNCTIONAL_ASSESSMENT: ACTIVITIES ARE NOT PREVENTED

## 2021-12-17 NOTE — PROGRESS NOTES
Renal Progress Note    Patient :  Colton Rainey; 61 y.o. MRN# [id number]  Location:  1010/1010-02  Attending:  Candelaria Quezada MD  Admit Date:  12/7/2021   Hospital Day: 4      Subjective:     Patient was seen and examined. Patient did not answer any questions. No new issues as mentioned overnight except patient has been refusing medical management off and on including some medications as per nurse's note. Patient also refused IV access and currently not on any fluids. He also refused Bumex. Nurse did report that patient is eating drinking well. No new blood work available from today. Labs have been ordered in the system. Urine output documented about 1.1 L in the 24 hours. Currently on Bumex 2 mg p.o. twice daily. Patient is also receiving IV fluids 50 cc/hr normal saline. Patient is solitary kidney with chronic hydronephrosis, urology on board. Currently no acute intervention planned by required reinsertion of ureteral stent in the future per urology. Currently on COVID-19 infection isolation. On dexamethasone.   Outpatient Medications:     Medications Prior to Admission: clopidogrel (PLAVIX) 75 MG tablet, Take 1 tablet by mouth daily  atorvastatin (LIPITOR) 40 MG tablet, Take 1 tablet by mouth daily  tamsulosin (FLOMAX) 0.4 MG capsule, Take 0.4 mg by mouth daily  apixaban (ELIQUIS) 5 MG TABS tablet, Take 5 mg by mouth 2 times daily  furosemide (LASIX) 20 MG tablet, Take 20 mg by mouth as needed (swelling)   amLODIPine (NORVASC) 10 MG tablet, Take 0.5 tablets by mouth daily  carvedilol (COREG) 6.25 MG tablet, Take 1 tablet by mouth 2 times daily    Current Medications:     Scheduled Meds:    bumetanide  2 mg Oral BID    gabapentin  100 mg Oral TID    pantoprazole  40 mg Oral QAM AC    metoprolol succinate  50 mg Oral Daily    apixaban  5 mg Oral BID    dexamethasone  6 mg Oral Daily    atorvastatin  40 mg Oral Nightly    clopidogrel  75 mg Oral Daily    tamsulosin  0.4 mg Oral Daily Continuous Infusions:    sodium chloride 50 mL/hr at 21 0411    sodium chloride Stopped (21 0139)     PRN Meds:  LORazepam, loperamide, oxyCODONE-acetaminophen, sodium chloride flush, acetaminophen, sodium chloride, albuterol sulfate HFA, hydrocortisone-aloe, diphenhydrAMINE, ondansetron **OR** ondansetron, magnesium hydroxide, potassium chloride **OR** potassium alternative oral replacement **OR** potassium chloride, potassium chloride, magnesium sulfate, nitroGLYCERIN, perflutren lipid microspheres    Input/Output:       I/O last 3 completed shifts: In: 700 [P.O.:700]  Out: 1100 [Urine:1100]. Patient Vitals for the past 96 hrs (Last 3 readings):   Weight   21 0546 170 lb 8 oz (77.3 kg)   21 0506 172 lb 7 oz (78.2 kg)   21 0640 172 lb 4.8 oz (78.2 kg)       Vital Signs:   Temperature:  Temp: 98.6 °F (37 °C)  TMax:   Temp (24hrs), Av.4 °F (36.9 °C), Min:97.9 °F (36.6 °C), Max:98.6 °F (37 °C)    Respirations:  Resp: 16  Pulse:   Pulse: 70  BP:    BP: 122/76  BP Range: Systolic (65NVW), DIQ:935 , Min:122 , OXI:948       Diastolic (60LYB), IL, Min:76, Max:112      Physical Examination:     General:  Alert and awake but did not answer any questions. No accessory muscle use. HEENT: Atraumatic, normocephalic, no throat congestion, moist mucosa. Eyes:   Pupils equal, round and reactive to light, EOMI. Neck:   Supple  Chest:   Bilateral vesicular breath sounds, no rales or wheezes. Cardiac:  S1 S2 RR, no murmurs, gallops or rubs. Abdomen: Soft, non-tender, no masses or organomegaly, BS audible. :   No suprapubic or flank tenderness. Neuro:  Alert and awake but did not answer any questions. No acute distress. SKIN:  No rashes, good skin turgor. Extremities:  Trace edema.     Labs:       Recent Labs     21  2234 12/15/21  0600 21  0511   WBC 3.5 3.2* 3.4*   RBC 3.30* 3.10* 3.34*   HGB 10.1* 9.5* 10.1*   HCT 32.1* 29.7* 31.9*   MCV 97.3 95.8 95.5   MCH 30.6 30.6 30.2   MCHC 31.5 32.0 31.7   RDW 16.3* 16.3* 15.9*    129* 144   MPV 10.8 10.9 11.0      BMP:   Recent Labs     12/14/21  2234 12/15/21  0600 12/16/21  0511    137 134*   K 3.5* 3.0* 3.2*   CL 95* 100 100   CO2 28 22 21   BUN 42* 41* 36*   CREATININE 2.28* 2.20* 1.89*   GLUCOSE 94 91 88   CALCIUM 8.0* 7.8* 7.8*      SPEP:  Lab Results   Component Value Date    PROT 6.3 12/09/2021    PATH ELECTRONICALLY SIGNED. Nicole Yoo M.D. 12/09/2021     Hep BsAg:         Lab Results   Component Value Date    HEPBSAG NONREACTIVE 12/09/2021     Hep C AB:          Lab Results   Component Value Date    HEPCAB REACTIVE 12/09/2021       Urinalysis/Chemistries:      Lab Results   Component Value Date    NITRU NEGATIVE 12/11/2021    COLORU Yellow 12/11/2021    PHUR 7.0 12/11/2021    WBCUA 0 TO 2 12/11/2021    RBCUA 0 TO 2 12/11/2021    MUCUS NOT REPORTED 12/11/2021    TRICHOMONAS NOT REPORTED 12/11/2021    YEAST NOT REPORTED 12/11/2021    BACTERIA NOT REPORTED 12/11/2021    SPECGRAV 1.015 12/11/2021    LEUKOCYTESUR NEGATIVE 12/11/2021    UROBILINOGEN Normal 12/11/2021    BILIRUBINUR NEGATIVE 12/11/2021    GLUCOSEU NEGATIVE 12/11/2021    KETUA NEGATIVE 12/11/2021    AMORPHOUS NOT REPORTED 12/11/2021     Urine Sodium:     Lab Results   Component Value Date    SHERMAN <20 12/09/2021      Urine Creatinine:     Lab Results   Component Value Date    LABCREA 95.6 12/09/2021     Radiology:     Reviewed. Assessment:     1. Acute Kidney Injury Nonoliguric likely secondary to possible obstruction, chronic hydro-along with history of solitary left kidney complicated further by decompensated heart failure. Baseline creatinine currently not available, creatinine admission was 1.99 mg/dl and peak creatinine this admission was 2.34 mg/DL. Cloteal Melo Patient did receive repeat contrast on 12/15/2021 due to stroke alert, contrast might affect his renal function further, will continue to monitor. 2.  COVID-19 infection.   3. Congenital absence of right kidney. 4.  Left-sided hydronephrosis/urinary retention. 5.  Ischemic cardiomyopathy with EF of 25-30% as per 2D echo done on 20-30% as per 2 D Echo done on 12/3/2021.  6.  Coronary disease with history of CABG. 7.  History of A. fib. 8.  Hypertension. 9.  History of CVA, bilateral occipital lobes status post TPA. 10.  Hep C antibody positive. 11.  Homelessness. 12.  History of urinary retention along with chronic left with history of stent placement and removal at the hospital December 21. Plan:   1. Continue Bumex 2 mg twice a day. 2.  Okay to stop IV fluids as his oral intake is optimal.  3.  Follow-up urology plan, due to history of urine retention. 4.  Neurology on board due to stroke alert. 5.  Covid-19 infection management as per primary. 6.  Monitor strict I's and O's and renal function. 7.  BMP results from today pending, patient was counseled about getting his lab work done. 8.  BMP in a.m. as well. 9.  Will follow. Nutrition   Please ensure that patient is on a renal diet/TF. Avoid nephrotoxic drugs/contrast exposure. We will continue to follow along with you. Nagi Toscano MD  [facility]     This note is created with the assistance of a speech-recognition program. While intending to generate a document that actually reflects the content of the visit, no guarantees can be provided that every mistake has been identified and corrected by editing.

## 2021-12-17 NOTE — FLOWSHEET NOTE
Pt calm at present. Writer reviewed medications with pt one by one and pt stating which pills he would take and refuse. MAR updated. Pt also refuses to wear telemetry box and pulse ox. Informed to keep oxygen on as needed. He is wearing 2 liters nasal cannula. Writer requested to do assessment and pt stated \"not now\" I just want to be left alone to rest.. pt becomes more agitated and rude when writer attempting to do job. No complete assessment done at this time. . Pt is A&O x 4. feening self and states he has pain all the time to chest, neck and right flank area. Denies need for anything at present but continues to repeat self telling writer at 1100 he wants benadryl and ativan. Will monitor and intervene as pt allows.

## 2021-12-17 NOTE — PROGRESS NOTES
Able to restart IV. Patient requests one-time Morphine, and PRN Ativan & PRN Benadryl. Encouraged patient to space medication apart. Patient states, \"No, I need it. My pain is an 6, and I am really itchy & really anxious. I need them all now. \"  Patient continues to refuse heart monitor. Bed alarm activated & side rails raised for patient safety. Will monitor.

## 2021-12-17 NOTE — DISCHARGE INSTR - DIET
Good nutrition is important when healing from an illness, injury, or surgery. Follow any nutrition recommendations given to you during your hospital stay. If you were given an oral nutrition supplement while in the hospital, continue to take this supplement at home. You can take it with meals, in-between meals, and/or before bedtime. These supplements can be purchased at most local grocery stores, pharmacies, and chain [employer]-stores. If you have any questions about your diet or nutrition, call the hospital and ask for the dietitian.       GENERAL HEALTHY DIET

## 2021-12-17 NOTE — PROGRESS NOTES
Loss of IV access on day-shift. Unable to restart IV due to patient being a hard IV start. House supervisor called twice to attempt, which restart was unsuccessful. ED called to attempt IV restart. Patient refuses PO Percocet for pain control, and refuses any other route other than IV. Patient continues to refuse heart monitor & refuses scheduled Bumex & Gabapentin. Patient accepts scheduled Eloquis & Lipitor.

## 2021-12-17 NOTE — CARE COORDINATION
Social work: [facility]ed to [facility]/continuing care and Kimi Scott also fax to continuing care also.   Dot onofrew

## 2021-12-17 NOTE — PROGRESS NOTES
Mayuri Maurer   Urology Progress Note            Subjective: Follow-up solitary kidney, chronic kidney disease    Patient Vitals for the past 24 hrs:   BP Temp Temp src Pulse Resp SpO2 Weight   12/17/21 1323 108/63 98.4 °F (36.9 °C) Oral 72 16 95 % --   12/17/21 0855 122/76 98.6 °F (37 °C) Oral 70 16 99 % --   12/17/21 0546 -- -- -- -- -- -- 170 lb 8 oz (77.3 kg)   12/17/21 0344 (!) 146/89 97.9 °F (36.6 °C) Oral 72 20 91 % --   12/16/21 2307 (!) 141/112 98.4 °F (36.9 °C) Oral 70 20 97 % --   12/16/21 1922 (!) 125/102 98.6 °F (37 °C) Oral 70 20 94 % --   12/16/21 1652 136/84 98.6 °F (37 °C) Oral 72 20 100 % --       Intake/Output Summary (Last 24 hours) at 12/17/2021 1626  Last data filed at 12/17/2021 0035  Gross per 24 hour   Intake 700 ml   Output 1100 ml   Net -400 ml       Recent Labs     12/15/21  0600 12/16/21  0511 12/17/21  1123   WBC 3.2* 3.4* 3.5   HGB 9.5* 10.1* 9.6*   HCT 29.7* 31.9* 29.7*   MCV 95.8 95.5 94.0   * 144 149     Recent Labs     12/15/21  0600 12/16/21  0511 12/17/21  1123    134* 136   K 3.0* 3.2* 3.6*    100 104   CO2 22 21 21   BUN 41* 36* 34*   CREATININE 2.20* 1.89* 1.83*       No results for input(s): COLORU, PHUR, LABCAST, WBCUA, RBCUA, MUCUS, TRICHOMONAS, YEAST, BACTERIA, CLARITYU, SPECGRAV, LEUKOCYTESUR, UROBILINOGEN, BILIRUBINUR, BLOODU in the last 72 hours.     Invalid input(s): NITRATE, GLUCOSEUKETONESUAMORPHOUS    Additional Lab/culture results:    Physical Exam: Creatinine continues to trend down, no acute urologic problem at this time, continue proper hydration and monitoring urinary output    Interval Imaging Findings:    Impression:    Patient Active Problem List   Diagnosis    [facility] (acute [facility] injury) ([facility]    Chest pain    AICD (automatic cardioverter/defibrillator) present    History of MI (myocardial infarction)    Elevated brain natriuretic peptide (BNP) level    Elevated troponin    Chronic systolic heart failure ([facility]    Hx of CABG    Stage 3 chronic kidney disease ([facility]    COVID-19 virus infection    Hydronephrosis of left kidney    Acute urinary retention    Hepatitis C    Coronary artery disease involving native coronary artery of native heart with angina pectoris ([facility]    Solitary kidney, congenital    Homeless single person    Acute right-sided weakness       Plan: Advised to follow-up with his urologist upon discharge    Ulysses Denson MD  4:26 PM 12/17/2021

## 2021-12-17 NOTE — PROGRESS NOTES
Physical Therapy  Facility/Department: [facility] PROGRESSIVE CARE  Daily Treatment Note  NAME: Jennifer Mendez  : 1965  MRN: [id number]    Date of Service: 2021   RN reports patient is medically stable for therapy treatment this date. Chart reviewed prior to treatment and patient is agreeable for therapy. All lines intact and patient positioned comfortably at end of treatment. All patient needs addressed prior to ending therapy session. Discharge Recommendations:  Pt currently functioning below baseline. Would suggest additional therapy at time of discharge to maximize long term outcomes and prevent re-admission. Please refer to AM-PAC score for current level of function. Patient would benefit from continued therapy after discharge     PT Equipment Recommendations  Equipment Needed:  (Pt not agreeable to attempt mobility w/ AD this date)    Assessment   Body structures, Functions, Activity limitations: Decreased functional mobility ; Decreased ROM; Decreased strength; Decreased safe awareness; Decreased balance; Decreased endurance; Decreased cognition; Decreased posture  Assessment: Pt tolerated session poor. Pt w/ increasing agitation throughout session w/ writer limiting participation in further activity. Pt did ambulate 50ft within room w/ poor steadiness noted. Pt not agreeable to attempt ambulation w/ AD at this time and w/ several LOB throughout requiring MaxA from writer to correct to prevent fall. Pt limited most by RLE/RUE strength deficits. Pt is a HIGH fall risk d/t mobility deficits, strength deficits, and decreased safety awareness. Pt would benefit from continued skilled physical therapy to address these deficits in order to return to PLOF.   Prognosis: Good  Decision Making: Medium Complexity  PT Education: PT Role; General Safety; Gait Training  Patient Education: Pt educated on: importance of continued mobility throughout admission, general safety awareness, purpose of acute PT, and PT POC. No evidence of learning. Pt requires continued reinforcement of education. REQUIRES PT FOLLOW UP: Yes  Activity Tolerance  Activity Tolerance: Treatment limited secondary to agitation     Patient Diagnosis(es): The primary encounter diagnosis was Chest pain, unspecified type. Diagnoses of Elevated troponin, Elevated brain natriuretic peptide (BNP) level, and Elevated d-dimer were also pertinent to this visit. has a past medical history of AICD (automatic cardioverter/defibrillator) present, CKD (chronic kidney disease), stage III ([address] Myocardial infarction ([address] S/P CABG (coronary artery bypass graft), and Solitary kidney, congenital.   has a past surgical history that includes Coronary artery bypass graft and Coronary artery bypass graft. Restrictions  Restrictions/Precautions  Restrictions/Precautions: General Precautions, Fall Risk, Up as Tolerated  Required Braces or Orthoses?: No  Position Activity Restriction  Other position/activity restrictions: bed alarm  Subjective   General  Response To Previous Treatment: Patient with no complaints from previous session. Family / Caregiver Present: No  Subjective  Subjective: \"I don't feel well. \"  General Comment  Comments: RN and pt agreeable to therapy. Pt supine in bed upon arrival.          Orientation  Orientation  Overall Orientation Status: Within Functional Limits  Cognition   Cognition  Overall Cognitive Status: Exceptions  Arousal/Alertness: Inconsistent responses to stimuli; Delayed responses to stimuli  Following Commands: Does not follow commands; Inconsistently follows commands  Attention Span: Unable to maintain attention; Difficulty dividing attention;  Difficulty attending to directions  Safety Judgement: Decreased awareness of need for assistance; Decreased awareness of need for safety  Problem Solving: Decreased awareness of errors; Assistance required to identify errors made; Assistance required to generate solutions; Assistance required to correct errors made; Assistance required to implement solutions  Insights: Decreased awareness of deficits  Initiation: Requires cues for some  Sequencing: Requires cues for some  Objective   Bed mobility  Supine to Sit: Stand by assistance  Sit to Supine: Stand by assistance  Comment: Pt w/o significant difficulties throughout bed mobility this date, uses bedrails for UE assist throughout. Transfers  Sit to Stand: Contact guard assistance; Minimal Assistance  Stand to sit: Moderate Assistance (to control descent)  Comment: Pt w/ poor steadiness throughout transfers this date, requiring MAX verbal cueing for safety throughout. Upon stand, pt requiring increased time to gain footing. Pt w/ poor eccentric quad control noted throughout stand>sit transfer. Upon standing EOB, pt performed pre-gait standing marches however unable to lift RLE from floor d/t hx of stroke (per pt). Ambulation  Ambulation?: Yes  More Ambulation?: No  Ambulation 1  Surface: level tile  Device: No Device  Assistance: Maximum assistance; Moderate assistance  Quality of Gait: poor steadiness, drags RLE, several LOB w/o fall, narrow MOHINI  Gait Deviations: Shuffles; Decreased step length; Decreased step height; Decreased arm swing; Slow [place]; Decreased head and trunk rotation; Deviated path  Distance: 50ft  Comments: Pt ambulating within room this date w/ poor steadiness throughout. Pt unable to lift RLE d/t hx of previous stroke (~4 weeks ago per pt) but not agreeable to attempt ambulation w/ AD. Pt drags RLE throughout ambulation to progress extremity. Pt frequently ambulating a deviated path to L and w/ several lateral LOB throughout requiring MaxA from writer to correct to prevent fall. Pt w/ poor safety awareness throughout.   Stairs/Curb  Stairs?: No     Balance  Posture: Fair  Sitting - Static: Good  Sitting - Dynamic: Good; -  Standing - Static: Fair; +  Standing - Dynamic: Poor; +  Comments: Standing balance assessed w/o AD  Exercises  Hip Flexion: Standing marches LLE 2 x 10  Knee Long Arc Quad: LLE x 10  Comments: Pt unable to perform RLE ther ex actively d/t strength deficits. Participation in ther ex limited this date d/t increased pt agitation. Strength RLE  Strength RLE: Exception  Comment: No active movement of RLE throughout assessment  Strength LLE  Strength LLE: WFL  Comment: Not formally assessed d/t pt agitation. Movement against gravity noted at hip, knee, and ankle throughout session. Strength RUE  Strength RUE: Exception  Comment: Pt able to supinate/pronate forearm and minimally flex/ext digits. No other active movement present throughout RUE noted. Strength LUE  Strength LUE: WFL  Comment: Not formally assessed d/t pt agitation. Movement against gravity noted at shoulder, elbow, and wrist throughout session. AM-PAC Score  AM-PAC Inpatient Mobility Raw Score : 14 (12/17/21 1536)  AM-PAC Inpatient T-Scale Score : 38.1 (12/17/21 1536)  Mobility Inpatient CMS 0-100% Score: 61.29 (12/17/21 1536)  Mobility Inpatient CMS G-Code Modifier : CL (12/17/21 1536)       Functional Outcome Measure-   Single Leg Stance Test:  1 sec. (<5 sec.= fall risk)    Goals  Short term goals  Time Frame for Short term goals: 12 visits:  Short term goal 1: Pt. to be indep with bed mob. Short term goal 2: Pt to be indep with transfers with approp. AD and safe use of % of time  Short term goal 3: Pt. to amb. 50ft. with approp. AD and O2, indep  Short term goal 4: Pt. to tolerate 25+ min. of PT daily for ther ex/ gait/ balance/ functional mob. training/ energy conservation edu/ endurance training.   Patient Goals   Patient goals : did not state    Plan    Plan  Times per week: 1-2x/day; 5-6days/wk  Current Treatment Recommendations: Strengthening, ROM, Balance Training, Functional Mobility Training, Transfer Training, Endurance Training, Wheelchair Mobility Training, Gait Training, Safety Education & Training, Home Exercise Program, Patient/Caregiver Education & Training  Safety Devices  Type of devices: Bed alarm in place, Call light within reach, Gait belt, Nurse notified, Left in bed  Restraints  Initially in place: No     Therapy Time   Individual Concurrent Group Co-treatment   Time In 1510         Time Out 1534         Minutes 24                 Verona Amaral, PT

## 2021-12-17 NOTE — FLOWSHEET NOTE
Pt very wright /states he gets frustrated because he is scared. He states he is scared of Covid and having no plan after discharge. . reassurance given t/o. Pt asking for ativan and benadryl for itching and nerves. . pt agreeable to wearing pulse ox as writer greatly encouraged to receive the medication he is asking for. Pt sats at 98% on 2 liters nasal cannula after administretion of meds .  Continue to monitor closely

## 2021-12-17 NOTE — CARE COORDINATION
Discharge planning    Met with patient to finalize a plan of care. Patient refused therapy today. He states they were not in his room. Pulled up note and read to patient. Asked if he would work with therapy now if they came to see him and said he would think about it. Stated he has three options  1. Work with therapy and if recommends rehab will refer to who can accept his insurance and COVID. 2. ambulette back to [facility] where his belongings are being held but it would be one way. 3. Discharge with 211 list and can call shelters. He continues to be verbally difficult with staff. Would not give definitive answer. Stated he would put on his jeans and \"fucking walk out\". That \"we dont care about him\". \" I fucking dont feel good\" . When discussed shelter list and 211 he stated \" im a going to walk the [employer] [address]\"     Did call therapy and they will come see patient in 15-20 minutes to work with him. MERYL Gonzalez in room as this was occurring and clinical lead outside in hallway to assist as needed. 1600  PT did work with patient and they are recommending snf.  SS to try the 4 available COVID facilities and if unable to accept will have to anticipate discharge home

## 2021-12-17 NOTE — PROGRESS NOTES
[facility]  Office: [address], DO, Nate Rowe, DO, Danay [place], DO, Raquel Donahue Blood, DO, John Christina MD, Bryce Vale MD, Ketan Soto MD, Margret Dominique MD, Mei Goel MD, Petrona Huang MD, Penny Baker MD, [place] Officer, DO, Jarad Navarro, DO, Chema Raines MD,  March , DO, Eusebio Sims MD, Shakeel Moeller MD, Avery Romano MD, Gemma Martinez MD, Jen Stephen MD, Matilde Duffy MD, Aiden Ricks MD, Zaira Rhodes, [facility], [facility], CNP, Reyna Beard, CNP, Robert Carcamo, CNS, Dominick Madrid, CNP, John Arreguin, CNP, Best Aguila, CNP, Gearline Precise, CNP, Flora Oh, CNP, Luis Enrique Patel PA-C, Brian Ruiz, [facility], Christal Coulter, CNP, Konrad Gaines, CNP, Jada Stover, CNP, Lena Rodas, CNP, Sallie Dunn, CNP, Sunday Oliva, [facility], [facility]      Daily Progress Note     Admit Date: 12/7/2021  Bed/Room No.  1010/1010-02  Admitting Physician : Ketan Soto MD  Code Status :Full Code  Hospital Day:  LOS: 4 days   Chief Complaint:     Chief Complaint   Patient presents with    Chest Pain     pt reports onset at least 1 week ago. has been admitted to [facility] for past 10 days, left AMA from there this afternoon.  Shortness of Breath     ongoing and unchanged x past 4-5 days.      Principal Problem:    Chest pain  Active Problems:    Elevated troponin    Hydronephrosis of left kidney    Acute urinary retention    Chronic systolic heart failure (HCC)    Hx of CABG    Stage 3 chronic kidney disease (HCC)    COVID-19 virus infection    Hepatitis C    JACKSON (acute kidney injury) (HCC)    AICD (automatic cardioverter/defibrillator) present    History of MI (myocardial infarction)    Elevated brain natriuretic peptide (BNP) level    Coronary artery disease involving native coronary artery of native heart with angina pectoris ([facility] [address]    Solitary kidney, congenital    Homeless single person    Acute right-sided requesting IV narcotics in the emergency room. Initial evaluation showed creatinine 1.9, trop 69, proBNP 20,281 WBC 4.9 repeat showed drop to 2.6. Patient is a positive for COVID-19 infection. Work-up showed positive hep C. CT abdomen showed left-sided hydronephrosis, 4 mm nonobstructing left kidney stone, body well no cervical, sigmoid diverticulosis without diverticulitis. NM VQ scan showed low probability for PE. CXR showed cardiomegaly. Patient had positive blood culture for staph epidermidis 1 out of 2. On 12/14/2021 patient had a good onset right-sided weakness and hyporesponsiveness called. Stroke alert was called in due to [facility] 9.  Work-up showed negative CTA head and neck, CT head with chronic infarcts without any acute bleeding or infarct. PMH:  Past Medical History:   Diagnosis Date    AICD (automatic cardioverter/defibrillator) present     CKD (chronic kidney disease), stage III (HCC)     Myocardial infarction ([facility] [address]     S/P CABG (coronary artery bypass graft)     x2 separate occasions    Solitary kidney, congenital       Allergies:    Allergies   Allergen Reactions    Nsaids Swelling    Asa [Aspirin] Swelling    Oxycodone Hives      Medications :  bumetanide, 2 mg, Oral, BID  gabapentin, 100 mg, Oral, TID  pantoprazole, 40 mg, Oral, QAM AC  [Held by provider] metoprolol succinate, 50 mg, Oral, Daily  apixaban, 5 mg, Oral, BID  dexamethasone, 6 mg, Oral, Daily  atorvastatin, 40 mg, Oral, Nightly  clopidogrel, 75 mg, Oral, Daily  tamsulosin, 0.4 mg, Oral, Daily        Review of Systems   Review of Systems   Unable to perform ROS: Psychiatric disorder (Uncooperative)     Objective :      Current Vitals : Temp: 97.9 °F (36.6 °C),  Pulse: 72, Resp: 20, BP: (!) 146/89, SpO2: 91 %  Last 24 Hrs Vitals   Patient Vitals for the past 24 hrs:   BP Temp Temp src Pulse Resp SpO2 Weight   12/17/21 0546 -- -- -- -- -- -- 170 lb 8 oz (77.3 kg)   12/17/21 0344 (!) 146/89 97.9 °F (36.6 °C) Oral 72 20 91 % -- crusting. Comments: On lower end of sternum. Neurological:      Mental Status: He is alert and oriented to person, place, and time. Motor: No tremor. Comments: Unable to assess strength as patient is uncooperative . Has good tone and has been reported to  walking to bathroom   Psychiatric:         Behavior: Behavior is uncooperative. Laboratory findings:    Recent Labs     12/14/21  2234 12/15/21  0600 12/16/21  0511   WBC 3.5 3.2* 3.4*   HGB 10.1* 9.5* 10.1*   HCT 32.1* 29.7* 31.9*    129* 144     Recent Labs     12/14/21  2234 12/15/21  0600 12/16/21  0511    137 134*   K 3.5* 3.0* 3.2*   CL 95* 100 100   CO2 28 22 21   GLUCOSE 94 91 88   BUN 42* 41* 36*   CREATININE 2.28* 2.20* 1.89*   MG 1.7  --   --    CALCIUM 8.0* 7.8* 7.8*     No results for input(s): PROT, LABALBU, LABA1C, C4WWJDB, O5JTBDB, FT4, TSH, AST, ALT, LDH, GGT, ALKPHOS, BILITOT, BILIDIR, AMMONIA, AMYLASE, LIPASE, LACTATE, CHOL, HDL, LDLCHOLESTEROL, CHOLHDLRATIO, TRIG, VLDL, BNP, TROPONINI, CKTOTAL, CKMB, CKMBINDEX, RF, ANNALISE in the last 72 hours. Specific Gravity, UA   Date Value Ref Range Status   12/11/2021 1.015 1.005 - 1.030 Final     Protein, UA   Date Value Ref Range Status   12/11/2021 1+ (A) NEGATIVE Final     RBC, UA   Date Value Ref Range Status   12/11/2021 0 TO 2 0 - 2 /HPF Final     Bacteria, UA   Date Value Ref Range Status   12/11/2021 NOT REPORTED None Final     Nitrite, Urine   Date Value Ref Range Status   12/11/2021 NEGATIVE NEGATIVE Final     WBC, UA   Date Value Ref Range Status   12/11/2021 0 TO 2 0 - 5 /HPF Final     Leukocyte Esterase, Urine   Date Value Ref Range Status   12/11/2021 NEGATIVE NEGATIVE Final       Imaging / Clinical Data :-   CT ABDOMEN PELVIS WO CONTRAST Additional Contrast? None    Result Date: 12/9/2021  1. Left-sided hydronephrosis/hydroureter with uroepithelial thickening and small amount of air seen within the collecting system.   No obstructing stone or gross mass.  Left UVJ stricture cannot be excluded. Correlation with urinalysis is suggested as underlying infectious process cannot be excluded. 2. 4 mm nonobstructing calculus left kidney. 3. Cardiomegaly. 4. Presumed post injection changes are seen involving the left anterior abdominal wall. 5. Body wall anasarca. 6. Small amount of free fluid. 7. Sigmoid diverticulosis. NM LUNG SCAN PERFUSION ONLY    Result Date: 12/7/2021  Low Probability for Pulmonary Embolus. XR CHEST 1 VIEW    Result Date: 12/7/2021  No prior study available. No definite pneumonia. Slight blunting left CP angle. Cardiomegaly; venous hypertension without radiographic CHF. US RETROPERITONEAL COMPLETE    Result Date: 12/8/2021  1. Nonvisualization of the right kidney. 2. Mild-to-moderate left-sided hydronephrosis. 3. Exam limited due to patient's bowel gas and body habitus. 4. Nonvisualization of ureteral jets. Urinary bladder grossly unremarkable. [facility] Dec  2, 2021.   Left Ventricle: Systolic function is severely decreased with an   ejection fraction of 25-30%.   Right Ventricle: Systolic function is mildly reduced. [employer] CRT-D   noted with no evidence of apparent vegetation or thrombus.  All 3 leads   visualized.   LA appendage:  Spontaneous echo contrast and prominent pectinate are   noted, however no evidence of organized thrombus     Aortic Valve: The aortic valve is trileaflet. The leaflets exhibit   normal excursion. Previously repaired valve, evidence of repair on non   coronary cusp. There is mild regurgitation. There is no evidence of aortic   valve stenosis or thrombus or vegetation.   Mitral Valve: There is mild to moderate regurgitation. There is no   evidence of mitral valve stenosis.   Tricuspid Valve: There is mild to moderate regurgitation. There is no   evidence of tricuspid valve stenosis.   Pericardium: There is no pericardial effusion.   Aorta:  There is grade I plaque in the transverse and descending aorta.   Interatrial septum:  No evidence of interatrial shunting      CT head 11/28/2021 at [address]: Multiple bilateral supratentorial cerebral infarctions of different ages with no acute intracranial disease process, intracranial hemorrhage or gross mass effect. Clinical Course : unchanged  Assessment and Plan  :        COVID-19 infection with pneumonia. Continue O2 support, dexamethasone 6 mg daily. Droplet plus isolation  Chest pain -atypical likely secondary to rash concern for zoster infection. Recommend calamine, Neurontin. Cardiology recommendations noted. Conservative management at this time. Fever likely secondary to COVID-19 infection-conservative management. Left hydronephrosis ,   Urology evaluated and no immediate plan for stent. Outpatient follow-up. CAD s/p CABG and  multiple stents -Eliquis, Plavix. Chronic kidney disease -patient has solitary kidney. Not sure about baseline creatinine. Avoid nephrotoxic agents. Chronic systolic CHF due to ischemic cardiomyopathy -on Bumex. 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  Neurology consult for acute left-sided weakness worsening  PAF -Eliquis 5 mg twice daily  Chronic hep C -outpatient follow-up with GI  Homeless -   Gastritis-PPI  Behavioral problem - Psych consult - refused consent . Outpatient follow up . Discharge home   Does not cooperate for PT eval  Neurontin for chronic pain     Plan and updates discussed with patient ,  answers  explained to satisfaction.    Plan discussed with Staff   RN     (Please note that portions of this note were completed with a voice recognition program. Efforts were made to edit the dictations but occasionally words are mis-transcribed.)      Natividad Carrillo MD  12/17/2021

## 2021-12-17 NOTE — PLAN OF CARE
Problem: Airway Clearance - Ineffective  Goal: Achieve or maintain patent airway  Outcome: Ongoing  Note: Assessed ability to cough & breathe without laboring. Assessed need for suctioning if needed. Encouraged cough & deep breathing. Problem: Gas Exchange - Impaired  Goal: Absence of hypoxia  Outcome: Ongoing  Note: Assess breath sounds every shift and as needed. Assess oxygenation level & respiration rate. Encourage coughing & deep breathing. Encourage use of incentive spirometer. Assess cough & sputum. Administer oxygen as needed. Goal: Promote optimal lung function  Outcome: Ongoing     Problem: Breathing Pattern - Ineffective  Goal: Ability to achieve and maintain a regular respiratory rate  Outcome: Ongoing     Problem: Body Temperature -  Risk of, Imbalanced  Goal: Ability to maintain a body temperature within defined limits  Outcome: Ongoing  Note: Assessed for fever. Provided appropriate intervention to regulate body temperature. Goal: Will regain or maintain usual level of consciousness  Outcome: Ongoing  Goal: Complications related to the disease process, condition or treatment will be avoided or minimized  Outcome: Ongoing     Problem: Isolation Precautions - Risk of Spread of Infection  Goal: Prevent transmission of infection  Outcome: Ongoing  Note: Monitor for signs & symptoms of infection. Utilize standard precautions & proper handwashing. Communicate referral to infection control. Problem: Nutrition Deficits  Goal: Optimize nutritional status  Outcome: Ongoing  Note: Review diet daily and as needed with pt. Provide supplement nutrition as ordered by physician & educate pt. Review nutritional guidelines with pt. Problem: Risk for Fluid Volume Deficit  Goal: Maintain normal heart rhythm  Outcome: Ongoing  Note: Monitor vital signs at least every shift & as needed. Monitor intake & output at least every shift.    Goal: Maintain absence of muscle cramping  Outcome: Ongoing  Goal: Maintain normal serum potassium, sodium, calcium, phosphorus, and pH  Outcome: Ongoing     Problem: Loneliness or Risk for Loneliness  Goal: Demonstrate positive use of time alone when socialization is not possible  Outcome: Ongoing  Note: Assess mood. Provide support & reassurance, & coping skills to aid with mood. Promote level of activity as pt is tolerable. Problem: Fatigue  Goal: Verbalize increase energy and improved vitality  Outcome: Ongoing     Problem: Patient Education: Go to Patient Education Activity  Goal: Patient/Family Education  Outcome: Ongoing  Note: Educated pt regarding reason for admission, treatment, medication. Provided oral and/or written instructions to treatment. Assessed level of verbal understanding of pt and/or family. Problem: OXYGENATION/RESPIRATORY FUNCTION  Goal: Patient will maintain patent airway  Outcome: Ongoing  Note: Assessed ability to cough & breathe without laboring. Assessed need for suctioning if needed. Encouraged cough & deep breathing. Goal: Patient will achieve/maintain normal respiratory rate/effort  Description: Respiratory rate and effort will be within normal limits for the patient  Outcome: Ongoing  Note: Assess for adventitious breath sounds. Monitored SaO2 > 90%. Applied 02 per nasal cannula as needed. Elevated HOB to improve breathing as needed. Problem: HEMODYNAMIC STATUS  Goal: Patient has stable vital signs and fluid balance  Outcome: Ongoing  Note: Monitor vital signs at least every shift & as needed. Monitor intake & output at least every shift. Problem: FLUID AND ELECTROLYTE IMBALANCE  Goal: Fluid and electrolyte balance are achieved/maintained  Outcome: Ongoing  Note: Assess lab values. Replacement therapy provided as needed. Assessed respiratory status as needed.       Problem: ACTIVITY INTOLERANCE/IMPAIRED MOBILITY  Goal: Mobility/activity is maintained at optimum level for patient  Outcome: Ongoing  Note: Monitoring pain with each assessment and prn. [place] 0-10 pain scale utilized. Non-pharmacological measures to be encouraged prior to pharmacological measures. Goal: Control of acute pain  Description: Control of acute pain  Outcome: Ongoing  Goal: Control of chronic pain  Description: Control of chronic pain  Outcome: Ongoing     Problem: Skin Integrity:  Goal: Will show no infection signs and symptoms  Description: Will show no infection signs and symptoms  Outcome: Ongoing  Note: Monitor for signs & symptoms of infection. Utilize standard precautions & proper handwashing. Communicate referral to infection control. Goal: Absence of new skin breakdown  Description: Absence of new skin breakdown  Outcome: Ongoing  Note: Continuing to monitor for skin integrity risks. Patient independent with turning/repositioning. Turning/repositioning encouraged at least once every 2 hrs, and prn basis. Hygiene care being completed independently per patient; assistance provided when deemed necessary.

## 2021-12-17 NOTE — PROGRESS NOTES
Jerilyn Tripathi   Urology Progress Note            Subjective: Follow-up solitary kidney, acute kidney injury    Patient Vitals for the past 24 hrs:   BP Temp Temp src Pulse Resp SpO2 Weight   12/16/21 2307 (!) 141/112 98.4 °F (36.9 °C) Oral 70 20 97 % --   12/16/21 1922 (!) 125/102 98.6 °F (37 °C) Oral 70 20 94 % --   12/16/21 1652 136/84 98.6 °F (37 °C) Oral 72 20 100 % --   12/16/21 1228 132/80 98.2 °F (36.8 °C) Oral 69 20 100 % --   12/16/21 0848 120/87 98 °F (36.7 °C) Oral 70 20 97 % --   12/16/21 0506 -- -- -- -- -- -- 172 lb 7 oz (78.2 kg)   12/16/21 0351 (!) 134/91 98.8 °F (37.1 °C) Oral 71 21 98 % --       Intake/Output Summary (Last 24 hours) at 12/17/2021 0140  Last data filed at 12/17/2021 0035  Gross per 24 hour   Intake 1591.6 ml   Output 2050 ml   Net -458.4 ml       Recent Labs     12/14/21  2234 12/15/21  0600 12/16/21  0511   WBC 3.5 3.2* 3.4*   HGB 10.1* 9.5* 10.1*   HCT 32.1* 29.7* 31.9*   MCV 97.3 95.8 95.5    129* 144     Recent Labs     12/14/21  2234 12/15/21  0600 12/16/21  0511    137 134*   K 3.5* 3.0* 3.2*   CL 95* 100 100   CO2 28 22 21   BUN 42* 41* 36*   CREATININE 2.28* 2.20* 1.89*       No results for input(s): COLORU, PHUR, LABCAST, WBCUA, RBCUA, MUCUS, TRICHOMONAS, YEAST, BACTERIA, CLARITYU, SPECGRAV, LEUKOCYTESUR, UROBILINOGEN, BILIRUBINUR, BLOODU in the last 72 hours.     Invalid input(s): NITRATE, GLUCOSEUKETONESUAMORPHOUS    Additional Lab/culture results:    Physical Exam: Stable from urology standpoint, serum creatinine 1.89    Prior [facility] did not reveal any obstruction    Interval Imaging Findings:    Impression:    Patient Active Problem List   Diagnosis    JACKSON (acute kidney injury) ([address] [address]    Chest pain    AICD (automatic cardioverter/defibrillator) present    History of MI (myocardial infarction)    Elevated brain natriuretic peptide (BNP) level    Elevated troponin    Chronic systolic heart failure ([facility] [address]    Hx of CABG    Stage 3 chronic kidney disease ([facility] [address]    COVID-19 virus infection    Hydronephrosis of left kidney    Acute urinary retention    Hepatitis C    Coronary artery disease involving native coronary artery of native heart with angina pectoris ([facility] [address]    Solitary kidney, congenital    Homeless single person    Acute right-sided weakness       Plan: Continue follow-up with urology upon discharge    Natan Ramsey MD  1:40 AM 12/17/2021

## 2021-12-18 VITALS
RESPIRATION RATE: 18 BRPM | HEIGHT: 70 IN | DIASTOLIC BLOOD PRESSURE: 62 MMHG | SYSTOLIC BLOOD PRESSURE: 122 MMHG | TEMPERATURE: 98.6 F | HEART RATE: 70 BPM | OXYGEN SATURATION: 97 % | BODY MASS INDEX: 24.47 KG/M2 | WEIGHT: 170.9 LBS

## 2021-12-18 LAB
CULTURE: NORMAL
Lab: NORMAL
SPECIMEN DESCRIPTION: NORMAL

## 2021-12-18 PROCEDURE — 6370000000 HC RX 637 (ALT 250 FOR IP): Performed by: FAMILY MEDICINE

## 2021-12-18 PROCEDURE — 6370000000 HC RX 637 (ALT 250 FOR IP): Performed by: INTERNAL MEDICINE

## 2021-12-18 PROCEDURE — 6360000002 HC RX W HCPCS: Performed by: INTERNAL MEDICINE

## 2021-12-18 PROCEDURE — 6370000000 HC RX 637 (ALT 250 FOR IP): Performed by: NURSE PRACTITIONER

## 2021-12-18 PROCEDURE — 6370000000 HC RX 637 (ALT 250 FOR IP): Performed by: STUDENT IN AN ORGANIZED HEALTH CARE EDUCATION/TRAINING PROGRAM

## 2021-12-18 PROCEDURE — 99239 HOSP IP/OBS DSCHRG MGMT >30: CPT | Performed by: FAMILY MEDICINE

## 2021-12-18 PROCEDURE — 2060000000 HC ICU INTERMEDIATE R&B

## 2021-12-18 RX ADMIN — DEXAMETHASONE 6 MG: 4 TABLET ORAL at 08:09

## 2021-12-18 RX ADMIN — DIPHENHYDRAMINE HYDROCHLORIDE 25 MG: 50 INJECTION, SOLUTION INTRAMUSCULAR; INTRAVENOUS at 07:00

## 2021-12-18 RX ADMIN — APIXABAN 5 MG: 5 TABLET, FILM COATED ORAL at 08:10

## 2021-12-18 RX ADMIN — CLOPIDOGREL BISULFATE 75 MG: 75 TABLET ORAL at 08:10

## 2021-12-18 RX ADMIN — PANTOPRAZOLE SODIUM 40 MG: 40 TABLET, DELAYED RELEASE ORAL at 05:43

## 2021-12-18 RX ADMIN — METOPROLOL SUCCINATE 50 MG: 50 TABLET, EXTENDED RELEASE ORAL at 08:10

## 2021-12-18 RX ADMIN — TAMSULOSIN HYDROCHLORIDE 0.4 MG: 0.4 CAPSULE ORAL at 08:09

## 2021-12-18 RX ADMIN — ATORVASTATIN CALCIUM 40 MG: 40 TABLET, FILM COATED ORAL at 19:57

## 2021-12-18 RX ADMIN — APIXABAN 5 MG: 5 TABLET, FILM COATED ORAL at 19:57

## 2021-12-18 RX ADMIN — GABAPENTIN 100 MG: 100 CAPSULE ORAL at 08:09

## 2021-12-18 ASSESSMENT — PAIN SCALES - GENERAL: PAINLEVEL_OUTOF10: 0

## 2021-12-18 NOTE — PROGRESS NOTES
Occupational Therapy    DATE: 2021    NAME: Christine Hodge  MRN: [id number]   : 1965    Patient not seen this date for Occupational Therapy due to:      [] Cancel by RN or physician due to:    [] Hemodialysis    [] Critical Lab Value Level     [] Blood transfusion in progress    [] Acute or unstable cardiovascular status   _MAP < 55 or more than >115  _HR < 40 or > 130    [] Acute or unstable pulmonary status   -FiO2 > 60%   _RR < 5 or >40    _O2 sats < 85%    [] Strict Bedrest    [] Off Unit for surgery or procedure    [] Off Unit for testing       [] Pending imaging to R/O fracture    [x] Refusal by Patient \"I don't feel good\" \"I'm not saying no\" Pt declined to open his eyes or initiate movement. OT provided light and touch stimulation to wake pt. OT provided MAX education on benefits of participating in therapy. Pt declined to move or open his eyes. OT attempted from 12:09 -12:22. Will continue to follow.       [] Magdi Natarajan OTR/L

## 2021-12-18 NOTE — PROGRESS NOTES
[facility]  Office: [address], DO, Nasim Romero, DO, Rachael Greene, DO, Hardy Jordan [facility], DO, Paulette Hernandez MD, Jocelin Rosales MD, Kennis Osler, MD, Larissa Torre MD, Deepali Magdaleno MD, Vicente Aranda MD, Jami Kearns MD, Xi Blanc, DO, Margi Ferraro, DO, Yuliet Womack MD,  Isaiah Quiroga DO, Johnathon Villaseñor MD, Gerry Scanlon MD, Andrea Cade MD, Marta Christian MD, Amelie Quiñones MD, Lanre Hayden MD, Nancy Coronado MD, Jenna Maradiaga [facility], [facility], CNP, Mehnaz Baxter, CNP, Ba Dias, CNS, Kalyan Jefferson, CNP, Wolfgang Lorenz, CNP, Odalis Carpio, CNP, Michael Cerrato, CNP, Darrell Elizalde, CNP, Romelia Shah PA-C, Marek Kumar, [facility], Chel Davis, CNP, Genoveva Reed, CNP, Tracy Treviño, CNP, Anjali Rizo, CNP, Estephanie Mckeon, CNP, Miguel Marks, CNP, Daljit Morrisoy      Daily Progress Note     Admit Date: 12/7/2021  Bed/Room No.  1010/1010-02  Admitting Physician : Kennis Osler, MD  Code Status :Full Code  Hospital Day:  LOS: 5 days   Chief Complaint:     Chief Complaint   Patient presents with    Chest Pain     pt reports onset at least 1 week ago. has been admitted to [facility] for past 10 days, left AMA from there this afternoon.  Shortness of Breath     ongoing and unchanged x past 4-5 days.      Principal Problem:    Chest pain  Active Problems:    Elevated troponin    Hydronephrosis of left kidney    Acute urinary retention    Chronic systolic heart failure (HCC)    Hx of CABG    Stage 3 chronic kidney disease (HCC)    COVID-19 virus infection    Hepatitis C    JACKSON (acute kidney injury) (HCC)    AICD (automatic cardioverter/defibrillator) present    History of MI (myocardial infarction)    Elevated brain natriuretic peptide (BNP) level    Coronary artery disease involving native coronary artery of native heart with angina pectoris ([address]    Solitary kidney, congenital    Homeless single person    Acute right-sided weakness  Resolved Problems:    * No resolved hospital problems. *    Subjective : Interval History/Significant events :  12/18/21    Patient continues to be uncooperative. He is laying on the bed without any distress and eyes closed. Patient does not want to be bothered and does not engage in conversation. He denies any acute complaints at this time. Vitals - Stable afebrile  Labs -creatinine 1.83, potassium 3.6, hemoglobin 9.6. Nursing notes , Consults notes reviewed. Overnight events and updates discussed with Nursing staff . Background History:         Jennifer Mendez is 61 y.o. male  Who was admitted to the hospital on 12/7/2021 for treatment of Chest pain. Patient presented to hospital with chest pain for 1 week associated with shortness of breath for 4 to 5 days. Patient had initially went to [facility] 3 days before but left as he was not satisfied with the care over there  and left [address] Patient was admitted at [address] [place] [address] about 10 days before with right-sided deficit and was given TPA. Work-up showed supratentorial bilateral cerebral infarcts, embolic in origin. He was also found to have A. fib and was given Eliquis. Patient was started on Eliquis. He also had hematemesis which required urgent EGD and did not show any source of bleed except mild gastritis. Patient was put back on Plavix and Eliquis. Patient is homeless and had been bouncing between the hotels. Patient [place] the mall with security noticed him to be unwell and called EMS. On initial evaluation patient was found to have COVID-19 infection. He was started on Decadron and oxygen supplement. Lab evaluation showed mild elevated troponin and elevated creatinine which seem to be chronic. Patient has underlying history of CAD with CABG in 2011 and redo CABG in 2018. He had run out of medication for 3 days. Patient was requesting IV narcotics in the emergency room.  Initial evaluation showed creatinine 1.9, trop 69, proBNP 20,281 WBC 4.9 repeat showed drop to 2.6. Patient is a positive for COVID-19 infection. Work-up showed positive hep C. CT abdomen showed left-sided hydronephrosis, 4 mm nonobstructing left kidney stone, body well no cervical, sigmoid diverticulosis without diverticulitis. NM VQ scan showed low probability for PE. CXR showed cardiomegaly. Patient had positive blood culture for staph epidermidis 1 out of 2. On 12/14/2021 patient had  sudden onset right-sided weakness and hyporesponsiveness called. Stroke alert was called in due to NIH 9.  Work-up showed negative CTA head and neck, CT head with chronic infarcts without any acute bleeding or infarct. Patient has been having good ambulation without help in the room and his exam findings and weakness was inconsistent. He has been uncooperative refusing treatment in the hospital.  PMH:  Past Medical History:   Diagnosis Date    AICD (automatic cardioverter/defibrillator) present     CKD (chronic kidney disease), stage III ([address]     [facility]     S/P CABG (coronary artery bypass graft)     x2 separate occasions    Solitary kidney, congenital       Allergies:    Allergies   Allergen Reactions    Nsaids Swelling    Asa [Aspirin] Swelling    Oxycodone Hives      Medications :  bumetanide, 2 mg, Oral, BID  gabapentin, 100 mg, Oral, TID  pantoprazole, 40 mg, Oral, QAM AC  metoprolol succinate, 50 mg, Oral, Daily  apixaban, 5 mg, Oral, BID  dexamethasone, 6 mg, Oral, Daily  atorvastatin, 40 mg, Oral, Nightly  clopidogrel, 75 mg, Oral, Daily  tamsulosin, 0.4 mg, Oral, Daily        Review of Systems   Review of Systems   Unable to perform ROS: Psychiatric disorder (Uncooperative)     Objective :      Current Vitals : Temp: 98.6 °F (37 °C),  Pulse: 68, Resp: 18, BP: 118/74, SpO2: 96 %  Last 24 Hrs Vitals   Patient Vitals for the past 24 hrs:   BP Temp Temp src Pulse Resp SpO2 Weight   12/18/21 1235 118/74 98.6 °F (37 °C) Axillary 68 18 96 % --   12/18/21 0844 116/83 98 °F (36.7 °C) Axillary 68 18 94 % --   12/18/21 0607 -- -- -- -- -- -- 170 lb 14.4 oz (77.5 kg)   12/18/21 0421 (!) 141/82 98.6 °F (37 °C) Axillary 70 19 92 % --   12/18/21 0001 (!) 145/95 -- -- 70 16 98 % --   12/17/21 2005 132/73 97.7 °F (36.5 °C) Axillary 66 16 94 % --   12/17/21 1800 -- -- -- -- -- 98 % --     Intake / output   12/17 0701 - 12/18 0700  In: 300 [P.O.:300]  Out: -   Physical Exam:  Physical Exam  Vitals and nursing note reviewed. Constitutional:       General: He is not in acute distress. Appearance: He is not diaphoretic. Interventions: Nasal cannula in place. HENT:      Head: Normocephalic and atraumatic. Nose:      Right Sinus: No maxillary sinus tenderness or frontal sinus tenderness. Left Sinus: No maxillary sinus tenderness or frontal sinus tenderness. Mouth/Throat:      Pharynx: No oropharyngeal exudate. Eyes:      General: No scleral icterus. Conjunctiva/sclera: Conjunctivae normal.      Pupils: Pupils are equal, round, and reactive to light. Neck:      Thyroid: No thyromegaly. Vascular: No JVD. Cardiovascular:      Rate and Rhythm: Normal rate and regular rhythm. Pulses:           Dorsalis pedis pulses are 2+ on the right side and 2+ on the left side. Heart sounds: Normal heart sounds. No murmur heard. Pulmonary:      Effort: Pulmonary effort is normal.      Breath sounds: Normal breath sounds. No wheezing or rales. Chest:      Comments: ICD in place. Sternotomy scar   Abdominal:      Palpations: Abdomen is soft. There is no mass. Tenderness: There is no abdominal tenderness. Musculoskeletal:      Cervical back: Full passive range of motion without pain and neck supple. Lymphadenopathy:      Head:      Right side of head: No submandibular adenopathy. Left side of head: No submandibular adenopathy. Cervical: No cervical adenopathy.    Skin: General: Skin is warm. Neurological:      Mental Status: He is alert and oriented to person, place, and time. Motor: No tremor. Comments: Unable to assess strength as patient is uncooperative . Has good tone and has been reported to  walking to bathroom   Psychiatric:         Behavior: Behavior is uncooperative. Laboratory findings:    Recent Labs     12/16/21  0511 12/17/21  1123   WBC 3.4* 3.5   HGB 10.1* 9.6*   HCT 31.9* 29.7*    149     Recent Labs     12/16/21  0511 12/17/21  1123   * 136   K 3.2* 3.6*    104   CO2 21 21   GLUCOSE 88 115*   BUN 36* 34*   CREATININE 1.89* 1.83*   CALCIUM 7.8* 7.9*     No results for input(s): PROT, LABALBU, LABA1C, O7HCOMF, V4BGJMJ, FT4, TSH, AST, ALT, LDH, GGT, ALKPHOS, BILITOT, BILIDIR, AMMONIA, AMYLASE, LIPASE, LACTATE, CHOL, HDL, LDLCHOLESTEROL, CHOLHDLRATIO, TRIG, VLDL, BNP, TROPONINI, CKTOTAL, CKMB, CKMBINDEX, RF, ANNALISE in the last 72 hours. Specific Gravity, UA   Date Value Ref Range Status   12/11/2021 1.015 1.005 - 1.030 Final     Protein, UA   Date Value Ref Range Status   12/11/2021 1+ (A) NEGATIVE Final     RBC, UA   Date Value Ref Range Status   12/11/2021 0 TO 2 0 - 2 /HPF Final     Bacteria, UA   Date Value Ref Range Status   12/11/2021 NOT REPORTED None Final     Nitrite, Urine   Date Value Ref Range Status   12/11/2021 NEGATIVE NEGATIVE Final     WBC, UA   Date Value Ref Range Status   12/11/2021 0 TO 2 0 - 5 /HPF Final     Leukocyte Esterase, Urine   Date Value Ref Range Status   12/11/2021 NEGATIVE NEGATIVE Final       Imaging / Clinical Data :-   CT ABDOMEN PELVIS WO CONTRAST Additional Contrast? None    Result Date: 12/9/2021  1. Left-sided hydronephrosis/hydroureter with uroepithelial thickening and small amount of air seen within the collecting system. No obstructing stone or gross mass. Left UVJ stricture cannot be excluded.   Correlation with urinalysis is suggested as underlying infectious process cannot be excluded. 2. 4 mm nonobstructing calculus left kidney. 3. Cardiomegaly. 4. Presumed post injection changes are seen involving the left anterior abdominal wall. 5. Body wall anasarca. 6. Small amount of free fluid. 7. Sigmoid diverticulosis. NM LUNG SCAN PERFUSION ONLY    Result Date: 12/7/2021  Low Probability for Pulmonary Embolus. XR CHEST 1 VIEW    Result Date: 12/7/2021  No prior study available. No definite pneumonia. Slight blunting left CP angle. Cardiomegaly; venous hypertension without radiographic CHF. US RETROPERITONEAL COMPLETE    Result Date: 12/8/2021  1. Nonvisualization of the right kidney. 2. Mild-to-moderate left-sided hydronephrosis. 3. Exam limited due to patient's bowel gas and body habitus. 4. Nonvisualization of ureteral jets. Urinary bladder grossly unremarkable. [facility].   Left Ventricle: Systolic function is severely decreased with an   ejection fraction of 25-30%.   Right Ventricle: Systolic function is mildly reduced. [employer] CRT-D   noted with no evidence of apparent vegetation or thrombus.  All 3 leads   visualized.   LA appendage:  Spontaneous echo contrast and prominent pectinate are   noted, however no evidence of organized thrombus     Aortic Valve: The aortic valve is trileaflet. The leaflets exhibit   normal excursion. Previously repaired valve, evidence of repair on non   coronary cusp. There is mild regurgitation. There is no evidence of aortic   valve stenosis or thrombus or vegetation.   Mitral Valve: There is mild to moderate regurgitation. There is no   evidence of mitral valve stenosis.   Tricuspid Valve: There is mild to moderate regurgitation. There is no   evidence of tricuspid valve stenosis.   Pericardium: There is no pericardial effusion.   Aorta: There is grade I plaque in the transverse and descending aorta.      Interatrial septum:  No evidence of interatrial shunting      CT head 11/28/2021

## 2021-12-18 NOTE — PLAN OF CARE
Problem: Airway Clearance - Ineffective  Goal: Achieve or maintain patent airway  Outcome: Met This Shift     Problem: Gas Exchange - Impaired  Goal: Absence of hypoxia  Outcome: Met This Shift  Goal: Promote optimal lung function  Outcome: Met This Shift     Problem: Breathing Pattern - Ineffective  Goal: Ability to achieve and maintain a regular respiratory rate  Outcome: Met This Shift     Problem:  Body Temperature -  Risk of, Imbalanced  Goal: Ability to maintain a body temperature within defined limits  Outcome: Met This Shift  Goal: Will regain or maintain usual level of consciousness  Outcome: Met This Shift  Goal: Complications related to the disease process, condition or treatment will be avoided or minimized  Outcome: Met This Shift     Problem: Isolation Precautions - Risk of Spread of Infection  Goal: Prevent transmission of infection  Outcome: Met This Shift     Problem: Nutrition Deficits  Goal: Optimize nutritional status  Outcome: Met This Shift     Problem: Risk for Fluid Volume Deficit  Goal: Maintain normal heart rhythm  Outcome: Met This Shift

## 2021-12-18 NOTE — PROGRESS NOTES
Renal Progress Note    Patient :  Palma Padilla; 61 y.o. MRN# [id number]  Location:  1010/1010-02  Attending:  Ketan Soto MD  Admit Date:  12/7/2021   Hospital Day: 5      Subjective:     Patient was seen and examined. Bowel habits are regular every other day. No dysuria. Oxygen levels appear reasonable with patient's aching in complete sentences on room air. He said he is still coughing up black reddish material.  He does still have some wheezing. No chest pain. No nausea or vomiting. He refused lab draw this morning. Last creatinine appears at or below bit better than previously thought baseline creatinine. No new issues as mentioned overnight except patient has been refusing medical management off and on including some medications as per nurse's note. Patient also refused IV access and currently not on any fluids. He also refused Bumex. labwork from yesterday showed hemoglobin 9.6 with white blood cells 3.5 and platelets 628. BMP showed BUN 34 and creatinine 1.3 with calcium 7.9, glucose 1:15, sodium 136, potassium 3.6 chloride 104 and CO2 21. Patient has a solitary kidney with chronic hydronephrosis, with no new urology recommendations. Currently on COVID-19 infection isolation.      Outpatient Medications:     Medications Prior to Admission: clopidogrel (PLAVIX) 75 MG tablet, Take 1 tablet by mouth daily  atorvastatin (LIPITOR) 40 MG tablet, Take 1 tablet by mouth daily  tamsulosin (FLOMAX) 0.4 MG capsule, Take 0.4 mg by mouth daily  apixaban (ELIQUIS) 5 MG TABS tablet, Take 5 mg by mouth 2 times daily  [DISCONTINUED] amLODIPine (NORVASC) 10 MG tablet, Take 0.5 tablets by mouth daily  [DISCONTINUED] carvedilol (COREG) 6.25 MG tablet, Take 1 tablet by mouth 2 times daily  [DISCONTINUED] furosemide (LASIX) 20 MG tablet, Take 20 mg by mouth as needed (swelling)     Current Medications:     Scheduled Meds:    bumetanide  2 mg Oral BID    gabapentin  100 mg Oral TID    pantoprazole  40 turgor. Extremities:  Trace edema. Labs:       Recent Labs     12/16/21  0511 12/17/21  1123   WBC 3.4* 3.5   RBC 3.34* 3.16*   HGB 10.1* 9.6*   HCT 31.9* 29.7*   MCV 95.5 94.0   MCH 30.2 30.4   MCHC 31.7 32.3   RDW 15.9* 15.6*    149   MPV 11.0 10.9      BMP:   Recent Labs     12/16/21  0511 12/17/21  1123   * 136   K 3.2* 3.6*    104   CO2 21 21   BUN 36* 34*   CREATININE 1.89* 1.83*   GLUCOSE 88 115*   CALCIUM 7.8* 7.9*      SPEP:  Lab Results   Component Value Date    PROT 6.3 12/09/2021    PATH ELECTRONICALLY SIGNED. Christen Nunes M.D. 12/09/2021     Hep BsAg:         Lab Results   Component Value Date    HEPBSAG NONREACTIVE 12/09/2021     Hep C AB:          Lab Results   Component Value Date    HEPCAB REACTIVE 12/09/2021       Urinalysis/Chemistries:      Lab Results   Component Value Date    NITRU NEGATIVE 12/11/2021    COLORU Yellow 12/11/2021    PHUR 7.0 12/11/2021    WBCUA 0 TO 2 12/11/2021    RBCUA 0 TO 2 12/11/2021    MUCUS NOT REPORTED 12/11/2021    TRICHOMONAS NOT REPORTED 12/11/2021    YEAST NOT REPORTED 12/11/2021    BACTERIA NOT REPORTED 12/11/2021    SPECGRAV 1.015 12/11/2021    LEUKOCYTESUR NEGATIVE 12/11/2021    UROBILINOGEN Normal 12/11/2021    BILIRUBINUR NEGATIVE 12/11/2021    GLUCOSEU NEGATIVE 12/11/2021    KETUA NEGATIVE 12/11/2021    AMORPHOUS NOT REPORTED 12/11/2021     Urine Sodium:     Lab Results   Component Value Date    SHERMAN <20 12/09/2021      Urine Creatinine:     Lab Results   Component Value Date    LABCREA 95.6 12/09/2021     Radiology:     Reviewed. Assessment:     1. Acute Kidney Injury on top of apparent chronic kidney disease stage IIIB. Nonoliguric likely secondary to possible obstruction, chronic hydro-along with history of solitary left kidney complicated further by decompensated heart failure. Baseline creatinine currently not available, creatinine admission was 1.99 mg/dl and peak creatinine this admission was 2.34 mg/DL.   Serum creatinine yesterday was back down to 1.83 MG/DL. Patient did receive repeat contrast on 12/15/2021 due to stroke alert, contrast do not appear to adversely affect his renal function based on lab data from yesterday. 2.  COVID-19 infection. 3.  Congenital absence of right kidney. 4.  Left-sided hydronephrosis/urinary retention. 5.  Ischemic cardiomyopathy with EF of 25-30% as per 2D echo done on 20-30% as per 2 D Echo done on 12/3/2021.  6.  Coronary disease with history of CABG. 7.  History of A. fib. 8.  Hypertension. 9.  History of CVA, bilateral occipital lobes status post TPA. 10.  Hep C antibody positive. 11.  Homelessness. 12.  History of urinary retention along with chronic left with history of stent placement and removal    13. Anemia of chronic disease    Plan:   1. No changes from nephrology standpoint at this time  2. Nephrology will sign off. Please contact us if there are any questions. Nutrition   Please ensure that patient is on a renal diet/TF. Avoid nephrotoxic drugs/contrast exposure. We will continue to follow along with you. Katheryne Dakin M.D. [facility]     This note is created with the assistance of a speech-recognition program. While intending to generate a document that actually reflects the content of the visit, no guarantees can be provided that every mistake has been identified and corrected by editing.

## 2021-12-18 NOTE — PLAN OF CARE
Problem: Airway Clearance - Ineffective  Goal: Achieve or maintain patent airway  12/18/2021 0838 by Ari Downs RN  Outcome: Ongoing     Problem: Gas Exchange - Impaired  Goal: Absence of hypoxia  12/18/2021 0838 by Ari Downs RN  Outcome: Ongoing     Problem: Gas Exchange - Impaired  Goal: Promote optimal lung function  12/18/2021 0838 by Ari Downs RN  Outcome: Ongoing     Problem: Breathing Pattern - Ineffective  Goal: Ability to achieve and maintain a regular respiratory rate  12/18/2021 0838 by Ari Downs RN  Outcome: Ongoing     Problem: Body Temperature -  Risk of, Imbalanced  Goal: Ability to maintain a body temperature within defined limits  12/18/2021 0838 by Ari Downs RN  Outcome: Ongoing     Problem: Body Temperature -  Risk of, Imbalanced  Goal: Will regain or maintain usual level of consciousness  12/18/2021 0838 by Ari Downs RN  Outcome: Ongoing     Problem:  Body Temperature -  Risk of, Imbalanced  Goal: Complications related to the disease process, condition or treatment will be avoided or minimized  12/18/2021 0838 by Ari Downs RN  Outcome: Ongoing     Problem: Isolation Precautions - Risk of Spread of Infection  Goal: Prevent transmission of infection  12/18/2021 0838 by Ari Downs RN  Outcome: Ongoing     Problem: Nutrition Deficits  Goal: Optimize nutritional status  12/18/2021 0838 by Ari Downs RN  Outcome: Ongoing     Problem: Risk for Fluid Volume Deficit  Goal: Maintain normal heart rhythm  12/18/2021 0838 by Ari Downs RN  Note: Patient refuses monitoring      Problem: Risk for Fluid Volume Deficit  Goal: Maintain normal serum potassium, sodium, calcium, phosphorus, and pH  Outcome: Ongoing  Note: Patient refuses lab draws      Problem: Loneliness or Risk for Loneliness  Goal: Demonstrate positive use of time alone when socialization is not possible  Outcome: Ongoing     Problem: Fatigue  Goal: Verbalize increase energy and improved vitality  Outcome: Ongoing Problem: Patient Education: Go to Patient Education Activity  Goal: Patient/Family Education  Outcome: Ongoing  Note: Patient refusing education      Problem: OXYGENATION/RESPIRATORY FUNCTION  Goal: Patient will maintain patent airway  Outcome: Ongoing     Problem: OXYGENATION/RESPIRATORY FUNCTION  Goal: Patient will achieve/maintain normal respiratory rate/effort  Description: Respiratory rate and effort will be within normal limits for the patient  Outcome: Ongoing     Problem: FLUID AND ELECTROLYTE IMBALANCE  Goal: Fluid and electrolyte balance are achieved/maintained  Outcome: Ongoing  Note: GERMÁN- patient refusing lab draws and refuses to save urine      Problem: ACTIVITY INTOLERANCE/IMPAIRED MOBILITY  Goal: Mobility/activity is maintained at optimum level for patient  Outcome: Ongoing     Problem: Cardiac:  Goal: Ability to maintain an adequate cardiac output will improve  Description: Ability to maintain an adequate cardiac output will improve  Outcome: Ongoing  Note: GERMÁN-patient refuses monitoring     Problem: Falls - Risk of:  Goal: Will remain free from falls  Description: Will remain free from falls  Outcome: Ongoing  Note: Patient is a fall risk during this admission. Fall risk assessment was performed. Patient is absent of falls. Bed is in the lowest position. Wheels on the bed are locked. Call light and bed side table are within reach. Clutter is removed. Patient was educated to call out when needing assistance or wanting to get out of bed. Patient offered toileting assistance during rounding.       Problem: Pain:  Goal: Pain level will decrease  Description: Pain level will decrease  Outcome: Ongoing  Note: Denies pain at this time     Problem: Skin Integrity:  Goal: Absence of new skin breakdown  Description: Absence of new skin breakdown  Outcome: Ongoing  Note: Refuses assessment

## 2021-12-18 NOTE — PROGRESS NOTES
Patient very argumentative and refusing to sit back in bed so bed alarm can be reactivated. Pt also refuses to sit up in a chair with a chair alarm and also refusing to lay back in the bed. Patient states \"once I get my Benadryl I'll sit back in the bed\". Nurse has repeatedly informed patient that Benadryl was last administered at 0700 and it is not due until 1300. Patient continues to argue with nurse stating \"I never got Benadryl and I will not do anything until I receive it\". Nurse called for a refusal form for patient to sign and while doing this, the patient decided to lay back in the bed. Bed alarm was reactivated and nurse informed patient that a refusal form will be brought to him that he will have to sign for each treatment/safety precaution patient continues to refuse.

## 2021-12-19 LAB
CULTURE: NORMAL
Lab: NORMAL
SPECIMEN DESCRIPTION: NORMAL

## 2021-12-19 NOTE — PROGRESS NOTES
Patient scratching and causing his skin to open and bleed. RN offered to ask the doc if he could get oral benadryl but he said it won't do anything. IV Benadryl was d/elvi today. Patient won't let RN cover bleeding spots. He is refusing most meds and is angry and agitated. Also complains of pain everywhere but refuses the Tylenol or Percocet. -2250- Patient came out of room saying that we aren't helping him and that he's bleeding all over and has pain and no one is doing anything about it. I offered to help patient with what he needs but he said only IV meds would help him. He wouldn't let me put gauze on his bleeding scratches either. Patient insists on going to the ER so he can \"actually be helped. \" Patient is technically discharged and every consult has signed off. We were just awaiting word from the rehab facilities. Patient was escorted to ED by security. ED notified by in house NP of the patient coming to them. IV's were taken out.

## 2021-12-19 NOTE — DISCHARGE SUMMARY
[facility]  Office: [phone]  Gabrielle Piedra DO, Marce Pierce MD, Afua Oliva MD, Dorys Rai MD, Gloria Sheets MD, Que Fink MD, Brenda Gerber MD, Alberto Latif MD, Ariel Groman MD, Vika Grossman MD, Portia Flores DO, Teodoro Arellano MD, Tina Leyva MD, Michaelle Bustamante DO, Marcelino Sanderson MD,  Julieta Kyle DO, Fara Klein MD, Rachael Ballesteros MD, Lynn Harding [facility], [facility] CNP, [facility] CNP, Jarrod Zeng CNS, Lio Mosqueda CNP, Luke Hi CNP, Chapo Gaviria CNP, Jannette Felix CNP, Damián Brenner CNP, Ino Olivares PA-C, Luz Elena Romano CNP, Jessica Lipoma CNP, Santo  CNP, Phyllistine [place] CNP, Yosi Falk CNP, Reynold Greer 126      Discharge Summary     Patient ID: Jennifer Mendez  :  1965   MRN: [id number]     ACCOUNT:  [de-identified]   Patient Location : [address] [place] [address]  Patient's PCP: No primary care provider on file.   Admit Date: 2021   Discharge Date: 2021     Length of Stay: 5  Code Status:  Prior  Admitting Physician: Dorys Rai MD  Discharge Physician: Dorys Rai MD     Active Discharge Diagnosis :     Primary Problem  Chest pain      [facility] Problems    Diagnosis Date Noted    Hydronephrosis of left kidney [N13.30] 2021     Priority: High    Acute urinary retention [R33.8] 2021     Priority: High    Elevated troponin [R77.8] 2021     Priority: High    Hepatitis C [B19.20] 2021     Priority: Medium    Chronic systolic heart failure ([facility] [I50.22] 2021     Priority: Medium    Hx of CABG [Z95.1] 2021     Priority: Medium    Stage 3 chronic kidney disease ([facility] [N18.30] 2021     Priority: Medium    COVID-19 virus infection [U07.1] 2021     Priority: Medium    Acute right-sided weakness [R53.1]     Solitary kidney, congenital [Q60.0] 2021    Homeless single person [Z59.00] 12/14/2021    Coronary artery disease involving native coronary artery of native heart with angina pectoris ([facility] [address] [I25.119] 12/13/2021    JACKSON (acute kidney injury) ([facility] [N17.9] 12/08/2021    AICD (automatic cardioverter/defibrillator) present [Z95.810] 12/08/2021    History of MI (myocardial infarction) [I25.2] 12/08/2021    Elevated brain natriuretic peptide (BNP) level [R79.89] 12/08/2021    Chest pain [R07.9] 12/08/2021       Admission Condition:  fair     Discharged Condition: [address] Stay:     Hospital Course:  Miceky Barraza is a 61 y.o. male who was admitted for the management of   Chest pain , presented to ER with Chest Pain (pt reports onset at least 1 week ago. has been admitted to [facility] for past 10 days, left AMA from there this afternoon.) and Shortness of Breath (ongoing and unchanged x past 4-5 days.)    Patient had initially went to [facility] 3 days before but left as he was not satisfied with the care over there  and left [address] Patient was admitted at [address] [place] [address] about 10 days before with right-sided deficit and was given TPA. Work-up showed supratentorial bilateral cerebral infarcts, embolic in origin. He was also found to have A. fib and was given Eliquis. Patient was started on Eliquis. He also had hematemesis which required urgent EGD and did not show any source of bleed except mild gastritis. Patient was put back on Plavix and Eliquis. Patient is homeless and had been bouncing between the hotels. Patient [place] the mall with security noticed him to be unwell and called EMS.    On initial evaluation patient was found to have COVID-19 infection. He was started on Decadron and oxygen supplement. Lab evaluation showed mild elevated troponin and elevated creatinine which seem to be chronic. Patient has underlying history of CAD with CABG in 2011 and redo CABG in 2018. He had run out of medication for 3 days.  Patient was requesting IV narcotics in the emergency room. Initial evaluation showed creatinine 1.9, trop 69, proBNP 20,281 WBC 4.9 repeat showed drop to 2.6. Patient is a positive for COVID-19 infection. Work-up showed positive hep C. CT abdomen showed left-sided hydronephrosis, 4 mm nonobstructing left kidney stone, body well no cervical, sigmoid diverticulosis without diverticulitis. NM VQ scan showed low probability for PE. CXR showed cardiomegaly. Patient had positive blood culture for staph epidermidis 1 out of 2. On 12/14/2021 patient had  sudden onset right-sided weakness and hyporesponsiveness called. Stroke alert was called in due to NIH 9.  Work-up showed negative CTA head and neck, CT head with chronic infarcts without any acute bleeding or infarct. Patient has been having good ambulation without help in the room and his exam findings and weakness was inconsistent. He was  uncooperative refusing treatment in the hospital, participation with PT. He was recommended continued PT and OT in SNF by PT OT and referrals were sent to places. IVopioid medications and IV benadryl was stopped and patient became upset and left hospital.        Significant therapeutic interventions:   COVID-19 infection with pneumonia. Continue O2 support, dexamethasone 6 mg daily. Droplet plus isolation  Chest pain -atypical likely secondary to rash concern for zoster infection. Recommend calamine, Neurontin. Patient refused. Cardiology evaluated and recommended Conservative management. Continue Eliquis and plavix and lipitor. Fever likely secondary to COVID-19 infection- resolved . Monitor off antibiotics. Left hydronephrosis ,   Urology evaluated and no immediate plan for stent. Outpatient follow-up. CAD s/p CABG and  multiple stents -Eliquis, Plavix. Chronic kidney disease -patient has solitary kidney. Not sure about baseline creatinine. Avoid nephrotoxic agents.   Chronic systolic CHF due to ischemic cardiomyopathy -on Bumex. Recent CVA -thromboembolic (bilateral supratentorial cerebral infarcts ) treated with TPA at [facility]. CT head negative for acute changes. MRI not possible due to ICD in place. PAF -Eliquis 5 mg twice daily  Chronic hep C -outpatient follow-up with GI  Homeless -   Gastritis-PPI  Behavioral problem - Psych consult - refused consent . Denied suicidal ideation. Outpatient follow up . Significant Diagnostic Studies:   Labs / Micro:/Radiology  Recent Labs     12/17/21  1123 12/16/21  0511 12/15/21  0600   WBC 3.5 3.4* 3.2*   HGB 9.6* 10.1* 9.5*   HCT 29.7* 31.9* 29.7*   MCV 94.0 95.5 95.8    144 129*     Labs Renal Latest Ref Rng & Units 12/17/2021 12/16/2021 12/15/2021 12/14/2021 12/14/2021   BUN 6 - 20 mg/dL 34(H) 36(H) 41(H) 42(H) 40(H)   Cr 0.70 - 1.20 mg/dL 1.83(H) 1.89(H) 2.20(H) 2.28(H) 2.31(H)   K 3.7 - 5.3 mmol/L 3.6(L) 3. 2(L) 3.0(L) 3.5(L) 3.7   Na 135 - 144 mmol/L 136 134(L) 137 136 137     Lab Results   Component Value Date    ALT 43 (H) 12/09/2021    AST 30 12/09/2021    ALKPHOS 89 12/09/2021    BILITOT 0.29 (L) 12/09/2021     No results found for: TSHFT4, TSH  Lab Results   Component Value Date    HEPAIGM NONREACTIVE 12/09/2021    HEPBIGM NONREACTIVE 12/09/2021    HEPCAB REACTIVE (A) 12/09/2021     Lab Results   Component Value Date    COLORU Yellow 12/11/2021    NITRU NEGATIVE 12/11/2021    GLUCOSEU NEGATIVE 12/11/2021    KETUA NEGATIVE 12/11/2021    UROBILINOGEN Normal 12/11/2021    BILIRUBINUR NEGATIVE 12/11/2021     No results found for: LABA1C  No results found for: EAG  Lab Results   Component Value Date    INR 1.1 12/09/2021    PROTIME 14.3 (H) 12/09/2021       CT HEAD WO CONTRAST    Result Date: 12/16/2021  1. Overall, no significant change in the appearance of the brain compared to the prior CT. 2. Areas of encephalomalacia involving the bilateral occipital and left temporal lobes. 3. Mild global parenchymal volume loss with chronic microvascular ischemic changes. RECOMMENDATIONS: Unavailable     CT HEAD WO CONTRAST    Result Date: 12/14/2021  1. No acute intracranial abnormality. 2. Bilateral occipital encephalomalacia, suggestive of prior ischemia. 3. Microangiopathic change. Findings were discussed with Tyree Martinez CNP at 11:02 pm on 12/14/2021. RECOMMENDATIONS: Unavailable     CTA HEAD NECK W CONTRAST    Result Date: 12/15/2021  No flow-limiting arterial stenosis in the head and neck. Patchy ground-glass infiltrates in the visualized lungs. Mediastinal lymphadenopathy. RECOMMENDATIONS: Unavailable            Consultations:    Consults:     Final Specialist Recommendations/Findings:   IP CONSULT TO NEPHROLOGY  IP CONSULT TO UROLOGY  IP CONSULT TO CARDIOLOGY  IP CONSULT TO UROLOGY  IP CONSULT TO NEUROLOGY  IP CONSULT TO PSYCHIATRY      The patient was seen and examined on day of discharge and this discharge summary is in conjunction with any daily progress note from day of discharge. Discharge plan:     [facility] against advice. Physician Follow Up:     VERONICA Webster CNP  [address] [facility] [place]  [address]  [facility] [address]  [phone]      please call and schedule a follow-up appt to be seen in     Jenna Goff MD  [address] [facility]  [phone]             Requiring Further Evaluation/Follow Up POST HOSPITALIZATION/Incidental Findings: follow up outpatient with neurology / cardiology / PCP / Urology. Diet: cardiac diet    Activity: As tolerated. Instructions to Patient: medication compliance. Discharge Medications:      Medication List      START taking these medications    bumetanide 2 MG tablet  Commonly known as: BUMEX  Take 1 tablet by mouth 2 times daily     dexamethasone 6 MG tablet  Commonly known as: DECADRON  Take 1 tablet by mouth daily for 6 doses     gabapentin 100 MG capsule  Commonly known as: NEURONTIN  Take 1 capsule by mouth 3 times daily for 30 days.      metoprolol succinate 50 MG extended release tablet  Commonly known as: TOPROL XL  Take 1 tablet by mouth daily     pantoprazole 40 MG tablet  Commonly known as: PROTONIX  Take 1 tablet by mouth every morning (before breakfast)        CONTINUE taking these medications    atorvastatin 40 MG tablet  Commonly known as: LIPITOR     clopidogrel 75 MG tablet  Commonly known as: PLAVIX     Eliquis 5 MG Tabs tablet  Generic drug: apixaban     tamsulosin 0.4 MG capsule  Commonly known as: FLOMAX        STOP taking these medications    amLODIPine 10 MG tablet  Commonly known as: NORVASC     carvedilol 6.25 MG tablet  Commonly known as: COREG     furosemide 20 MG tablet  Commonly known as: LASIX           Where to Get Your Medications      These medications were sent to [address] -  [phone] - F [phone]  [address] [place] [address], [address] [place] [address]    Phone: [phone]   bumetanide 2 MG tablet  dexamethasone 6 MG tablet  gabapentin 100 MG capsule  metoprolol succinate 50 MG extended release tablet  pantoprazole 40 MG tablet         Time Spent on discharge is  33 mins in patient examination, evaluation, counseling as well as medication reconciliation, prescriptions for required medications, discharge plan and follow up. Electronically signed by   Sophie Jacobs MD  12/19/2021        Thank you Dr. Lauri Christensen primary care provider on file. for the opportunity to be involved in this patient's care.

## 2021-12-20 ENCOUNTER — CARE COORDINATION (OUTPATIENT)
Dept: CASE MANAGEMENT | Age: 56
End: 2021-12-20

## 2021-12-20 NOTE — CARE COORDINATION
Justina [facility] Transitions Initial Follow Up Call    Call within 2 business days of discharge: No    Patient: Anali Jay Patient : 1965   MRN: <[id number]>  Reason for Admission: Chest pain/Covid-19+  Discharge Date: 21 RARS: Readmission Risk Score: 15 ( )      Last Discharge [facility]       Complaint Diagnosis Description Type Department Provider    21 Chest Pain; Shortness of Breath Chest pain, unspecified type . .. ED to Hosp-Admission (Discharged) (ADMITTED) EREN Pepper MD; Reynold Zepeda. .. Unable to contact pt due no phone number on file. No EC listed. Episode ended. Facility: [facility]        Care Transitions 24 Hour Call    Care Transitions Interventions         Follow Up  No future appointments.     Riky Warren RN

## 2021-12-23 ENCOUNTER — APPOINTMENT (OUTPATIENT)
Dept: CT IMAGING | Age: 56
DRG: 287 | End: 2021-12-23
Payer: MEDICARE

## 2021-12-23 ENCOUNTER — APPOINTMENT (OUTPATIENT)
Dept: GENERAL RADIOLOGY | Age: 56
DRG: 287 | End: 2021-12-23
Payer: MEDICARE

## 2021-12-23 ENCOUNTER — HOSPITAL ENCOUNTER (INPATIENT)
Age: 56
LOS: 6 days | Discharge: NURSING, REHAB OR LONG-TERM CARE | DRG: 287 | End: 2021-12-29
Attending: EMERGENCY MEDICINE | Admitting: INTERNAL MEDICINE
Payer: MEDICARE

## 2021-12-23 DIAGNOSIS — I21.4 NSTEMI (NON-ST ELEVATED MYOCARDIAL INFARCTION) (HCC): ICD-10-CM

## 2021-12-23 DIAGNOSIS — R07.9 ACUTE CHEST PAIN: Primary | ICD-10-CM

## 2021-12-23 PROBLEM — I63.9 ACUTE CVA (CEREBROVASCULAR ACCIDENT) (HCC): Status: ACTIVE | Noted: 2021-12-23

## 2021-12-23 LAB
% CKMB: 8.3 % (ref 0–3.5)
ABSOLUTE EOS #: 0.05 K/UL (ref 0–0.4)
ABSOLUTE EOS #: 0.09 K/UL (ref 0–0.44)
ABSOLUTE EOS #: ABNORMAL K/UL
ABSOLUTE IMMATURE GRANULOCYTE: 0 K/UL (ref 0–0.3)
ABSOLUTE IMMATURE GRANULOCYTE: 0.05 K/UL (ref 0–0.3)
ABSOLUTE IMMATURE GRANULOCYTE: ABNORMAL K/UL (ref 0–0.3)
ABSOLUTE LYMPH #: 0.54 K/UL (ref 1.1–3.7)
ABSOLUTE LYMPH #: 0.92 K/UL (ref 1–4.8)
ABSOLUTE LYMPH #: ABNORMAL K/UL
ABSOLUTE MONO #: 0.36 K/UL (ref 0.1–1.2)
ABSOLUTE MONO #: 0.77 K/UL (ref 0.1–0.8)
ABSOLUTE MONO #: ABNORMAL K/UL
ALBUMIN SERPL-MCNC: 2.8 G/DL (ref 3.5–5.2)
ALBUMIN/GLOBULIN RATIO: 0.8 (ref 1–2.5)
ALP BLD-CCNC: 68 U/L (ref 40–129)
ALT SERPL-CCNC: 14 U/L (ref 5–41)
ANION GAP SERPL CALCULATED.3IONS-SCNC: 12 MMOL/L (ref 9–17)
ANION GAP SERPL CALCULATED.3IONS-SCNC: 13 MMOL/L (ref 9–17)
AST SERPL-CCNC: 18 U/L
BASOPHILS # BLD: 0 % (ref 0–2)
BASOPHILS # BLD: 1 % (ref 0–2)
BASOPHILS # BLD: ABNORMAL %
BASOPHILS ABSOLUTE: 0 K/UL (ref 0–0.2)
BASOPHILS ABSOLUTE: 0.05 K/UL (ref 0–0.2)
BASOPHILS ABSOLUTE: ABNORMAL K/UL (ref 0–0.2)
BILIRUB SERPL-MCNC: 0.58 MG/DL (ref 0.3–1.2)
BUN BLDV-MCNC: 33 MG/DL (ref 6–20)
BUN BLDV-MCNC: 33 MG/DL (ref 6–20)
BUN/CREAT BLD: ABNORMAL (ref 9–20)
BUN/CREAT BLD: ABNORMAL (ref 9–20)
CALCIUM SERPL-MCNC: 8.7 MG/DL (ref 8.6–10.4)
CALCIUM SERPL-MCNC: 8.8 MG/DL (ref 8.6–10.4)
CHLORIDE BLD-SCNC: 107 MMOL/L (ref 98–107)
CHLORIDE BLD-SCNC: 109 MMOL/L (ref 98–107)
CK MB: 3.3 NG/ML
CKMB INTERPRETATION: ABNORMAL
CO2: 16 MMOL/L (ref 20–31)
CO2: 18 MMOL/L (ref 20–31)
CREAT SERPL-MCNC: 1.83 MG/DL (ref 0.7–1.2)
CREAT SERPL-MCNC: 1.88 MG/DL (ref 0.7–1.2)
DIFFERENTIAL TYPE: ABNORMAL
EOSINOPHILS RELATIVE PERCENT: 1 % (ref 1–4)
EOSINOPHILS RELATIVE PERCENT: 2 % (ref 1–4)
EOSINOPHILS RELATIVE PERCENT: ABNORMAL %
GFR AFRICAN AMERICAN: 45 ML/MIN
GFR AFRICAN AMERICAN: 46 ML/MIN
GFR NON-AFRICAN AMERICAN: 37 ML/MIN
GFR NON-AFRICAN AMERICAN: 38 ML/MIN
GFR SERPL CREATININE-BSD FRML MDRD: ABNORMAL ML/MIN/{1.73_M2}
GLUCOSE BLD-MCNC: 77 MG/DL (ref 75–110)
GLUCOSE BLD-MCNC: 81 MG/DL (ref 70–99)
GLUCOSE BLD-MCNC: 83 MG/DL (ref 70–99)
HCT VFR BLD CALC: 28.7 % (ref 40.7–50.3)
HCT VFR BLD CALC: 31 % (ref 40.7–50.3)
HCT VFR BLD CALC: ABNORMAL %
HEMOGLOBIN: 9.2 G/DL (ref 13–17)
HEMOGLOBIN: 9.9 G/DL (ref 13–17)
HEMOGLOBIN: ABNORMAL G/DL
IMMATURE GRANULOCYTES: 0 %
IMMATURE GRANULOCYTES: 1 %
IMMATURE GRANULOCYTES: ABNORMAL %
INR BLD: 1.2
INR BLD: ABNORMAL
LYMPHOCYTES # BLD: 12 % (ref 24–43)
LYMPHOCYTES # BLD: 18 % (ref 24–44)
LYMPHOCYTES # BLD: ABNORMAL %
MCH RBC QN AUTO: 30.7 PG (ref 25.2–33.5)
MCH RBC QN AUTO: 30.9 PG (ref 25.2–33.5)
MCH RBC QN AUTO: ABNORMAL PG
MCHC RBC AUTO-ENTMCNC: 31.9 G/DL (ref 28.4–34.8)
MCHC RBC AUTO-ENTMCNC: 32.1 G/DL (ref 28.4–34.8)
MCHC RBC AUTO-ENTMCNC: ABNORMAL G/DL
MCV RBC AUTO: 95.7 FL (ref 82.6–102.9)
MCV RBC AUTO: 96.9 FL (ref 82.6–102.9)
MCV RBC AUTO: ABNORMAL FL
MONOCYTES # BLD: 15 % (ref 1–7)
MONOCYTES # BLD: 8 % (ref 3–12)
MONOCYTES # BLD: ABNORMAL %
MORPHOLOGY: ABNORMAL
MORPHOLOGY: ABNORMAL
MYOGLOBIN: 44 NG/ML (ref 28–72)
NRBC AUTOMATED: 0 PER 100 WBC
NRBC AUTOMATED: 0 PER 100 WBC
NRBC AUTOMATED: ABNORMAL PER 100 WBC
PARTIAL THROMBOPLASTIN TIME: 23.5 SEC (ref 20.5–30.5)
PARTIAL THROMBOPLASTIN TIME: ABNORMAL SEC
PDW BLD-RTO: 16.5 % (ref 11.8–14.4)
PDW BLD-RTO: 16.5 % (ref 11.8–14.4)
PDW BLD-RTO: ABNORMAL %
PLATELET # BLD: 229 K/UL (ref 138–453)
PLATELET # BLD: 251 K/UL (ref 138–453)
PLATELET # BLD: ABNORMAL K/UL
PLATELET ESTIMATE: ABNORMAL
PMV BLD AUTO: 10.2 FL (ref 8.1–13.5)
PMV BLD AUTO: 9.7 FL (ref 8.1–13.5)
PMV BLD AUTO: ABNORMAL FL
POTASSIUM SERPL-SCNC: 4.1 MMOL/L (ref 3.7–5.3)
POTASSIUM SERPL-SCNC: 4.5 MMOL/L (ref 3.7–5.3)
PROTHROMBIN TIME: 12.7 SEC (ref 9.1–12.3)
PROTHROMBIN TIME: ABNORMAL SEC
RBC # BLD: 3 M/UL (ref 4.21–5.77)
RBC # BLD: 3.2 M/UL (ref 4.21–5.77)
RBC # BLD: ABNORMAL 10*6/UL
RBC # BLD: ABNORMAL M/UL
SEG NEUTROPHILS: 65 % (ref 36–66)
SEG NEUTROPHILS: 77 % (ref 36–65)
SEG NEUTROPHILS: ABNORMAL %
SEGMENTED NEUTROPHILS ABSOLUTE COUNT: 3.31 K/UL (ref 1.8–7.7)
SEGMENTED NEUTROPHILS ABSOLUTE COUNT: 3.46 K/UL (ref 1.5–8.1)
SEGMENTED NEUTROPHILS ABSOLUTE COUNT: ABNORMAL K/UL
SODIUM BLD-SCNC: 137 MMOL/L (ref 135–144)
SODIUM BLD-SCNC: 138 MMOL/L (ref 135–144)
TOTAL CK: 40 U/L (ref 39–308)
TOTAL PROTEIN: 6.5 G/DL (ref 6.4–8.3)
TROPONIN INTERP: ABNORMAL
TROPONIN T: ABNORMAL NG/ML
TROPONIN, HIGH SENSITIVITY: 58 NG/L (ref 0–22)
TROPONIN, HIGH SENSITIVITY: 62 NG/L (ref 0–22)
TROPONIN, HIGH SENSITIVITY: 65 NG/L (ref 0–22)
TROPONIN, HIGH SENSITIVITY: 65 NG/L (ref 0–22)
WBC # BLD: 4.5 K/UL (ref 3.5–11.3)
WBC # BLD: 5.1 K/UL (ref 3.5–11.3)
WBC # BLD: ABNORMAL 10*3/UL
WBC # BLD: ABNORMAL K/UL

## 2021-12-23 PROCEDURE — 99448 NTRPROF PH1/NTRNET/EHR 21-30: CPT | Performed by: PSYCHIATRY & NEUROLOGY

## 2021-12-23 PROCEDURE — 6370000000 HC RX 637 (ALT 250 FOR IP): Performed by: NURSE PRACTITIONER

## 2021-12-23 PROCEDURE — 84484 ASSAY OF TROPONIN QUANT: CPT

## 2021-12-23 PROCEDURE — 2060000000 HC ICU INTERMEDIATE R&B

## 2021-12-23 PROCEDURE — 82553 CREATINE MB FRACTION: CPT

## 2021-12-23 PROCEDURE — 96366 THER/PROPH/DIAG IV INF ADDON: CPT

## 2021-12-23 PROCEDURE — 82550 ASSAY OF CK (CPK): CPT

## 2021-12-23 PROCEDURE — 6360000002 HC RX W HCPCS: Performed by: STUDENT IN AN ORGANIZED HEALTH CARE EDUCATION/TRAINING PROGRAM

## 2021-12-23 PROCEDURE — 85730 THROMBOPLASTIN TIME PARTIAL: CPT

## 2021-12-23 PROCEDURE — 2580000003 HC RX 258: Performed by: INTERNAL MEDICINE

## 2021-12-23 PROCEDURE — 85610 PROTHROMBIN TIME: CPT

## 2021-12-23 PROCEDURE — 6370000000 HC RX 637 (ALT 250 FOR IP): Performed by: STUDENT IN AN ORGANIZED HEALTH CARE EDUCATION/TRAINING PROGRAM

## 2021-12-23 PROCEDURE — 80053 COMPREHEN METABOLIC PANEL: CPT

## 2021-12-23 PROCEDURE — 96365 THER/PROPH/DIAG IV INF INIT: CPT

## 2021-12-23 PROCEDURE — 6360000002 HC RX W HCPCS: Performed by: NURSE PRACTITIONER

## 2021-12-23 PROCEDURE — 2500000003 HC RX 250 WO HCPCS: Performed by: STUDENT IN AN ORGANIZED HEALTH CARE EDUCATION/TRAINING PROGRAM

## 2021-12-23 PROCEDURE — 70450 CT HEAD/BRAIN W/O DYE: CPT

## 2021-12-23 PROCEDURE — 93005 ELECTROCARDIOGRAM TRACING: CPT | Performed by: STUDENT IN AN ORGANIZED HEALTH CARE EDUCATION/TRAINING PROGRAM

## 2021-12-23 PROCEDURE — 96375 TX/PRO/DX INJ NEW DRUG ADDON: CPT

## 2021-12-23 PROCEDURE — 80048 BASIC METABOLIC PNL TOTAL CA: CPT

## 2021-12-23 PROCEDURE — 85025 COMPLETE CBC W/AUTO DIFF WBC: CPT

## 2021-12-23 PROCEDURE — 83874 ASSAY OF MYOGLOBIN: CPT

## 2021-12-23 PROCEDURE — 6370000000 HC RX 637 (ALT 250 FOR IP): Performed by: INTERNAL MEDICINE

## 2021-12-23 PROCEDURE — 6360000002 HC RX W HCPCS: Performed by: INTERNAL MEDICINE

## 2021-12-23 PROCEDURE — 82947 ASSAY GLUCOSE BLOOD QUANT: CPT

## 2021-12-23 PROCEDURE — 71045 X-RAY EXAM CHEST 1 VIEW: CPT

## 2021-12-23 PROCEDURE — 99222 1ST HOSP IP/OBS MODERATE 55: CPT | Performed by: NURSE PRACTITIONER

## 2021-12-23 PROCEDURE — 99285 EMERGENCY DEPT VISIT HI MDM: CPT

## 2021-12-23 PROCEDURE — 96376 TX/PRO/DX INJ SAME DRUG ADON: CPT

## 2021-12-23 RX ORDER — POTASSIUM CHLORIDE 20 MEQ/1
40 TABLET, EXTENDED RELEASE ORAL PRN
Status: DISCONTINUED | OUTPATIENT
Start: 2021-12-23 | End: 2021-12-29 | Stop reason: HOSPADM

## 2021-12-23 RX ORDER — PANTOPRAZOLE SODIUM 40 MG/10ML
40 INJECTION, POWDER, LYOPHILIZED, FOR SOLUTION INTRAVENOUS DAILY
Status: DISCONTINUED | OUTPATIENT
Start: 2021-12-24 | End: 2021-12-29 | Stop reason: HOSPADM

## 2021-12-23 RX ORDER — SODIUM CHLORIDE 9 MG/ML
25 INJECTION, SOLUTION INTRAVENOUS PRN
Status: DISCONTINUED | OUTPATIENT
Start: 2021-12-23 | End: 2021-12-29 | Stop reason: HOSPADM

## 2021-12-23 RX ORDER — GABAPENTIN 100 MG/1
100 CAPSULE ORAL 3 TIMES DAILY
Status: DISCONTINUED | OUTPATIENT
Start: 2021-12-23 | End: 2021-12-29 | Stop reason: HOSPADM

## 2021-12-23 RX ORDER — ATORVASTATIN CALCIUM 80 MG/1
80 TABLET, FILM COATED ORAL ONCE
Status: DISCONTINUED | OUTPATIENT
Start: 2021-12-23 | End: 2021-12-23

## 2021-12-23 RX ORDER — TAMSULOSIN HYDROCHLORIDE 0.4 MG/1
0.4 CAPSULE ORAL DAILY
Status: DISCONTINUED | OUTPATIENT
Start: 2021-12-23 | End: 2021-12-29 | Stop reason: HOSPADM

## 2021-12-23 RX ORDER — SODIUM CHLORIDE 0.9 % (FLUSH) 0.9 %
10 SYRINGE (ML) INJECTION PRN
Status: DISCONTINUED | OUTPATIENT
Start: 2021-12-23 | End: 2021-12-29 | Stop reason: HOSPADM

## 2021-12-23 RX ORDER — NITROGLYCERIN 20 MG/100ML
5-200 INJECTION INTRAVENOUS CONTINUOUS
Status: DISCONTINUED | OUTPATIENT
Start: 2021-12-23 | End: 2021-12-27 | Stop reason: ALTCHOICE

## 2021-12-23 RX ORDER — ACETAMINOPHEN 650 MG/1
650 SUPPOSITORY RECTAL EVERY 6 HOURS PRN
Status: DISCONTINUED | OUTPATIENT
Start: 2021-12-23 | End: 2021-12-29 | Stop reason: HOSPADM

## 2021-12-23 RX ORDER — HEPARIN SODIUM 1000 [USP'U]/ML
2000 INJECTION, SOLUTION INTRAVENOUS; SUBCUTANEOUS PRN
Status: DISCONTINUED | OUTPATIENT
Start: 2021-12-23 | End: 2021-12-24

## 2021-12-23 RX ORDER — NITROGLYCERIN 0.4 MG/1
0.4 TABLET SUBLINGUAL EVERY 5 MIN PRN
Status: DISCONTINUED | OUTPATIENT
Start: 2021-12-23 | End: 2021-12-29 | Stop reason: HOSPADM

## 2021-12-23 RX ORDER — BUMETANIDE 1 MG/1
2 TABLET ORAL 2 TIMES DAILY
Status: DISCONTINUED | OUTPATIENT
Start: 2021-12-23 | End: 2021-12-29 | Stop reason: HOSPADM

## 2021-12-23 RX ORDER — SODIUM CHLORIDE 0.9 % (FLUSH) 0.9 %
5-40 SYRINGE (ML) INJECTION EVERY 12 HOURS SCHEDULED
Status: DISCONTINUED | OUTPATIENT
Start: 2021-12-23 | End: 2021-12-29 | Stop reason: HOSPADM

## 2021-12-23 RX ORDER — POTASSIUM CHLORIDE 7.45 MG/ML
10 INJECTION INTRAVENOUS PRN
Status: DISCONTINUED | OUTPATIENT
Start: 2021-12-23 | End: 2021-12-29 | Stop reason: HOSPADM

## 2021-12-23 RX ORDER — ACETAMINOPHEN 325 MG/1
650 TABLET ORAL EVERY 6 HOURS PRN
Status: DISCONTINUED | OUTPATIENT
Start: 2021-12-23 | End: 2021-12-29 | Stop reason: HOSPADM

## 2021-12-23 RX ORDER — HEPARIN SODIUM 10000 [USP'U]/100ML
5-30 INJECTION, SOLUTION INTRAVENOUS CONTINUOUS
Status: DISCONTINUED | OUTPATIENT
Start: 2021-12-23 | End: 2021-12-24

## 2021-12-23 RX ORDER — SODIUM CHLORIDE 0.9 % (FLUSH) 0.9 %
5-40 SYRINGE (ML) INJECTION PRN
Status: DISCONTINUED | OUTPATIENT
Start: 2021-12-23 | End: 2021-12-29 | Stop reason: HOSPADM

## 2021-12-23 RX ORDER — NITROGLYCERIN 0.4 MG/1
0.4 TABLET SUBLINGUAL ONCE
Status: COMPLETED | OUTPATIENT
Start: 2021-12-23 | End: 2021-12-23

## 2021-12-23 RX ORDER — HEPARIN SODIUM 1000 [USP'U]/ML
4000 INJECTION, SOLUTION INTRAVENOUS; SUBCUTANEOUS ONCE
Status: DISCONTINUED | OUTPATIENT
Start: 2021-12-23 | End: 2021-12-24

## 2021-12-23 RX ORDER — HEPARIN SODIUM 1000 [USP'U]/ML
4000 INJECTION, SOLUTION INTRAVENOUS; SUBCUTANEOUS PRN
Status: DISCONTINUED | OUTPATIENT
Start: 2021-12-23 | End: 2021-12-24

## 2021-12-23 RX ORDER — ONDANSETRON 4 MG/1
4 TABLET, ORALLY DISINTEGRATING ORAL EVERY 8 HOURS PRN
Status: DISCONTINUED | OUTPATIENT
Start: 2021-12-23 | End: 2021-12-29 | Stop reason: HOSPADM

## 2021-12-23 RX ORDER — NITROGLYCERIN 0.4 MG/1
0.4 TABLET SUBLINGUAL ONCE
Status: DISCONTINUED | OUTPATIENT
Start: 2021-12-23 | End: 2021-12-29 | Stop reason: HOSPADM

## 2021-12-23 RX ORDER — ACETAMINOPHEN 500 MG
1000 TABLET ORAL ONCE
Status: COMPLETED | OUTPATIENT
Start: 2021-12-23 | End: 2021-12-23

## 2021-12-23 RX ORDER — CLOPIDOGREL BISULFATE 75 MG/1
75 TABLET ORAL DAILY
Status: DISCONTINUED | OUTPATIENT
Start: 2021-12-23 | End: 2021-12-24

## 2021-12-23 RX ORDER — SODIUM CHLORIDE 9 MG/ML
10 INJECTION INTRAVENOUS DAILY
Status: DISCONTINUED | OUTPATIENT
Start: 2021-12-24 | End: 2021-12-29 | Stop reason: HOSPADM

## 2021-12-23 RX ORDER — METOPROLOL SUCCINATE 50 MG/1
50 TABLET, EXTENDED RELEASE ORAL DAILY
Status: DISCONTINUED | OUTPATIENT
Start: 2021-12-23 | End: 2021-12-29 | Stop reason: HOSPADM

## 2021-12-23 RX ORDER — LIDOCAINE HYDROCHLORIDE 10 MG/ML
5 INJECTION, SOLUTION INFILTRATION; PERINEURAL ONCE
Status: DISCONTINUED | OUTPATIENT
Start: 2021-12-23 | End: 2021-12-29 | Stop reason: HOSPADM

## 2021-12-23 RX ORDER — ATORVASTATIN CALCIUM 80 MG/1
40 TABLET, FILM COATED ORAL DAILY
Status: DISCONTINUED | OUTPATIENT
Start: 2021-12-24 | End: 2021-12-23

## 2021-12-23 RX ORDER — FENTANYL CITRATE 50 UG/ML
25 INJECTION, SOLUTION INTRAMUSCULAR; INTRAVENOUS ONCE
Status: COMPLETED | OUTPATIENT
Start: 2021-12-23 | End: 2021-12-23

## 2021-12-23 RX ORDER — PANTOPRAZOLE SODIUM 40 MG/1
40 TABLET, DELAYED RELEASE ORAL
Status: DISCONTINUED | OUTPATIENT
Start: 2021-12-24 | End: 2021-12-23

## 2021-12-23 RX ORDER — DIPHENHYDRAMINE HYDROCHLORIDE 50 MG/ML
25 INJECTION INTRAMUSCULAR; INTRAVENOUS EVERY 6 HOURS PRN
Status: DISCONTINUED | OUTPATIENT
Start: 2021-12-23 | End: 2021-12-29 | Stop reason: HOSPADM

## 2021-12-23 RX ORDER — ASPIRIN 325 MG
325 TABLET ORAL DAILY
Status: DISCONTINUED | OUTPATIENT
Start: 2021-12-24 | End: 2021-12-23

## 2021-12-23 RX ORDER — ATORVASTATIN CALCIUM 80 MG/1
80 TABLET, FILM COATED ORAL NIGHTLY
Status: DISCONTINUED | OUTPATIENT
Start: 2021-12-23 | End: 2021-12-29 | Stop reason: HOSPADM

## 2021-12-23 RX ORDER — ONDANSETRON 2 MG/ML
4 INJECTION INTRAMUSCULAR; INTRAVENOUS EVERY 6 HOURS PRN
Status: DISCONTINUED | OUTPATIENT
Start: 2021-12-23 | End: 2021-12-29 | Stop reason: HOSPADM

## 2021-12-23 RX ADMIN — DIPHENHYDRAMINE HYDROCHLORIDE 25 MG: 50 INJECTION, SOLUTION INTRAMUSCULAR; INTRAVENOUS at 23:14

## 2021-12-23 RX ADMIN — NITROGLYCERIN 0.4 MG: 0.4 TABLET SUBLINGUAL at 15:21

## 2021-12-23 RX ADMIN — HEPARIN SODIUM 2000 UNITS: 1000 INJECTION INTRAVENOUS; SUBCUTANEOUS at 23:43

## 2021-12-23 RX ADMIN — NITROGLYCERIN 40 MCG/MIN: 20 INJECTION INTRAVENOUS at 17:41

## 2021-12-23 RX ADMIN — ACETAMINOPHEN 1000 MG: 500 TABLET ORAL at 16:37

## 2021-12-23 RX ADMIN — HEPARIN SODIUM 930 UNITS/HR: 5000 INJECTION, SOLUTION INTRAVENOUS; SUBCUTANEOUS at 23:38

## 2021-12-23 RX ADMIN — FENTANYL CITRATE 25 MCG: 50 INJECTION INTRAMUSCULAR; INTRAVENOUS at 16:37

## 2021-12-23 RX ADMIN — NITROGLYCERIN 0.4 MG: 0.4 TABLET SUBLINGUAL at 21:34

## 2021-12-23 RX ADMIN — FENTANYL CITRATE 25 MCG: 50 INJECTION INTRAMUSCULAR; INTRAVENOUS at 23:14

## 2021-12-23 ASSESSMENT — ENCOUNTER SYMPTOMS
NAUSEA: 1
ABDOMINAL PAIN: 0
BACK PAIN: 0
VOMITING: 0
SHORTNESS OF BREATH: 0
COUGH: 0
PHOTOPHOBIA: 0
RHINORRHEA: 0
SORE THROAT: 0

## 2021-12-23 ASSESSMENT — PAIN DESCRIPTION - PAIN TYPE
TYPE: ACUTE PAIN
TYPE: ACUTE PAIN

## 2021-12-23 ASSESSMENT — PAIN SCALES - GENERAL
PAINLEVEL_OUTOF10: 10
PAINLEVEL_OUTOF10: 10
PAINLEVEL_OUTOF10: 6
PAINLEVEL_OUTOF10: 8
PAINLEVEL_OUTOF10: 8

## 2021-12-23 ASSESSMENT — PAIN DESCRIPTION - ORIENTATION: ORIENTATION: LEFT

## 2021-12-23 ASSESSMENT — PAIN DESCRIPTION - LOCATION: LOCATION: CHEST

## 2021-12-23 NOTE — ED NOTES
Bed: 11  Expected date:   Expected time:   Means of arrival:   Comments:  [address], RN  12/23/21 [id number]

## 2021-12-23 NOTE — ED PROVIDER NOTES
[facility] ED  Emergency Department Encounter  EmergencyMedicine Resident     Pt Name:Blanco Mccain  MRN: [id number]  Lobogfclarence 1965  Date of evaluation: 12/23/21  PCP:  No primary care provider on file. This patient was evaluated in the Emergency Department for symptoms described in the history of present illness. The patient was evaluated in the context of the global COVID-19 pandemic, which necessitated consideration that the patient might be at risk for infection with the SARS-CoV-2 virus that causes COVID-19. Institutional protocols and algorithms that pertain to the evaluation of patients at risk for COVID-19 are in a state of rapid change based on information released by regulatory bodies including the CDC and federal and state organizations. These policies and algorithms were followed during the patient's care in the ED. CHIEF COMPLAINT       Chief Complaint   Patient presents with    Chest Pain   chest pain sub-sternal rating to his neck    HISTORY OF PRESENT ILLNESS  (Location/Symptom, Timing/Onset, Context/Setting, Quality, Duration, Modifying Factors, Severity.)      Leslie Paez is a 61 y.o. male who presents with plaint of sudden onset substernal chest pain that is radiating to his neck began proxy 2 hours ago he is talking to someone else not associated any dyspnea on exertion. Patient stating he feels like someone is sitting on his chest since feels similar to prior episodes of MI. She has a history of systolic heart failure, recent Covid infection 2 weeks ago, CABG, hepatitis C, AICD placement. No interventions pta. No fevers, +poor po intake.  Pt is undomiciled    PAST MEDICAL / SURGICAL / SOCIAL / FAMILY HISTORY      has a past medical history of AICD (automatic cardioverter/defibrillator) present, CKD (chronic kidney disease), stage III ([address] Myocardial infarction ([address] S/P CABG (coronary artery bypass graft), and Solitary kidney, congenital.       has a past surgical history that includes Coronary artery bypass graft and Coronary artery bypass graft. Social History     Socioeconomic History    Marital status: Single     Spouse name: Not on file    Number of children: Not on file    Years of education: Not on file    Highest education level: Not on file   Occupational History    Not on file   Tobacco Use    Smoking status: Current Every Day Smoker     Packs/day: 0.25    Smokeless tobacco: Never Used   Substance and Sexual Activity    Alcohol use: Not Currently     Comment: rarely    Drug use: Not Currently     Comment: not in over 27 years    Sexual activity: Not on file   Other Topics Concern    Not on file   Social History Narrative    Not on file     Social Determinants of Health     Financial Resource Strain:     Difficulty of Paying Living Expenses: Not on file   Food Insecurity:     Worried About Running Out of Food in the Last Year: Not on file    [address] of Food in the Last Year: Not on file   Transportation Needs:     Lack of Transportation (Medical): Not on file    Lack of Transportation (Non-Medical):  Not on file   Physical Activity:     Days of Exercise per Week: Not on file    Minutes of Exercise per Session: Not on file   Stress:     Feeling of Stress : Not on file   Social Connections:     Frequency of Communication with Friends and Family: Not on file    Frequency of Social Gatherings with Friends and Family: Not on file    Attends [employer] Services: Not on file    Active Member of Clubs or Organizations: Not on file    Attends Club or Organization Meetings: Not on file    Marital Status: Not on file   Intimate Partner Violence:     Fear of Current or Ex-Partner: Not on file    Emotionally Abused: Not on file    Physically Abused: Not on file    Sexually Abused: Not on file   Housing Stability:     Unable to Pay for Housing in the Last Year: Not on file    Number of [place] in the Last Year: Not on file    Unstable Housing in the Last Year: Not on file       Family History   Problem Relation Age of Onset    Cancer Mother     Diabetes Father     Heart Disease Father     Diabetes Paternal Grandmother        Allergies:  Nsaids, Asa [aspirin], and Oxycodone    Home Medications:  Prior to Admission medications    Medication Sig Start Date End Date Taking? Authorizing Provider   bumetanide (BUMEX) 2 MG tablet Take 1 tablet by mouth 2 times daily 12/17/21   Rhea Ferguson MD   dexamethasone (DECADRON) 6 MG tablet Take 1 tablet by mouth daily for 6 doses 12/18/21 12/24/21  Rhea Ferguson MD   gabapentin (NEURONTIN) 100 MG capsule Take 1 capsule by mouth 3 times daily for 30 days. 12/17/21 1/16/22  Rhea Ferguson MD   metoprolol succinate (TOPROL XL) 50 MG extended release tablet Take 1 tablet by mouth daily 12/18/21   Rhea Ferguson MD   pantoprazole (PROTONIX) 40 MG tablet Take 1 tablet by mouth every morning (before breakfast) 12/18/21   Rhea Ferguson MD   clopidogrel (PLAVIX) 75 MG tablet Take 1 tablet by mouth daily    Historical Provider, MD   atorvastatin (LIPITOR) 40 MG tablet Take 1 tablet by mouth daily    Historical Provider, MD   tamsulosin (FLOMAX) 0.4 MG capsule Take 0.4 mg by mouth daily    Historical Provider, MD   apixaban (ELIQUIS) 5 MG TABS tablet Take 5 mg by mouth 2 times daily    Historical Provider, MD       REVIEW OF SYSTEMS    (2-9 systems for level 4, 10 or more for level 5)      Review of Systems   Constitutional: Positive for appetite change and fatigue. Negative for fever. HENT: Negative for congestion, rhinorrhea and sore throat. Eyes: Negative for photophobia. Respiratory: Negative for cough and shortness of breath. Cardiovascular: Positive for chest pain. Gastrointestinal: Positive for nausea. Negative for abdominal pain and vomiting. Genitourinary: Negative for flank pain. Musculoskeletal: Negative for back pain and gait problem. Skin: Negative for pallor, rash and wound. Allergic/Immunologic: Negative for food allergies. Neurological: Negative for headaches. Hematological: Negative for adenopathy. Psychiatric/Behavioral: Negative for agitation. PHYSICAL EXAM   (up to 7 for level 4, 8 or more for level 5)      INITIAL VITALS:   BP (!) 143/107   Pulse 70   Temp 98.6 °F (37 °C) (Oral)   Resp 19   SpO2 94%     Physical Exam  Vitals and nursing note reviewed. Constitutional:       General: He is not in acute distress. Appearance: Normal appearance. He is not toxic-appearing. HENT:      Head: Normocephalic and atraumatic. Right Ear: External ear normal.      Left Ear: External ear normal.      Nose: Nose normal.      Mouth/Throat:      Pharynx: Oropharynx is clear. Eyes:      Conjunctiva/sclera: Conjunctivae normal.   Cardiovascular:      Rate and Rhythm: Normal rate. Pulses: Normal pulses. Pulmonary:      Effort: Pulmonary effort is normal.   Abdominal:      Palpations: Abdomen is soft. Tenderness: There is no abdominal tenderness. Musculoskeletal:         General: Normal range of motion. Cervical back: Normal range of motion. Skin:     General: Skin is warm. Capillary Refill: Capillary refill takes less than 2 seconds. Neurological:      Mental Status: He is alert and oriented to person, place, and time.    Psychiatric:         Mood and Affect: Mood normal.         DIFFERENTIAL  DIAGNOSIS     PLAN (LABS / IMAGING / EKG):  Orders Placed This Encounter   Procedures    XR CHEST PORTABLE    Troponin    CBC WITH AUTO DIFFERENTIAL    COMPREHENSIVE METABOLIC PANEL    Troponin    CBC    CBC    APTT    Urine Drug Screen    Diet NPO Exceptions are: Sips of Water with Meds    Check Pulse Oximetry while ambulating    Inpatient consult to Cardiology    Inpatient consult to Hospitalist    Pacer Interrogate    EKG 12 Lead    DME Order for Pulse Ox as OP       MEDICATIONS ORDERED:  Orders Placed This Encounter   Medications    nitroGLYCERIN (NITROSTAT) SL tablet 0.4 mg    fentaNYL (SUBLIMAZE) injection 25 mcg    nitroGLYCERIN (NITROSTAT) SL tablet 0.4 mg    acetaminophen (TYLENOL) tablet 1,000 mg    nitroGLYCERIN 50 mg in dextrose 5% 250 mL infusion    heparin (porcine) injection 4,000 Units    heparin (porcine) injection 4,000 Units    heparin (porcine) injection 2,000 Units    heparin 25,000 units in dextrose 5% 250 mL (premix) infusion    atorvastatin (LIPITOR) tablet 80 mg       DDX: acs, atypical chest pain    DIAGNOSTIC RESULTS / EMERGENCY DEPARTMENT COURSE / MDM   LAB RESULTS:  Results for orders placed or performed during the hospital encounter of 12/23/21   Troponin   Result Value Ref Range    Troponin, High Sensitivity 58 (HH) 0 - 22 ng/L    Troponin T NOT REPORTED <0.03 ng/mL    Troponin Interp NOT REPORTED    CBC WITH AUTO DIFFERENTIAL   Result Value Ref Range    WBC 5.1 3.5 - 11.3 k/uL    RBC 3.20 (L) 4.21 - 5.77 m/uL    Hemoglobin 9.9 (L) 13.0 - 17.0 g/dL    Hematocrit 31.0 (L) 40.7 - 50.3 %    MCV 96.9 82.6 - 102.9 fL    MCH 30.9 25.2 - 33.5 pg    MCHC 31.9 28.4 - 34.8 g/dL    RDW 16.5 (H) 11.8 - 14.4 %    Platelets 565 869 - 325 k/uL    MPV 10.2 8.1 - 13.5 fL    NRBC Automated 0.0 0.0 per 100 WBC    Differential Type NOT REPORTED     WBC Morphology NOT REPORTED     RBC Morphology NOT REPORTED     Platelet Estimate NOT REPORTED     Immature Granulocytes 0 0 %    Seg Neutrophils 65 36 - 66 %    Lymphocytes 18 (L) 24 - 44 %    Monocytes 15 (H) 1 - 7 %    Eosinophils % 1 1 - 4 %    Basophils 1 0 - 2 %    Absolute Immature Granulocyte 0.00 0.00 - 0.30 k/uL    Segs Absolute 3.31 1.8 - 7.7 k/uL    Absolute Lymph # 0.92 (L) 1.0 - 4.8 k/uL    Absolute Mono # 0.77 0.1 - 0.8 k/uL    Absolute Eos # 0.05 0.0 - 0.4 k/uL    Basophils Absolute 0.05 0.0 - 0.2 k/uL    Morphology ANISOCYTOSIS PRESENT    COMPREHENSIVE METABOLIC PANEL   Result Value Ref Range    Glucose 83 70 - 99 mg/dL    BUN 33 (H) 6 - 20 mg/dL    CREATININE 1.88 (H) 0.70 - 1.20 mg/dL    Bun/Cre Ratio NOT REPORTED 9 - 20    Calcium 8.7 8.6 - 10.4 mg/dL    Sodium 137 135 - 144 mmol/L    Potassium 4.1 3.7 - 5.3 mmol/L    Chloride 109 (H) 98 - 107 mmol/L    CO2 16 (L) 20 - 31 mmol/L    Anion Gap 12 9 - 17 mmol/L    Alkaline Phosphatase 68 40 - 129 U/L    ALT 14 5 - 41 U/L    AST 18 <40 U/L    Total Bilirubin 0.58 0.3 - 1.2 mg/dL    Total Protein 6.5 6.4 - 8.3 g/dL    Albumin 2.8 (L) 3.5 - 5.2 g/dL    Albumin/Globulin Ratio 0.8 (L) 1.0 - 2.5    GFR Non- 37 (L) >60 mL/min    GFR  45 (L) >60 mL/min    GFR Comment          GFR Staging NOT REPORTED    Troponin   Result Value Ref Range    Troponin, High Sensitivity 65 (HH) 0 - 22 ng/L    Troponin T NOT REPORTED <0.03 ng/mL    Troponin Interp NOT REPORTED        IMPRESSION: Patient is an alert 67-year-old male no acute distress nontoxic with multiple cardiac comorbidities with substernal chest pain describes as someone sitting on his chest similar nature to prior episodes of MI heart is regular rate and rhythm abdomen soft nontender lungs are clear to auscultation he is afebrile satting 94% on room air recent Covid diagnosis 2 months ago. EKG shows ventricular paced rhythm we will interrogate, obtain labs, troponin, chest x ray and reevaluate. RADIOLOGY:  XR CHEST PORTABLE    Result Date: 12/23/2021  EXAMINATION: ONE XRAY VIEW OF THE CHEST 12/23/2021 3:28 pm COMPARISON: CT December 14, 2021 and chest radiograph December 7, 2021 HISTORY: ORDERING SYSTEM PROVIDED HISTORY: Chest pain TECHNOLOGIST PROVIDED HISTORY: cp Reason for Exam: upr FINDINGS: Moderate cardiomegaly appears unchanged. Biventricular pacer leads in satisfactory position in the AP view. Relatively diffuse multifocal bilateral airspace disease has progressed significantly since prior radiograph. No definite effusion. No pneumothorax or subdiaphragmatic free air. No acute osseous abnormality identified.      Relatively diffuse multifocal bilateral airspace disease most likely representing COVID-19 pneumonia. Pulmonary edema felt less likely. EKG  sinus at a rate of 71 there is leftward axis QTC is prolonged at 534 QRS duration 174 non specific ekg compared with 09-DEC-2021 09:33:51    All EKG's are interpreted by the Emergency Department Physician who either signs or Co-signs this chart in the absence of a cardiologist.    EMERGENCY DEPARTMENT COURSE:  ED Course as of 12/23/21 1835   Thu Dec 23, 2021   1441 Seen and evaluated cp, multiple cardiac risk factors.  Hs at least 4 before troponin [BG]   1443 No aspirin as patient reports allergy [BG]   1530 Cardiomegaly on chest x ray [BG]   1550 Diagnosed with covid 12/7/21 [BG]   1630 Troponin(!!):    Troponin, High Sensitivity 58(!!)   Troponin T NOT REPORTED   Troponin Interp NOT REPORTED [BG]   1632 CBC WITH AUTO DIFFERENTIAL(!):    WBC 5.1   RBC 3.20(!)   Hemoglobin Quant 9.9(!)   Hematocrit 31.0(!)   MCV 96.9   MCH 30.9   MCHC 31.9   RDW 16.5(!)   Platelet Count 909   MPV 10.2   NRBC Automated 0.0   Differential Type NOT REPORTED   WBC Morphology NOT REPORTED   RBC Morphology NOT REPORTED   Platelet Estimate NOT REPORTED   Immature Granulocytes 0   Seg Neutrophils 65   Lymphocytes 18(!)   Monocytes 15(!)   Eosinophils % 1   Basophils 1   Absolute Immature Granulocyte 0.00   Segs Absolute 3.31   Absolute Lymph # 0.92(!)   Absolute Mono # 0.77   Absolute Eos # 0.05   Basophils Absolute 0.05   Morphology ANISOCYTOSIS PRESENT [BG]   1649 Creat stable [BG]   1650 Troponin(!!):    Troponin, High Sensitivity 58(!!)   Troponin T NOT REPORTED   Troponin Interp NOT REPORTED [BG]   1650 COMPREHENSIVE METABOLIC PANEL(!):    Glucose 83   BUN 33(!)   Creatinine 1.88(!)   Bun/Cre Ratio NOT REPORTED   CALCIUM, SERUM, 362718 8.7   Sodium 137   Potassium 4.1   Chloride 109(!)   CO2 16(!)   Anion Gap 12   Alk Phos 68   ALT 14   AST 18   Bilirubin 0.58   Total Protein 6.5   Albumin 2.8(!) Albumin/Globulin Ratio 0.8(!)   GFR Non- 37(!)   GFR  45(!)   GFR Comment        GFR Staging NOT REPORTED [BG]   1811 Nstemi, heparin, cardiology, admit [BG]      ED Course User Index  [BG] Krish Camarena DO       PROCEDURES:      CONSULTS:  IP CONSULT TO CARDIOLOGY  IP CONSULT TO HOSPITALIST    CRITICAL CARE:      FINAL IMPRESSION      1. Acute chest pain    2. NSTEMI (non-ST elevated myocardial infarction) ([facility] [address]          DISPOSITION / PLAN     DISPOSITION  admitted      PATIENT REFERRED TO:  No follow-up provider specified.     DISCHARGE MEDICATIONS:  New Prescriptions    No medications on file       Benjamin Sullivan DO  Emergency Medicine Resident    (Please note that portions of thisnote were completed with a voice recognition program.  Efforts were made to edit the dictations but occasionally words are mis-transcribed.)       Benjamin Sullivan DO  Resident  12/23/21 0054

## 2021-12-23 NOTE — ED PROVIDER NOTES
[facility] [facility]     Emergency Department     Faculty Attestation    I performed a history and physical examination of the patient and discussed management with the resident. I reviewed the residents note and agree with the documented findings and plan of care. Any areas of disagreement are noted on the chart. I was personally present for the key portions of any procedures. I have documented in the chart those procedures where I was not present during the key portions. I have reviewed the emergency nurses triage note. I agree with the chief complaint, past medical history, past surgical history, allergies, medications, social and family history as documented unless otherwise noted below. For Physician Assistant/ Nurse Practitioner cases/documentation I have personally evaluated this patient and have completed at least one if not all key elements of the E/M (history, physical exam, and MDM). Additional findings are as noted. I have personally seen and evaluated the patient. I find the patient's history and physical exam are consistent with the NP/PA documentation. I agree with the care provided, treatment rendered, disposition and follow-up plan. Nonspecific EKG    EKG Interpretation    Interpreted by emergency department physician    Rhythm: normal sinus   Rate: normal  Axis: normal  Conduction: paced  ST Segments: no acute change  T Waves: no acute change  Q Waves: no acute change    Clinical Impression:  nonspecific EKG. Describing an abrupt onset of chest discomfort history of cardiac disease and recent Covid infection as well. Currently vitals are stable lungs are clear heart regular rate and rhythm abdomen soft nontender      Critical Care     Javid David M.D.   Attending Emergency  Physician              Joey Matute MD  12/23/21 2078

## 2021-12-24 ENCOUNTER — APPOINTMENT (OUTPATIENT)
Dept: CT IMAGING | Age: 56
DRG: 287 | End: 2021-12-24
Payer: MEDICARE

## 2021-12-24 ENCOUNTER — APPOINTMENT (OUTPATIENT)
Dept: ULTRASOUND IMAGING | Age: 56
DRG: 287 | End: 2021-12-24
Payer: MEDICARE

## 2021-12-24 LAB
EKG ATRIAL RATE: 42 BPM
EKG Q-T INTERVAL: 492 MS
EKG QRS DURATION: 174 MS
EKG QTC CALCULATION (BAZETT): 534 MS
EKG R AXIS: -99 DEGREES
EKG T AXIS: 92 DEGREES
EKG VENTRICULAR RATE: 71 BPM
GLUCOSE BLD-MCNC: 71 MG/DL (ref 75–110)
HCT VFR BLD CALC: 28.4 % (ref 40.7–50.3)
HEMOGLOBIN: 9 G/DL (ref 13–17)
MCH RBC QN AUTO: 30.9 PG (ref 25.2–33.5)
MCHC RBC AUTO-ENTMCNC: 31.7 G/DL (ref 28.4–34.8)
MCV RBC AUTO: 97.6 FL (ref 82.6–102.9)
NRBC AUTOMATED: 0 PER 100 WBC
PARTIAL THROMBOPLASTIN TIME: 34.2 SEC (ref 20.5–30.5)
PARTIAL THROMBOPLASTIN TIME: 36.7 SEC (ref 20.5–30.5)
PDW BLD-RTO: 16.5 % (ref 11.8–14.4)
PHOSPHORUS: 3.1 MG/DL (ref 2.5–4.5)
PLATELET # BLD: 228 K/UL (ref 138–453)
PMV BLD AUTO: 10.1 FL (ref 8.1–13.5)
RBC # BLD: 2.91 M/UL (ref 4.21–5.77)
TROPONIN INTERP: ABNORMAL
TROPONIN T: ABNORMAL NG/ML
TROPONIN, HIGH SENSITIVITY: 68 NG/L (ref 0–22)
VITAMIN D 25-HYDROXY: 6.2 NG/ML (ref 30–100)
WBC # BLD: 4.4 K/UL (ref 3.5–11.3)

## 2021-12-24 PROCEDURE — 99223 1ST HOSP IP/OBS HIGH 75: CPT | Performed by: PSYCHIATRY & NEUROLOGY

## 2021-12-24 PROCEDURE — 96375 TX/PRO/DX INJ NEW DRUG ADDON: CPT

## 2021-12-24 PROCEDURE — 93880 EXTRACRANIAL BILAT STUDY: CPT

## 2021-12-24 PROCEDURE — 6360000002 HC RX W HCPCS: Performed by: NURSE PRACTITIONER

## 2021-12-24 PROCEDURE — 85027 COMPLETE CBC AUTOMATED: CPT

## 2021-12-24 PROCEDURE — 85730 THROMBOPLASTIN TIME PARTIAL: CPT

## 2021-12-24 PROCEDURE — 92610 EVALUATE SWALLOWING FUNCTION: CPT

## 2021-12-24 PROCEDURE — 96376 TX/PRO/DX INJ SAME DRUG ADON: CPT

## 2021-12-24 PROCEDURE — 70450 CT HEAD/BRAIN W/O DYE: CPT

## 2021-12-24 PROCEDURE — 2060000000 HC ICU INTERMEDIATE R&B

## 2021-12-24 PROCEDURE — 2580000003 HC RX 258: Performed by: NURSE PRACTITIONER

## 2021-12-24 PROCEDURE — 84100 ASSAY OF PHOSPHORUS: CPT

## 2021-12-24 PROCEDURE — 99232 SBSQ HOSP IP/OBS MODERATE 35: CPT | Performed by: FAMILY MEDICINE

## 2021-12-24 PROCEDURE — 6360000002 HC RX W HCPCS: Performed by: FAMILY MEDICINE

## 2021-12-24 PROCEDURE — 93010 ELECTROCARDIOGRAM REPORT: CPT | Performed by: INTERNAL MEDICINE

## 2021-12-24 PROCEDURE — 82947 ASSAY GLUCOSE BLOOD QUANT: CPT

## 2021-12-24 PROCEDURE — 96366 THER/PROPH/DIAG IV INF ADDON: CPT

## 2021-12-24 PROCEDURE — 92523 SPEECH SOUND LANG COMPREHEN: CPT

## 2021-12-24 PROCEDURE — 36415 COLL VENOUS BLD VENIPUNCTURE: CPT

## 2021-12-24 PROCEDURE — 84484 ASSAY OF TROPONIN QUANT: CPT

## 2021-12-24 PROCEDURE — 93005 ELECTROCARDIOGRAM TRACING: CPT | Performed by: FAMILY MEDICINE

## 2021-12-24 PROCEDURE — 99221 1ST HOSP IP/OBS SF/LOW 40: CPT | Performed by: INTERNAL MEDICINE

## 2021-12-24 PROCEDURE — 76775 US EXAM ABDO BACK WALL LIM: CPT

## 2021-12-24 PROCEDURE — 82306 VITAMIN D 25 HYDROXY: CPT

## 2021-12-24 RX ORDER — MORPHINE SULFATE 4 MG/ML
4 INJECTION, SOLUTION INTRAMUSCULAR; INTRAVENOUS EVERY 4 HOURS PRN
Status: COMPLETED | OUTPATIENT
Start: 2021-12-24 | End: 2021-12-25

## 2021-12-24 RX ORDER — HEPARIN SODIUM 1000 [USP'U]/ML
2000 INJECTION, SOLUTION INTRAVENOUS; SUBCUTANEOUS PRN
Status: DISCONTINUED | OUTPATIENT
Start: 2021-12-24 | End: 2021-12-27 | Stop reason: ALTCHOICE

## 2021-12-24 RX ORDER — HEPARIN SODIUM 1000 [USP'U]/ML
4000 INJECTION, SOLUTION INTRAVENOUS; SUBCUTANEOUS ONCE
Status: COMPLETED | OUTPATIENT
Start: 2021-12-24 | End: 2021-12-24

## 2021-12-24 RX ORDER — HEPARIN SODIUM 10000 [USP'U]/100ML
5-30 INJECTION, SOLUTION INTRAVENOUS CONTINUOUS
Status: DISCONTINUED | OUTPATIENT
Start: 2021-12-24 | End: 2021-12-27 | Stop reason: ALTCHOICE

## 2021-12-24 RX ORDER — HEPARIN SODIUM 1000 [USP'U]/ML
4000 INJECTION, SOLUTION INTRAVENOUS; SUBCUTANEOUS PRN
Status: DISCONTINUED | OUTPATIENT
Start: 2021-12-24 | End: 2021-12-27 | Stop reason: ALTCHOICE

## 2021-12-24 RX ORDER — MORPHINE SULFATE 4 MG/ML
4 INJECTION, SOLUTION INTRAMUSCULAR; INTRAVENOUS ONCE
Status: COMPLETED | OUTPATIENT
Start: 2021-12-24 | End: 2021-12-24

## 2021-12-24 RX ORDER — SODIUM BICARBONATE 650 MG/1
650 TABLET ORAL 2 TIMES DAILY
Status: DISCONTINUED | OUTPATIENT
Start: 2021-12-24 | End: 2021-12-29 | Stop reason: HOSPADM

## 2021-12-24 RX ORDER — DEXTROSE AND SODIUM CHLORIDE 5; .45 G/100ML; G/100ML
INJECTION, SOLUTION INTRAVENOUS CONTINUOUS
Status: DISCONTINUED | OUTPATIENT
Start: 2021-12-24 | End: 2021-12-28

## 2021-12-24 RX ADMIN — DEXTROSE AND SODIUM CHLORIDE: 5; 450 INJECTION, SOLUTION INTRAVENOUS at 21:15

## 2021-12-24 RX ADMIN — MORPHINE SULFATE 4 MG: 4 INJECTION INTRAVENOUS at 21:16

## 2021-12-24 RX ADMIN — HEPARIN SODIUM 12 UNITS/KG/HR: 5000 INJECTION, SOLUTION INTRAVENOUS; SUBCUTANEOUS at 16:14

## 2021-12-24 RX ADMIN — DIPHENHYDRAMINE HYDROCHLORIDE 25 MG: 50 INJECTION, SOLUTION INTRAMUSCULAR; INTRAVENOUS at 13:17

## 2021-12-24 RX ADMIN — DIPHENHYDRAMINE HYDROCHLORIDE 25 MG: 50 INJECTION, SOLUTION INTRAMUSCULAR; INTRAVENOUS at 06:18

## 2021-12-24 RX ADMIN — DIPHENHYDRAMINE HYDROCHLORIDE 25 MG: 50 INJECTION, SOLUTION INTRAMUSCULAR; INTRAVENOUS at 18:50

## 2021-12-24 RX ADMIN — MORPHINE SULFATE 4 MG: 4 INJECTION INTRAVENOUS at 02:37

## 2021-12-24 RX ADMIN — HEPARIN SODIUM 4000 UNITS: 1000 INJECTION INTRAVENOUS; SUBCUTANEOUS at 16:18

## 2021-12-24 ASSESSMENT — PAIN SCALES - GENERAL
PAINLEVEL_OUTOF10: 10
PAINLEVEL_OUTOF10: 10
PAINLEVEL_OUTOF10: 8

## 2021-12-24 ASSESSMENT — ENCOUNTER SYMPTOMS
VOMITING: 0
CONSTIPATION: 0
NAUSEA: 0
RHINORRHEA: 0
BACK PAIN: 0
SINUS PRESSURE: 0
CHOKING: 0
COUGH: 0
ABDOMINAL PAIN: 0
WHEEZING: 0
VOICE CHANGE: 0
CHEST TIGHTNESS: 0
DIARRHEA: 0
SHORTNESS OF BREATH: 0

## 2021-12-24 ASSESSMENT — PAIN DESCRIPTION - PROGRESSION
CLINICAL_PROGRESSION: NOT CHANGED

## 2021-12-24 ASSESSMENT — PAIN DESCRIPTION - LOCATION: LOCATION: HEAD

## 2021-12-24 NOTE — CARE COORDINATION
Transitional Planning    Patient frustrated at this time trying to find glasses; will try to do initial assessment again when time permits. Case Management Initial Discharge Plan  Yoshi Cervantes,             Met with:patient to discuss discharge plans. Information verified: address, contacts, phone number, , insurance Yes  Insurance Provider: Camelia Earl [place]    Emergency Contact/Next of Kin name & number: patient declined to have any emergency contacts  Who are involved in patient's support system? none    PCP: No primary care provider on file. Date of last visit: has been homeless and has not seen a PCP unless he was at the hospital or a SNF      Discharge Planning    Living Arrangements:        Home has n/a stories    Patient able to perform ADL's:Independent    Current Services (outpatient & in home)   DME equipment: none  DME provider:      Is patient receiving oral anticoagulation therapy? No      Potential Assistance Needed:       Patient agreeable to home care: Yes  Freedom of choice provided:  yes    Prior SNF/Rehab Placement and Facility: unable to recall SNFs  Agreeable to SNF/Rehab: Yes  [place] of choice provided: yes; patient states \" we will see what happens\" when asked  if agreeable to SNF      Evaluation: yes    Expected Discharge date:       Patient expects to be discharged to: If home: is the family and/or caregiver wiling & able to provide support at home? no  Who will be providing this support? n/a*    Follow Up Appointment: Best Day/ Time:      Transportation provider: needs transportation  Transportation arrangements needed for discharge: Yes    Readmission Risk              Risk of Unplanned Readmission:  21             Does patient have a readmission risk score greater than 14?: Yes  If yes, follow-up appointment must be made within 7 days of discharge.      Goals of Care:       Educated patient on transitional options, provided freedom of choice and are agreeable with plan      Discharge Plan: patient did not want to speak with CM regarding discharge planning. CM explained importance of discharge planning. Patient states he will \"NOT go to a shelter\" When asked if agreeable to SNF if needed, patient stated \"we will see what happens. \"           Electronically signed by Paul Boone RN on 12/24/21 at 5:48 PM EST

## 2021-12-24 NOTE — ED PROVIDER NOTES
[facility] HANDOFF       Handoff taken on the following patient from prior Attending Physician: Dr. Yudelka Schmitt  Pt Name: Teressa Quiros  PCP:  No primary care provider on file. Attestation  I was available and discussed any additional care issues that arose and coordinated the management plans with the resident(s) caring for the patient during my duty period. Any areas of disagreement with resident's documentation of care or procedures are noted on the chart. I was personally present for the key portions of any/all procedures during my duty period. I have documented in the chart those procedures where I was not present during the key portions. CHIEF COMPLAINT       Chief Complaint   Patient presents with    Chest Pain         CURRENT MEDICATIONS     Previous Medications  Previous Medications    APIXABAN (ELIQUIS) 5 MG TABS TABLET    Take 5 mg by mouth 2 times daily    ATORVASTATIN (LIPITOR) 40 MG TABLET    Take 1 tablet by mouth daily    BUMETANIDE (BUMEX) 2 MG TABLET    Take 1 tablet by mouth 2 times daily    CLOPIDOGREL (PLAVIX) 75 MG TABLET    Take 1 tablet by mouth daily    DEXAMETHASONE (DECADRON) 6 MG TABLET    Take 1 tablet by mouth daily for 6 doses    GABAPENTIN (NEURONTIN) 100 MG CAPSULE    Take 1 capsule by mouth 3 times daily for 30 days.     METOPROLOL SUCCINATE (TOPROL XL) 50 MG EXTENDED RELEASE TABLET    Take 1 tablet by mouth daily    PANTOPRAZOLE (PROTONIX) 40 MG TABLET    Take 1 tablet by mouth every morning (before breakfast)    TAMSULOSIN (FLOMAX) 0.4 MG CAPSULE    Take 0.4 mg by mouth daily       Encounter Medications  Orders Placed This Encounter   Medications    nitroGLYCERIN (NITROSTAT) SL tablet 0.4 mg    fentaNYL (SUBLIMAZE) injection 25 mcg    nitroGLYCERIN (NITROSTAT) SL tablet 0.4 mg    acetaminophen (TYLENOL) tablet 1,000 mg    nitroGLYCERIN 50 mg in dextrose 5% 250 mL infusion    heparin (porcine) injection 4,000 Units    heparin (porcine) injection 4,000 Units    heparin (porcine) injection 2,000 Units    heparin 25,000 units in dextrose 5% 250 mL (premix) infusion    atorvastatin (LIPITOR) tablet 80 mg       ALLERGIES     is allergic to nsaids, asa [aspirin], and oxycodone. RECENT VITALS:   Temp: 98.6 °F (37 °C),  Pulse: 70, Resp: 19, BP: (!) 143/107    RADIOLOGY:   XR CHEST PORTABLE   Final Result   Relatively diffuse multifocal bilateral airspace disease most likely   representing COVID-19 pneumonia. Pulmonary edema felt less likely. CT HEAD WO CONTRAST    (Results Pending)   CTA HEAD NECK W CONTRAST    (Results Pending)       LABS:  Labs Reviewed   TROPONIN - Abnormal; Notable for the following components:       Result Value    Troponin, High Sensitivity 58 (*)     All other components within normal limits   CBC WITH AUTO DIFFERENTIAL - Abnormal; Notable for the following components:    RBC 3.20 (*)     Hemoglobin 9.9 (*)     Hematocrit 31.0 (*)     RDW 16.5 (*)     Lymphocytes 18 (*)     Monocytes 15 (*)     Absolute Lymph # 0.92 (*)     All other components within normal limits   COMPREHENSIVE METABOLIC PANEL - Abnormal; Notable for the following components:    BUN 33 (*)     CREATININE 1.88 (*)     Chloride 109 (*)     CO2 16 (*)     Albumin 2.8 (*)     Albumin/Globulin Ratio 0.8 (*)     GFR Non- 37 (*)     GFR  45 (*)     All other components within normal limits   TROPONIN - Abnormal; Notable for the following components:    Troponin, High Sensitivity 65 (*)     All other components within normal limits   CBC   APTT   APTT   APTT   URINE DRUG SCREEN   STROKE PANEL           PLAN/ TASKS OUTSTANDING     Pt with chest pain, Likely admit. Pt has AICD.     8:36 PM EST  Called to bedside for concern for stroke with left sided deficits. Pt has left sided deficits new from less than 30 min prior per nursing. Pt awake and alert with slurred speech and left facial droop and left upper/lower weakness. Stroke alert called.       (Please note that portions of this note were completed with a voice recognition program.  Efforts were made to edit the dictations but occasionally words are mis-transcribed.)    Behzad More MD, MD,   Attending Emergency Physician         Behzad More MD  12/23/21 2038

## 2021-12-24 NOTE — PROGRESS NOTES
[facility]  Office: [address], DO, Wade Stain, DO, Ye Reyna, DO, Anderson Ortega Blood, DO, Jazmine Gonzalez MD, Soledad Ewing MD, Adriana Singh MD, Xiomara Hernandez MD, Elisabeth Beach MD, Analia Vincent MD, Julieth Clifford MD, Daniela Cervantes, DO, Austin Cruz, DO, Rani Landin MD,  Dominguez Andrade, DO, Vitaliy Hess MD, Ty Armenta MD, Oscar Fraire MD, Fina Mckeon MD, Leslie Roca MD, Ana Collins MD, Karl Quiroz MD, Ashley Murray, [employer], [facility], CNP, Viridiana Kelly, CNP, Mathew Dye, CNS, Bao Valentino, CNP, Lior Menon, CNP, George Kapadia, CNP, Jonas Lyman, CNP, Jazmine Alva, CNP, Vincent Guzmán PA-C, Peter Forte, [facility], Shaheed Lux, [facility], Laquita Kaur, CNP, Abhijeet Kendall, CNP, Shashi Smith, [employer], Chino Alston, CNP, Desiree Davalos, [employer], Shakeel Álvarez, [facility]      Daily Progress Note     Admit Date: 2021  Bed/Room No.    Admitting Physician : Adriana Singh MD  Code Status :Full Code  Hospital Day:  LOS: 1 day   Chief Complaint:     Chief Complaint   Patient presents with    Chest Pain     Principal Problem:    Coronary artery disease involving native coronary artery of native heart with angina pectoris [facility])  Active Problems:    Elevated troponin    Chronic systolic heart failure ([facility] [address]    Stage 3 chronic kidney disease [facility])    Acute right-sided weakness    Acute CVA (cerebrovascular accident) [facility])  Resolved Problems:    * No resolved hospital problems. *    Subjective : Interval History/Significant events :  21    Patient continues to complain of right-sided weakness and chest pain. He is n.p.o. awaiting swallow assessment. Patient is on heparin infusion. He does not report any improvement with nitro infusion for chest pain. Vitals - Stable afebrile  Labs -kidney function stable at 1.83. Troponin 68.   CT head showed old infarct in left frontotemporal lobes, bilateral occipital lobes and minimally in the left cerebellar hemisphere without any acute intracranial abnormality. Ultrasound kidney showed absent right kidney. Nursing notes , Consults notes reviewed. Overnight events and updates discussed with Nursing staff . Background History:         Merline Jimenez is 61 y.o. male  Who was admitted to the hospital on 12/23/2021 for treatment of Coronary artery disease involving native coronary artery of native heart with angina pectoris ([address] Patient presented to emergency room with chest pain. Patient is new to the area and has recently moved from [place]. He was admitted a month ago at [facility] with stroke like symptoms with right-sided weakness. Patient received TPA. CT head showed supratentorial bilateral cerebral infarcts embolic in origin. He was also found to have A. fib and given Eliquis. Patient had episode of hematemesis in the hospital which required urgent EGD and did not show any bleeding ulcer except mild gastritis. Patient was advised to have Eliquis and Plavix at discharge. Patient is homeless and has been bouncing between hotels. He left hospital at [address] [place] [address] as he was not satisfied with the care. Patient then got admitted at [facility] with similar symptoms. CT head and neck and CT head showed chronic infarcts without any acute bleeding infarct. Patient had negative VQ scan, chest x-ray showed cardiomegaly, creatinine was stable. Patient was also found to have COVID-19 infection and required oxygen. He was treated with Decadron initially. Patient's breathing improved and was saturating well on room air. Patient was also found to have left sided hydronephrosis with 4 mm nonobstructing left kidney stone. Patient has congenital absence of right kidney. He was evaluated by urology and recommended outpatient follow-up. Patient was uncooperative to during the hospital stay and refusing care and participation with physical therapy.   He was requesting IV Benadryl and IV opioid medications repeatedly. Medications were stopped and patient got upset and left hospital.  Patient then left and went to [facility] emergency room. Patient has been evaluated by stroke team and recommended to monitor. PMH:  Past Medical History:   Diagnosis Date    AICD (automatic cardioverter/defibrillator) present     CKD (chronic kidney disease), stage III ([address]     COVID-19     Myocardial infarction [facility] [address]     S/P CABG (coronary artery bypass graft)     x2 separate occasions    Solitary kidney, congenital       Allergies: Allergies   Allergen Reactions    Nsaids Swelling and Other (See Comments)    Aspirin Swelling and Rash     Other reaction(s): Facial Swelling, Swelling of Lip/Tongue/Throat    Adhesive Tape     Ceftriaxone     Ketorolac Tromethamine     Other      Other reaction(s): Difficulty Breathing    Oxycodone Hives and Rash      Medications :  nitroGLYCERIN, 0.4 mg, SubLINGual, Once  heparin (porcine), 4,000 Units, IntraVENous, Once  clopidogrel, 75 mg, Oral, Daily  tamsulosin, 0.4 mg, Oral, Daily  bumetanide, 2 mg, Oral, BID  gabapentin, 100 mg, Oral, TID  metoprolol succinate, 50 mg, Oral, Daily  sodium chloride flush, 5-40 mL, IntraVENous, 2 times per day  atorvastatin, 80 mg, Oral, Nightly  lidocaine 1 % injection, 5 mL, IntraDERmal, Once  sodium chloride flush, 5-40 mL, IntraVENous, 2 times per day  pantoprazole, 40 mg, IntraVENous, Daily   And  sodium chloride (PF), 10 mL, IntraVENous, Daily        Review of Systems   Review of Systems   Constitutional: Negative for activity change, appetite change, fatigue, fever and unexpected weight change. HENT: Negative for congestion, nosebleeds, rhinorrhea, sinus pressure, sneezing and voice change. Respiratory: Negative for cough, choking, chest tightness, shortness of breath and wheezing. Cardiovascular: Negative for chest pain, palpitations and leg swelling.    Gastrointestinal: Negative for abdominal pain, constipation, diarrhea, nausea and vomiting. Genitourinary: Negative for difficulty urinating, dysuria, frequency, penile discharge and testicular pain. Musculoskeletal: Negative for back pain. Skin: Negative for rash. Neurological: Positive for speech difficulty and weakness. Negative for dizziness, light-headedness and headaches. Hematological: Does not bruise/bleed easily. Psychiatric/Behavioral: Negative for agitation, behavioral problems, confusion, self-injury, sleep disturbance and suicidal ideas. Objective :      Current Vitals : Temp: 98.6 °F (37 °C),  Pulse: 71, Resp: 14, BP: (!) 144/97, SpO2: 95 %  Last 24 Hrs Vitals   Patient Vitals for the past 24 hrs:   BP Temp Temp src Pulse Resp SpO2   12/24/21 0520 (!) 144/97 -- -- 71 14 95 %   12/24/21 0420 135/88 -- -- 71 15 --   12/24/21 0400 (!) 135/92 -- -- 73 15 98 %   12/24/21 0355 (!) 138/97 -- -- 75 17 97 %   12/24/21 0250 (!) 140/88 -- -- 72 19 97 %   12/24/21 0230 (!) 156/108 -- -- 76 18 --   12/24/21 0215 (!) 143/105 -- -- 70 -- (!) 81 %   12/24/21 0209 (!) 143/93 -- -- 70 20 95 %   12/24/21 0030 (!) 135/99 -- -- 70 17 92 %   12/24/21 0020 (!) 136/101 -- -- 70 15 93 %   12/24/21 0000 (!) 146/107 -- -- 79 16 90 %   12/23/21 2350 (!) 123/98 -- -- 71 18 93 %   12/23/21 2330 (!) 141/96 -- -- 70 19 93 %   12/23/21 2320 (!) 150/106 -- -- 71 15 96 %   12/23/21 2300 138/78 -- -- 71 18 94 %   12/23/21 2250 (!) 152/114 -- -- 71 15 95 %   12/23/21 2047 (!) 151/103 -- -- 70 19 96 %   12/23/21 2030 (!) 154/102 -- -- 71 18 95 %   12/23/21 2000 (!) 138/101 -- -- 80 15 (!) 84 %   12/23/21 1950 (!) 134/91 -- -- 73 14 92 %   12/23/21 1440 (!) 143/107 98.6 °F (37 °C) Oral 70 19 94 %     Intake / output   12/23 0701 - 12/24 0700  In: -   Out: 1400 [Urine:1400]  Physical Exam:  Physical Exam  Vitals and nursing note reviewed. Constitutional:       General: He is not in acute distress.   HENT:      Head: Normocephalic and atraumatic. Eyes:      Conjunctiva/sclera: Conjunctivae normal.      Pupils: Pupils are equal, round, and reactive to light. Cardiovascular:      Rate and Rhythm: Normal rate and regular rhythm. Heart sounds: No murmur heard. Pulmonary:      Effort: Pulmonary effort is normal. No accessory muscle usage or respiratory distress. Breath sounds: No stridor. No decreased breath sounds, wheezing, rhonchi or rales. Chest:      Comments: Sternotomy scar  Abdominal:      General: Bowel sounds are normal. There is no distension. Palpations: Abdomen is soft. Abdomen is not rigid. Tenderness: There is no abdominal tenderness. There is no guarding. Musculoskeletal:         General: No tenderness. Skin:     General: Skin is warm and dry. Findings: No erythema, lesion or rash. Neurological:      Mental Status: He is alert and oriented to person, place, and time. Cranial Nerves: No cranial nerve deficit. Motor: No seizure activity. Comments: Effort limited right-sided weakness  Facial asymmetry   Psychiatric:         Mood and Affect: Affect is blunt and angry. Speech: Speech normal.         Behavior: Behavior is uncooperative. Laboratory findings:    Recent Labs     12/23/21  1550 12/23/21  2103 12/23/21  2235   WBC 5.1 Unable to perform testing: Specimen clotted. 4.5   HGB 9.9* Unable to perform testing: Specimen clotted. 9.2*   HCT 31.0* Unable to perform testing: Specimen clotted. 28.7*    Unable to perform testing: Specimen clotted. 229   INR  --  Unable to perform testing: Specimen clotted.  1.2     Recent Labs     12/23/21  1550 12/23/21 2103    138   K 4.1 4.5   * 107   CO2 16* 18*   GLUCOSE 83 81   BUN 33* 33*   CREATININE 1.88* 1.83*   CALCIUM 8.7 8.8     Recent Labs     12/23/21  1550 12/23/21  2103   PROT 6.5  --    LABALBU 2.8*  --    AST 18  --    ALT 14  --    ALKPHOS 68  --    BILITOT 0.58  --    CKTOTAL  --  40   CKMB  -- 3.3          Specific Gravity, UA   Date Value Ref Range Status   12/11/2021 1.015 1.005 - 1.030 Final     Protein, UA   Date Value Ref Range Status   12/11/2021 1+ (A) NEGATIVE Final     RBC, UA   Date Value Ref Range Status   12/11/2021 0 TO 2 0 - 2 /HPF Final     Bacteria, UA   Date Value Ref Range Status   12/11/2021 NOT REPORTED None Final     Nitrite, Urine   Date Value Ref Range Status   12/11/2021 NEGATIVE NEGATIVE Final     WBC, UA   Date Value Ref Range Status   12/11/2021 0 TO 2 0 - 5 /HPF Final     Leukocyte Esterase, Urine   Date Value Ref Range Status   12/11/2021 NEGATIVE NEGATIVE Final       Imaging / Clinical Data :-   CT HEAD WO CONTRAST    Result Date: 12/23/2021  Unchanged chronic encephalomalacia of the right medial occipital lobe and unchanged chronic encephalomalacia of left inferior parietal lobule. No acute territorial infarction, intracranial hemorrhage or mass lesion. Mild chronic microangiopathic ischemic disease. Mild generalized volume loss. RECOMMENDATIONS: Unavailable     XR CHEST PORTABLE    Result Date: 12/23/2021  Relatively diffuse multifocal bilateral airspace disease most likely representing COVID-19 pneumonia. Pulmonary edema felt less likely. Clinical Course : stable  Assessment and Plan  :        1. Chest pain- atypical -cardiology consultation. Currently on heparin infusion , nephrology has been consulted for clearance for possible heart catheterization. 2. Right-sided weakness- neurology evaluation. No new strokes on CT. Unable to perform MRI brain. 3. Chronic systolic CHF s/p AICD -continue Bumex. 4. Stage III CKD -stable  5. H/o CVA -with right hemiparesis  6. Chronic left hydronephrosis outpatient urology follow-up  7. CAD s/p CABG with multiple stents continue Plavix. Eliquis on hold as patient on heparin infusion. 8. Paroxysmal atrial fibrillation -in normal sinus rhythm. 9. Chronic hep C -   10. Homeless -social service consult   11.  Acute gastritis-PPI      Continue to monitor vitals , Intake / output ,  Cell count , HGB , Kidney function, oxygenation  as indicated . Plan and updates discussed with patient ,  answers  explained to satisfaction.    Plan discussed with Staff Clau Romero RN     (Please note that portions of this note were completed with a voice recognition program. Efforts were made to edit the dictations but occasionally words are mis-transcribed.)      Bee Lora MD  12/24/2021

## 2021-12-24 NOTE — PROGRESS NOTES
Called to bedside by RN taking care of this patient who is boarding in the emergency department after a diagnosis of NSTEMI. Upon my arrival to the room patient resting in bed in acute distress with new onset focal weakness he has right-sided deficits involving both the upper and lower extremities right-sided facial droop slurred speech which are new within the last several moments. Stroke alert critical called CT head CT a head and neck ordered as well as stroke panel Intermed service/primary service notified.     [address] [facility] MS, RD  PGY-3 Emergency Medicine

## 2021-12-24 NOTE — CONSULTS
Department of Neurology/Endovascular neurology                                          Resident Stroke Consult  Note                                                       Reason for Consult:  Stroke evaluation   Endovascular Neurosurgeon:   []Dr. Malcolm Cheek  []Dr. Pio Virgen    History Obtained From:  patient, electronic medical record    CHIEF COMPLAINT:       Chest pain    HISTORY OF PRESENT ILLNESS:       The patient is a 61 y.o. male with past medical history of CKD, CAD/MI, heart failure, arrhythmia, CABG, hepatitis C, AICD placement previous strokes on Eliquis and Plavix with baseline right-sided weakness. Presented to the ED today with sudden onset substernal chest pain that radiates to his neck. Chest pain was described as like someone is sitting on his chest and it feels similar to the prior episodes of MI he had. His troponin was 65 with nonspecific EKG. The patient was started on nitroglycerin infusion to help with his chest pain. According to ED, the patient was awake alert oriented had normal speech and no visible neurological deficits on initial presentation to the ED today at around 2.30 PM.  Plan was to admit the patient to medicine as a case of NSTEMI. Upon nursing check while the patient was still in the ED waiting for bed at around 8:10 PM; the nurse noted that the patient had a sudden change with a new right facial droop and right hemiplegia. Stroke alert was called. Upon my encounter, the patient was awake alert and oriented not in distress. Nurse attempting IV line on his right arm. He had visible right facial droop and right-sided weakness with right arm 4 out of 5, right leg 3  out of 5 and hemisensory loss on the right side. He reported that he feels numb on the right side of the face as if he went to the dentist and got an anesthetic. NIH score was 8 [1 vision, 2 face, 1 right arm, 1 right leg, 1 sensory, 1 dysarthria, 1 extinction] SBP 150s, glucose 77. His pupils were equal reactive bilaterally. EOMI    LKW: Symptoms development occurred occurred few minutes prior to stroke alert. Making last well-known around 8:10 PM 2021  Baseline deficits: Right hemiplegia  Prior AP/AC: On Plavix and Eliquis with last dose of Eliquis this morning patient had received heparin already in the ED earlier today for non-STEMI. BP: 150/90  Blood Glucose: 77    CT head showed unchanged encephalomalacia of right medial occipital lobe and unchanged chronic encephalomalacia of left inferior parietal lobe with no acute hemorrhage. Of note, patient had Covid diagnosis 2 weeks ago and was also noted to have sudden onset right-sided weakness with stroke alert and seen by , he had CT head which showed encephalomalacia bilateral occipital and left temporal lobe. CTA was also done at that time was negative and was continued on Plavix and Eliquis. NIH Stroke Scale Total (if not done complete detailed one below):    1a.  Level of consciousness:  0 - alert; keenly responsive  1b. Level of consciousness questions:  0 - answers both questions correctly  1c. Level of consciousness questions:  0 - performs both tasks correctly  2. Best Gaze:  0 - normal  3. Visual:  1  4. Facial Palsy:  2 - partial paralysis (total or near total paralysis of the lower face)  5a. Motor left arm: 0  5b. Motor right arm:  1  6a. Motor left le  6b. Motor right le  7. Limb Ataxia:  0  8. Sensory:  1  9. Best Language: 1  10. Dysarthria: 0  11.   Extinction and Inattention:  1    Total score of 8    PAST MEDICAL HISTORY :       Past Medical History:        Diagnosis Date    AICD (automatic cardioverter/defibrillator) present     CKD (chronic kidney disease), stage III (HCC)     Myocardial infarction ([facility] [address]     S/P CABG (coronary artery bypass graft)     x2 separate occasions    Solitary kidney, congenital        Past Surgical History:        Procedure Laterality Date    CORONARY ARTERY BYPASS GRAFT      CORONARY ARTERY BYPASS GRAFT         Social History:   Social History     Socioeconomic History    Marital status: Single     Spouse name: Not on file    Number of children: Not on file    Years of education: Not on file    Highest education level: Not on file   Occupational History    Not on file   Tobacco Use    Smoking status: Current Every Day Smoker     Packs/day: 0.25    Smokeless tobacco: Never Used   Substance and Sexual Activity    Alcohol use: Not Currently     Comment: rarely    Drug use: Not Currently     Comment: not in over 30 years    Sexual activity: Not on file   Other Topics Concern    Not on file   Social History Narrative    Not on file     Social Determinants of Health     Financial Resource Strain:     Difficulty of Paying Living Expenses: Not on file   Food Insecurity:     Worried About Running Out of Food in the Last Year: Not on file    [place] of Food in the Last Year: Not on file   Transportation Needs:     Lack of Transportation (Medical): Not on file    Lack of Transportation (Non-Medical):  Not on file   Physical Activity:     Days of Exercise per Week: Not on file    Minutes of Exercise per Session: Not on file   Stress:     Feeling of Stress : Not on file   Social Connections:     Frequency of Communication with Friends and Family: Not on file    Frequency of Social Gatherings with Friends and Family: Not on file    Attends Orthodox Services: Not on file    Active Member of Clubs or Organizations: Not on file    Attends Club or Organization Meetings: Not on file    Marital Status: Not on file   Intimate Partner Violence:     Fear of Current or Ex-Partner: Not on file    Emotionally Abused: Not on file    Physically Abused: Not on file    Sexually Abused: Not on file   Housing Stability:     Unable to Pay for Housing in the Last Year: Not on file    Number of [place] in the Last Year: Not on file    Unstable Housing in the Last Year: Not on file       Family History:       Problem Relation Age of Onset   Peggy Mack Cancer Mother     Diabetes Father     Heart Disease Father     Diabetes Paternal Grandmother        Allergies:  Nsaids, Aspirin, Adhesive tape, Ceftriaxone, Ketorolac tromethamine, Other, and Oxycodone    Home Medications:  Prior to Admission medications    Medication Sig Start Date End Date Taking? Authorizing Provider   bumetanide (BUMEX) 2 MG tablet Take 1 tablet by mouth 2 times daily 12/17/21   Marcos Bryant MD   dexamethasone (DECADRON) 6 MG tablet Take 1 tablet by mouth daily for 6 doses 12/18/21 12/24/21  Marcos Bryant MD   gabapentin (NEURONTIN) 100 MG capsule Take 1 capsule by mouth 3 times daily for 30 days.  12/17/21 1/16/22  Marcos Bryant MD   metoprolol succinate (TOPROL XL) 50 MG extended release tablet Take 1 tablet by mouth daily 12/18/21   Marcos Bryant MD   pantoprazole (PROTONIX) 40 MG tablet Take 1 tablet by mouth every morning (before breakfast) 12/18/21   Marcos Bryant MD   clopidogrel (PLAVIX) 75 MG tablet Take 1 tablet by mouth daily    Historical Provider, MD   atorvastatin (LIPITOR) 40 MG tablet Take 1 tablet by mouth daily    Historical Provider, MD   tamsulosin (FLOMAX) 0.4 MG capsule Take 0.4 mg by mouth daily    Historical Provider, MD   apixaban (ELIQUIS) 5 MG TABS tablet Take 5 mg by mouth 2 times daily    Historical Provider, MD       Current Medications:   Current Facility-Administered Medications: nitroGLYCERIN (NITROSTAT) SL tablet 0.4 mg, 0.4 mg, SubLINGual, Once  nitroGLYCERIN 50 mg in dextrose 5% 250 mL infusion, 5-200 mcg/min, IntraVENous, Continuous  heparin (porcine) injection 4,000 Units, 4,000 Units, IntraVENous, Once  heparin (porcine) injection 4,000 Units, 4,000 Units, IntraVENous, PRN  heparin (porcine) injection 2,000 Units, 2,000 Units, IntraVENous, PRN  heparin 25,000 units in dextrose 5% 250 mL (premix) infusion, 5-30 Units/kg/hr, IntraVENous, Continuous  clopidogrel (PLAVIX) tablet 75 mg, 75 mg, Oral, Daily  tamsulosin (FLOMAX) capsule 0.4 mg, 0.4 mg, Oral, Daily  bumetanide (BUMEX) tablet 2 mg, 2 mg, Oral, BID  gabapentin (NEURONTIN) capsule 100 mg, 100 mg, Oral, TID  metoprolol succinate (TOPROL XL) extended release tablet 50 mg, 50 mg, Oral, Daily  sodium chloride flush 0.9 % injection 5-40 mL, 5-40 mL, IntraVENous, 2 times per day  sodium chloride flush 0.9 % injection 10 mL, 10 mL, IntraVENous, PRN  0.9 % sodium chloride infusion, 25 mL, IntraVENous, PRN  potassium chloride (KLOR-CON M) extended release tablet 40 mEq, 40 mEq, Oral, PRN **OR** potassium bicarb-citric acid (EFFER-K) effervescent tablet 40 mEq, 40 mEq, Oral, PRN **OR** potassium chloride 10 mEq/100 mL IVPB (Peripheral Line), 10 mEq, IntraVENous, PRN  ondansetron (ZOFRAN-ODT) disintegrating tablet 4 mg, 4 mg, Oral, Q8H PRN **OR** ondansetron (ZOFRAN) injection 4 mg, 4 mg, IntraVENous, Q6H PRN  acetaminophen (TYLENOL) tablet 650 mg, 650 mg, Oral, Q6H PRN **OR** acetaminophen (TYLENOL) suppository 650 mg, 650 mg, Rectal, Q6H PRN  atorvastatin (LIPITOR) tablet 80 mg, 80 mg, Oral, Nightly  nitroGLYCERIN (NITROSTAT) SL tablet 0.4 mg, 0.4 mg, SubLINGual, Q5 Min PRN  bisacodyl (DULCOLAX) EC tablet 5 mg, 5 mg, Oral, Daily PRN  lidocaine 1 % injection 5 mL, 5 mL, IntraDERmal, Once  sodium chloride flush 0.9 % injection 5-40 mL, 5-40 mL, IntraVENous, 2 times per day  sodium chloride flush 0.9 % injection 5-40 mL, 5-40 mL, IntraVENous, PRN  0.9 % sodium chloride infusion, 25 mL, IntraVENous, PRN  [START ON 12/24/2021] pantoprazole (PROTONIX) injection 40 mg, 40 mg, IntraVENous, Daily **AND** [START ON 12/24/2021] sodium chloride (PF) 0.9 % injection 10 mL, 10 mL, IntraVENous, Daily    REVIEW OF SYSTEMS:       CONSTITUTIONAL: negative for fatigue and malaise   EYES: negative for double vision and photophobia    HEENT: negative for tinnitus and sore throat   RESPIRATORY: negative for cough, shortness of breath   CARDIOVASCULAR:  Positive for chest pain, palpitations   GASTROINTESTINAL: negative for nausea, vomiting   GENITOURINARY: negative for incontinence   MUSCULOSKELETAL: negative for neck or back pain   NEUROLOGICAL: negative for seizures, positive for headache and right facial numbness and right side weakness. PSYCHIATRIC: negative for fatigue     Review of systems otherwise negative. PHYSICAL EXAM:       BP (!) 151/103   Pulse 70   Temp 98.6 °F (37 °C) (Oral)   Resp 19   SpO2 96%      General Examination    General Resting comfortably in bed   Head Normocephalic, right facial droop   Neck Supple, symmetrical. Good ROM. No midline or paraspinal tenderness. Lungs Respirations unlabored, no wheezing   Chest Wall No deformity   Heart RRR, no murmur   Abdomen Soft. Non-tender, non-distended   Extremities No cyanosis or edema or warmth. Pulses 2+ and symmetric   Skin: Skin  turgor normal, no rashes or lesions       Neurological examination:    Mental status   Alert and oriented x 3; following all commands  Speech: Dysarthric     Cranial nerves   II -right partial hemianopsia.   Pupils reactive  III, IV, VI - extraocular muscles intact; no JOHN; no nystagmus; no ptosis   V -decrease sensation over right face                                                             VII -right facial droop                                                         VIII - intact hearing                                                                             IX, X - symmetrical palate elevation                                               XI - symmetrical shoulder shrug                                                       XII - midline tongue without atrophy or fasciculation     Motor function  Strength:   5 out of 5 left upper arm, left leg 4/5  4 out of 5 right arm, RT leg 3/5      Sensory function Intact to touch, pin, vibration, proprioception throughout except for right hemisensory loss over the face and right arm and leg     Cerebellar Intact finger-nose-finger testing. Intact heel-shin testing. No dysdiadochokinesia present. No tremors                    Reflex function 2/4 symmetric throughout . Gait                   not performed         LABS AND IMAGING:     CBC with Differential:    Lab Results   Component Value Date    WBC Unable to perform testing: Specimen clotted. 12/23/2021    RBC Unable to perform testing: Specimen clotted. 12/23/2021    HGB Unable to perform testing: Specimen clotted. 12/23/2021    HCT Unable to perform testing: Specimen clotted. 12/23/2021    PLT Unable to perform testing: Specimen clotted. 12/23/2021    MCV Unable to perform testing: Specimen clotted. 12/23/2021    MCH Unable to perform testing: Specimen clotted. 12/23/2021    MCHC Unable to perform testing: Specimen clotted. 12/23/2021    RDW Unable to perform testing: Specimen clotted. 12/23/2021    LYMPHOPCT Unable to perform testing: Specimen clotted. 12/23/2021    MONOPCT Unable to perform testing: Specimen clotted. 12/23/2021    BASOPCT Unable to perform testing: Specimen clotted. 12/23/2021    MONOSABS Unable to perform testing: Specimen clotted. 12/23/2021    LYMPHSABS Unable to perform testing: Specimen clotted. 12/23/2021    EOSABS Unable to perform testing: Specimen clotted. 12/23/2021    BASOSABS Unable to perform testing: Specimen clotted. 12/23/2021    DIFFTYPE Unable to perform testing: Specimen clotted.  12/23/2021     BMP:    Lab Results   Component Value Date     12/23/2021    K 4.5 12/23/2021     12/23/2021    CO2 18 12/23/2021    BUN 33 12/23/2021    LABALBU 2.8 12/23/2021    CREATININE 1.83 12/23/2021    CALCIUM 8.8 12/23/2021    GFRAA 46 12/23/2021    LABGLOM 38 12/23/2021    GLUCOSE 81 12/23/2021       ASSESSMENT AND PLAN:       Patient Active Problem List   Diagnosis    [facility]) ([facility] [address]    Atypical chest pain    AICD (automatic cardioverter/defibrillator) present    History of MI (myocardial infarction)    Elevated brain natriuretic peptide (BNP) level    Elevated troponin    Chronic systolic heart failure (HCC)    Hx of CABG    Stage 3 chronic kidney disease ([facility] [address]    COVID-19 virus infection    Hydronephrosis of left kidney    Acute urinary retention    Hepatitis C    Coronary artery disease involving native coronary artery of native heart with angina pectoris ([facility] [address]    Solitary kidney, congenital    Homeless single person    Acute right-sided weakness    Acute CVA (cerebrovascular accident) ([facility] [address]       61 y.o. male with past medical history of CKD, CAD/MI, heart failure, arrhythmia, CABG, hepatitis C, AICD placement previous strokes on Eliquis and Plavix with baseline right-sided weakness. Presented to the ED with chest pain and elevated troponin was started on nitroglycerin and was admitted as a case of NSTEMI for medical management. Stroke alert was called in the ED as the nurse noted a sudden change in the neurological exam of the patient with right facial droop and as reported; new right hemiplegia. Patient has baseline right-sided weakness. NIH score of 8. , glucose 77. CT head was negative for acute findings. Not a TPA candidate as the patient was given heparin in the ED and took his Eliquis dose early this morning.    -Discussed with Dr. Myrna Kaufman  -MRI Brain WO   -neck and brain MRA  -Echo with bubble study   -Continue with Eliquis/anticoagulation as per primary  -Continue on 75 mg Plavix  - Lipitor 80 mg qhs   - Fasting Lipid panel  - HbA1c   - We recommend SBP less than 220  - Blood glucose goal less than 180  - Please avoid dextrose containing solutions  - PT, OT, Speech eval  -Telemetry   - Neuro checks per protocol  -Remaining of management per primary    Additional recommendations may follow  Please contact [facility] with any changes in patients neurologic status. Thank you for your consult.      Tyler Torres MD 12/23/2021  10:01 PM

## 2021-12-24 NOTE — CONSULTS
[facility] Cardiology Cardiology    Consult                        Today's Date: 12/24/2021  Patient Name: Villa Muniz  Date of admission: 12/23/2021  2:32 PM  Patient's age: 61 y. o., 1965  Admission Dx: Coronary artery disease involving native coronary artery of native heart with angina pectoris ([facility] [address] [I25.119]    Reason for Consult: NSTEMI. Requesting Physician: No admitting provider for patient encounter. CHIEF COMPLAINT:  Chest pain    History Obtained From:  patient    HISTORY OF PRESENT ILLNESS:      The patient is a 61 y.o.  male who is admitted to the hospital for Chest pain. The patient presented with chest pain, continues, more with deep breathing and coughing, relieved by morphine. Associated with SOB, no dizziness or syncope. Known to have ischemic cardiomyopathy and multiple stents before done in [address] [place] [address],[address]. Past Medical History:   has a past medical history of AICD (automatic cardioverter/defibrillator) present, CKD (chronic kidney disease), stage III ([facility] [address] COVID-19, Myocardial infarction ([facility] [address] S/P CABG (coronary artery bypass graft), and Solitary kidney, congenital.    Past Surgical History:   has a past surgical history that includes Coronary artery bypass graft and Coronary artery bypass graft. Home Medications:    Prior to Admission medications    Medication Sig Start Date End Date Taking? Authorizing Provider   bumetanide (BUMEX) 2 MG tablet Take 1 tablet by mouth 2 times daily 12/17/21   Frankie Cedillo MD   dexamethasone (DECADRON) 6 MG tablet Take 1 tablet by mouth daily for 6 doses 12/18/21 12/24/21  Frankie Cedillo MD   gabapentin (NEURONTIN) 100 MG capsule Take 1 capsule by mouth 3 times daily for 30 days.  12/17/21 1/16/22  Frankie Cedillo MD   metoprolol succinate (TOPROL XL) 50 MG extended release tablet Take 1 tablet by mouth daily 12/18/21   Frankie Cedillo MD   pantoprazole (PROTONIX) 40 MG tablet Take 1 tablet by mouth every morning (before breakfast) 12/18/21   Adriana Singh MD   clopidogrel (PLAVIX) 75 MG tablet Take 1 tablet by mouth daily    Historical Provider, MD   atorvastatin (LIPITOR) 40 MG tablet Take 1 tablet by mouth daily    Historical Provider, MD   tamsulosin (FLOMAX) 0.4 MG capsule Take 0.4 mg by mouth daily    Historical Provider, MD   apixaban (ELIQUIS) 5 MG TABS tablet Take 5 mg by mouth 2 times daily    Historical Provider, MD       Allergies:  Nsaids, Aspirin, Adhesive tape, Ceftriaxone, Ketorolac tromethamine, Other, and Oxycodone    Social History:   reports that he has been smoking. He has been smoking about 0.25 packs per day. He has never used smokeless tobacco. He reports previous alcohol use. He reports previous drug use. Family History: family history includes Cancer in his mother; Diabetes in his father and paternal grandmother; Heart Disease in his father. No h/o sudden cardiac death. No for premature CAD    REVIEW OF SYSTEMS:    · Constitutional: there has been no unanticipated weight loss. There's been No change in energy level, No change in activity level. · Eyes: No visual changes or diplopia. No scleral icterus. · ENT: No Headaches, hearing loss or vertigo. No mouth sores or sore throat. · Cardiovascular: Yes chest pain, Yes dyspnea on exertion, No palpitations or No loss of consciousness. No cough, hemoptysis, Yes pleuritic pain, or phlebitis. · Respiratory: No cough or wheezing, no sputum production. No hematemesis. · Gastrointestinal: No abdominal pain, appetite loss, blood in stools. No change in bowel or bladder habits. · Genitourinary: No dysuria, trouble voiding, or hematuria. · Musculoskeletal:  No gait disturbance, No weakness or joint complaints. · Integumentary: No rash or pruritis. · Neurological: No headache, diplopia, change in muscle strength, numbness or tingling. No change in gait, balance, coordination, mood, affect, memory, mentation, behavior.   · Psychiatric: No anxiety, or depression. · Endocrine: No temperature intolerance. No excessive thirst, fluid intake, or urination. No tremor. · Hematologic/Lymphatic: No abnormal bruising or bleeding, blood clots or swollen lymph nodes. · Allergic/Immunologic: No nasal congestion or hives. PHYSICAL EXAM:      BP (!) 136/90   Pulse 70   Temp 98.6 °F (37 °C) (Oral)   Resp 17   SpO2 93%    Constitutional and General Appearance: alert, cooperative, no distress and appears stated age  HEENT: PERRL, no cervical lymphadenopathy. No masses palpable. Normal oral mucosa  Respiratory:  · Normal excursion and expansion without use of accessory muscles  · Resp Auscultation: Good respiratory effort. No for increased work of breathing. On auscultation: clear to auscultation bilaterally  Cardiovascular:  · The apical impulse is not displaced  · Heart tones are crisp and normal. regular S1 and S2.  · Jugular venous pulsation Normal  · The carotid upstroke is normal in amplitude and contour without delay or bruit  · Peripheral pulses are symmetrical and full  Abdomen:  · No masses or tenderness  · Bowel sounds present  Extremities:  ·  No Cyanosis or Clubbing  ·  Lower extremity edema: No  · Skin: Warm and dry  Neurological:  · Alert and oriented. · Moves all extremities well  · No abnormalities of mood, affect, memory, mentation, or behavior are noted    DATA:    Diagnostics:    EKG:Ventricular paced rhythm. Echo 12/2021  Summary  Left ventricle is enlarged Global left ventricular systolic function is  moderate to severely reduced Estimated ejection fraction is 25-30% . Mild aortic insufficiency. Moderate mitral regurgitation. Moderate tricuspid regurgitation. Moderate pulmonary hypertension. Estimated right ventricular systolic  pressure is 56-16MYNU. No pericardial effusion. Labs:     CBC:   Recent Labs     12/23/21 2103 12/23/21  2235   WBC Unable to perform testing: Specimen clotted.  4.5   HGB Unable to perform testing: Specimen clotted. 9.2*   HCT Unable to perform testing: Specimen clotted. 28.7*   PLT Unable to perform testing: Specimen clotted. 229     BMP:   Recent Labs     12/23/21  1550 12/23/21 2103    138   K 4.1 4.5   CO2 16* 18*   BUN 33* 33*   CREATININE 1.88* 1.83*   LABGLOM 37* 38*   GLUCOSE 83 81     BNP: No results for input(s): BNP in the last 72 hours. PT/INR:   Recent Labs     12/23/21 2103 12/23/21 2235   PROTIME Unable to perform testing: Specimen clotted. 12.7*   INR Unable to perform testing: Specimen clotted. 1.2     APTT:  Recent Labs     12/23/21 2103 12/23/21 2235   APTT Unable to perform testing: Specimen clotted. 23.5     CARDIAC ENZYMES:  Recent Labs     12/23/21 2103   CKTOTAL 40   CKMB 3.3     FASTING LIPID PANEL:  Lab Results   Component Value Date    HDL 48 12/09/2021    TRIG 83 12/09/2021     LIVER PROFILE:  Recent Labs     12/23/21  1550   AST 18   ALT 14   LABALBU 2.8*       IMPRESSION/RECOMMENDATIONS:  1. Chest pain, typical and atypical features. Mildly elevated HS troponin, pain relieved by morphine. Currently on IV heparin and nitro. Does have history of multiple stents done in [place], will obtain records. Continue current medications. Trend troponin. Echo.   2. Ischemic Cardiomyopathy. S/p AICD placement. Continue current medications. 3. Chronic Afib. Rate controlled. 4. CKD. Nephrology consult to evaluate. Clear for possible cath. Discussed with patient and Nurse.     Yudith Ferreira MD, MD  [facility] Consult           [phone]

## 2021-12-24 NOTE — H&P
[facility]  Office: [address], DO, Sun Olvera, DO, Sherry Honeycutt, DO, Ray Piedra, DO, Saumya Casper MD, Gopi Gan MD, Olivia López MD, Faith Palomino MD, Rahul Levin MD, Susana Foster MD, Carlie Owens MD, Jose Martin Dunbar, DO, Ambika Mansfield DO, Gavino Bucio MD,  Deloris Mon DO, Lorenzo Foley MD, Saravanan Salguero MD, Mariah Larsen MD, Glenn Santillan MD, Frederick Reynolds MD, Jose Darby MD, Vy Baez MD, Kylee Wells [facility], [facility], CNP, You Alexis, CNP, Walter Benitez, CNS, Scott Leroy, CNP, Zuhair Manning, CNP, Yoli Cruz, CNP, Olu Stout, CNP, Katerina Keene CNP, Cherie Sanchez PA-C, Irineo Woodard, CLAUDETTE, Negra Bolden DNP, Glenny Piañ, CNP, Lillian Dinero, CNP, Poonam Connolly CNP, Debora Price CNP, Abhijeet Prado, CNP, Chani Ventura CNP         [address]    HISTORY AND PHYSICAL EXAMINATION            Date:   12/23/2021  Patient name:  Constance Miguel  Date of admission:  12/23/2021  2:32 PM  MRN:   [id number]  Account:  [de-identified]  YOB: 1965  PCP:    No primary care provider on file. Room:   11/11  Code Status:    Full Code    Chief Complaint:     Chief Complaint   Patient presents with    Chest Pain       History Obtained From:     EHR, patient unable to give good hx d/t acute neurological changes    History of Present Illness:     Constance Miguel is a 61 y.o. Non- / non  male who presents with Chest Pain   and is admitted to the hospital for the management of Coronary artery disease involving native coronary artery of native heart with angina pectoris ([facility] [address] He admitted to the hospital for complaints of chest pain and COVID-19 pneumonia and was discharged on December 18. Patient was noted then to have had a recent CVA of the thromboembolic nature and was treated with TPA at a [address] [facility]  He had a right sided deficit noticed at that time. Work-up showed supratentorial bilateral cerebral infarcts. He was also diagnosed with A. fib at that time. MRI was not completed due to patient has an AICD. He is on chronic anticoagulation with Eliquis 5 mg twice daily for atrial fib. Also has a history of CAD with CABG and redo x1. On December 14 he had sudden onset right-sided weakness and decreased responsiveness. Work-up was completed and was negative, and CT head revealed chronic infarcts without any acute bleeding or acute infarcts. Came to the ED today complaining of sudden onset substernal chest pain that radiated to his neck and began approximately 2 hours prior to coming to the ED. X-ray was completed and revealed relatively diffuse multifocal bilateral airspace disease most likely representing COVID-19 pneumonia. He does have a known history of COVID-19. Patient did not receive aspirin as he reports an allergy. Bone was noted to be 58, hemoglobin 9.9. Was decided the patient be admitted to the hospital for further observation and work-up for chest pain/NSTEMI. Cardiology was consulted. At approximately 2024, stroke alert was called due to patient had sudden right-sided weakness, loss of sensation on the right side and on the right side of his face, right-sided facial droop, and slurred speech. Stroke team was immediately consulted and evaluated. CT the head showed unchanged encephalomalacia of right medial occipital lobe and unchanged chronic encephalomalacia of left inferior. A lobe with no acute hemorrhage. Plan for patient to have an MRI of the brain without contrast, neck and brain MRA, echo with bubble study, continue with Eliquis, continue Plavix, high-dose statin, maintain SBP less than 220, glycemic control with blood glucose less than 180, avoid dextrose containing solutions, PT/OT/speech eval's, neurochecks per protocol and telemetry.     He is being admitted to the hospital for further observation and management of NSTEMI and possible acute stroke. Past Medical History:     Past Medical History:   Diagnosis Date    AICD (automatic cardioverter/defibrillator) present     CKD (chronic kidney disease), stage III ([facility] [address]     Myocardial infarction ([facility] [address]     S/P CABG (coronary artery bypass graft)     x2 separate occasions    Solitary kidney, congenital         Past Surgical History:     Past Surgical History:   Procedure Laterality Date    CORONARY ARTERY BYPASS GRAFT      CORONARY ARTERY BYPASS GRAFT          Medications Prior to Admission:     Prior to Admission medications    Medication Sig Start Date End Date Taking? Authorizing Provider   bumetanide (BUMEX) 2 MG tablet Take 1 tablet by mouth 2 times daily 12/17/21   Deric Samll MD   dexamethasone (DECADRON) 6 MG tablet Take 1 tablet by mouth daily for 6 doses 12/18/21 12/24/21  Deric Small MD   gabapentin (NEURONTIN) 100 MG capsule Take 1 capsule by mouth 3 times daily for 30 days. 12/17/21 1/16/22  Deric Small MD   metoprolol succinate (TOPROL XL) 50 MG extended release tablet Take 1 tablet by mouth daily 12/18/21   Deric Small MD   pantoprazole (PROTONIX) 40 MG tablet Take 1 tablet by mouth every morning (before breakfast) 12/18/21   Deric Small MD   clopidogrel (PLAVIX) 75 MG tablet Take 1 tablet by mouth daily    Historical Provider, MD   atorvastatin (LIPITOR) 40 MG tablet Take 1 tablet by mouth daily    Historical Provider, MD   tamsulosin (FLOMAX) 0.4 MG capsule Take 0.4 mg by mouth daily    Historical Provider, MD   apixaban (ELIQUIS) 5 MG TABS tablet Take 5 mg by mouth 2 times daily    Historical Provider, MD        Allergies:     Nsaids, Aspirin, Adhesive tape, Ceftriaxone, Ketorolac tromethamine, Other, and Oxycodone    Social History:     Tobacco:    reports that he has been smoking. He has been smoking about 0.25 packs per day. He has never used smokeless tobacco.  Alcohol:      reports previous alcohol use.   Drug Use:  reports previous drug use.    Family History:     Family History   Problem Relation Age of Onset   Gricelda Akers Cancer Mother     Diabetes Father     Heart Disease Father     Diabetes Paternal Grandmother        Review of Systems:     Positive and Negative as described in HPI. Review of Systems   Unable to perform ROS: Mental status change     Stroke alert called at , patient speech altered and right sided weakness  Physical Exam:   BP (!) 151/103   Pulse 70   Temp 98.6 °F (37 °C) (Oral)   Resp 19   SpO2 96%   Temp (24hrs), Av.6 °F (37 °C), Min:98.6 °F (37 °C), Max:98.6 °F (37 °C)    Recent Labs     21   POCGLU 77     No intake or output data in the 24 hours ending 21    Physical Exam  Constitutional:       General: He is in acute distress. Appearance: He is ill-appearing. He is not toxic-appearing or diaphoretic. HENT:      Head: Normocephalic and atraumatic. Right Ear: External ear normal.      Left Ear: External ear normal.      Nose: Nose normal. No rhinorrhea. Mouth/Throat:      Mouth: Mucous membranes are moist.   Eyes:      General: No scleral icterus. Right eye: No discharge. Left eye: No discharge. Extraocular Movements: Extraocular movements intact. Conjunctiva/sclera: Conjunctivae normal.      Pupils: Pupils are equal, round, and reactive to light. Neck:      Comments: No JVD  Cardiovascular:      Rate and Rhythm: Normal rate and regular rhythm. Pulses: Normal pulses. Heart sounds: Normal heart sounds. No murmur heard. No friction rub. No gallop. Pulmonary:      Effort: Pulmonary effort is normal. No respiratory distress. Breath sounds: Normal breath sounds. No wheezing or rhonchi. Comments: On room air  Abdominal:      General: There is no distension. Palpations: Abdomen is soft. Tenderness: There is no abdominal tenderness. There is no guarding. Hernia: No hernia is present. Comments:  Bowel sounds hypoactive Musculoskeletal:      Cervical back: Normal range of motion and neck supple. Right lower leg: Edema present. Left lower leg: Edema present. Comments: 1+ pitting edema BLE   Skin:     General: Skin is warm. Coloration: Skin is pale. Skin is not jaundiced. Findings: No bruising, erythema, lesion or rash. Neurological:      Mental Status: He is alert. GCS: GCS eye subscore is 4. GCS verbal subscore is 4. GCS motor subscore is 6. Cranial Nerves: Facial asymmetry present. Sensory: Sensory deficit present. Motor: Weakness present. No tremor or seizure activity. Coordination: Coordination abnormal. Heel to Gonzalez Test abnormal.      Comments: Patient able to move right UE and LE, however there is marked weakness and decreased coordination   Psychiatric:         Attention and Perception: Attention normal.         Mood and Affect: Mood is anxious. Affect is labile. Speech: Speech is slurred. Behavior: Behavior is cooperative.          Investigations:      Laboratory Testing:  Recent Results (from the past 24 hour(s))   Troponin    Collection Time: 12/23/21  3:50 PM   Result Value Ref Range    Troponin, High Sensitivity 58 (HH) 0 - 22 ng/L    Troponin T NOT REPORTED <0.03 ng/mL    Troponin Interp NOT REPORTED    CBC WITH AUTO DIFFERENTIAL    Collection Time: 12/23/21  3:50 PM   Result Value Ref Range    WBC 5.1 3.5 - 11.3 k/uL    RBC 3.20 (L) 4.21 - 5.77 m/uL    Hemoglobin 9.9 (L) 13.0 - 17.0 g/dL    Hematocrit 31.0 (L) 40.7 - 50.3 %    MCV 96.9 82.6 - 102.9 fL    MCH 30.9 25.2 - 33.5 pg    MCHC 31.9 28.4 - 34.8 g/dL    RDW 16.5 (H) 11.8 - 14.4 %    Platelets 739 295 - 184 k/uL    MPV 10.2 8.1 - 13.5 fL    NRBC Automated 0.0 0.0 per 100 WBC    Differential Type NOT REPORTED     WBC Morphology NOT REPORTED     RBC Morphology NOT REPORTED     Platelet Estimate NOT REPORTED     Immature Granulocytes 0 0 %    Seg Neutrophils 65 36 - 66 %    Lymphocytes 18 (L) 24 - systolic heart failure ([facility] [address] 12/23/2021 Yes    Stage 3 chronic kidney disease ([facility] [address] 12/23/2021 Yes    Acute right-sided weakness 12/23/2021 Yes    Acute CVA (cerebrovascular accident) ([facility] [address] 12/23/2021 Yes          Plan:     Patient status inpatient in the Progressive Unit/Step down    1. CAD: Continue Plavix, Bumex, Lipitor. Continuous telemetry. Supplemental O2 as needed to keep SPO2 greater than 92%. 2. Elevated troponin: Trend troponin. Toprol-XL daily as ordered. Parameters in place. Cardiology following. Await formal recommendations. Heparin gtt. 3. Chronic systolic heart failure: Continue Bumex as ordered. 4. Stage III CKD: Labs reviewed. Avoid nephrotoxic agents as able. Avoid hypotension. Daily BMP. Replace electrolytes as needed. 5. Right-sided weakness: Avoid extremes in blood pressure. PT/OT/speech eval and treat. Neurovascular assessment every 4 hours. Neurology/stroke team following. Appreciate assistance. 6. Acute CVA: Statin, Plavix, aspirin allergy noted, further imaging per neurology recommendations, avoid dextrose containing solutions, blood glucose less than 180, SBP less than 220, check A1c, check lipid panel, continue heparin gtt, obtain echo, MRI brain and neck and brain MRA if AICD device is MRI safe. Interrogate AICD. Continue telemetry. Consultations:   IP CONSULT TO HOSPITALIST  IP CONSULT TO STROKE TEAM  IP CONSULT TO CARDIOLOGY  IP CONSULT TO NEUROLOGY    Patient is admitted as inpatient status because of co-morbidities listed above, severity of signs and symptoms as outlined, requirement for current medical therapies and most importantly because of direct risk to patient if care not provided in a hospital setting. Expected length of stay > 48 hours. VERONICA Fernandez NP  12/23/2021  9:08 PM    Copy sent to Dr. Coty Mendoza primary care provider on file.

## 2021-12-24 NOTE — ED NOTES
Report to Jaren Yoder, with verbal understanding of the patients needs and concerns      Namrata Grande RN  12/24/21 4117

## 2021-12-24 NOTE — CONSULTS
Renal Consult Note    Patient :  Kel Batista; 61 y.o. MRN# [id number]  Location:  5659/9085-12  Attending:  Johnnie Claude, MD  Admit Date:  12/23/2021   Hospital Day: 1    Reason for Consult:     Asked by Dr Johnnie Claude, MD to see patient for Pre OP clearance for heart cath. History Obtained From:     patient, electronic medical record    History of Present Illness:     Kel Batista; 61 y.o. male with past medical history of CKD, CAD s/p CABG, AICD placement and previous strokes is presenting to the hospital with substernal chest pain, 10/10 in intensity, radiating to the shoulder and neck, associated with shortness of breath, nausea and cold sweats on forehead but no vomiting. As per the patient pain aggravates with deep inspiration and improve mildly with painkillers. Patient is unsure about the time of onset of chest pain. Patient was also found to have new onset right facial droop and right hemiplegia for which stroke alert was called and they are working up the patient for acute stroke. Patient was also seen by the cardiology team for NSTEMI and nephrology was consulted for heart cath clearance. Past History/Allergies? Social History:     Past Medical History:   Diagnosis Date    AICD (automatic cardioverter/defibrillator) present     CKD (chronic kidney disease), stage III ([address]     COVID-19     Myocardial infarction ([address]     S/P CABG (coronary artery bypass graft)     x2 separate occasions    Solitary kidney, congenital        Past Surgical History:   Procedure Laterality Date    CORONARY ARTERY BYPASS GRAFT      CORONARY ARTERY BYPASS GRAFT      PACEMAKER PLACEMENT  09/11/2020    [employer] ICD [id number] RV lead 6935-[id number], LV lead [id number].  NOT MRI CONDITIONAL        Allergies   Allergen Reactions    Nsaids Swelling and Other (See Comments)    Aspirin Swelling and Rash     Other reaction(s): Facial Swelling, Swelling of Lip/Tongue/Throat    Adhesive Tape     Ceftriaxone     Ketorolac Tromethamine     Other      Other reaction(s): Difficulty Breathing    Oxycodone Hives and Rash       Social History     Socioeconomic History    Marital status: Single     Spouse name: Not on file    Number of children: Not on file    Years of education: Not on file    Highest education level: Not on file   Occupational History    Not on file   Tobacco Use    Smoking status: Current Every Day Smoker     Packs/day: 0.25    Smokeless tobacco: Never Used   Substance and Sexual Activity    Alcohol use: Not Currently     Comment: rarely    Drug use: Not Currently     Comment: not in over 27 years    Sexual activity: Not on file   Other Topics Concern    Not on file   Social History Narrative    Not on file     Social Determinants of Health     Financial Resource Strain:     Difficulty of Paying Living Expenses: Not on file   Food Insecurity:     Worried About Running Out of Food in the Last Year: Not on file    [place] of Food in the Last Year: Not on file   Transportation Needs:     Lack of Transportation (Medical): Not on file    Lack of Transportation (Non-Medical):  Not on file   Physical Activity:     Days of Exercise per Week: Not on file    Minutes of Exercise per Session: Not on file   Stress:     Feeling of Stress : Not on file   Social Connections:     Frequency of Communication with Friends and Family: Not on file    Frequency of Social Gatherings with Friends and Family: Not on file    Attends Worship Services: Not on file    Active Member of Clubs or Organizations: Not on file    Attends Club or Organization Meetings: Not on file    Marital Status: Not on file   Intimate Partner Violence:     Fear of Current or Ex-Partner: Not on file    Emotionally Abused: Not on file    Physically Abused: Not on file    Sexually Abused: Not on file   Housing Stability:     Unable to Pay for Housing in the Last Year: Not on file    Number of [place] in the Last Year: Not on file    potassium chloride, ondansetron **OR** ondansetron, acetaminophen **OR** acetaminophen, nitroGLYCERIN, bisacodyl, sodium chloride flush, sodium chloride, diphenhydrAMINE    Review of Systems:     Constitutional: Positive for weakness and lethargy but negative for fever insulin  HEENT:  No headache, otalgia, itchy eyes, nasal discharge or sore throat. Cardiac:  Positive for chest pain and dyspnea but negative for orthopnea and PND. Chest:   No cough, phlegm or wheezing. Abdomen:  Positive for nausea but negative for abdominal pain and vomiting  Neuro:  No focal weakness, abnormal movements or seizure like activity. Skin:   No rashes, no itching. :   No hematuria, no pyuria, no dysuria, no flank pain. Extremities:  No swelling or joint pains. ROS was otherwise negative except as mentioned in the [address] Input/Output:       I/O last 3 completed shifts:  In: -   Out: 1400 [Urine:1400]    Vital Signs:   Temperature:  Temp: 98.3 °F (36.8 °C)  TMax:   Temp (24hrs), Av.5 °F (36.9 °C), Min:98.3 °F (36.8 °C), Max:98.6 °F (37 °C)    Respirations:  Resp: 16  Pulse:   Pulse: 75  BP:    BP: (!) 145/93  BP Range: Systolic (07SMK), WRO:016 , Min:123 , ZACHERY:024       Diastolic (11TNT), ULE:24, Min:78, Max:114      Physical Examination:     General:  AAO x 3, speaking in full sentences, no accessory muscle use. HEENT: Atraumatic, normocephalic, no throat congestion, moist mucosa. Eyes:   Pupils equal, round and reactive to light, EOMI. Neck:   Supple  Chest:   Bilateral vesicular breath sounds, no rales or wheezes. Cardiac:  S1 S2 RR, no murmurs, gallops or rubs. Abdomen: Soft, non-tender, no masses or organomegaly, BS audible. :   No suprapubic or flank tenderness. Neuro:  AAO x 3, No FND. SKIN:  No rashes, good skin turgor. Extremities:  No edema. Labs:       Recent Labs     21  1550 12/21  2235   WBC 5.1 Unable to perform testing: Specimen clotted.  4.5   RBC 3.20* Unable to perform testing: Specimen clotted. 3.00*   HGB 9.9* Unable to perform testing: Specimen clotted. 9.2*   HCT 31.0* Unable to perform testing: Specimen clotted. 28.7*   MCV 96.9 Unable to perform testing: Specimen clotted. 95.7   MCH 30.9 Unable to perform testing: Specimen clotted. 30.7   MCHC 31.9 Unable to perform testing: Specimen clotted. 32.1   RDW 16.5* Unable to perform testing: Specimen clotted. 16.5*    Unable to perform testing: Specimen clotted. 229   MPV 10.2 Unable to perform testing: Specimen clotted. 9.7      BMP:   Recent Labs     12/23/21  1550 12/23/21  2103    138   K 4.1 4.5   * 107   CO2 16* 18*   BUN 33* 33*   CREATININE 1.88* 1.83*   GLUCOSE 83 81   CALCIUM 8.7 8.8      Phosphorus:   No results for input(s): PHOS in the last 72 hours. Magnesium:  No results for input(s): MG in the last 72 hours. Albumin:    Recent Labs     12/23/21  1550   LABALBU 2.8*     BNP:    No results found for: BNP  ANNALISE:    No results found for: ANNALISE  SPEP:  Lab Results   Component Value Date    PROT 6.5 12/23/2021    PATH ELECTRONICALLY SIGNED.  Jacky Eng M.D. 12/09/2021     UPEP:   No results found for: LABPE  C3:   No results found for: C3  C4:   No results found for: C4  MPO ANCA:   No results found for: MPO  PR3 ANCA:   No results found for: PR3  Anti-GBM:   No results found for: GBMABIGG  Hep BsAg:         Lab Results   Component Value Date    HEPBSAG NONREACTIVE 12/09/2021     Hep C AB:          Lab Results   Component Value Date    HEPCAB REACTIVE 12/09/2021       Urinalysis/Chemistries:      Lab Results   Component Value Date    NITRU NEGATIVE 12/11/2021    COLORU Yellow 12/11/2021    PHUR 7.0 12/11/2021    WBCUA 0 TO 2 12/11/2021    RBCUA 0 TO 2 12/11/2021    MUCUS NOT REPORTED 12/11/2021    TRICHOMONAS NOT REPORTED 12/11/2021    YEAST NOT REPORTED 12/11/2021    BACTERIA NOT REPORTED 12/11/2021    SPECGRAV 1.015 12/11/2021    LEUKOCYTESUR NEGATIVE 12/11/2021    UROBILINOGEN Normal 12/11/2021    BILIRUBINUR NEGATIVE 12/11/2021    GLUCOSEU NEGATIVE 12/11/2021    KETUA NEGATIVE 12/11/2021    AMORPHOUS NOT REPORTED 12/11/2021     Urine Sodium:     Lab Results   Component Value Date    SHERMAN <20 12/09/2021     Urine Potassium:  No results found for: KUR  Urine Chloride:  No results found for: CLUR  Urine Osmolarity: No results found for: OSMOU  Urine Protein:   No results found for: TPU  Urine Creatinine:     Lab Results   Component Value Date    LABCREA 95.6 12/09/2021     UPC:     Urine Eosinophils:  No components found for: UEOS    Radiology:     Reviewed. Assessment:     1. NSTEMI  2. CKD stage IIIB with baseline creatinine 1.8-2. 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 3. Acute stroke with past history of CVA  4. Congenital absence of right kidney  5. Ischemic cardiomyopathy with EF of 25-30% on echo from 12/3/2021 S/P AICD  6. Hx of CAD s/p CABG  7. Hx of A. Fib  8. Hypertension  9. Hx of COVID-19 infection  10. Mild to moderate left-sided hydronephrosis -chronic  11. Chronic metabolic acidosis    Plan:   1. Will do 1 renal ultrasound to follow-up on the hydronephrosis  2. Maintain intake output record  3. Avoid nephrotoxic drugs. Add oral sodium bicarb  4. Discussed in detail with patient with regards to the renal effects of heart with leading to [place] and even might need hemodialysis  5. Inform nephrology of the schedule so that appropriate MARGARET preventive measures can be initiated. Nutrition   Please ensure that patient is on a renal diet/TF. Avoid nephrotoxic drugs/contrast exposure. Thank you for the consultation. Please do not hesitate to contact us for any further questions/concerns. We will continue to follow along with you. Shahnaz Irizarry MD  Resident internal medicine, PGY-2  [facility].   11:44 AM 12/24/21      This note is created with the assistance of a speech-recognition program. While intending to generate a document that actually reflects the content of the visit, no guarantees can be provided that every mistake has been identified and corrected by editing. Attending Physician Statement  I have discussed the care of Yvonne Davis, including pertinent history and exam findings,  with the Fellow/Residentt. I have reviewed the key elements of all parts of the encounter with the Fellow/ Resident. I agree with the assessment, plan and orders as documented by the resident.     Tin Longoria MD MD, MRCP Thang Jan), FACP   12/24/2021 1:51 PM    Nephrology [address] [place] [address]

## 2021-12-24 NOTE — ED NOTES
Stroke alert called        Swetha Francisco RN  12/23/21 2043       Swetha Francisco RN  12/23/21 2046

## 2021-12-24 NOTE — ED NOTES
Phlebotomy called for morning draw was told they would not be here til 1000, patient is hard stick, was poked 10 times with IV guided ultrasound  EG access is not drawing, has both NTG and heparin infusion.  Waiting on PICC team for accshaji Agarwal RN  12/24/21 1223

## 2021-12-24 NOTE — FLOWSHEET NOTE
[address] [facility] [address]     Emergency/Trauma Note    PATIENT NAME: Braulio Mejia    Shift date: 12.23.2021  Shift day: Thursday   Shift # 2    Room # 11/11   Name: Braulio Mejia            Age: 61 y.o. Gender: male          Sikhism: No Protestant on file   Place of Rastafarian: unknown    Trauma/Incident type: Stroke Alert  Admit Date & Time: 12/23/2021  2:32 PM  TRAUMA NAME: None    ADVANCE DIRECTIVES IN CHART? No    NAME OF DECISION MAKER: None    RELATIONSHIP OF DECISION MAKER TO PATIENT: None    PATIENT/EVENT DESCRIPTION:  Braulio Mejia is a 61 y.o. male who arrived via as [address] [facility] due to stroke-like symptoms. Pt to be admitted to 11/11. SPIRITUAL ASSESSMENT/INTERVENTION:  Patient appears to fearful and anxious. Patient states that he has no family to contact. Patient apologized and seemed to not understand what was wrong with him. Patient stated to medical staff that he has had strokes twice before.  provided space for patient to express feelings, thoughts, and concerns. Attempted to reduce anxiety and provided encouragement. Maintained presence. PATIENT BELONGINGS:  No belongings noted    ANY BELONGINGS OF SIGNIFICANT VALUE NOTED:  None    REGISTRATION STAFF NOTIFIED? Yes      WHAT IS YOUR SPIRITUAL CARE PLAN FOR THIS PATIENT?:   Chaplains will remain available to offer spiritual and emotional support as needed. Electronically signed by Ida Guevara on 12/23/2021 at 10:17 PM.  [facility]  [phone]       12/23/21 2030   Encounter Summary   Services provided to: Patient   Referral/Consult From: Multi-disciplinary team   Support System Unknown   Continue Visiting   ([phone])   Complexity of Encounter Moderate   Length of Encounter 30 minutes   Spiritual Assessment Completed Yes   Crisis   Type Stroke Alert   Assessment Fearful; Anxious;Guilt;Helplessness   Intervention Active listening;Explored feelings, thoughts, concerns;Explored coping resources; Discussed illness/injury and it's impact;Sustaining presence/ Ministry of presence   Outcome Expressed feelings/needs/concerns;Engaged in conversation     Electronically signed by Shiv Boone on 12/23/2021 at 10:17 PM

## 2021-12-24 NOTE — CONSULTS
Department of Neurology                                          Resident  Consult  Note                                                       Reason for Consult:  Stroke evaluation   Endovascular Neurosurgeon:   []Dr. Long Nadeem  []Dr. Ousmane Roger    History Obtained From:  patient, electronic medical record    CHIEF COMPLAINT:       Chest pain    HISTORY OF PRESENT ILLNESS:       The patient is a 61 y.o. male with past medical history of CKD, CAD/MI, heart failure, arrhythmia, CABG, hepatitis C, AICD placement previous strokes on Eliquis and Plavix with baseline right-sided weakness. Presented to the ED today with sudden onset substernal chest pain that radiates to his neck. Chest pain was described as like someone is sitting on his chest and it feels similar to the prior episodes of MI he had. His troponin was 65 with nonspecific EKG. The patient was started on nitroglycerin infusion to help with his chest pain. According to ED, the patient was awake alert oriented had normal speech and no visible neurological deficits on initial presentation to the ED today at around 2.30 PM.  Plan was to admit the patient to medicine as a case of NSTEMI. Upon nursing check while the patient was still in the ED waiting for bed at around 8:10 PM; the nurse noted that the patient had a sudden change with a new right facial droop and right hemiplegia. Stroke alert was called. Upon my encounter, the patient was awake alert and oriented not in distress. Nurse attempting IV line on his right arm. He had visible right facial droop and right-sided weakness with right arm 4 out of 5, right leg 3  out of 5 and hemisensory loss on the right side. He reported that he feels numb on the right side of the face as if he went to the dentist and got an anesthetic. NIH score was 8 [1 vision, 2 face, 1 right arm, 1 right leg, 1 sensory, 1 dysarthria, 1 extinction] SBP 150s, glucose 77.   His pupils were equal reactive bilaterally. EOMI    LKW: Symptoms development occurred occurred few minutes prior to stroke alert. Making last well-known around 8:10 PM 2021  Baseline deficits: Right hemiplegia  Prior AP/AC: On Plavix and Eliquis with last dose of Eliquis this morning patient had received heparin already in the ED earlier today for non-STEMI. BP: 150/90  Blood Glucose: 77    CT head showed unchanged encephalomalacia of right medial occipital lobe and unchanged chronic encephalomalacia of left inferior parietal lobe with no acute hemorrhage. Of note, patient had Covid diagnosis 2 weeks ago and was also noted to have sudden onset right-sided weakness with stroke alert and seen by , he had CT head which showed encephalomalacia bilateral occipital and left temporal lobe. CTA was also done at that time was negative and was continued on Plavix and Eliquis. NIH Stroke Scale Total (if not done complete detailed one below):    1a.  Level of consciousness:  0 - alert; keenly responsive  1b. Level of consciousness questions:  0 - answers both questions correctly  1c. Level of consciousness questions:  0 - performs both tasks correctly  2. Best Gaze:  0 - normal  3. Visual:  1  4. Facial Palsy:  2 - partial paralysis (total or near total paralysis of the lower face)  5a. Motor left arm: 0  5b. Motor right arm:  1  6a. Motor left le  6b. Motor right le  7. Limb Ataxia:  0  8. Sensory:  1  9. Best Language: 1  10. Dysarthria: 0  11.   Extinction and Inattention:  1    Total score of 8    PAST MEDICAL HISTORY :       Past Medical History:        Diagnosis Date    AICD (automatic cardioverter/defibrillator) present     CKD (chronic kidney disease), stage III ([address]     COVID-19     [facility] [facility]     S/P CABG (coronary artery bypass graft)     x2 separate occasions    Solitary kidney, congenital        Past Surgical History:        Procedure Laterality Date    CORONARY ARTERY BYPASS GRAFT      CORONARY ARTERY BYPASS GRAFT         Social History:   Social History     Socioeconomic History    Marital status: Single     Spouse name: Not on file    Number of children: Not on file    Years of education: Not on file    Highest education level: Not on file   Occupational History    Not on file   Tobacco Use    Smoking status: Current Every Day Smoker     Packs/day: 0.25    Smokeless tobacco: Never Used   Substance and Sexual Activity    Alcohol use: Not Currently     Comment: rarely    Drug use: Not Currently     Comment: not in over 30 years    Sexual activity: Not on file   Other Topics Concern    Not on file   Social History Narrative    Not on file     Social Determinants of Health     Financial Resource Strain:     Difficulty of Paying Living Expenses: Not on file   Food Insecurity:     Worried About Running Out of Food in the Last Year: Not on file    [place] of Food in the Last Year: Not on file   Transportation Needs:     Lack of Transportation (Medical): Not on file    Lack of Transportation (Non-Medical):  Not on file   Physical Activity:     Days of Exercise per Week: Not on file    Minutes of Exercise per Session: Not on file   Stress:     Feeling of Stress : Not on file   Social Connections:     Frequency of Communication with Friends and Family: Not on file    Frequency of Social Gatherings with Friends and Family: Not on file    Attends Druze Services: Not on file    Active Member of Clubs or Organizations: Not on file    Attends Club or Organization Meetings: Not on file    Marital Status: Not on file   Intimate Partner Violence:     Fear of Current or Ex-Partner: Not on file    Emotionally Abused: Not on file    Physically Abused: Not on file    Sexually Abused: Not on file   Housing Stability:     Unable to Pay for Housing in the Last Year: Not on file    Number of [place] in the Last Year: Not on file    Unstable Housing in the Last Year: Not on file       Family History:       Problem Relation Age of Onset    Cancer Mother     Diabetes Father     Heart Disease Father     Diabetes Paternal Grandmother        Allergies:  Nsaids, Aspirin, Adhesive tape, Ceftriaxone, Ketorolac tromethamine, Other, and Oxycodone    Home Medications:  Prior to Admission medications    Medication Sig Start Date End Date Taking? Authorizing Provider   bumetanide (BUMEX) 2 MG tablet Take 1 tablet by mouth 2 times daily 12/17/21   Cornelius Rodriguez MD   dexamethasone (DECADRON) 6 MG tablet Take 1 tablet by mouth daily for 6 doses 12/18/21 12/24/21  Cornelius Rodriguez MD   gabapentin (NEURONTIN) 100 MG capsule Take 1 capsule by mouth 3 times daily for 30 days.  12/17/21 1/16/22  Cornelius Rodriguez MD   metoprolol succinate (TOPROL XL) 50 MG extended release tablet Take 1 tablet by mouth daily 12/18/21   Cornelius Rodriguez MD   pantoprazole (PROTONIX) 40 MG tablet Take 1 tablet by mouth every morning (before breakfast) 12/18/21   Cornelius Rodriguez MD   clopidogrel (PLAVIX) 75 MG tablet Take 1 tablet by mouth daily    Historical Provider, MD   atorvastatin (LIPITOR) 40 MG tablet Take 1 tablet by mouth daily    Historical Provider, MD   tamsulosin (FLOMAX) 0.4 MG capsule Take 0.4 mg by mouth daily    Historical Provider, MD   apixaban (ELIQUIS) 5 MG TABS tablet Take 5 mg by mouth 2 times daily    Historical Provider, MD       Current Medications:   Current Facility-Administered Medications: nitroGLYCERIN (NITROSTAT) SL tablet 0.4 mg, 0.4 mg, SubLINGual, Once  nitroGLYCERIN 50 mg in dextrose 5% 250 mL infusion, 5-200 mcg/min, IntraVENous, Continuous  heparin (porcine) injection 4,000 Units, 4,000 Units, IntraVENous, Once  heparin (porcine) injection 4,000 Units, 4,000 Units, IntraVENous, PRN  heparin (porcine) injection 2,000 Units, 2,000 Units, IntraVENous, PRN  heparin 25,000 units in dextrose 5% 250 mL (premix) infusion, 5-30 Units/kg/hr, IntraVENous, Continuous  clopidogrel (PLAVIX) tablet 75 mg, 75 mg, Oral, Daily  tamsulosin (FLOMAX) capsule 0.4 mg, 0.4 mg, Oral, Daily  bumetanide (BUMEX) tablet 2 mg, 2 mg, Oral, BID  gabapentin (NEURONTIN) capsule 100 mg, 100 mg, Oral, TID  metoprolol succinate (TOPROL XL) extended release tablet 50 mg, 50 mg, Oral, Daily  sodium chloride flush 0.9 % injection 5-40 mL, 5-40 mL, IntraVENous, 2 times per day  sodium chloride flush 0.9 % injection 10 mL, 10 mL, IntraVENous, PRN  0.9 % sodium chloride infusion, 25 mL, IntraVENous, PRN  potassium chloride (KLOR-CON M) extended release tablet 40 mEq, 40 mEq, Oral, PRN **OR** potassium bicarb-citric acid (EFFER-K) effervescent tablet 40 mEq, 40 mEq, Oral, PRN **OR** potassium chloride 10 mEq/100 mL IVPB (Peripheral Line), 10 mEq, IntraVENous, PRN  ondansetron (ZOFRAN-ODT) disintegrating tablet 4 mg, 4 mg, Oral, Q8H PRN **OR** ondansetron (ZOFRAN) injection 4 mg, 4 mg, IntraVENous, Q6H PRN  acetaminophen (TYLENOL) tablet 650 mg, 650 mg, Oral, Q6H PRN **OR** acetaminophen (TYLENOL) suppository 650 mg, 650 mg, Rectal, Q6H PRN  atorvastatin (LIPITOR) tablet 80 mg, 80 mg, Oral, Nightly  nitroGLYCERIN (NITROSTAT) SL tablet 0.4 mg, 0.4 mg, SubLINGual, Q5 Min PRN  bisacodyl (DULCOLAX) EC tablet 5 mg, 5 mg, Oral, Daily PRN  lidocaine 1 % injection 5 mL, 5 mL, IntraDERmal, Once  sodium chloride flush 0.9 % injection 5-40 mL, 5-40 mL, IntraVENous, 2 times per day  sodium chloride flush 0.9 % injection 5-40 mL, 5-40 mL, IntraVENous, PRN  0.9 % sodium chloride infusion, 25 mL, IntraVENous, PRN  pantoprazole (PROTONIX) injection 40 mg, 40 mg, IntraVENous, Daily **AND** sodium chloride (PF) 0.9 % injection 10 mL, 10 mL, IntraVENous, Daily  diphenhydrAMINE (BENADRYL) injection 25 mg, 25 mg, IntraVENous, Q6H PRN    REVIEW OF SYSTEMS:       CONSTITUTIONAL: negative for fatigue and malaise   EYES: negative for double vision and photophobia    HEENT: negative for tinnitus and sore throat RESPIRATORY: negative for cough, shortness of breath   CARDIOVASCULAR:  Positive for chest pain, palpitations   GASTROINTESTINAL: negative for nausea, vomiting   GENITOURINARY: negative for incontinence   MUSCULOSKELETAL: negative for neck or back pain   NEUROLOGICAL: negative for seizures, positive for headache and right facial numbness and right side weakness. PSYCHIATRIC: negative for fatigue     Review of systems otherwise negative. PHYSICAL EXAM:       BP (!) 135/99   Pulse 70   Temp 98.6 °F (37 °C) (Oral)   Resp 17   SpO2 92%      General Examination    General Resting comfortably in bed   Head Normocephalic, right facial droop   Neck Supple, symmetrical. Good ROM. No midline or paraspinal tenderness. Lungs Respirations unlabored, no wheezing   Chest Wall No deformity   Heart RRR, no murmur   Abdomen Soft. Non-tender, non-distended   Extremities No cyanosis or edema or warmth. Pulses 2+ and symmetric   Skin: Skin  turgor normal, no rashes or lesions       Neurological examination:    Mental status   Alert and oriented x 3; following all commands  Speech: Dysarthric     Cranial nerves   II -right partial hemianopsia.   Pupils reactive  III, IV, VI - extraocular muscles intact; no JOHN; no nystagmus; no ptosis   V -decrease sensation over right face                                                             VII -right facial droop                                                         VIII - intact hearing                                                                             IX, X - symmetrical palate elevation                                               XI - symmetrical shoulder shrug                                                       XII - midline tongue without atrophy or fasciculation     Motor function  Strength:   5 out of 5 left upper arm, left leg 4/5  4 out of 5 right arm, RT leg 3/5      Sensory function Intact to touch, pin, vibration, proprioception throughout except for right hemisensory loss over the face and right arm and leg     Cerebellar Intact finger-nose-finger testing. Intact heel-shin testing. No dysdiadochokinesia present. No tremors                    Reflex function 2/4 symmetric throughout .       Gait                   not performed         LABS AND IMAGING:     CBC with Differential:    Lab Results   Component Value Date    WBC 4.5 12/23/2021    RBC 3.00 12/23/2021    HGB 9.2 12/23/2021    HCT 28.7 12/23/2021     12/23/2021    MCV 95.7 12/23/2021    MCH 30.7 12/23/2021    MCHC 32.1 12/23/2021    RDW 16.5 12/23/2021    LYMPHOPCT 12 12/23/2021    MONOPCT 8 12/23/2021    BASOPCT 0 12/23/2021    MONOSABS 0.36 12/23/2021    LYMPHSABS 0.54 12/23/2021    EOSABS 0.09 12/23/2021    BASOSABS 0.00 12/23/2021    DIFFTYPE NOT REPORTED 12/23/2021     BMP:    Lab Results   Component Value Date     12/23/2021    K 4.5 12/23/2021     12/23/2021    CO2 18 12/23/2021    BUN 33 12/23/2021    LABALBU 2.8 12/23/2021    CREATININE 1.83 12/23/2021    CALCIUM 8.8 12/23/2021    GFRAA 46 12/23/2021    LABGLOM 38 12/23/2021    GLUCOSE 81 12/23/2021       ASSESSMENT AND PLAN:       Patient Active Problem List   Diagnosis    [facility] (acute [facility]) ([facility] [facility]    Atypical chest pain    AICD (automatic cardioverter/defibrillator) present    History of MI (myocardial infarction)    Elevated brain natriuretic peptide (BNP) level    Elevated troponin    Chronic systolic heart failure (HCC)    Hx of CABG    Stage 3 chronic kidney disease ([address]    COVID-19 virus infection    Hydronephrosis of left kidney    Acute urinary retention    Hepatitis C    Coronary artery disease involving native coronary artery of native heart with angina pectoris ([address]    Solitary kidney, congenital    Homeless single person    Acute right-sided weakness    Acute CVA (cerebrovascular accident) ([facility] [address]       61 y.o. male with past medical history of CKD, CAD/MI, heart failure, arrhythmia,

## 2021-12-24 NOTE — ED NOTES
Per pt.  He is unable to have MRI done because of his type of defibrillator  MRI aware, will notify physician     Sydni Christie RN  12/24/21 0469

## 2021-12-24 NOTE — PROGRESS NOTES
Speech Language Pathology  Facility/Department: [facility] 4B STEPDOWN   CLINICAL BEDSIDE SWALLOW EVALUATION    NAME: Carolin Christina  : 1965  MRN: [id number]    ADMISSION DATE: 2021  ADMITTING DIAGNOSIS: has JACKSON (acute kidney injury) ([address] Atypical chest pain; AICD (automatic cardioverter/defibrillator) present; History of MI (myocardial infarction); Elevated brain natriuretic peptide (BNP) level; Elevated troponin; Chronic systolic heart failure ([address] Hx of CABG; Stage 3 chronic kidney disease ([address] COVID-19 virus infection; Hydronephrosis of left kidney; Acute urinary retention; Hepatitis C; Coronary artery disease involving native coronary artery of native heart with angina pectoris ([address] Solitary kidney, congenital; Homeless single person; Acute right-sided weakness; Acute CVA (cerebrovascular accident) ([address] and Acute chest pain on their problem list.    Date of Eval: 2021  Evaluating Therapist: TAMARA Levine    Current Diet level:  Current Diet : NPO  Current Liquid Diet : NPO      Primary ComplaintGerald Romayne Arias; 61 y.o. male with past medical history of CKD, CAD s/p CABG, AICD placement and previous strokes is presenting to the hospital with substernal chest pain, 10/10 in intensity, radiating to the shoulder and neck, associated with shortness of breath, nausea and cold sweats on forehead but no vomiting. As per the patient pain aggravates with deep inspiration and improve mildly with painkillers. Patient is unsure about the time of onset of chest pain. Patient was also found to have new onset right facial droop and right hemiplegia for which stroke alert was called and they are working up the patient for acute stroke. Patient was also seen by the cardiology team for NSTEMI and nephrology was consulted for heart cath clearance.        Pain:  Pain Assessment  Pain Assessment: 0-10  Pain Level: 0    Reason for Referral  Carolin Christina was referred for a bedside swallow evaluation to assess the efficiency of his swallow function, identify signs and symptoms of aspiration and make recommendations regarding safe dietary consistencies, effective compensatory strategies, and safe eating environment. Impression:  Pt. Presents with + s/s of aspiration with puree and nectar thick liquid trials. Pt. With right sided weakness with minimal movement. Tongue deviation. A-P transit appears to be [facility]. ST to recommend NPO and modified barium swallow study to r/o/confirm aspiration. Results and recommendations reported to RN. Treatment Plan  Requires SLP Intervention: Yes  Duration/Frequency of Treatment: 3-5 x week  D/C Recommendations: Further therapy recommended at discharge. Recommended Diet and Intervention  Diet Solids Recommendation: NPO  Liquid Consistency Recommendation: NPO  Recommended Form of Meds: Via alternative means of nutrition  Recommendations: NPO;Modified barium swallow study  Therapeutic Interventions: Oral motor exercises; Elina; Patient/Family education    Treatment/Goals  Short-term Goals  Goal 1: Pt. will complete OMEX for dysphagia 10-20 x per session. Dysphagia Goals: The patient will tolerate instrumental swallowing procedure    General  Chart Reviewed: Yes  Behavior/Cognition: Alert; Cooperative  Temperature Spikes Noted: No  Respiratory Status: O2 via nasual cannula  O2 Device: Nasal cannula  Communication Observation: Dysarthria  Follows Directions: Simple  Patient Positioning: Upright in bed  Consistencies Administered: Dysphagia Pureed (Dysphagia I); Nectar - teaspoon    Vision/Hearing  Vision  Vision: Impaired (wears glasses)  Hearing  Hearing: Within functional limits    Oral Motor Deficits  Oral/Motor  Oral Motor: Exceptions to [facility]  Labial Symmetry: Abnormal symmetry right  Lingual Symmetry: Abnormal symmetry left    Oral Phase Dysfunction  Oral Phase  Oral Phase: WFL     Indicators of Pharyngeal Phase Dysfunction   Pharyngeal Phase  Pharyngeal Phase: Exceptions  Pharyngeal Phase   Pharyngeal: Pt. with + s/s of aspiration with puree and nectar thick liquid. Prognosis  Prognosis  Prognosis for safe diet advancement: fair  Individuals consulted  Consulted and agree with results and recommendations: Patient;RN    Education  Patient Education: yes  Patient Education Response: Needs reinforcement             Therapy Time  SLP Individual Minutes  Time In: 1140  Time Out: 1150  Minutes: Brandin Sorensen M.A.  CCC-SLP  12/24/2021 12:41 PM

## 2021-12-24 NOTE — PROGRESS NOTES
Speech Language Pathology  Facility/Department: [facility] 4B STEPDOWN  Initial Speech/Language/Cognitive Assessment    NAME: Yvonne Davis  : 1965   MRN: [id number]  ADMISSION DATE: 2021  ADMITTING DIAGNOSIS: has JACKSON (acute kidney injury) ([facility] [address] Atypical chest pain; AICD (automatic cardioverter/defibrillator) present; History of MI (myocardial infarction); Elevated brain natriuretic peptide (BNP) level; Elevated troponin; Chronic systolic heart failure ([facility] [address] Hx of CABG; Stage 3 chronic kidney disease ([facility] [address] COVID-19 virus infection; Hydronephrosis of left kidney; Acute urinary retention; Hepatitis C; Coronary artery disease involving native coronary artery of native heart with angina pectoris ([facility] [address] Solitary kidney, congenital; Homeless single person; Acute right-sided weakness; Acute CVA (cerebrovascular accident) [facility]); and Acute chest pain on their problem list.    Date of Eval: 2021   Evaluating Therapist: TAMARA Moser    Primary Complaint: Yvonne Davis; 61 y.o. male with past medical history of CKD, CAD s/p CABG, AICD placement and previous strokes is presenting to the hospital with substernal chest pain, 10/10 in intensity, radiating to the shoulder and neck, associated with shortness of breath, nausea and cold sweats on forehead but no vomiting. As per the patient pain aggravates with deep inspiration and improve mildly with painkillers. Patient is unsure about the time of onset of chest pain. Patient was also found to have new onset right facial droop and right hemiplegia for which stroke alert was called and they are working up the patient for acute stroke. Patient was also seen by the cardiology team for NSTEMI and nephrology was consulted for heart cath clearance. Pain:  Pain Assessment  Pain Assessment: 0-10  Pain Level: 0    Assessment:  Pt presents with moderate-severe expressive and receptive language deficits.   Pt. Demonstrated difficulty following 2 unit commands, answering y/n questions, completing automatic speech tasks and confrontational naming tasks. Pt. With severe right sided facial weakness with minimal movement.  + tongue deviation. Pt with moderate-severe dysarthria. Yes bias noted. Pt. Became agitated and perseverating on finding glasses. Unable to complete remainder of evaluation. ST to follow up and provide treatment to address noted deficits. Education provided. Recommendations:  Requires SLP Intervention: Yes  Duration/Frequency of Treatment: 3-5 x week  D/C Recommendations: Further therapy recommended at discharge. Plan:   Goals:  Short-term Goals  Goal 1: Pt. will answer simple y/n questions with 90% accuracy. Goal 2: Pt. will follow 2 unit commands with 90% accuracy. Goal 3: Pt. will complete OMEX for facial weakness 10-20 x per session. Goal 4: Pt. will complete automatic speech tasks with 90% accuracy. Goal 5: Pt. will complete confrontational naming tasks with 90% accuracy. Goal 6: Pt. will complete remainder of evaluation as appropriate. Patient/family involved in developing goals and treatment plan: yes    Subjective:  General  Chart Reviewed: Yes  Family / Caregiver Present: No     Vision  Vision: Impaired (wears glasses)  Hearing  Hearing: Within functional limits           Objective:     Oral/Motor  Oral Motor: Exceptions to [facility]  Labial Symmetry: Abnormal symmetry right  Lingual Symmetry: Abnormal symmetry left    Auditory Comprehension  Comprehension: Exceptions  Yes/No Questions: Moderate (Pt. presented with yes bias)  One Step Basic Commands:  [facility] [place] [facility])  Two Step Basic Commands: Moderate  Common Objects: To be assessed in therapy    Expression  Primary Mode of Expression: Verbal    Verbal Expression  Verbal Expression: Exceptions to functional limits  Automatic Speech: Severe (# 1-10: 3/10)  Confrontation: Severe (0/3)  Divergent: To be assessed in therapy  Responsive: To be assessed in therapy   Sentence Completion:  To be assessed in therapy    Motor Speech  Motor Speech: Exceptions to [facility]  Dysarthria : Severe    Cognition:      Orientation  Overall Orientation Status: Impaired    Prognosis:  Speech Therapy Prognosis  Prognosis: Fair  Individuals consulted  Consulted and agree with results and recommendations: Patient    Education:  Patient Education: yes  Patient Education Response: Verbalizes understanding          Therapy Time:   Individual Concurrent Group Co-treatment   Time In 1150         Time Out 1200         Minutes 5001 LINDA Morales M.A. CCC-SLP  12/24/2021 1:00 PM

## 2021-12-24 NOTE — PLAN OF CARE
Patient has a [employer] ICD, model # T034014, RV lead 6935-m55, LV lead [id number]. Per [employer], this is not MRI conditional, patient cannot have a MRI. Any questions please call MRI [phone].

## 2021-12-24 NOTE — ED NOTES
Sung Madrid NP from internal [facility] at bedside , to evualate the patient at bedside      Darwin Guzmán RN  12/23/21 2046

## 2021-12-24 NOTE — PROGRESS NOTES
[facility]   Progress Note                   Date:   12/24/2021  Patient name: Jassi Stephens  Date of admission:  12/23/2021  2:32 PM  MRN:   [id number]  YOB: 1965  PCP: No primary care provider on file. Reason for Admission: Acute chest pain [R07.9]  NSTEMI (non-ST elevated myocardial infarction) ([facility] [address] [I21.4]  Coronary artery disease involving native coronary artery of native heart with angina pectoris ([facility] [address] [I25.119]    Subjective:       Clinical Changes / Abnormalities: Pt seen and examined in the room. Pt denies any sob but is having some CP today. He states that it feels like his \"chest is coming out\"       Medications:   Scheduled Meds:   nitroGLYCERIN  0.4 mg SubLINGual Once    heparin (porcine)  4,000 Units IntraVENous Once    clopidogrel  75 mg Oral Daily    tamsulosin  0.4 mg Oral Daily    bumetanide  2 mg Oral BID    gabapentin  100 mg Oral TID    metoprolol succinate  50 mg Oral Daily    sodium chloride flush  5-40 mL IntraVENous 2 times per day    atorvastatin  80 mg Oral Nightly    lidocaine 1 % injection  5 mL IntraDERmal Once    sodium chloride flush  5-40 mL IntraVENous 2 times per day    pantoprazole  40 mg IntraVENous Daily    And    sodium chloride (PF)  10 mL IntraVENous Daily     Continuous Infusions:   nitroGLYCERIN 60 mcg/min (12/24/21 0754)    heparin (PORCINE) Infusion 930 Units/hr (12/23/21 2338)    sodium chloride      sodium chloride       CBC:   Recent Labs     12/23/21  1550 12/23/21  2103 12/23/21  2235   WBC 5.1 Unable to perform testing: Specimen clotted. 4.5   HGB 9.9* Unable to perform testing: Specimen clotted. 9.2*    Unable to perform testing: Specimen clotted.  229     BMP:    Recent Labs     12/23/21  1550 12/23/21  2103    138   K 4.1 4.5   * 107   CO2 16* 18*   BUN 33* 33*   CREATININE 1.88* 1.83*   GLUCOSE 83 81     Hepatic:   Recent Labs     12/23/21  1550   AST 18   ALT 14   BILITOT 0.58   ALKPHOS 68 Troponin:   Recent Labs     12/23/21  1727 12/23/21  2103 12/23/21  2236   TROPHS 65* 65* 62*     BNP: No results for input(s): BNP in the last 72 hours. Lipids: No results for input(s): CHOL, HDL in the last 72 hours. Invalid input(s): LDLCALCU  INR:   Recent Labs     12/23/21  2103 12/23/21  2235   INR Unable to perform testing: Specimen clotted. 1.2     Diagnostics:    EKG:Ventricular paced rhythm.      Echo 12/2021  Summary  Left ventricle is enlarged Global left ventricular systolic function is  moderate to severely reduced Estimated ejection fraction is 25-30% . Mild aortic insufficiency. Moderate mitral regurgitation. Moderate tricuspid regurgitation. Moderate pulmonary hypertension. Estimated right ventricular systolic  pressure is 59-49TTNU. No pericardial effusion. Objective:   Vitals: BP (!) 145/93   Pulse 75   Temp 98.3 °F (36.8 °C) (Axillary)   Resp 16   SpO2 93%   General appearance: alert and cooperative with exam  HEENT: Head: Normocephalic, no lesions, without obvious abnormality. Neck: no JVD, trachea midline, no adenopathy  Lungs: Clear to auscultation  Heart: Regular rate and rhythm, s1/s2 auscultated, no murmurs  Abdomen: soft, non-tender, bowel sounds active  Extremities: no edema  Neurologic: not done        Assessment / Acute Cardiac Problems:   1. CP  2. CHF   3. PAF  4.  CKD     Patient Active Problem List:     JACKSON (acute kidney injury) ([facility] [address]     Atypical chest pain     AICD (automatic cardioverter/defibrillator) present     History of MI (myocardial infarction)     Elevated brain natriuretic peptide (BNP) level     Elevated troponin     Chronic systolic heart failure (HCC)     Hx of CABG     Stage 3 chronic kidney disease ([facility] [address]     COVID-19 virus infection     Hydronephrosis of left kidney     Acute urinary retention     Hepatitis C     Coronary artery disease involving native coronary artery of native heart with angina pectoris (HCC)     Solitary kidney, congenital Homeless single person     Acute right-sided weakness     Acute CVA (cerebrovascular accident) [facility])      Plan of Treatment:   1. Chest pain with hx of CAD. Type I vs II MI. Continue IV heparin. Will restart Nitro gtt if needed. Add imdur 30mg daily. Continue brilinta, BB, statin   2. ICMP s/p AICD. Continue current medications. No fluid overload today. Continue bumex. 3. CKD. Will need clearance for cath. 4. Afib. Stable. Continue BB. On heparin gtt. 5. ECHO reviewed.        Electronically signed by VERONICA Ace CNP on 12/24/2021 at 11:02 [address] [place] [address].  [phone]

## 2021-12-24 NOTE — CONSULTS
Endovascular Neurosurgery Consult      Reason for evaluation: R rusty weakness and vision loss, r/o LVO    SUBJECTIVE:   History of Chief Complaint:    59yo homeless male with pmh of smoker, drug seeking behavior, strokes (b/l occipital, L crebellar), CAD s/p cabg, solitary kidney with ckd, hfref s/p aicd, on ac (eliquis and plavix, unclear what indication), covid positive 12/14/2021. Pt admitted 12/23/2021 with c/p and nstemi, code stroke called 12/23/2021 pm for R side weakness, slurred speech, R hemianopia. CT head neg at the time. Team consulted for persistent symptoms. Of note, similar symptoms have happened recently 12/14/2021: pt admitted for c/p and was treated for NSTEMI. Just prior to discharge pt had acute onset R hemiplegia, code stroke was called. CT and CTA at that time was neg for acute changes. Pt reports repeated episodes of R side weakness dating back at least to 2019. He reports that episodes were \"too many to count,\" and has had 2 in the last month. Reportedly episodes take months to recover. Allergies  is allergic to nsaids, aspirin, adhesive tape, ceftriaxone, ketorolac tromethamine, other, and oxycodone. Medications  Prior to Admission medications    Medication Sig Start Date End Date Taking? Authorizing Provider   bumetanide (BUMEX) 2 MG tablet Take 1 tablet by mouth 2 times daily 12/17/21   Sierra Mccormick MD   dexamethasone (DECADRON) 6 MG tablet Take 1 tablet by mouth daily for 6 doses 12/18/21 12/24/21  Sierra Mccormick MD   gabapentin (NEURONTIN) 100 MG capsule Take 1 capsule by mouth 3 times daily for 30 days.  12/17/21 1/16/22  Sierra Mccormick MD   metoprolol succinate (TOPROL XL) 50 MG extended release tablet Take 1 tablet by mouth daily 12/18/21   Sierra Mccormick MD   pantoprazole (PROTONIX) 40 MG tablet Take 1 tablet by mouth every morning (before breakfast) 12/18/21   Sierra Mccormick MD   clopidogrel (PLAVIX) 75 MG tablet Take 1 tablet by mouth daily    Historical Provider, MD   atorvastatin (LIPITOR) 40 MG tablet Take 1 tablet by mouth daily    Historical Provider, MD   tamsulosin (FLOMAX) 0.4 MG capsule Take 0.4 mg by mouth daily    Historical Provider, MD   apixaban (ELIQUIS) 5 MG TABS tablet Take 5 mg by mouth 2 times daily    Historical Provider, MD    Scheduled Meds:   apixaban  5 mg Oral BID    nitroGLYCERIN  0.4 mg SubLINGual Once    clopidogrel  75 mg Oral Daily    tamsulosin  0.4 mg Oral Daily    bumetanide  2 mg Oral BID    gabapentin  100 mg Oral TID    metoprolol succinate  50 mg Oral Daily    sodium chloride flush  5-40 mL IntraVENous 2 times per day    atorvastatin  80 mg Oral Nightly    lidocaine 1 % injection  5 mL IntraDERmal Once    sodium chloride flush  5-40 mL IntraVENous 2 times per day    pantoprazole  40 mg IntraVENous Daily    And    sodium chloride (PF)  10 mL IntraVENous Daily     Continuous Infusions:   nitroGLYCERIN 70 mcg/min (12/24/21 1106)    sodium chloride      sodium chloride       PRN Meds:.sodium chloride flush, sodium chloride, potassium chloride **OR** potassium alternative oral replacement **OR** potassium chloride, ondansetron **OR** ondansetron, acetaminophen **OR** acetaminophen, nitroGLYCERIN, bisacodyl, sodium chloride flush, sodium chloride, diphenhydrAMINE  Past Medical History   has a past medical history of AICD (automatic cardioverter/defibrillator) present, CKD (chronic kidney disease), stage III ([facility] [address] COVID-19, Myocardial infarction ([facility] [address] S/P CABG (coronary artery bypass graft), and Solitary kidney, congenital.  Past Surgical History   has a past surgical history that includes Coronary artery bypass graft; Coronary artery bypass graft; and pacemaker placement (09/11/2020). Social History   reports that he has been smoking. He has been smoking about 0.25 packs per day. He has never used smokeless tobacco.   reports previous alcohol use. reports previous drug use.   Family History  family history includes Cancer in his mother; Diabetes in his father and paternal grandmother; Heart Disease in his father. Review of Systems:  CONSTITUTIONAL:  negative for fevers, chills, fatigue and malaise    EYES:  negative for double vision, blurred vision and photophobia     HEENT:  negative for tinnitus, epistaxis and sore throat    RESPIRATORY:  negative for cough, shortness of breath, wheezing    CARDIOVASCULAR:  negative for chest pain, palpitations, syncope, edema    GASTROINTESTINAL:  negative for nausea, vomiting    GENITOURINARY:  negative for incontinence    MUSCULOSKELETAL:  negative for neck or back pain    NEUROLOGICAL:  Negative for weakness and tingling  negative for headaches and dizziness    PSYCHIATRIC:  negative for anxiety      Review of systems otherwise negative. OBJECTIVE:     Vitals:    12/24/21 1015   BP: (!) 145/93   Pulse: 75   Resp: 16   Temp: 98.3 °F (36.8 °C)   SpO2:         General:  Gen: normal habitus, NAD  HEENT: NCAT, mucosa moist  Cvs: RRR, S1 S2 normal  Resp: symmetric unlabored breathing  Abd: s/nd/nt  Ext: no edema  Skin: no lesions seen, warm and dry    Neuro:  Gen: awake and alert, oriented x3. Lang/speech: no aphasia. moderate dysarthria. Follows commands. CN: PERRL, EOMI, R eye blurry unable to count fingers, L eye r inferior quadrantanopia. V1-3 decreased LT on R, R face droop, hearing intact  Motor: L hemibody 5/5, R hemibody 4-/5 with drift. Sense: decreased LT R hemibody  Coord: no gross ataxia  DTR: deferred  Gait: deferred    NIH Stroke Scale:   1a  Level of consciousness: 0 - alert; keenly responsive   1b. LOC questions:  0 - answers both questions correctly   1c. LOC commands: 0 - performs both tasks correctly   2. Best Gaze: 0 - normal   3. Visual: 1 - partial hemianopia   4. Facial Palsy: 2 - partial paralysis (total or near total paralysis of the lower face)   5a. Motor left arm: 0 - no drift, limb holds 90 (or 45) degrees for full 10 seconds   5b.   Motor right arm: 1 - drift, limb holds 90 (or 45) degrees but drifts down before full 10 seconds: does not hit bed   6a. Motor left le - no drift; leg holds 30 degree position for full 5 seconds   6b  Motor right le - drift; leg falls by the end of the 5 second period but does not hit bed   7. Limb Ataxia: 0 - absent   8. Sensory: 1 - mild to moderate sensory loss; patient feels pinprick is less sharp or is dull on the affected side; there is a loss of superficial pain with pinprick but patient is aware of being touched    9. Best Language:  0 - no aphasia, normal   10. Dysarthria: 1 - mild to moderate, patient slurs at least some words and at worst, can be understood with some difficulty   11. Extinction and Inattention: 0 - no abnormality         Total:   7     MRS: 3      LABS:   Reviewed. Lab Results   Component Value Date    HGB 9.2 (L) 2021    WBC 4.5 2021     2021     2021    BUN 33 (H) 2021    CREATININE 1.83 (H) 2021    AST 18 2021    ALT 14 2021    MG 1.7 2021    APTT 23.5 2021    INR 1.2 2021      Lab Results   Component Value Date    COVID19 DETECTED 2021       RADIOLOGY:   Images were personally reviewed including:  CT head wo con repeat 2021  No acute intracranial abnormality.       Old infarctions in the left frontotemporal lobes, bilateral occipital lobes   and minimally in the left cerebellar hemisphere, stable. CT head wo con 2021  Unchanged chronic encephalomalacia of the right medial occipital lobe and   unchanged chronic encephalomalacia of left inferior parietal lobule.       No acute territorial infarction, intracranial hemorrhage or mass lesion.       Mild chronic microangiopathic ischemic disease.       Mild generalized volume loss.      CTA head and neck w con 12/15/2021  No flow-limiting arterial stenosis in the head and neck.       Patchy ground-glass infiltrates in the visualized lungs.  Mediastinal lymphadenopathy. ASSESSMENT:   59yo homeless male with pmh of smoker, drug seeking behavior, strokes (b/l occipital, L crebellar), CAD s/p cabg, solitary kidney with ckd, hfref s/p aicd, on ac (eliquis and plavix, unclear what indication), covid positive 12/14/2021. Pt admitted 12/23/2021 with c/p and nstemi, code stroke called 12/23/2021 pm for R side weakness, slurred speech, R hemianopia. CT head neg at the time. Team consulted for persistent symptoms. Of note, similar symptoms have happened recently 12/14/2021, workup including ct and and cta at that time neg. Pt reports repeated episodes of R side weakness dating back at least to 2019. Repeat ct head 24hr after initial ct stable. Symptoms are unlikely to be ischemic in nature. ddx stroke recrudescence, seizure/postictal, psychogenic, malingering. mri not possible, aicd. Pt has persistent R face droop, dysarthria, R inferior anopia with R eye blurriness, R rusty weakness and numbness. PLAN:   --no acute intervention or dsa at this time. --sbp < 140  --check cta head and neck w con  --continue heparin gtt as per cards, transition to eliquis when able to do so  --continue plavix 75  --continue lipitor 80  --if negative consider sz treatment and eval.    Case discussed with Dr. Tiffany Waldron attending.     Shayne Salinas MD, PhD  Stroke, [facility]  [facility]  Electronically signed 12/24/2021 at 11:58 AM

## 2021-12-25 LAB
ABSOLUTE EOS #: 0.05 K/UL (ref 0–0.4)
ABSOLUTE IMMATURE GRANULOCYTE: 0 K/UL (ref 0–0.3)
ABSOLUTE LYMPH #: 0.54 K/UL (ref 1–4.8)
ABSOLUTE MONO #: 0.27 K/UL (ref 0.1–0.8)
ANION GAP SERPL CALCULATED.3IONS-SCNC: 11 MMOL/L (ref 9–17)
BASOPHILS # BLD: 0 % (ref 0–2)
BASOPHILS ABSOLUTE: 0 K/UL (ref 0–0.2)
BUN BLDV-MCNC: 24 MG/DL (ref 6–20)
BUN/CREAT BLD: ABNORMAL (ref 9–20)
CALCIUM SERPL-MCNC: 8.2 MG/DL (ref 8.6–10.4)
CHLORIDE BLD-SCNC: 110 MMOL/L (ref 98–107)
CO2: 16 MMOL/L (ref 20–31)
CREAT SERPL-MCNC: 1.58 MG/DL (ref 0.7–1.2)
DIFFERENTIAL TYPE: ABNORMAL
EOSINOPHILS RELATIVE PERCENT: 1 % (ref 1–4)
GFR AFRICAN AMERICAN: 55 ML/MIN
GFR NON-AFRICAN AMERICAN: 45 ML/MIN
GFR SERPL CREATININE-BSD FRML MDRD: ABNORMAL ML/MIN/{1.73_M2}
GFR SERPL CREATININE-BSD FRML MDRD: ABNORMAL ML/MIN/{1.73_M2}
GLUCOSE BLD-MCNC: 90 MG/DL (ref 70–99)
HCT VFR BLD CALC: 30.7 % (ref 40.7–50.3)
HEMOGLOBIN: 9.4 G/DL (ref 13–17)
IMMATURE GRANULOCYTES: 0 %
LYMPHOCYTES # BLD: 12 % (ref 24–44)
MCH RBC QN AUTO: 31.1 PG (ref 25.2–33.5)
MCHC RBC AUTO-ENTMCNC: 30.6 G/DL (ref 28.4–34.8)
MCV RBC AUTO: 101.7 FL (ref 82.6–102.9)
MONOCYTES # BLD: 6 % (ref 1–7)
MORPHOLOGY: ABNORMAL
NRBC AUTOMATED: 0 PER 100 WBC
PARTIAL THROMBOPLASTIN TIME: 22.9 SEC (ref 20.5–30.5)
PARTIAL THROMBOPLASTIN TIME: 46.9 SEC (ref 20.5–30.5)
PDW BLD-RTO: 16.9 % (ref 11.8–14.4)
PLATELET # BLD: 237 K/UL (ref 138–453)
PLATELET ESTIMATE: ABNORMAL
PMV BLD AUTO: 10.9 FL (ref 8.1–13.5)
POTASSIUM SERPL-SCNC: 4.6 MMOL/L (ref 3.7–5.3)
PTH INTACT: 52.49 PG/ML (ref 15–65)
RBC # BLD: 3.02 M/UL (ref 4.21–5.77)
RBC # BLD: ABNORMAL 10*6/UL
SEG NEUTROPHILS: 81 % (ref 36–66)
SEGMENTED NEUTROPHILS ABSOLUTE COUNT: 3.64 K/UL (ref 1.8–7.7)
SODIUM BLD-SCNC: 137 MMOL/L (ref 135–144)
WBC # BLD: 4.5 K/UL (ref 3.5–11.3)
WBC # BLD: ABNORMAL 10*3/UL

## 2021-12-25 PROCEDURE — 85730 THROMBOPLASTIN TIME PARTIAL: CPT

## 2021-12-25 PROCEDURE — 2060000000 HC ICU INTERMEDIATE R&B

## 2021-12-25 PROCEDURE — 85025 COMPLETE CBC W/AUTO DIFF WBC: CPT

## 2021-12-25 PROCEDURE — 99233 SBSQ HOSP IP/OBS HIGH 50: CPT | Performed by: PSYCHIATRY & NEUROLOGY

## 2021-12-25 PROCEDURE — 2580000003 HC RX 258: Performed by: NURSE PRACTITIONER

## 2021-12-25 PROCEDURE — 96366 THER/PROPH/DIAG IV INF ADDON: CPT

## 2021-12-25 PROCEDURE — 6360000002 HC RX W HCPCS: Performed by: NURSE PRACTITIONER

## 2021-12-25 PROCEDURE — 99232 SBSQ HOSP IP/OBS MODERATE 35: CPT | Performed by: INTERNAL MEDICINE

## 2021-12-25 PROCEDURE — 83970 ASSAY OF PARATHORMONE: CPT

## 2021-12-25 PROCEDURE — 2500000003 HC RX 250 WO HCPCS: Performed by: INTERNAL MEDICINE

## 2021-12-25 PROCEDURE — 99232 SBSQ HOSP IP/OBS MODERATE 35: CPT | Performed by: FAMILY MEDICINE

## 2021-12-25 PROCEDURE — 2580000003 HC RX 258: Performed by: FAMILY MEDICINE

## 2021-12-25 PROCEDURE — 36415 COLL VENOUS BLD VENIPUNCTURE: CPT

## 2021-12-25 PROCEDURE — 80048 BASIC METABOLIC PNL TOTAL CA: CPT

## 2021-12-25 PROCEDURE — 96375 TX/PRO/DX INJ NEW DRUG ADDON: CPT

## 2021-12-25 PROCEDURE — 96376 TX/PRO/DX INJ SAME DRUG ADON: CPT

## 2021-12-25 PROCEDURE — 6360000002 HC RX W HCPCS: Performed by: FAMILY MEDICINE

## 2021-12-25 RX ORDER — ISOSORBIDE MONONITRATE 30 MG/1
30 TABLET, EXTENDED RELEASE ORAL DAILY
Status: DISCONTINUED | OUTPATIENT
Start: 2021-12-25 | End: 2021-12-29 | Stop reason: HOSPADM

## 2021-12-25 RX ORDER — LORAZEPAM 2 MG/ML
0.5 INJECTION INTRAMUSCULAR ONCE
Status: COMPLETED | OUTPATIENT
Start: 2021-12-25 | End: 2021-12-25

## 2021-12-25 RX ADMIN — LORAZEPAM 0.5 MG: 2 INJECTION INTRAMUSCULAR; INTRAVENOUS at 01:03

## 2021-12-25 RX ADMIN — NITROGLYCERIN 40 MCG/MIN: 20 INJECTION INTRAVENOUS at 21:20

## 2021-12-25 RX ADMIN — MORPHINE SULFATE 4 MG: 4 INJECTION INTRAVENOUS at 23:53

## 2021-12-25 RX ADMIN — DIPHENHYDRAMINE HYDROCHLORIDE 25 MG: 50 INJECTION, SOLUTION INTRAMUSCULAR; INTRAVENOUS at 16:01

## 2021-12-25 RX ADMIN — DIPHENHYDRAMINE HYDROCHLORIDE 25 MG: 50 INJECTION, SOLUTION INTRAMUSCULAR; INTRAVENOUS at 00:40

## 2021-12-25 RX ADMIN — SODIUM CHLORIDE, PRESERVATIVE FREE 10 ML: 5 INJECTION INTRAVENOUS at 21:21

## 2021-12-25 RX ADMIN — HEPARIN SODIUM 2000 UNITS: 1000 INJECTION INTRAVENOUS; SUBCUTANEOUS at 00:11

## 2021-12-25 RX ADMIN — MORPHINE SULFATE 4 MG: 4 INJECTION INTRAVENOUS at 18:50

## 2021-12-25 RX ADMIN — DIPHENHYDRAMINE HYDROCHLORIDE 25 MG: 50 INJECTION, SOLUTION INTRAMUSCULAR; INTRAVENOUS at 09:58

## 2021-12-25 RX ADMIN — HEPARIN SODIUM 4000 UNITS: 1000 INJECTION INTRAVENOUS; SUBCUTANEOUS at 06:14

## 2021-12-25 RX ADMIN — HEPARIN SODIUM 18 UNITS/KG/HR: 5000 INJECTION, SOLUTION INTRAVENOUS; SUBCUTANEOUS at 06:13

## 2021-12-25 RX ADMIN — DIPHENHYDRAMINE HYDROCHLORIDE 25 MG: 50 INJECTION, SOLUTION INTRAMUSCULAR; INTRAVENOUS at 22:15

## 2021-12-25 ASSESSMENT — PAIN DESCRIPTION - PROGRESSION

## 2021-12-25 ASSESSMENT — ENCOUNTER SYMPTOMS
CHOKING: 0
COUGH: 0
CHEST TIGHTNESS: 0
ABDOMINAL PAIN: 0
BACK PAIN: 1
DIARRHEA: 0
VOICE CHANGE: 0
SHORTNESS OF BREATH: 0
RHINORRHEA: 0
VOMITING: 0
CONSTIPATION: 0
NAUSEA: 0
SINUS PRESSURE: 0
WHEEZING: 0

## 2021-12-25 ASSESSMENT — PAIN SCALES - GENERAL
PAINLEVEL_OUTOF10: 10
PAINLEVEL_OUTOF10: 8
PAINLEVEL_OUTOF10: 10
PAINLEVEL_OUTOF10: 10
PAINLEVEL_OUTOF10: 7
PAINLEVEL_OUTOF10: 7
PAINLEVEL_OUTOF10: 2

## 2021-12-25 ASSESSMENT — PAIN DESCRIPTION - DESCRIPTORS: DESCRIPTORS: PRESSURE;HEAVINESS

## 2021-12-25 ASSESSMENT — PAIN DESCRIPTION - LOCATION: LOCATION: CHEST

## 2021-12-25 NOTE — PROGRESS NOTES
Neurology Resident Progress Note      SUBJECTIVE:  This is a 61 y.o.  male admitted 12/23/2021 for Acute chest pain [R07.9]  NSTEMI (non-ST elevated myocardial infarction) [facility]) [I21.4]  Coronary artery disease involving native coronary artery of native heart with angina pectoris ([facility] [address] [I25.119]  This is a follow-up neurology progress note. The patient was seen and examined and the chart was reviewed. There were no acute events overnight. ROS  Constitutional: no fever, chills, fatigue  HENT: No change in vision or hearing   Respiratory: No cough, SOB, wheezing. Cardiovascular:  No chest pain, palpitations, leg swelling. Gastrointestinal: No nausea, vomiting, diarrhea. Genitourinary: No increased frequency, urgency. Musculoskeletal: No myalgia or arthralgia. Skin: No rashes or scarring or bruises. Neurological: No headache, paresthesia, or focal weakness. Endo/Heme/Allergies: Negative for itchy eyes or runny nose. Psychiatric/Behavioral: No anxiety or depressed mood.      HPI  See H&P     isosorbide mononitrate  30 mg Oral Daily    sodium bicarbonate  650 mg Oral BID    ticagrelor  90 mg Oral BID    nitroGLYCERIN  0.4 mg SubLINGual Once    tamsulosin  0.4 mg Oral Daily    bumetanide  2 mg Oral BID    gabapentin  100 mg Oral TID    metoprolol succinate  50 mg Oral Daily    sodium chloride flush  5-40 mL IntraVENous 2 times per day    atorvastatin  80 mg Oral Nightly    lidocaine 1 % injection  5 mL IntraDERmal Once    sodium chloride flush  5-40 mL IntraVENous 2 times per day    pantoprazole  40 mg IntraVENous Daily    And    sodium chloride (PF)  10 mL IntraVENous Daily       Past Medical History:   Diagnosis Date    AICD (automatic cardioverter/defibrillator) present     CKD (chronic kidney disease), stage III ([address]     COVID-19     Myocardial infarction ([facility] [address]     S/P CABG (coronary artery bypass graft)     x2 separate occasions    Solitary kidney, congenital        Past Surgical History:   Procedure Laterality Date    CORONARY ARTERY BYPASS GRAFT      CORONARY ARTERY BYPASS GRAFT      PACEMAKER PLACEMENT  09/11/2020    [employer] ICD [id number] RV lead [id number], LV lead [id number]. NOT MRI CONDITIONAL       PHYSICAL EXAM:      Blood pressure (!) 148/97, pulse 70, temperature 100.6 °F (38.1 °C), temperature source Oral, resp. rate 16, weight 169 lb 1.5 oz (76.7 kg), SpO2 95 %. Neurological examination:     Mental status    Alert and oriented x 3; following all commands  Speech: Dysarthric      Cranial nerves    II -right partial hemianopsia. Pupils reactive  III, IV, VI - extraocular muscles intact; no JOHN; no nystagmus; no ptosis   V -decrease sensation over right face                                                             VII -right facial droop                                                         VIII - intact hearing                                                                             IX, X - symmetrical palate elevation                                               XI - symmetrical shoulder shrug                                                       XII - midline tongue without atrophy or fasciculation      Motor function  Strength:   5 out of 5 left upper arm, left leg 4/5  4 out of 5 right arm, RT leg 3/5       Sensory function Intact to touch, pin, vibration, proprioception throughout except for right hemisensory loss over the face and right arm and leg      Cerebellar Intact finger-nose-finger testing. Intact heel-shin testing. No dysdiadochokinesia present. No tremors                    Reflex function 2/4 symmetric throughout .        Gait                   not performed             Investigations:      Laboratory Testing:  Recent Results (from the past 24 hour(s))   VITAMIN D 25 HYDROXY    Collection Time: 12/24/21  2:16 PM   Result Value Ref Range    Vit D, 25-Hydroxy 6.2 (L) 30.0 - 100.0 ng/mL   PHOSPHORUS    Collection Time: 12/24/21  2:16 PM Result Value Ref Range    Phosphorus 3.1 2.5 - 4.5 mg/dL   Troponin    Collection Time: 12/24/21  2:16 PM   Result Value Ref Range    Troponin, High Sensitivity 68 (HH) 0 - 22 ng/L    Troponin T NOT REPORTED <0.03 ng/mL    Troponin Interp NOT REPORTED    CBC    Collection Time: 12/24/21  2:16 PM   Result Value Ref Range    WBC 4.4 3.5 - 11.3 k/uL    RBC 2.91 (L) 4.21 - 5.77 m/uL    Hemoglobin 9.0 (L) 13.0 - 17.0 g/dL    Hematocrit 28.4 (L) 40.7 - 50.3 %    MCV 97.6 82.6 - 102.9 fL    MCH 30.9 25.2 - 33.5 pg    MCHC 31.7 28.4 - 34.8 g/dL    RDW 16.5 (H) 11.8 - 14.4 %    Platelets 891 649 - 412 k/uL    MPV 10.1 8.1 - 13.5 fL    NRBC Automated 0.0 0.0 per 100 WBC   APTT    Collection Time: 12/24/21  5:37 PM   Result Value Ref Range    PTT 34.2 (H) 20.5 - 30.5 sec   POC Glucose Fingerstick    Collection Time: 12/24/21  8:29 PM   Result Value Ref Range    POC Glucose 71 (L) 75 - 110 mg/dL   APTT    Collection Time: 12/24/21  9:56 PM   Result Value Ref Range    PTT 36.7 (H) 20.5 - 30.5 sec   PTH, Intact    Collection Time: 12/25/21  4:46 AM   Result Value Ref Range    Pth Intact 52.49 15.0 - 65.0 pg/mL   APTT    Collection Time: 12/25/21  4:46 AM   Result Value Ref Range    PTT 22.9 20.5 - 30.5 sec       Imaging/Diagnostics:  CT ABDOMEN PELVIS WO CONTRAST Additional Contrast? None    Result Date: 12/9/2021  EXAMINATION: CT OF THE ABDOMEN AND PELVIS WITHOUT CONTRAST 12/9/2021 11:44 am TECHNIQUE: CT of the abdomen and pelvis was performed without the administration of intravenous contrast. Multiplanar reformatted images are provided for review. Dose modulation, iterative reconstruction, and/or weight based adjustment of the mA/kV was utilized to reduce the radiation dose to as low as reasonably achievable. COMPARISON: None. HISTORY: ORDERING SYSTEM PROVIDED HISTORY: left hydronephrosis TECHNOLOGIST PROVIDED HISTORY: left hydronephrosis Reason for Exam: left hydronephrosis FINDINGS: Lower Chest: Mild bibasilar atelectasis. Cardiomegaly. Organs: Suboptimal evaluation due to lack of IV contrast.  Liver gallbladder pancreas spleen and adrenal glands all appear grossly unremarkable. 4 mm nonobstructing calculus left kidney. Left-sided hydronephrosis/hydroureter is noted with a small amount of air seen within the collecting system. There is associated urothelial enhancement extending along the proximal ureter no obstructing stone or gross mass is seen. Right kidney is absent. Abdominal aorta appears normal in caliber. GI/Bowel: Stomach is grossly unremarkable. Small bowel appears nondilated. Sigmoid diverticulosis. Pelvis: Urinary bladder is grossly unremarkable. Prostate gland is normal in size. Peritoneum/Retroperitoneum: No free air or lymphadenopathy. Small amount of free fluid. Bones/Soft Tissues: Abdominal wall demonstrates anasarca type changes. Subcutaneous air is seen along the left anterior abdominal wall. Osseous structures demonstrate degenerative change. 1. Left-sided hydronephrosis/hydroureter with uroepithelial thickening and small amount of air seen within the collecting system. No obstructing stone or gross mass. Left UVJ stricture cannot be excluded. Correlation with urinalysis is suggested as underlying infectious process cannot be excluded. 2. 4 mm nonobstructing calculus left kidney. 3. Cardiomegaly. 4. Presumed post injection changes are seen involving the left anterior abdominal wall. 5. Body wall anasarca. 6. Small amount of free fluid. 7. Sigmoid diverticulosis. CT HEAD WO CONTRAST    Result Date: 12/24/2021  EXAMINATION: CT OF THE HEAD WITHOUT CONTRAST  12/24/2021 12:45 pm TECHNIQUE: CT of the head was performed without the administration of intravenous contrast. Dose modulation, iterative reconstruction, and/or weight based adjustment of the mA/kV was utilized to reduce the radiation dose to as low as reasonably achievable.  COMPARISON: CT head December 23, 2021, December 14, 2021 HISTORY: ORDERING SYSTEM PROVIDED HISTORY: repeat due to concern for left cortical acute stroke, significantly worsening right arm, face leg weakness, dysarthria and right visual field deficit TECHNOLOGIST PROVIDED HISTORY: repeat due to concern for left cortical acute stroke, significantly worsening right arm, face leg weakness, dysarthria and right visual field deficit Reason for Exam: repeat concern for left cortical acute stroke significantly worsening right arm face leg weakness FINDINGS: BRAIN/VENTRICLES: There are old infarctions in the left frontotemporal lobes, bilateral occipital lobes and minimally in the left cerebellar hemisphere, stable. No evidence of acute territorial infarction. There is no acute intracranial hemorrhage, mass effect or midline shift. No abnormal extra-axial fluid collection. There is no evidence of hydrocephalus. ORBITS: There is old fracture defect of the left lamina papyracea. The visualized portion of the orbits demonstrate no acute abnormality. SINUSES: The visualized paranasal sinuses and mastoid air cells demonstrate no acute abnormality. SOFT TISSUES/SKULL:  No acute abnormality of the visualized skull or soft tissues. No acute intracranial abnormality. 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 CT HEAD WO CONTRAST    Result Date: 12/23/2021  EXAMINATION: CT OF THE HEAD WITHOUT CONTRAST  12/23/2021 9:04 pm TECHNIQUE: CT of the head was performed without the administration of intravenous contrast. Dose modulation, iterative reconstruction, and/or weight based adjustment of the mA/kV was utilized to reduce the radiation dose to as low as reasonably achievable. COMPARISON: 12/16/2021.  HISTORY: ORDERING SYSTEM PROVIDED HISTORY: right sided deficits last known well moments ago TECHNOLOGIST PROVIDED HISTORY: right sided deficits last known well moments ago FINDINGS: Unchanged chronic encephalomalacia of the right medial occipital lobe and unchanged chronic encephalomalacia of left inferior parietal lobule. There is no acute infarction, intracranial hemorrhage or intracranial mass lesion. No mass effect, midline shift or extra-axial collection is noted. There are mild nonspecific foci of periventricular and subcortical cerebral white matter hypodensity, most likely representing chronic microangiopathic disease in this age group. The brain parenchyma is otherwise normal. The cerebellar tonsils are in normal position. The ventricles, sulci, and cisterns are mildly prominent suggestive of mild generalized volume loss. The globes and orbits are within normal limits. The visualized extracranial structures including paranasal sinuses and mastoid air cells are unremarkable. No fracture is identified. Unchanged chronic encephalomalacia of the right medial occipital lobe and unchanged chronic encephalomalacia of left inferior parietal lobule. No acute territorial infarction, intracranial hemorrhage or mass lesion. Mild chronic microangiopathic ischemic disease. Mild generalized volume loss. RECOMMENDATIONS: Unavailable     CT HEAD WO CONTRAST    Result Date: 12/16/2021  EXAMINATION: CT OF THE HEAD WITHOUT CONTRAST  12/16/2021 12:06 am TECHNIQUE: CT of the head was performed without the administration of intravenous contrast. Dose modulation, iterative reconstruction, and/or weight based adjustment of the mA/kV was utilized to reduce the radiation dose to as low as reasonably achievable. COMPARISON: 12/14/2021. HISTORY: ORDERING SYSTEM PROVIDED HISTORY: repeat CT of head r/o CVA TECHNOLOGIST PROVIDED HISTORY: repeat CT of head r/o CVA Reason for Exam: Repeat CT of head rule out CVA Relevant Medical/Surgical History: Covid + Slurred speech. Right-sided weakness. Initial evaluation. FINDINGS: BRAIN/VENTRICLES: Areas of encephalomalacia are again seen involving the occipital lobes bilaterally.   There is also encephalomalacia involving the left temporal lobe. Otherwise, the gray-white differentiation appears maintained. No evidence of an acute intracranial hemorrhage. There is no mass effect or midline shift. There are areas of hypoattenuation in the periventricular and subcortical white matter, which is nonspecific, but may represent chronic microvasvular ischemic change. There is mild global parenchymal volume loss. Minimal scattered atherosclerosis of the intracranial vasculature. ORBITS: There is a chronic defect involving the medial wall the left orbit. No acute abnormality seen of the orbits. SINUSES: Scattered mucosal thickening is seen of the ethmoid sinuses. The mastoid air cells demonstrate no acute abnormality. SOFT TISSUES/SKULL:  No acute abnormality of the visualized skull or soft tissues. 1. Overall, no significant change in the appearance of the brain compared to the prior CT. 2. Areas of encephalomalacia involving the bilateral occipital and left temporal lobes. 3. Mild global parenchymal volume loss with chronic microvascular ischemic changes. RECOMMENDATIONS: Unavailable     CT HEAD WO CONTRAST    Result Date: 12/14/2021  EXAMINATION: CT OF THE HEAD WITHOUT CONTRAST  12/14/2021 10:42 pm TECHNIQUE: CT of the head was performed without the administration of intravenous contrast. Dose modulation, iterative reconstruction, and/or weight based adjustment of the mA/kV was utilized to reduce the radiation dose to as low as reasonably achievable. COMPARISON: None. HISTORY: ORDERING SYSTEM PROVIDED HISTORY: stroke alert TECHNOLOGIST PROVIDED HISTORY: stroke alert Reason for Exam: Slurred speech, right sided weakness, previous stroke 3 months ago FINDINGS: BRAIN/VENTRICLES: There is no acute intracranial hemorrhage, mass effect or midline shift. No abnormal extra-axial fluid collection. The gray-white differentiation is maintained without evidence of an acute infarct.   There is no evidence of hydrocephalus. Periventricular and deep subcortical white matter hypoattenuation, consistent microangiopathic change. There is mild parenchymal volume loss. There is right occipital encephalomalacia. There is mild prominence of the cisterna magna. There is also encephalomalacia in the left occipital lobe. ORBITS: The visualized portion of the orbits demonstrate no acute abnormality. SINUSES: Scattered mucosal thickening in the paranasal sinuses. SOFT TISSUES/SKULL:  No acute abnormality of the visualized skull or soft tissues. 1. No acute intracranial abnormality. 2. Bilateral occipital encephalomalacia, suggestive of prior ischemia. 3. Microangiopathic change. Findings were discussed with Krzysztof Ryder CNP at 11:02 pm on 12/14/2021. RECOMMENDATIONS: Unavailable     NM LUNG SCAN PERFUSION ONLY    Result Date: 12/7/2021  EXAMINATION: NUCLEAR MEDICINE PERFUSION SCAN. 12/7/2021 TECHNIQUE: Ventilation not performed as part of COVID-19 safety precautions. 7.5 millicuries of Tc 89E MAA was administered intravenously prior to planar imaging of the lungs in multiple projections. COMPARISON: Chest radiograph December 7, 2021. HISTORY: ORDERING SYSTEM PROVIDED HISTORY: CP SOB COVID+ +ddimer borederline GFR TECHNOLOGIST PROVIDED HISTORY: CP SOB COVID+ +ddimer borederline GFR Reason for Exam: CAD, SOB, elevated d dimer, Covid + ? Acuity: Unknown Type of Exam: Unknown FINDINGS: PERFUSION: Distribution of radiotracer is homogeneous. No segmental defects identified. CHEST RADIOGRAPH: No focal areas of consolidation or significant effusions on recent chest radiograph. Low Probability for Pulmonary Embolus.      XR CHEST PORTABLE    Result Date: 12/23/2021  EXAMINATION: ONE XRAY VIEW OF THE CHEST 12/23/2021 3:28 pm COMPARISON: CT December 14, 2021 and chest radiograph December 7, 2021 HISTORY: ORDERING SYSTEM PROVIDED HISTORY: Chest pain TECHNOLOGIST PROVIDED HISTORY: cp Reason for Exam: upr FINDINGS: Moderate cardiomegaly appears unchanged. Biventricular pacer leads in satisfactory position in the AP view. Relatively diffuse multifocal bilateral airspace disease has progressed significantly since prior radiograph. No definite effusion. No pneumothorax or subdiaphragmatic free air. No acute osseous abnormality identified. Relatively diffuse multifocal bilateral airspace disease most likely representing COVID-19 pneumonia. Pulmonary edema felt less likely. XR CHEST 1 VIEW    Result Date: 12/7/2021  EXAMINATION: ONE XRAY VIEW OF THE CHEST 12/7/2021 4:07 pm COMPARISON: None. HISTORY: ORDERING SYSTEM PROVIDED HISTORY: CP/SOB known covid + TECHNOLOGIST PROVIDED HISTORY: CP/SOB known covid + Reason for Exam: CP/SOB covid + Acuity: Unknown Type of Exam: Unknown FINDINGS: Left-sided subclavian approach AICD with atrial and ventricular leads; midline sternotomy wires and clips. Overlying ECG monitor leads. Enlarged cardiac silhouette. Mediastinal structures midline with some elongation thoracic aorta. Mild cephalization of blood flow but no Kerley lines. No clear-cut GGO radiographically. Slight blunting left costophrenic angle but no sizable pleural effusion. No pneumothorax. DJD spine. No prior study available. No definite pneumonia. Slight blunting left CP angle. Cardiomegaly; venous hypertension without radiographic CHF.      VL DUP CAROTID BILATERAL    Result Date: 12/24/2021    [facility]  Vascular Carotid Procedure   Patient Name    Wilma Burr Date of Study             12/24/2021   Date of Birth   1965  Gender                    Male   Age             61 year(s)  Race                         Room Number     1346   Corporate ID #  [id number]   Patient Acct #  [de-identified]   MR #            [id number]     Sonographer               [facility]   Accession #     [id number]  Interpreting Physician    Elvira Hernandez   Referring Nurse             Referring Physician Harjinder Bautista  Practitioner  Procedure Type of Study:   Cerebral: Carotid, Carotid Scan Bilateral.  Indications for Study:CVA. Patient Status:ER. Technical Quality:Limited visualization. Limitation reason:patient IV in right jugular vein with bandage. Comments:Basic Classification of ICA Stenosis: PSV - Peak Systolic Velocity Normal: No plaque or calcification identified, no elevation of PSV Mild: <50% spectral broadening without increased PSV Moderate: 50 - 69% PSV >125 - <230 cm/sec Severe: 70 - 99% PSV >230 cm/sec Critical: 80 - 99% PSV >230cm/sec and/or End Diastolic Velocities >277HD/PSM. Conclusions   Summary   Simultaneous real time imaging utilizing B-Mode, color flow doppler and  spectral waveform analysis was performed on the bilateral extracerebral  vascular system. The study demonstrates:   Right:  Internal carotid artery has a mild, <50% stenosis based on velocities. The vertebral artery is patent with antegrade flow. Left:  Internal carotid artery has a mild, <50% stenosis based on velocities. The vertebral artery is patent with antegrade flow. Signature   ----------------------------------------------------------------  Electronically signed by Ishan Wu RVT(Sonographer) on  12/24/2021 09:26 AM  ----------------------------------------------------------------   ----------------------------------------------------------------  Electronically signed by Hi Villafuerte(Interpreting  physician) on 12/24/2021 04:40 PM  ----------------------------------------------------------------  Findings:   Right Impression:                    Left Impression:  Intimal thickening right common      Intimal thickening left common  carotid artery. carotid artery. The carotid bulb has an irregular    The carotid bulb has an irregular  heterogeneous plaque causing a <50%  heterogeneous plaque causing a <50%  stenosis. stenosis.   The internal carotid artery has a    The internal carotid artery has a  smooth heterogeneous plaque causing  smooth homogeneous plaque causing a  a <50% stenosis based on velocities. <50% stenosis based on velocities. The external carotid artery has a    The external carotid artery has a  smooth homogeneous plaque causing a  smooth homogeneous plaque causing a  <50% stenosis. <50% stenosis. The vertebral artery is patent with  The vertebral artery is patent with  antegrade flow. antegrade flow. Risk Factors   - Current - Every day. Velocities are measured in cm/s ; Diameters are measured in cm Carotid Right Measurements +------------+-------+-------+--------+-------+------------+---------------+ ! Location    ! PSV    ! EDV    ! Angle   ! RI     !%Stenosis   ! Tortuosity     ! +------------+-------+-------+--------+-------+------------+---------------+ ! Prox CCA    !67.1   !11.2   !60      !0.83   !            !               ! +------------+-------+-------+--------+-------+------------+---------------+ ! Mid CCA     !77.7   !13.7   !60      !0.82   !            !               ! +------------+-------+-------+--------+-------+------------+---------------+ ! Dist CCA    !80.8   !22.4   !60      !0.72   !            !               ! +------------+-------+-------+--------+-------+------------+---------------+ ! Bulb        !61.5   !16.8   !60      !0.73   !            !               ! +------------+-------+-------+--------+-------+------------+---------------+ ! Prox ICA    !51.6   !18.4   !22      !0.64   !            !               ! +------------+-------+-------+--------+-------+------------+---------------+ ! Mid ICA     !33.5   !12.3   !50      !0.63   !            !               ! +------------+-------+-------+--------+-------+------------+---------------+ ! Dist ICA    !47     !14.9   !60      !0.68   !            !               ! +------------+-------+-------+--------+-------+------------+---------------+ ! Prox ECA    !44.3   !1.18   !60      !0.97   !            !               ! +------------+-------+-------+--------+-------+------------+---------------+ ! Vertebral   !29.5   !13.5   !60      !0.54   !            !               ! +------------+-------+-------+--------+-------+------------+---------------+   - Additional Measurements:ICAPSV/CCAPSV 0.77. ICAEDV/CCAEDV 1.64. Carotid Left Measurements +------------+-------+-------+--------+-------+------------+---------------+ ! Location    ! PSV    ! EDV    ! Angle   ! RI     !%Stenosis   ! Tortuosity     ! +------------+-------+-------+--------+-------+------------+---------------+ ! Prox CCA    !81.8   !16.5   !60      !0.8    ! !               ! +------------+-------+-------+--------+-------+------------+---------------+ ! Mid CCA     ! 80.7   !14.8   !60      !0.82   !            !               ! +------------+-------+-------+--------+-------+------------+---------------+ ! Dist CCA    !79.6   !12.1   ! 60      !0.85   !            !               ! +------------+-------+-------+--------+-------+------------+---------------+ ! Bulb        !61.5   !18.7   ! 60      !0.7    ! !               ! +------------+-------+-------+--------+-------+------------+---------------+ ! Prox ICA    ! 65.9   !10.4   !60      !0.84   !            !               ! +------------+-------+-------+--------+-------+------------+---------------+ ! Mid ICA     !63.1   ! 17     !60      !0.73   !            !               ! +------------+-------+-------+--------+-------+------------+---------------+ ! Dist ICA    !41.9   !15.7   !60      !0.63   !            !               ! +------------+-------+-------+--------+-------+------------+---------------+ ! Prox ECA    !58.8   ! 7.45   !60      !0.87   !            !               ! +------------+-------+-------+--------+-------+------------+---------------+ !Vertebral   !27.9   !10.3   ! 60      !0.63   !            !               ! +------------+-------+-------+--------+-------+------------+---------------+   - Additional Measurements:ICAPSV/CCAPSV 0.81. ICAEDV/CCAEDV 1.03.    US RETROPERITONEAL COMPLETE    Result Date: 12/8/2021  EXAMINATION: RETROPERITONEAL ULTRASOUND OF THE KIDNEYS AND URINARY BLADDER 12/8/2021 COMPARISON: None HISTORY: ORDERING SYSTEM PROVIDED HISTORY: ARF TECHNOLOGIST PROVIDED HISTORY: ARF 71-year-old male with acute renal failure FINDINGS: Kidneys: Exam limited due to bowel gas and patient's body habitus. Nonvisualization of the right kidney. Left kidney measures 11.9 x 7.1 x 7.0 cm. Left cortical thickness measures 1.5 cm. Mild-to-moderate left-sided hydronephrosis. Bladder: Urinary bladder measures 7.8 x 6.8 x 8.2 cm for a bladder volume of 301 mL. Nonvisualization of ureteral jets. Urinary bladder grossly unremarkable in appearance. 1. Nonvisualization of the right kidney. 2. Mild-to-moderate left-sided hydronephrosis. 3. Exam limited due to patient's bowel gas and body habitus. 4. Nonvisualization of ureteral jets. Urinary bladder grossly unremarkable. CTA HEAD NECK W CONTRAST    Result Date: 12/15/2021  EXAMINATION: CTA OF THE HEAD AND NECK WITH CONTRAST 12/14/2021 11:53 pm: TECHNIQUE: CTA of the head and neck was performed with the administration of intravenous contrast. Multiplanar reformatted images are provided for review. MIP images are provided for review. Stenosis of the internal carotid arteries measured using NASCET criteria. Dose modulation, iterative reconstruction, and/or weight based adjustment of the mA/kV was utilized to reduce the radiation dose to as low as reasonably achievable. COMPARISON: None.  HISTORY: ORDERING SYSTEM PROVIDED HISTORY: r/o stroke TECHNOLOGIST PROVIDED HISTORY: r/o stroke Reason for Exam: Slurred speech, right sided weakness FINDINGS: CTA NECK: AORTIC ARCH/ARCH VESSELS: No dissection or arterial injury. No significant stenosis of the brachiocephalic or subclavian arteries. CAROTID ARTERIES: Atherosclerosis in carotid bifurcations. No dissection, arterial injury, or hemodynamically significant stenosis by NASCET criteria. VERTEBRAL ARTERIES: No dissection, arterial injury, or significant stenosis. SOFT TISSUES: Patchy ground-glass infiltrates in the visualized lungs. There is mediastinal lymphadenopathy. The larynx and pharynx are unremarkable. No acute abnormality of the salivary and thyroid glands. BONES: No acute osseous abnormality. CTA HEAD: ANTERIOR CIRCULATION: No significant stenosis of the intracranial internal carotid, anterior cerebral, or middle cerebral arteries. No aneurysm. POSTERIOR CIRCULATION: No significant stenosis of the vertebral, basilar, or posterior cerebral arteries. No aneurysm. OTHER: No dural venous sinus thrombosis on this non-dedicated study. BRAIN: No mass effect or midline shift. No extra-axial fluid collection. The gray-white differentiation is maintained. No flow-limiting arterial stenosis in the head and neck. Patchy ground-glass infiltrates in the visualized lungs. Mediastinal lymphadenopathy. RECOMMENDATIONS: Unavailable     US RETROPERITONEAL LIMITED    Result Date: 12/24/2021  EXAMINATION: ULTRASOUND OF THE KIDNEYS 12/24/2021 12:13 pm COMPARISON: 12/08/2021, CT 12/09/2020 HISTORY: ORDERING SYSTEM PROVIDED HISTORY: FOLLOW UP ON LEFT HYDRONEPHROSIS TECHNOLOGIST PROVIDED HISTORY: Bilateral Renal Ultrasound FOLLOW UP ON LEFT HYDRONEPHROSIS Absent right kidney FINDINGS: The right kidney is not visualized. The left kidney measures 13.3 cm in length. Mild-to-moderate left hydronephrosis, similar to the prior exams. Cortical echogenicity appears intact. Renal contours remain somewhat lobular. No kidney stones by ultrasound. No perirenal fluid collections. Similar mild-to-moderate left hydronephrosis.  Absent right kidney RECOMMENDATIONS: Unavailable Echocardiogram Limited 2D w Doppler w Color Adult    Result Date: 12/10/2021  [facility] Transthoracic Echocardiography Report (TTE)  Patient Name Jack Jensen Date of Study               12/10/2021   Date of      1965  Gender                      Male  Birth   Age          63 year(s)  Race                           Room Number  1012        Height:                     70 inch, 177.8 cm   Corporate ID [id number]    Weight:                     170 pounds, 77.1 kg  #   Patient Acct [de-identified]   BSA:          1.95 m^2      BMI:     24.39 kg/m^2  #   MR #         [id number]     Sonographer                 Swathi Warren   Accession #  [id number]  Interpreting Physician      Kathleen Preston   Fellow                   Referring Nurse                           Practitioner   Interpreting             Referring Physician         Elvira Rodriguez, *  Fellow  Type of Study   TTE procedure:Doppler, Color Doppler, Limited Echo. Procedure Date Date: 12/10/2021 Start: 01:30 PM Study Location: [address] [place] [address] Technical Quality: Good visualization Indications:Chest pain. History / Tech. Comments: Procedure explained to patient. Patient Status: Inpatient Height: 70 inches Weight: 170 pounds BSA: 1.95 m^2 BMI: 24.39 kg/m^2 Rhythm: Within normal limits HR: 73 bpm CONCLUSIONS Summary Left ventricle is enlarged Global left ventricular systolic function is moderate to severely reduced Estimated ejection fraction is 25-30% . Mild aortic insufficiency. Moderate mitral regurgitation. Moderate tricuspid regurgitation. Moderate pulmonary hypertension. Estimated right ventricular systolic pressure is 90-95SFPT. No pericardial effusion.  Signature ----------------------------------------------------------------------------  Electronically signed by Nuria Rothman(Sonographer) on 12/10/2021 01:58 PM ---------------------------------------------------------------------------- ----------------------------------------------------------------------------  Electronically signed by Shannen SharpeInterpreting physician) on 12/10/2021  06:18 PM ---------------------------------------------------------------------------- FINDINGS Left Atrium Left atrium is mildly dilated. Left Ventricle Left ventricle is enlarged Global left ventricular systolic function is moderate to severely reduced Estimated ejection fraction is 25-30% . Moderate to severe generalized LV hypokinesis. Right Atrium Right atrium is normal in size. Pacing lead seen in the right atrium. Right Ventricle Normal right ventricular size and function. Mitral Valve Thickened mitral valve leaflets. Moderate mitral regurgitation. No mitral stenosis. Aortic Valve Aortic valve is sclerotic but opens well. Aortic valve is trileaflet. Mild aortic insufficiency. No aortic stenosis. Tricuspid Valve No obvious valvular abnormality. Moderate tricuspid regurgitation. Moderate pulmonary hypertension. Estimated right ventricular systolic pressure is 96-72VEXV. Pulmonic Valve Pulmonic valve is normal in structure and function. Trivial pulmonic insufficiency. Pericardial Effusion No pericardial effusion. Miscellaneous Normal aortic root dimension.  M-mode / 2D Measurements & Calculations:   LVIDd:6.6 cm(3.7 - 5.6 cm)       Diastolic QOJDWQ:280 ml  FVGAZ:7.1 cm(2.2 - 4.0 cm)       Systolic KTMYWT:888 ml  QILS:0.0 cm(0.6 - 1.1 cm)        Aortic Root:3.4 cm(2.0 - 3.7 cm)  LVPWd:1.2 cm(0.6 - 1.1 cm)       LA Dimension: 5.7 cm(1.9 - 4.0 cm)  Fractional Shortening:15.15 %  Calculated LVEF (%): 38.68 %   Tricuspid:   Peak TR Velocity: 3.08 m/s  Peak TR Gradient: 37.9456 mmHg        Assessment & Differential Dx:      Primary Problem  Coronary artery disease involving native coronary artery of native heart with angina pectoris [facility])    Active Hospital Problems    Diagnosis Date Noted    Elevated troponin [R77.8] 12/08/2021     Priority: High    Stage 3 chronic kidney disease ([facility] [N18.30] 12/08/2021     Priority: Medium    Chronic systolic heart failure ([facility] [I50.22] 12/08/2021     Priority: Medium    Acute chest pain [R07.9]     Acute CVA (cerebrovascular accident) ([facility] [I63.9] 12/23/2021    Acute right-sided weakness [R53.1]     Coronary artery disease involving native coronary artery of native heart with angina pectoris [facility]) [I25.119] 12/13/2021       Case of 76-year-old male with history of CAD, status post CABG, and AICD placement with a prior CVA with residual right hemiparesis was admitted on 12/23/2021 with chest pain and NSTEMI during this hospital stay she was noted to have right facial weakness for which neurology was consulted, patient stated that he has chronic right upper and right lower extremity weakness but right facial weakness is new, he also complaining of ongoing chest pain on nitro infusion, evaluated by stroke team not a candidate for TPA recommended MRA because of elevated creatinine of 1.8  -CT head: No acute intracranial normality, old infarction in the left frontotemporal lobes, bilateral occipital lobes and minimally in the left cerebellar hemisphere    Impression:  -Possible brainstem CVA we will get CTA/MRA, unable to get MRI due to AICD        Plan:     -Continue on brilinta   -Permissive hypertension with target systolic<180  -Medical management per primary team  -Currently on IV heparin and nitro        Erik López MD, MD, 12/25/2021 8:52 AM

## 2021-12-25 NOTE — PROGRESS NOTES
Attempted to do a psyche consult with Adolfo Logan, he was unwilling to sign the consent for his consult

## 2021-12-25 NOTE — PROGRESS NOTES
Endovascular Neurosurgery Progress Note    SUBJECTIVE:   No reported events overnight. This am pt reports mildly improved vision and strength, he continues to have chest pain. Review of Systems:  CONSTITUTIONAL:  negative for fevers, chills, fatigue and malaise    EYES:  negative for double vision, blurred vision and photophobia     HEENT:  negative for tinnitus, epistaxis and sore throat    RESPIRATORY:  negative for cough, shortness of breath, wheezing    CARDIOVASCULAR:  negative for chest pain, palpitations, syncope, edema    GASTROINTESTINAL:  negative for nausea, vomiting    GENITOURINARY:  negative for incontinence    MUSCULOSKELETAL:  negative for neck or back pain    NEUROLOGICAL:  Negative for weakness and tingling  negative for headaches and dizziness    PSYCHIATRIC:  negative for anxiety      Review of systems otherwise negative. OBJECTIVE:     Vitals:    12/25/21 0800   BP: (!) 148/97   Pulse:    Resp: 16   Temp: 100.6 °F (38.1 °C)   SpO2: 95%        General:  Gen: normal habitus, NAD  HEENT: NCAT, mucosa moist  Cvs: RRR, S1 S2 normal  Resp: symmetric unlabored breathing  Abd: s/nd/nt  Ext: no edema  Skin: no lesions seen, warm and dry     Neuro:  Gen: awake and alert, oriented x3. Lang/speech: no aphasia. moderate dysarthria. Follows commands. CN: PERRL, EOMI, R eye blurry, r inferior homonomous quadrantanopia. V1-3 equal, R face droop, hearing intact  Motor: L hemibody 5/5, R hemibody 4/5 no drift. Sense: decreased LT RLE  Coord: no gross ataxia  DTR: deferred  Gait: deferred     NIH Stroke Scale:   1a  Level of consciousness: 0 - alert; keenly responsive   1b. LOC questions:  0 - answers both questions correctly   1c. LOC commands: 0 - performs both tasks correctly   2. Best Gaze: 0 - normal   3. Visual: 1 - partial hemianopia   4. Facial Palsy: 2 - partial paralysis (total or near total paralysis of the lower face)   5a.  Motor left arm: 0 - no drift, limb holds 90 (or 45) degrees for full 10 seconds   5b. Motor right arm: 0   6a. Motor left le - no drift; leg holds 30 degree position for full 5 seconds   6b  Motor right le   7. Limb Ataxia: 0 - absent   8. Sensory: 1 - mild to moderate sensory loss; patient feels pinprick is less sharp or is dull on the affected side; there is a loss of superficial pain with pinprick but patient is aware of being touched    9. Best Language:  0 - no aphasia, normal   10. Dysarthria: 1 - mild to moderate, patient slurs at least some words and at worst, can be understood with some difficulty   11. Extinction and Inattention: 0 - no abnormality             Total:   5      MRS: 3        LABS:   Reviewed. Lab Results   Component Value Date    HGB 9.0 (L) 2021    WBC 4.4 2021     2021     2021    BUN 33 (H) 2021    CREATININE 1.83 (H) 2021    AST 18 2021    ALT 14 2021    MG 1.7 2021    APTT 22.9 2021    INR 1.2 2021      Lab Results   Component Value Date    COVID19 DETECTED 2021       RADIOLOGY:   Images were personally reviewed including:  CT head wo con repeat 2021  No acute intracranial abnormality.       Old infarctions in the left frontotemporal lobes, bilateral occipital lobes   and minimally in the left cerebellar hemisphere, stable.      CT head wo con 2021  Unchanged chronic encephalomalacia of the right medial occipital lobe and   unchanged chronic encephalomalacia of left inferior parietal lobule.       No acute territorial infarction, intracranial hemorrhage or mass lesion.       Mild chronic microangiopathic ischemic disease.       Mild generalized volume loss.      CTA head and neck w con 12/15/2021  No flow-limiting arterial stenosis in the head and neck.       Patchy ground-glass infiltrates in the visualized lungs.  Mediastinal   lymphadenopathy.          ASSESSMENT:   59yo homeless male with pmh of smoker, drug seeking behavior, strokes (b/l occipital, L crebellar), CAD s/p cabg, solitary kidney with ckd, hfref s/p aicd, on ac (eliquis and plavix, unclear what indication), covid positive 12/14/2021. Pt admitted 12/23/2021 with c/p and nstemi, code stroke called 12/23/2021 pm for R side weakness, slurred speech, R hemianopia. CT head neg at the time. Team consulted for persistent symptoms.     Of note, similar symptoms have happened recently 12/14/2021, workup including ct and and cta at that time neg. Pt reports repeated episodes of R side weakness dating back at least to 2019. Repeat ct head 24hr after initial ct stable. Symptoms are unlikely to be ischemic in nature. ddx stroke recrudescence, seizure/postictal, psychogenic, malingering. mri not possible, aicd.     Pt has persistent R face droop, dysarthria, R inferior anopia with R eye blurriness, R rusty weakness and numbness. PLAN:   --no acute intervention or dsa at this time. --sbp < 140  --check cta head and neck w con  --continue heparin gtt as per cards, transition to eliquis when able to do so  --continue brilinta 90 bid as per cards  --continue lipitor 80  --if negative consider sz treatment and eval.    Endovascular will sign off at this time. Case discussed with Dr. Paz Klein attending.     Caro Ryder MD, PhD  Stroke, [facility] Stroke [facility]  Electronically signed 12/25/2021 at 9:46 AM

## 2021-12-25 NOTE — PROGRESS NOTES
[facility]  Office: [address], DO, Angeles Crowe, DO, Sol Yesy, DO, Robert Wilson Blood, DO, Edie Galvan MD, Anurag Perez MD, Maldonado Lee MD, Kaur Garcia MD, Porsche Andrea MD, Dayron Carrasco MD, Tootie Bergeron MD, Jeancarlos Magallon, DO, Morales Bui, DO, Kelsie Ring MD,  Zaida Baig, DO, Tong White MD, Terrell Maloney MD, Kirill Minor MD, Bailee Grewal MD, Antwan Davidson MD, Ita Pierson MD, Kimberly Newsome MD, Juan Antonio Rivera, [facility], [facility], CNP, Xiomara Rainey, CNP, Xochitl Gamboa, CNS, Jasbir Meneses, CNP, Roberta Castillo, CNP, Joye Duane, CNP, Darwin Riley, CNP, Sukumar Llanos, CNP, Patricia Valentino PA-C, Missael López, [facility], Delroy Weaver, [facility], Kayy Hoyt, CNP, Marcelo Bazan, CNP, Torie Bucio, CNP, Jane German, [facility], Tony Donahue, CNP, [facility], [address] [place] [address]      Daily Progress Note     Admit Date: 12/23/2021  Bed/Room No.  5824/5234-72  Admitting Physician : Maldonado Lee MD  Code Status :Full Code  Hospital Day:  LOS: 2 days   Chief Complaint:     Chief Complaint   Patient presents with    Chest Pain     Principal Problem:    Coronary artery disease involving native coronary artery of native heart with angina pectoris [facility])  Active Problems:    Elevated troponin    Chronic systolic heart failure ([facility] [address]    Stage 3 chronic kidney disease [facility])    Acute right-sided weakness    Acute CVA (cerebrovascular accident) [facility])    Acute chest pain  Resolved Problems:    * No resolved hospital problems. *    Subjective : Interval History/Significant events :  12/25/21    Patient does not report any improvement in chest pain, back pain, head pain. Patient has IV in neck on right side. Patient got upset when asked if he had history of IV drug use and started yelling and cursing. His speech was normal during the process. He has not been moving his right side.   Patient has good strength and does not allow to move his arm.    Vitals - Stable afebrile  Labs -kidney function stable at 1.83. Troponin 68. CT head showed old infarct in left frontotemporal lobes, bilateral occipital lobes and minimally in the left cerebellar hemisphere without any acute intracranial abnormality. Ultrasound kidney showed absent right kidney. Nursing notes , Consults notes reviewed. Overnight events and updates discussed with Nursing staff . Background History:         Kamari Gonzales is 61 y.o. male  Who was admitted to the hospital on 12/23/2021 for treatment of Coronary artery disease involving native coronary artery of native heart with angina pectoris ([facility] [address] Patient presented to emergency room with chest pain. Patient is new to the area and has recently moved from [place]. He was admitted a month ago at [facility] with stroke like symptoms with right-sided weakness. Patient received TPA. CT head showed supratentorial bilateral cerebral infarcts embolic in origin. He was also found to have A. fib and given Eliquis. Patient had episode of hematemesis in the hospital which required urgent EGD and did not show any bleeding ulcer except mild gastritis. Patient was advised to have Eliquis and Plavix at discharge. Patient is homeless and has been bouncing between hotels. He left hospital at [facility] as he was not satisfied with the care. Patient then got admitted at [facility] with similar symptoms. CT head and neck and CT head showed chronic infarcts without any acute bleeding infarct. Patient had negative VQ scan, chest x-ray showed cardiomegaly, creatinine was stable. Patient was also found to have COVID-19 infection and required oxygen. He was treated with Decadron initially. Patient's breathing improved and was saturating well on room air. Patient was also found to have left sided hydronephrosis with 4 mm nonobstructing left kidney stone. Patient has congenital absence of right kidney.   He was evaluated by urology and recommended outpatient follow-up. Patient was uncooperative to during the hospital stay and refusing care and participation with physical therapy. He was requesting IV Benadryl and IV opioid medications repeatedly. Medications were stopped and patient got upset and left hospital.  Patient then left and went to [facility] emergency room. Patient has been evaluated by stroke team and recommended to monitor. PMH:  Past Medical History:   Diagnosis Date    AICD (automatic cardioverter/defibrillator) present     CKD (chronic kidney disease), stage III ([address]     COVID-19     Myocardial infarction ([facility] [address]     S/P CABG (coronary artery bypass graft)     x2 separate occasions    Solitary kidney, congenital       Allergies: Allergies   Allergen Reactions    Nsaids Swelling and Other (See Comments)    Aspirin Swelling and Rash     Other reaction(s): Facial Swelling, Swelling of Lip/Tongue/Throat    Adhesive Tape     Ceftriaxone     Ketorolac Tromethamine     Other      Other reaction(s): Difficulty Breathing    Oxycodone Hives and Rash      Medications :  isosorbide mononitrate, 30 mg, Oral, Daily  sodium bicarbonate, 650 mg, Oral, BID  ticagrelor, 90 mg, Oral, BID  nitroGLYCERIN, 0.4 mg, SubLINGual, Once  tamsulosin, 0.4 mg, Oral, Daily  bumetanide, 2 mg, Oral, BID  gabapentin, 100 mg, Oral, TID  metoprolol succinate, 50 mg, Oral, Daily  sodium chloride flush, 5-40 mL, IntraVENous, 2 times per day  atorvastatin, 80 mg, Oral, Nightly  lidocaine 1 % injection, 5 mL, IntraDERmal, Once  sodium chloride flush, 5-40 mL, IntraVENous, 2 times per day  pantoprazole, 40 mg, IntraVENous, Daily   And  sodium chloride (PF), 10 mL, IntraVENous, Daily        Review of Systems   Review of Systems   Constitutional: Negative for activity change, appetite change, fatigue, fever and unexpected weight change.    HENT: Negative for congestion, nosebleeds, rhinorrhea, sinus pressure, sneezing and voice change. Respiratory: Negative for cough, choking, chest tightness, shortness of breath and wheezing. Cardiovascular: Positive for chest pain. Negative for palpitations and leg swelling. Gastrointestinal: Negative for abdominal pain, constipation, diarrhea, nausea and vomiting. Genitourinary: Negative for difficulty urinating, dysuria, frequency, penile discharge and testicular pain. Musculoskeletal: Positive for back pain. Skin: Negative for rash. Neurological: Positive for speech difficulty and weakness. Negative for dizziness, light-headedness and headaches. Hematological: Does not bruise/bleed easily. Psychiatric/Behavioral: Positive for agitation and behavioral problems. Negative for confusion, self-injury, sleep disturbance and suicidal ideas. Objective :      Current Vitals : Temp: 98.8 °F (37.1 °C),  Pulse: 70, Resp: 22, BP: (!) 147/93, SpO2: 94 %  Last 24 Hrs Vitals   Patient Vitals for the past 24 hrs:   BP Temp Temp src Pulse Resp SpO2 Weight   12/25/21 0407 (!) 147/93 98.8 °F (37.1 °C) Oral 70 22 -- --   12/25/21 0350 -- -- -- -- -- -- 169 lb 1.5 oz (76.7 kg)   12/24/21 2310 (!) 133/97 98.9 °F (37.2 °C) Oral 70 16 94 % --   12/24/21 2000 (!) 138/97 98.8 °F (37.1 °C) Temporal 70 18 97 % --   12/24/21 1549 129/89 99.4 °F (37.4 °C) Temporal 69 16 97 % --   12/24/21 1015 (!) 145/93 98.3 °F (36.8 °C) Axillary 75 16 -- --   12/24/21 0933 126/83 -- -- 71 16 93 % --   12/24/21 0816 (!) 136/90 -- -- 70 17 93 % --     Intake / output   12/24 0701 - 12/25 0700  In: 909.3 [I.V.:909.3]  Out: 1650 [Urine:1650]  Physical Exam:  Physical Exam  Vitals and nursing note reviewed. Constitutional:       General: He is not in acute distress. HENT:      Head: Normocephalic and atraumatic. Eyes:      Conjunctiva/sclera: Conjunctivae normal.      Pupils: Pupils are equal, round, and reactive to light. Cardiovascular:      Rate and Rhythm: Normal rate and regular rhythm.       Heart sounds: No murmur heard.      Pulmonary:      Effort: Pulmonary effort is normal. No accessory muscle usage or respiratory distress. Breath sounds: No stridor. No decreased breath sounds, wheezing, rhonchi or rales. Chest:      Comments: Sternotomy scar  Abdominal:      General: Bowel sounds are normal. There is no distension. Palpations: Abdomen is soft. Abdomen is not rigid. Tenderness: There is no abdominal tenderness. There is no guarding. Musculoskeletal:         General: No tenderness. Skin:     General: Skin is warm and dry. Findings: No erythema, lesion or rash. Neurological:      Mental Status: He is alert and oriented to person, place, and time. Cranial Nerves: No cranial nerve deficit. Motor: No seizure activity. Comments: Effort limited right-sided weakness -good resistance and does not allow tomorrow. Facial asymmetry   Psychiatric:         Mood and Affect: Affect is blunt and angry. Speech: Speech normal.         Behavior: Behavior is uncooperative. Laboratory findings:    Recent Labs     12/23/21 2103 12/23/21  2235 12/24/21  1416   WBC Unable to perform testing: Specimen clotted. 4.5 4.4   HGB Unable to perform testing: Specimen clotted. 9.2* 9.0*   HCT Unable to perform testing: Specimen clotted. 28.7* 28.4*   PLT Unable to perform testing: Specimen clotted. 229 228   INR Unable to perform testing: Specimen clotted.  1.2  --      Recent Labs     12/23/21  1550 12/23/21 2103 12/24/21  1416    138  --    K 4.1 4.5  --    * 107  --    CO2 16* 18*  --    GLUCOSE 83 81  --    BUN 33* 33*  --    CREATININE 1.88* 1.83*  --    CALCIUM 8.7 8.8  --    PHOS  --   --  3.1     Recent Labs     12/23/21  1550 12/23/21 2103   PROT 6.5  --    LABALBU 2.8*  --    AST 18  --    ALT 14  --    ALKPHOS 68  --    BILITOT 0.58  --    CKTOTAL  --  40   CKMB  --  3.3          Specific Gravity, UA   Date Value Ref Range Status   12/11/2021 1.015 1.005 - 1.030 Final Protein, UA   Date Value Ref Range Status   12/11/2021 1+ (A) NEGATIVE Final     RBC, UA   Date Value Ref Range Status   12/11/2021 0 TO 2 0 - 2 /HPF Final     Bacteria, UA   Date Value Ref Range Status   12/11/2021 NOT REPORTED None Final     Nitrite, Urine   Date Value Ref Range Status   12/11/2021 NEGATIVE NEGATIVE Final     WBC, UA   Date Value Ref Range Status   12/11/2021 0 TO 2 0 - 5 /HPF Final     Leukocyte Esterase, Urine   Date Value Ref Range Status   12/11/2021 NEGATIVE NEGATIVE Final       Imaging / Clinical Data :-   CT HEAD WO CONTRAST    Result Date: 12/23/2021  Unchanged chronic encephalomalacia of the right medial occipital lobe and unchanged chronic encephalomalacia of left inferior parietal lobule. No acute territorial infarction, intracranial hemorrhage or mass lesion. Mild chronic microangiopathic ischemic disease. Mild generalized volume loss. RECOMMENDATIONS: Unavailable     XR CHEST PORTABLE    Result Date: 12/23/2021  Relatively diffuse multifocal bilateral airspace disease most likely representing COVID-19 pneumonia. Pulmonary edema felt less likely. Clinical Course : stable  Assessment and Plan  :        1. Chest pain- atypical -discussed with cardiology attending. .  Currently on heparin infusion , nephrology has been consulted for clearance for possible heart catheterization vs stress test.  2. Right-sided weakness- neurology evaluation. No new strokes on CT. Unable to perform MRI brain due to BiV ICD in place  3. Chronic systolic CHF s/p AICD -continue Bumex. 4. Stage III CKD -stable  5. H/o CVA -with right hemiparesis  6. Chronic left hydronephrosis outpatient urology follow-up  7. CAD s/p CABG with multiple stents continue Plavix. Eliquis on hold as patient on heparin infusion. 8. Paroxysmal atrial fibrillation -in normal sinus rhythm. 9. Chronic hep C -   10. Homeless -social service consult   11. Acute gastritis-PPI    Swallow assessment.   Diet if able to eat.  Social service to discuss discharge arrangements. Highly suspect drug-seeking behavior and difficult social situation due to homelessness leading to recurrent hospitalizations. Psych consult. Patient had refused evaluation by psychiatry last admission. Continue to monitor vitals , Intake / output ,  Cell count , HGB , Kidney function, oxygenation  as indicated . Plan and updates discussed with patient ,  answers  explained to satisfaction.    Plan discussed with Staff Ashley Bernardo RN     (Please note that portions of this note were completed with a voice recognition program. Efforts were made to edit the dictations but occasionally words are mis-transcribed.)      Vivian Krueger MD  12/25/2021

## 2021-12-25 NOTE — PROGRESS NOTES
Renal Progress Note    Patient :  Tania Awad; 61 y.o. MRN# [id number]  Location:  4863/5669-78  Attending:  Frandy Livingston MD  Admit Date:  2021   Hospital Day: 2      Subjective:     Patient seen and examined at the bedside  No acute events overnight  Patient have temp max of 100.6, /97  No BMP is reported from today-1 ordered state  Total intake 909 mL, urine 1650 mL  Still have mild to moderate left hydronephrosis on repeat ultrasound      Outpatient Medications:     Medications Prior to Admission: bumetanide (BUMEX) 2 MG tablet, Take 1 tablet by mouth 2 times daily  [] dexamethasone (DECADRON) 6 MG tablet, Take 1 tablet by mouth daily for 6 doses  gabapentin (NEURONTIN) 100 MG capsule, Take 1 capsule by mouth 3 times daily for 30 days.   metoprolol succinate (TOPROL XL) 50 MG extended release tablet, Take 1 tablet by mouth daily  pantoprazole (PROTONIX) 40 MG tablet, Take 1 tablet by mouth every morning (before breakfast)  clopidogrel (PLAVIX) 75 MG tablet, Take 1 tablet by mouth daily  atorvastatin (LIPITOR) 40 MG tablet, Take 1 tablet by mouth daily  tamsulosin (FLOMAX) 0.4 MG capsule, Take 0.4 mg by mouth daily  apixaban (ELIQUIS) 5 MG TABS tablet, Take 5 mg by mouth 2 times daily    Current Medications:     Scheduled Meds:    isosorbide mononitrate  30 mg Oral Daily    sodium bicarbonate  650 mg Oral BID    ticagrelor  90 mg Oral BID    nitroGLYCERIN  0.4 mg SubLINGual Once    tamsulosin  0.4 mg Oral Daily    bumetanide  2 mg Oral BID    gabapentin  100 mg Oral TID    metoprolol succinate  50 mg Oral Daily    sodium chloride flush  5-40 mL IntraVENous 2 times per day    atorvastatin  80 mg Oral Nightly    lidocaine 1 % injection  5 mL IntraDERmal Once    sodium chloride flush  5-40 mL IntraVENous 2 times per day    pantoprazole  40 mg IntraVENous Daily    And    sodium chloride (PF)  10 mL IntraVENous Daily     Continuous Infusions:    heparin (PORCINE) Infusion 18 Units/kg/hr (21 4815)    dextrose 5 % and 0.45 % NaCl 75 mL/hr at 21 2115    nitroGLYCERIN Stopped (21 1245)    sodium chloride      sodium chloride       PRN Meds:  heparin (porcine), heparin (porcine), morphine, sodium chloride flush, sodium chloride, potassium chloride **OR** potassium alternative oral replacement **OR** potassium chloride, ondansetron **OR** ondansetron, acetaminophen **OR** acetaminophen, nitroGLYCERIN, bisacodyl, sodium chloride flush, sodium chloride, diphenhydrAMINE    Input/Output:       I/O last 3 completed shifts: In: 909.3 [I.V.:909.3]  Out: 1650 [Urine:1650]. Patient Vitals for the past 96 hrs (Last 3 readings):   Weight   21 0350 169 lb 1.5 oz (76.7 kg)       Vital Signs:   Temperature:  Temp: 100.6 °F (38.1 °C)  TMax:   Temp (24hrs), Av.3 °F (37.4 °C), Min:98.8 °F (37.1 °C), Max:100.6 °F (38.1 °C)    Respirations:  Resp: 16  Pulse:   Pulse: 70  BP:    BP: (!) 148/97  BP Range: Systolic (00RVI), AQJ:836 , Min:129 , SMQ:347       Diastolic (03HAN), HHM:25, Min:89, Max:97      Physical Examination:     General:  AAO x 3, speaking in full sentences, no accessory muscle use. HEENT: Atraumatic, normocephalic, no throat congestion, moist mucosa. Eyes:   Pupils equal, round and reactive to light, EOMI. Neck:   Supple  Chest:   Bilateral vesicular breath sounds, no rales or wheezes. Cardiac:  S1 S2 RR, no murmurs, gallops or rubs. Abdomen: Soft, non-tender, no masses or organomegaly, BS audible. :   No suprapubic or flank tenderness. Neuro:  AAO x 3, No FND. SKIN:  No rashes, good skin turgor. Extremities:  No edema. Labs:       Recent Labs     21  2235 21  1416   WBC Unable to perform testing: Specimen clotted. 4.5 4.4   RBC Unable to perform testing: Specimen clotted. 3.00* 2.91*   HGB Unable to perform testing: Specimen clotted. 9.2* 9.0*   HCT Unable to perform testing: Specimen clotted.  28.7* 28.4*   MCV Unable to perform testing: Specimen clotted. 95.7 97.6   MCH Unable to perform testing: Specimen clotted. 30.7 30.9   MCHC Unable to perform testing: Specimen clotted. 32.1 31.7   RDW Unable to perform testing: Specimen clotted. 16.5* 16.5*   PLT Unable to perform testing: Specimen clotted. 229 228   MPV Unable to perform testing: Specimen clotted. 9.7 10.1      BMP:   Recent Labs     12/23/21  1550 12/23/21  2103    138   K 4.1 4.5   * 107   CO2 16* 18*   BUN 33* 33*   CREATININE 1.88* 1.83*   GLUCOSE 83 81   CALCIUM 8.7 8.8      Phosphorus:     Recent Labs     12/24/21  1416   PHOS 3.1     Magnesium:  No results for input(s): MG in the last 72 hours. Albumin:    Recent Labs     12/23/21  1550   LABALBU 2.8*     BNP:    No results found for: BNP  ANNALISE:    No results found for: ANNALISE  SPEP:  Lab Results   Component Value Date    PROT 6.5 12/23/2021    PATH ELECTRONICALLY SIGNED.  Adelina Dhillon M.D. 12/09/2021     UPEP:   No results found for: LABPE  C3:   No results found for: C3  C4:   No results found for: C4  MPO ANCA:   No results found for: MPO  PR3 ANCA:   No results found for: PR3  Anti-GBM:   No results found for: GBMABIGG  Hep BsAg:         Lab Results   Component Value Date    HEPBSAG NONREACTIVE 12/09/2021     Hep C AB:          Lab Results   Component Value Date    HEPCAB REACTIVE 12/09/2021       Urinalysis/Chemistries:      Lab Results   Component Value Date    NITRU NEGATIVE 12/11/2021    COLORU Yellow 12/11/2021    PHUR 7.0 12/11/2021    WBCUA 0 TO 2 12/11/2021    RBCUA 0 TO 2 12/11/2021    MUCUS NOT REPORTED 12/11/2021    TRICHOMONAS NOT REPORTED 12/11/2021    YEAST NOT REPORTED 12/11/2021    BACTERIA NOT REPORTED 12/11/2021    SPECGRAV 1.015 12/11/2021    LEUKOCYTESUR NEGATIVE 12/11/2021    UROBILINOGEN Normal 12/11/2021    BILIRUBINUR NEGATIVE 12/11/2021    GLUCOSEU NEGATIVE 12/11/2021    KETUA NEGATIVE 12/11/2021    AMORPHOUS NOT REPORTED 12/11/2021     Urine Sodium:     Lab Results   Component Value Date    SHERMAN <20 12/09/2021     Urine Potassium:  No results found for: KUR  Urine Chloride:  No results found for: CLUR  Urine Osmolarity: No results found for: OSMOU  Urine Protein:   No components found for: TOTALPROTEIN, URINE   Urine Creatinine:     Lab Results   Component Value Date    LABCREA 95.6 12/09/2021     Urine Eosinophils:  No components found for: UEOS    Radiology:     Reviewed. Assessment:     1. NSTEMI  2. CKD stage IIIB with baseline creatinine 1.8-2. 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 3. Acute stroke with past history of CVA  4. Congenital absence of right kidney  5. Ischemic cardiomyopathy with EF of 25-30% on echo from 12/3/2021 S/P AICD  6. Hx of CAD s/p CABG  7. Hx of A. Fib  8. Hypertension  9. Hx of COVID-19 infection  10. Mild to moderate left-sided hydronephrosis -chronic  11. Chronic metabolic acidosis    Plan:     1. Maintain intake output record  2. Avoid nephrotoxic drugs. 3. Continue oral sodium bicarbonate 650 mg twice daily  4. Discussed in detail with patient with regards to the renal effects of heart with leading to J[place] and even might need hemodialysis  5. Inform nephrology of the schedule so that appropriate MARGARET preventive measures can be initiated. Nutrition   Please ensure that patient is on a renal diet/TF. Avoid nephrotoxic drugs/contrast exposure. We will continue to follow along with you. Romelia Rush MD  Rotating resident nephrology, PGY [address],  [facility]. 10:34 AM 12/25/21  This note is created with the assistance of a speech-recognition program. While intending to generate a document that actually reflects the content of the visit, no guarantees can be provided that every mistake has been identified and corrected by editing. Attending Physician Statement  I have discussed the care of Winifred Lofton, including pertinent history and exam findings,  with the Fellow/Residentt.  I have reviewed the key elements of all parts of the encounter with the Fellow/ Resident. I agree with the assessment, plan and orders as documented by the resident.     Tomy Gallo MD MD, MRCP Garry Mcclain, FACP   12/25/2021 3:43 PM    Nephrology [address] [place] [address] Drive

## 2021-12-26 ENCOUNTER — APPOINTMENT (OUTPATIENT)
Dept: GENERAL RADIOLOGY | Age: 56
DRG: 287 | End: 2021-12-26
Payer: MEDICARE

## 2021-12-26 LAB
ABSOLUTE EOS #: 0.09 K/UL (ref 0–0.44)
ABSOLUTE IMMATURE GRANULOCYTE: 0.05 K/UL (ref 0–0.3)
ABSOLUTE LYMPH #: 0.47 K/UL (ref 1.1–3.7)
ABSOLUTE MONO #: 0.38 K/UL (ref 0.1–1.2)
ANION GAP SERPL CALCULATED.3IONS-SCNC: 10 MMOL/L (ref 9–17)
BASOPHILS # BLD: 0 % (ref 0–2)
BASOPHILS ABSOLUTE: 0 K/UL (ref 0–0.2)
BUN BLDV-MCNC: 19 MG/DL (ref 6–20)
BUN/CREAT BLD: ABNORMAL (ref 9–20)
CALCIUM SERPL-MCNC: 8.1 MG/DL (ref 8.6–10.4)
CHLORIDE BLD-SCNC: 109 MMOL/L (ref 98–107)
CO2: 17 MMOL/L (ref 20–31)
CREAT SERPL-MCNC: 1.8 MG/DL (ref 0.7–1.2)
DIFFERENTIAL TYPE: ABNORMAL
EOSINOPHILS RELATIVE PERCENT: 2 % (ref 1–4)
GFR AFRICAN AMERICAN: 47 ML/MIN
GFR NON-AFRICAN AMERICAN: 39 ML/MIN
GFR SERPL CREATININE-BSD FRML MDRD: ABNORMAL ML/MIN/{1.73_M2}
GFR SERPL CREATININE-BSD FRML MDRD: ABNORMAL ML/MIN/{1.73_M2}
GLUCOSE BLD-MCNC: 117 MG/DL (ref 70–99)
HCT VFR BLD CALC: 27.2 % (ref 40.7–50.3)
HEMOGLOBIN: 8.5 G/DL (ref 13–17)
IMMATURE GRANULOCYTES: 1 %
LYMPHOCYTES # BLD: 10 % (ref 24–43)
MCH RBC QN AUTO: 31.3 PG (ref 25.2–33.5)
MCHC RBC AUTO-ENTMCNC: 31.3 G/DL (ref 28.4–34.8)
MCV RBC AUTO: 100 FL (ref 82.6–102.9)
MONOCYTES # BLD: 8 % (ref 3–12)
MORPHOLOGY: ABNORMAL
MORPHOLOGY: ABNORMAL
NRBC AUTOMATED: 0 PER 100 WBC
PARTIAL THROMBOPLASTIN TIME: 35.5 SEC (ref 20.5–30.5)
PARTIAL THROMBOPLASTIN TIME: 43.8 SEC (ref 20.5–30.5)
PARTIAL THROMBOPLASTIN TIME: 48.9 SEC (ref 20.5–30.5)
PDW BLD-RTO: 16.8 % (ref 11.8–14.4)
PLATELET # BLD: 214 K/UL (ref 138–453)
PLATELET ESTIMATE: ABNORMAL
PMV BLD AUTO: 10.2 FL (ref 8.1–13.5)
POTASSIUM SERPL-SCNC: 4.1 MMOL/L (ref 3.7–5.3)
RBC # BLD: 2.72 M/UL (ref 4.21–5.77)
RBC # BLD: ABNORMAL 10*6/UL
SEG NEUTROPHILS: 79 % (ref 36–65)
SEGMENTED NEUTROPHILS ABSOLUTE COUNT: 3.71 K/UL (ref 1.5–8.1)
SODIUM BLD-SCNC: 136 MMOL/L (ref 135–144)
TROPONIN INTERP: ABNORMAL
TROPONIN T: ABNORMAL NG/ML
TROPONIN, HIGH SENSITIVITY: 120 NG/L (ref 0–22)
WBC # BLD: 4.7 K/UL (ref 3.5–11.3)
WBC # BLD: ABNORMAL 10*3/UL

## 2021-12-26 PROCEDURE — 96376 TX/PRO/DX INJ SAME DRUG ADON: CPT

## 2021-12-26 PROCEDURE — 6370000000 HC RX 637 (ALT 250 FOR IP): Performed by: INTERNAL MEDICINE

## 2021-12-26 PROCEDURE — 6360000002 HC RX W HCPCS: Performed by: FAMILY MEDICINE

## 2021-12-26 PROCEDURE — 6370000000 HC RX 637 (ALT 250 FOR IP): Performed by: STUDENT IN AN ORGANIZED HEALTH CARE EDUCATION/TRAINING PROGRAM

## 2021-12-26 PROCEDURE — 99232 SBSQ HOSP IP/OBS MODERATE 35: CPT | Performed by: INTERNAL MEDICINE

## 2021-12-26 PROCEDURE — 76937 US GUIDE VASCULAR ACCESS: CPT

## 2021-12-26 PROCEDURE — 80048 BASIC METABOLIC PNL TOTAL CA: CPT

## 2021-12-26 PROCEDURE — 92611 MOTION FLUOROSCOPY/SWALLOW: CPT

## 2021-12-26 PROCEDURE — 6360000002 HC RX W HCPCS: Performed by: NURSE PRACTITIONER

## 2021-12-26 PROCEDURE — 2580000003 HC RX 258: Performed by: NURSE PRACTITIONER

## 2021-12-26 PROCEDURE — 85025 COMPLETE CBC W/AUTO DIFF WBC: CPT

## 2021-12-26 PROCEDURE — 99232 SBSQ HOSP IP/OBS MODERATE 35: CPT | Performed by: FAMILY MEDICINE

## 2021-12-26 PROCEDURE — 99232 SBSQ HOSP IP/OBS MODERATE 35: CPT | Performed by: PSYCHIATRY & NEUROLOGY

## 2021-12-26 PROCEDURE — 94761 N-INVAS EAR/PLS OXIMETRY MLT: CPT

## 2021-12-26 PROCEDURE — 74230 X-RAY XM SWLNG FUNCJ C+: CPT

## 2021-12-26 PROCEDURE — 84484 ASSAY OF TROPONIN QUANT: CPT

## 2021-12-26 PROCEDURE — 96366 THER/PROPH/DIAG IV INF ADDON: CPT

## 2021-12-26 PROCEDURE — 96375 TX/PRO/DX INJ NEW DRUG ADDON: CPT

## 2021-12-26 PROCEDURE — 85730 THROMBOPLASTIN TIME PARTIAL: CPT

## 2021-12-26 PROCEDURE — 93005 ELECTROCARDIOGRAM TRACING: CPT | Performed by: FAMILY MEDICINE

## 2021-12-26 PROCEDURE — 2700000000 HC OXYGEN THERAPY PER DAY

## 2021-12-26 PROCEDURE — 36415 COLL VENOUS BLD VENIPUNCTURE: CPT

## 2021-12-26 PROCEDURE — 2060000000 HC ICU INTERMEDIATE R&B

## 2021-12-26 RX ORDER — MORPHINE SULFATE 4 MG/ML
4 INJECTION, SOLUTION INTRAMUSCULAR; INTRAVENOUS
Status: DISCONTINUED | OUTPATIENT
Start: 2021-12-26 | End: 2021-12-27 | Stop reason: ALTCHOICE

## 2021-12-26 RX ORDER — MORPHINE SULFATE 2 MG/ML
2 INJECTION, SOLUTION INTRAMUSCULAR; INTRAVENOUS
Status: DISCONTINUED | OUTPATIENT
Start: 2021-12-26 | End: 2021-12-27 | Stop reason: ALTCHOICE

## 2021-12-26 RX ORDER — MORPHINE SULFATE 2 MG/ML
2 INJECTION, SOLUTION INTRAMUSCULAR; INTRAVENOUS ONCE
Status: COMPLETED | OUTPATIENT
Start: 2021-12-26 | End: 2021-12-26

## 2021-12-26 RX ADMIN — TICAGRELOR 90 MG: 90 TABLET ORAL at 20:22

## 2021-12-26 RX ADMIN — SODIUM BICARBONATE 650 MG: 648 TABLET ORAL at 20:22

## 2021-12-26 RX ADMIN — MORPHINE SULFATE 2 MG: 2 INJECTION, SOLUTION INTRAMUSCULAR; INTRAVENOUS at 05:02

## 2021-12-26 RX ADMIN — ACETAMINOPHEN 650 MG: 325 TABLET ORAL at 23:51

## 2021-12-26 RX ADMIN — ATORVASTATIN CALCIUM 80 MG: 80 TABLET, FILM COATED ORAL at 20:22

## 2021-12-26 RX ADMIN — HEPARIN SODIUM 2000 UNITS: 1000 INJECTION INTRAVENOUS; SUBCUTANEOUS at 01:39

## 2021-12-26 RX ADMIN — DIPHENHYDRAMINE HYDROCHLORIDE 25 MG: 50 INJECTION, SOLUTION INTRAMUSCULAR; INTRAVENOUS at 04:07

## 2021-12-26 RX ADMIN — DIPHENHYDRAMINE HYDROCHLORIDE 25 MG: 50 INJECTION, SOLUTION INTRAMUSCULAR; INTRAVENOUS at 16:01

## 2021-12-26 RX ADMIN — ACETAMINOPHEN 650 MG: 325 TABLET ORAL at 17:15

## 2021-12-26 RX ADMIN — HEPARIN SODIUM 2000 UNITS: 1000 INJECTION INTRAVENOUS; SUBCUTANEOUS at 23:55

## 2021-12-26 RX ADMIN — SODIUM CHLORIDE, PRESERVATIVE FREE 10 ML: 5 INJECTION INTRAVENOUS at 20:23

## 2021-12-26 RX ADMIN — MORPHINE SULFATE 4 MG: 4 INJECTION INTRAVENOUS at 22:45

## 2021-12-26 RX ADMIN — DIPHENHYDRAMINE HYDROCHLORIDE 25 MG: 50 INJECTION, SOLUTION INTRAMUSCULAR; INTRAVENOUS at 22:01

## 2021-12-26 ASSESSMENT — PAIN SCALES - GENERAL
PAINLEVEL_OUTOF10: 3
PAINLEVEL_OUTOF10: 9
PAINLEVEL_OUTOF10: 6
PAINLEVEL_OUTOF10: 9
PAINLEVEL_OUTOF10: 9
PAINLEVEL_OUTOF10: 10
PAINLEVEL_OUTOF10: 9
PAINLEVEL_OUTOF10: 9

## 2021-12-26 ASSESSMENT — PAIN DESCRIPTION - PROGRESSION

## 2021-12-26 ASSESSMENT — ENCOUNTER SYMPTOMS
WHEEZING: 0
SHORTNESS OF BREATH: 0
VOMITING: 0
CHEST TIGHTNESS: 0
DIARRHEA: 0
CHOKING: 0
BACK PAIN: 1
RHINORRHEA: 0
CONSTIPATION: 0
SINUS PRESSURE: 0
NAUSEA: 0
COUGH: 0
VOICE CHANGE: 0
ABDOMINAL PAIN: 0

## 2021-12-26 ASSESSMENT — PAIN DESCRIPTION - DIRECTION: RADIATING_TOWARDS: LEFT SHOULDER AND NECK

## 2021-12-26 ASSESSMENT — PAIN DESCRIPTION - LOCATION
LOCATION: CHEST;SHOULDER;NECK
LOCATION: HEAD

## 2021-12-26 ASSESSMENT — PAIN DESCRIPTION - DESCRIPTORS: DESCRIPTORS: PRESSURE

## 2021-12-26 NOTE — PROGRESS NOTES
Neurology Resident Progress Note      SUBJECTIVE:  This is a 61 y.o.  male admitted 12/23/2021 for Acute chest pain [R07.9]  NSTEMI (non-ST elevated myocardial infarction) [facility]) [I21.4]  Coronary artery disease involving native coronary artery of native heart with angina pectoris ([facility] [address] [I25.119]  This is a follow-up neurology progress note. The patient was seen and examined and the chart was reviewed. There were no acute events overnight. ROS  Constitutional: no fever, chills, fatigue  HENT: No change in vision or hearing   Respiratory: No cough, SOB, wheezing. Cardiovascular:  No chest pain, palpitations, leg swelling. Gastrointestinal: No nausea, vomiting, diarrhea. Genitourinary: No increased frequency, urgency. Musculoskeletal: No myalgia or arthralgia. Skin: No rashes or scarring or bruises. Neurological: No headache, paresthesia, or focal weakness. Endo/Heme/Allergies: Negative for itchy eyes or runny nose. Psychiatric/Behavioral: No anxiety or depressed mood.      HPI  See H&P     isosorbide mononitrate  30 mg Oral Daily    sodium bicarbonate  650 mg Oral BID    ticagrelor  90 mg Oral BID    nitroGLYCERIN  0.4 mg SubLINGual Once    tamsulosin  0.4 mg Oral Daily    bumetanide  2 mg Oral BID    gabapentin  100 mg Oral TID    metoprolol succinate  50 mg Oral Daily    sodium chloride flush  5-40 mL IntraVENous 2 times per day    atorvastatin  80 mg Oral Nightly    lidocaine 1 % injection  5 mL IntraDERmal Once    sodium chloride flush  5-40 mL IntraVENous 2 times per day    pantoprazole  40 mg IntraVENous Daily    And    sodium chloride (PF)  10 mL IntraVENous Daily       Past Medical History:   Diagnosis Date    AICD (automatic cardioverter/defibrillator) present     CKD (chronic kidney disease), stage III ([address]     COVID-19     Myocardial infarction ([facility] [address]     S/P CABG (coronary artery bypass graft)     x2 separate occasions    Solitary kidney, congenital        Past Surgical History:   Procedure Laterality Date    CORONARY ARTERY BYPASS GRAFT      CORONARY ARTERY BYPASS GRAFT      PACEMAKER PLACEMENT  09/11/2020    [employer] ICD [id number] RV lead [id number], LV lead [id number]. NOT MRI CONDITIONAL       PHYSICAL EXAM:      Blood pressure (!) 143/83, pulse 70, temperature 99.1 °F (37.3 °C), temperature source Oral, resp. rate 16, weight 169 lb 1.5 oz (76.7 kg), SpO2 100 %. Neurological examination:     Mental status    Alert and oriented x 3; following all commands  Speech: Dysarthric      Cranial nerves    II -right partial hemianopsia. Pupils reactive  III, IV, VI - extraocular muscles intact; no JOHN; no nystagmus; no ptosis   V -decrease sensation over right face                                                             VII -right facial droop                                                         VIII - intact hearing                                                                             IX, X - symmetrical palate elevation                                               XI - symmetrical shoulder shrug                                                       XII - midline tongue without atrophy or fasciculation      Motor function  Strength:   5 out of 5 left upper arm, left leg 4/5  4 out of 5 right arm, RT leg 3/5       Sensory function Intact to touch, pin, vibration, proprioception throughout except for right hemisensory loss over the face and right arm and leg      Cerebellar Intact finger-nose-finger testing. Intact heel-shin testing. No dysdiadochokinesia present. No tremors                    Reflex function 2/4 symmetric throughout .        Gait                   not performed             Investigations:      Laboratory Testing:  Recent Results (from the past 24 hour(s))   APTT    Collection Time: 12/26/21 12:21 AM   Result Value Ref Range    PTT 48.9 (H) 20.5 - 30.5 sec   EKG 12 Lead    Collection Time: 12/26/21  4:03 AM   Result Value Ref Range Ventricular Rate 73 BPM    Atrial Rate 71 BPM    QRS Duration 174 ms    Q-T Interval 486 ms    QTc Calculation (Bazett) 535 ms    R Axis -97 degrees    T Axis 109 degrees   Basic Metabolic Panel w/ Reflex to MG    Collection Time: 12/26/21  5:20 AM   Result Value Ref Range    Glucose 117 (H) 70 - 99 mg/dL    BUN 19 6 - 20 mg/dL    CREATININE 1.80 (H) 0.70 - 1.20 mg/dL    Bun/Cre Ratio NOT REPORTED 9 - 20    Calcium 8.1 (L) 8.6 - 10.4 mg/dL    Sodium 136 135 - 144 mmol/L    Potassium 4.1 3.7 - 5.3 mmol/L    Chloride 109 (H) 98 - 107 mmol/L    CO2 17 (L) 20 - 31 mmol/L    Anion Gap 10 9 - 17 mmol/L    GFR Non-African American 39 (L) >60 mL/min    GFR  47 (L) >60 mL/min    GFR Comment          GFR Staging NOT REPORTED    CBC auto differential    Collection Time: 12/26/21  5:20 AM   Result Value Ref Range    WBC 4.7 3.5 - 11.3 k/uL    RBC 2.72 (L) 4.21 - 5.77 m/uL    Hemoglobin 8.5 (L) 13.0 - 17.0 g/dL    Hematocrit 27.2 (L) 40.7 - 50.3 %    .0 82.6 - 102.9 fL    MCH 31.3 25.2 - 33.5 pg    MCHC 31.3 28.4 - 34.8 g/dL    RDW 16.8 (H) 11.8 - 14.4 %    Platelets 183 998 - 254 k/uL    MPV 10.2 8.1 - 13.5 fL    NRBC Automated 0.0 0.0 per 100 WBC    Differential Type NOT REPORTED     WBC Morphology NOT REPORTED     RBC Morphology NOT REPORTED     Platelet Estimate NOT REPORTED     Immature Granulocytes 1 (H) 0 %    Seg Neutrophils 79 (H) 36 - 65 %    Lymphocytes 10 (L) 24 - 43 %    Monocytes 8 3 - 12 %    Eosinophils % 2 1 - 4 %    Basophils 0 0 - 2 %    Absolute Immature Granulocyte 0.05 0.00 - 0.30 k/uL    Segs Absolute 3.71 1.50 - 8.10 k/uL    Absolute Lymph # 0.47 (L) 1.10 - 3.70 k/uL    Absolute Mono # 0.38 0.10 - 1.20 k/uL    Absolute Eos # 0.09 0.00 - 0.44 k/uL    Basophils Absolute 0.00 0.00 - 0.20 k/uL    Morphology 1+ ELLIPTOCYTES     Morphology ANISOCYTOSIS PRESENT    Troponin    Collection Time: 12/26/21  5:20 AM   Result Value Ref Range    Troponin, High Sensitivity 120 (HH) 0 - 22 ng/L Troponin T NOT REPORTED <0.03 ng/mL    Troponin Interp NOT REPORTED    APTT    Collection Time: 12/26/21  2:01 PM   Result Value Ref Range    PTT 43.8 (H) 20.5 - 30.5 sec       Imaging/Diagnostics:  CT ABDOMEN PELVIS WO CONTRAST Additional Contrast? None    Result Date: 12/9/2021  EXAMINATION: CT OF THE ABDOMEN AND PELVIS WITHOUT CONTRAST 12/9/2021 11:44 am TECHNIQUE: CT of the abdomen and pelvis was performed without the administration of intravenous contrast. Multiplanar reformatted images are provided for review. Dose modulation, iterative reconstruction, and/or weight based adjustment of the mA/kV was utilized to reduce the radiation dose to as low as reasonably achievable. COMPARISON: None. HISTORY: ORDERING SYSTEM PROVIDED HISTORY: left hydronephrosis TECHNOLOGIST PROVIDED HISTORY: left hydronephrosis Reason for Exam: left hydronephrosis FINDINGS: Lower Chest: Mild bibasilar atelectasis. Cardiomegaly. Organs: Suboptimal evaluation due to lack of IV contrast.  Liver gallbladder pancreas spleen and adrenal glands all appear grossly unremarkable. 4 mm nonobstructing calculus left kidney. Left-sided hydronephrosis/hydroureter is noted with a small amount of air seen within the collecting system. There is associated urothelial enhancement extending along the proximal ureter no obstructing stone or gross mass is seen. Right kidney is absent. Abdominal aorta appears normal in caliber. GI/Bowel: Stomach is grossly unremarkable. Small bowel appears nondilated. Sigmoid diverticulosis. Pelvis: Urinary bladder is grossly unremarkable. Prostate gland is normal in size. Peritoneum/Retroperitoneum: No free air or lymphadenopathy. Small amount of free fluid. Bones/Soft Tissues: Abdominal wall demonstrates anasarca type changes. Subcutaneous air is seen along the left anterior abdominal wall. Osseous structures demonstrate degenerative change.      1. Left-sided hydronephrosis/hydroureter with uroepithelial thickening and small amount of air seen within the collecting system. No obstructing stone or gross mass. Left UVJ stricture cannot be excluded. Correlation with urinalysis is suggested as underlying infectious process cannot be excluded. 2. 4 mm nonobstructing calculus left kidney. 3. Cardiomegaly. 4. Presumed post injection changes are seen involving the left anterior abdominal wall. 5. Body wall anasarca. 6. Small amount of free fluid. 7. Sigmoid diverticulosis. CT HEAD WO CONTRAST    Result Date: 12/24/2021  EXAMINATION: CT OF THE HEAD WITHOUT CONTRAST  12/24/2021 12:45 pm TECHNIQUE: CT of the head was performed without the administration of intravenous contrast. Dose modulation, iterative reconstruction, and/or weight based adjustment of the mA/kV was utilized to reduce the radiation dose to as low as reasonably achievable. COMPARISON: CT head December 23, 2021, December 14, 2021 HISTORY: ORDERING SYSTEM PROVIDED HISTORY: repeat due to concern for left cortical acute stroke, significantly worsening right arm, face leg weakness, dysarthria and right visual field deficit TECHNOLOGIST PROVIDED HISTORY: repeat due to concern for left cortical acute stroke, significantly worsening right arm, face leg weakness, dysarthria and right visual field deficit Reason for Exam: repeat concern for left cortical acute stroke significantly worsening right arm face leg weakness FINDINGS: BRAIN/VENTRICLES: There are old infarctions in the left frontotemporal lobes, bilateral occipital lobes and minimally in the left cerebellar hemisphere, stable. No evidence of acute territorial infarction. There is no acute intracranial hemorrhage, mass effect or midline shift. No abnormal extra-axial fluid collection. There is no evidence of hydrocephalus. ORBITS: There is old fracture defect of the left lamina papyracea. The visualized portion of the orbits demonstrate no acute abnormality.  SINUSES: The visualized paranasal sinuses and mastoid air cells demonstrate no acute abnormality. SOFT TISSUES/SKULL:  No acute abnormality of the visualized skull or soft tissues. No acute intracranial abnormality. 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 CT HEAD WO CONTRAST    Result Date: 12/23/2021  EXAMINATION: CT OF THE HEAD WITHOUT CONTRAST  12/23/2021 9:04 pm TECHNIQUE: CT of the head was performed without the administration of intravenous contrast. Dose modulation, iterative reconstruction, and/or weight based adjustment of the mA/kV was utilized to reduce the radiation dose to as low as reasonably achievable. COMPARISON: 12/16/2021. HISTORY: ORDERING SYSTEM PROVIDED HISTORY: right sided deficits last known well moments ago TECHNOLOGIST PROVIDED HISTORY: right sided deficits last known well moments ago FINDINGS: Unchanged chronic encephalomalacia of the right medial occipital lobe and unchanged chronic encephalomalacia of left inferior parietal lobule. There is no acute infarction, intracranial hemorrhage or intracranial mass lesion. No mass effect, midline shift or extra-axial collection is noted. There are mild nonspecific foci of periventricular and subcortical cerebral white matter hypodensity, most likely representing chronic microangiopathic disease in this age group. The brain parenchyma is otherwise normal. The cerebellar tonsils are in normal position. The ventricles, sulci, and cisterns are mildly prominent suggestive of mild generalized volume loss. The globes and orbits are within normal limits. The visualized extracranial structures including paranasal sinuses and mastoid air cells are unremarkable. No fracture is identified. Unchanged chronic encephalomalacia of the right medial occipital lobe and unchanged chronic encephalomalacia of left inferior parietal lobule. No acute territorial infarction, intracranial hemorrhage or mass lesion. Mild chronic microangiopathic ischemic disease. Mild generalized volume loss. RECOMMENDATIONS: Unavailable     CT HEAD WO CONTRAST    Result Date: 12/16/2021  EXAMINATION: CT OF THE HEAD WITHOUT CONTRAST  12/16/2021 12:06 am TECHNIQUE: CT of the head was performed without the administration of intravenous contrast. Dose modulation, iterative reconstruction, and/or weight based adjustment of the mA/kV was utilized to reduce the radiation dose to as low as reasonably achievable. COMPARISON: 12/14/2021. HISTORY: ORDERING SYSTEM PROVIDED HISTORY: repeat CT of head r/o CVA TECHNOLOGIST PROVIDED HISTORY: repeat CT of head r/o CVA Reason for Exam: Repeat CT of head rule out CVA Relevant Medical/Surgical History: Covid + Slurred speech. Right-sided weakness. Initial evaluation. FINDINGS: BRAIN/VENTRICLES: Areas of encephalomalacia are again seen involving the occipital lobes bilaterally. There is also encephalomalacia involving the left temporal lobe. Otherwise, the gray-white differentiation appears maintained. No evidence of an acute intracranial hemorrhage. There is no mass effect or midline shift. There are areas of hypoattenuation in the periventricular and subcortical white matter, which is nonspecific, but may represent chronic microvasvular ischemic change. There is mild global parenchymal volume loss. Minimal scattered atherosclerosis of the intracranial vasculature. ORBITS: There is a chronic defect involving the medial wall the left orbit. No acute abnormality seen of the orbits. SINUSES: Scattered mucosal thickening is seen of the ethmoid sinuses. The mastoid air cells demonstrate no acute abnormality. SOFT TISSUES/SKULL:  No acute abnormality of the visualized skull or soft tissues. 1. Overall, no significant change in the appearance of the brain compared to the prior CT. 2. Areas of encephalomalacia involving the bilateral occipital and left temporal lobes.  3. Mild global parenchymal volume loss with chronic microvascular ischemic changes. RECOMMENDATIONS: Unavailable     CT HEAD WO CONTRAST    Result Date: 12/14/2021  EXAMINATION: CT OF THE HEAD WITHOUT CONTRAST  12/14/2021 10:42 pm TECHNIQUE: CT of the head was performed without the administration of intravenous contrast. Dose modulation, iterative reconstruction, and/or weight based adjustment of the mA/kV was utilized to reduce the radiation dose to as low as reasonably achievable. COMPARISON: None. HISTORY: ORDERING SYSTEM PROVIDED HISTORY: stroke alert TECHNOLOGIST PROVIDED HISTORY: stroke alert Reason for Exam: Slurred speech, right sided weakness, previous stroke 3 months ago FINDINGS: BRAIN/VENTRICLES: There is no acute intracranial hemorrhage, mass effect or midline shift. No abnormal extra-axial fluid collection. The gray-white differentiation is maintained without evidence of an acute infarct. There is no evidence of hydrocephalus. Periventricular and deep subcortical white matter hypoattenuation, consistent microangiopathic change. There is mild parenchymal volume loss. There is right occipital encephalomalacia. There is mild prominence of the cisterna magna. There is also encephalomalacia in the left occipital lobe. ORBITS: The visualized portion of the orbits demonstrate no acute abnormality. SINUSES: Scattered mucosal thickening in the paranasal sinuses. SOFT TISSUES/SKULL:  No acute abnormality of the visualized skull or soft tissues. 1. No acute intracranial abnormality. 2. Bilateral occipital encephalomalacia, suggestive of prior ischemia. 3. Microangiopathic change. Findings were discussed with Roland Turk CNP at 11:02 pm on 12/14/2021. RECOMMENDATIONS: Unavailable     NM LUNG SCAN PERFUSION ONLY    Result Date: 12/7/2021  EXAMINATION: NUCLEAR MEDICINE PERFUSION SCAN. 12/7/2021 TECHNIQUE: Ventilation not performed as part of COVID-19 safety precautions.   7.5 millicuries of Tc 77J MAA was administered intravenously prior to but no Palmer's. No clear-cut GGO radiographically. Slight blunting left costophrenic angle but no sizable pleural effusion. No pneumothorax. DJD spine. No prior study available. No definite pneumonia. Slight blunting left CP angle. Cardiomegaly; venous hypertension without radiographic CHF. VL DUP CAROTID BILATERAL    Result Date: 12/24/2021    [facility]  Vascular Carotid Procedure   Patient Name    Johanna Pollack Date of Study             12/24/2021   Date of Birth   1965  Gender                    Male   Age             61 year(s)  Race                         Room Number     5733   Corporate ID #  [id number]   Patient Acct #  [de-identified]   MR #            [id number]     Sonographer               Dwight Sharp, [facility]   Accession #     [id number]  Interpreting Physician    Tejas Simpson   Referring Nurse             Referring Physician       Annette Mccartney  Practitioner  Procedure Type of Study:   Cerebral: Carotid, Carotid Scan Bilateral.  Indications for Study:CVA. Patient Status:ER. Technical Quality:Limited visualization. Limitation reason:patient IV in right jugular vein with bandage. Comments:Basic Classification of ICA Stenosis: PSV - Peak Systolic Velocity Normal: No plaque or calcification identified, no elevation of PSV Mild: <50% spectral broadening without increased PSV Moderate: 50 - 69% PSV >125 - <230 cm/sec Severe: 70 - 99% PSV >230 cm/sec Critical: 80 - 99% PSV >230cm/sec and/or End Diastolic Velocities >533ZJ/WZX. Conclusions   Summary   Simultaneous real time imaging utilizing B-Mode, color flow doppler and  spectral waveform analysis was performed on the bilateral extracerebral  vascular system. The study demonstrates:   Right:  Internal carotid artery has a mild, <50% stenosis based on velocities. The vertebral artery is patent with antegrade flow. Left:  Internal carotid artery has a mild, <50% stenosis based on velocities.   The vertebral artery is patent with antegrade flow. Signature   ----------------------------------------------------------------  Electronically signed by Baltazar Jordan RVT(Sonographer) on  12/24/2021 09:26 AM  ----------------------------------------------------------------   ----------------------------------------------------------------  Electronically signed by Hi Velázquez(Interpreting  physician) on 12/24/2021 04:40 PM  ----------------------------------------------------------------  Findings:   Right Impression:                    Left Impression:  Intimal thickening right common      Intimal thickening left common  carotid artery. carotid artery. The carotid bulb has an irregular    The carotid bulb has an irregular  heterogeneous plaque causing a <50%  heterogeneous plaque causing a <50%  stenosis. stenosis. The internal carotid artery has a    The internal carotid artery has a  smooth heterogeneous plaque causing  smooth homogeneous plaque causing a  a <50% stenosis based on velocities. <50% stenosis based on velocities. The external carotid artery has a    The external carotid artery has a  smooth homogeneous plaque causing a  smooth homogeneous plaque causing a  <50% stenosis. <50% stenosis. The vertebral artery is patent with  The vertebral artery is patent with  antegrade flow. antegrade flow. Risk Factors   - Current - Every day. Velocities are measured in cm/s ; Diameters are measured in cm Carotid Right Measurements +------------+-------+-------+--------+-------+------------+---------------+ ! Location    ! PSV    ! EDV    ! Angle   ! RI     !%Stenosis   ! Tortuosity     ! +------------+-------+-------+--------+-------+------------+---------------+ ! Prox CCA    !67.1   !11.2   !60      !0.83   !            !               ! +------------+-------+-------+--------+-------+------------+---------------+ ! Mid CCA     !77.7   !13.7 +------------+-------+-------+--------+-------+------------+---------------+ ! Bulb        !61.5   !18.7   ! 60      !0.7    ! !               ! +------------+-------+-------+--------+-------+------------+---------------+ ! Prox ICA    ! 65.9   !10.4   !60      !0.84   !            !               ! +------------+-------+-------+--------+-------+------------+---------------+ ! Mid ICA     !63.1   ! 17     !60      !0.73   !            !               ! +------------+-------+-------+--------+-------+------------+---------------+ ! Dist ICA    !41.9   !15.7   !60      !0.63   !            !               ! +------------+-------+-------+--------+-------+------------+---------------+ ! Prox ECA    !58.8   ! 7.45   !60      !0.87   !            !               ! +------------+-------+-------+--------+-------+------------+---------------+ ! Vertebral   !27.9   !10.3   ! 60      !0.63   !            !               ! +------------+-------+-------+--------+-------+------------+---------------+   - Additional Measurements:ICAPSV/CCAPSV 0.81. ICAEDV/CCAEDV 1.03.    US RETROPERITONEAL COMPLETE    Result Date: 12/8/2021  EXAMINATION: RETROPERITONEAL ULTRASOUND OF THE KIDNEYS AND URINARY BLADDER 12/8/2021 COMPARISON: None HISTORY: ORDERING SYSTEM PROVIDED HISTORY: ARF TECHNOLOGIST PROVIDED HISTORY: ARF 71-year-old male with acute renal failure FINDINGS: Kidneys: Exam limited due to bowel gas and patient's body habitus. Nonvisualization of the right kidney. Left kidney measures 11.9 x 7.1 x 7.0 cm. Left cortical thickness measures 1.5 cm. Mild-to-moderate left-sided hydronephrosis. Bladder: Urinary bladder measures 7.8 x 6.8 x 8.2 cm for a bladder volume of 301 mL. Nonvisualization of ureteral jets. Urinary bladder grossly unremarkable in appearance. 1. Nonvisualization of the right kidney. 2. Mild-to-moderate left-sided hydronephrosis. 3. Exam limited due to patient's bowel gas and body habitus.  4. Nonvisualization of ureteral jets. Urinary bladder grossly unremarkable. CTA HEAD NECK W CONTRAST    Result Date: 12/15/2021  EXAMINATION: CTA OF THE HEAD AND NECK WITH CONTRAST 12/14/2021 11:53 pm: TECHNIQUE: CTA of the head and neck was performed with the administration of intravenous contrast. Multiplanar reformatted images are provided for review. MIP images are provided for review. Stenosis of the internal carotid arteries measured using NASCET criteria. Dose modulation, iterative reconstruction, and/or weight based adjustment of the mA/kV was utilized to reduce the radiation dose to as low as reasonably achievable. COMPARISON: None. HISTORY: ORDERING SYSTEM PROVIDED HISTORY: r/o stroke TECHNOLOGIST PROVIDED HISTORY: r/o stroke Reason for Exam: Slurred speech, right sided weakness FINDINGS: CTA NECK: AORTIC ARCH/ARCH VESSELS: No dissection or arterial injury. No significant stenosis of the brachiocephalic or subclavian arteries. CAROTID ARTERIES: Atherosclerosis in carotid bifurcations. No dissection, arterial injury, or hemodynamically significant stenosis by NASCET criteria. VERTEBRAL ARTERIES: No dissection, arterial injury, or significant stenosis. SOFT TISSUES: Patchy ground-glass infiltrates in the visualized lungs. There is mediastinal lymphadenopathy. The larynx and pharynx are unremarkable. No acute abnormality of the salivary and thyroid glands. BONES: No acute osseous abnormality. CTA HEAD: ANTERIOR CIRCULATION: No significant stenosis of the intracranial internal carotid, anterior cerebral, or middle cerebral arteries. No aneurysm. POSTERIOR CIRCULATION: No significant stenosis of the vertebral, basilar, or posterior cerebral arteries. No aneurysm. OTHER: No dural venous sinus thrombosis on this non-dedicated study. BRAIN: No mass effect or midline shift. No extra-axial fluid collection. The gray-white differentiation is maintained. No flow-limiting arterial stenosis in the head and neck.  Patchy ground-glass infiltrates in the visualized lungs. Mediastinal lymphadenopathy. RECOMMENDATIONS: Unavailable     US RETROPERITONEAL LIMITED    Result Date: 12/24/2021  EXAMINATION: ULTRASOUND OF THE KIDNEYS 12/24/2021 12:13 pm COMPARISON: 12/08/2021, CT 12/09/2020 HISTORY: ORDERING SYSTEM PROVIDED HISTORY: FOLLOW UP ON LEFT HYDRONEPHROSIS TECHNOLOGIST PROVIDED HISTORY: Bilateral Renal Ultrasound FOLLOW UP ON LEFT HYDRONEPHROSIS Absent right kidney FINDINGS: The right kidney is not visualized. The left kidney measures 13.3 cm in length. Mild-to-moderate left hydronephrosis, similar to the prior exams. Cortical echogenicity appears intact. Renal contours remain somewhat lobular. No kidney stones by ultrasound. No perirenal fluid collections. Similar mild-to-moderate left hydronephrosis. Absent right kidney RECOMMENDATIONS: Unavailable     Echocardiogram Limited 2D w Doppler w Color Adult    Result Date: 12/10/2021  [facility] Transthoracic Echocardiography Report (TTE)  Patient Name Tank Aponte Date of Study               12/10/2021   Date of      1965  Gender                      Male  Birth   Age          63 year(s)  Race                           Room Number  1012        Height:                     70 inch, 177.8 cm   Corporate ID [id number]    Weight:                     170 pounds, 77.1 kg  #   Patient Acct [de-identified]   BSA:          1.95 m^2      BMI:     24.39 kg/m^2  #   MR #         [id number]     Sonographer                 Jhonatan Hayden   Accession #  [id number]  Interpreting Physician      Akosua Meyers   Fellow                   Referring Nurse                           Practitioner   Interpreting             Referring Physician         Carole Link, *  Fellow  Type of Study   TTE procedure:Doppler, Color Doppler, Limited Echo. Procedure Date Date: 12/10/2021 Start: 01:30 PM Study Location: [address] [place] [address] Technical Quality: Good visualization Indications:Chest pain. History / Tech. Comments: Procedure explained to patient. Patient Status: Inpatient Height: 70 inches Weight: 170 pounds BSA: 1.95 m^2 BMI: 24.39 kg/m^2 Rhythm: Within normal limits HR: 73 bpm CONCLUSIONS Summary Left ventricle is enlarged Global left ventricular systolic function is moderate to severely reduced Estimated ejection fraction is 25-30% . Mild aortic insufficiency. Moderate mitral regurgitation. Moderate tricuspid regurgitation. Moderate pulmonary hypertension. Estimated right ventricular systolic pressure is 28-61HJPC. No pericardial effusion. Signature ----------------------------------------------------------------------------  Electronically signed by Nuria Miguel(Sonographer) on 12/10/2021 01:58 PM ---------------------------------------------------------------------------- ----------------------------------------------------------------------------  Electronically signed by Jayshree Sharpe(Interpreting physician) on 12/10/2021  06:18 PM ---------------------------------------------------------------------------- FINDINGS Left Atrium Left atrium is mildly dilated. Left Ventricle Left ventricle is enlarged Global left ventricular systolic function is moderate to severely reduced Estimated ejection fraction is 25-30% . Moderate to severe generalized LV hypokinesis. Right Atrium Right atrium is normal in size. Pacing lead seen in the right atrium. Right Ventricle Normal right ventricular size and function. Mitral Valve Thickened mitral valve leaflets. Moderate mitral regurgitation. No mitral stenosis. Aortic Valve Aortic valve is sclerotic but opens well. Aortic valve is trileaflet. Mild aortic insufficiency. No aortic stenosis. Tricuspid Valve No obvious valvular abnormality. Moderate tricuspid regurgitation. Moderate pulmonary hypertension. Estimated right ventricular systolic pressure is 57-37GRAQ. Pulmonic Valve Pulmonic valve is normal in structure and function. Trivial pulmonic insufficiency. Pericardial Effusion No pericardial effusion. Miscellaneous Normal aortic root dimension.  M-mode / 2D Measurements & Calculations:   LVIDd:6.6 cm(3.7 - 5.6 cm)       Diastolic QZUVYF:418 ml  RCUNZ:5.4 cm(2.2 - 4.0 cm)       Systolic AWEDBT:464 ml  SYLT:5.9 cm(0.6 - 1.1 cm)        Aortic Root:3.4 cm(2.0 - 3.7 cm)  LVPWd:1.2 cm(0.6 - 1.1 cm)       LA Dimension: 5.7 cm(1.9 - 4.0 cm)  Fractional Shortening:15.15 %  Calculated LVEF (%): 38.68 %   Tricuspid:   Peak TR Velocity: 3.08 m/s  Peak TR Gradient: 37.9456 mmHg        Assessment & Differential Dx:      Primary Problem  Coronary artery disease involving native coronary artery of native heart with angina pectoris [facility])    Active Hospital Problems    Diagnosis Date Noted    Elevated troponin [R77.8] 12/08/2021     Priority: High    Stage 3 chronic kidney disease ([facility] [address] [N18.30] 12/08/2021     Priority: Medium    Chronic systolic heart failure ([facility] [address] [I50.22] 12/08/2021     Priority: Medium    Acute chest pain [R07.9]     Acute CVA (cerebrovascular accident) ([facility] [address] [I63.9] 12/23/2021    Acute right-sided weakness [R53.1]     Coronary artery disease involving native coronary artery of native heart with angina pectoris [facility]) [I25.119] 12/13/2021       Case of 58-year-old male with history of CAD, status post CABG, and AICD placement with a prior CVA with residual right hemiparesis was admitted on 12/23/2021 with chest pain and NSTEMI during this hospital stay she was noted to have right facial weakness for which neurology was consulted, patient stated that he has chronic right upper and right lower extremity weakness but right facial weakness is new, he also complaining of ongoing chest pain on nitro infusion, evaluated by stroke team not a candidate for TPA recommended MRA because of elevated creatinine of 1.8  -CT head: No acute intracranial normality, old infarction in the left frontotemporal lobes, bilateral occipital lobes and minimally in the left cerebellar hemisphere    Impression:  -Possible brainstem CVA we will get CTA/MRA, unable to get MRI due to AICD  -New onset facial droop      Plan:     -Continue on brilinta   -Continue statin  -Permissive hypertension with target systolic<180  -Medical management per primary team  -Currently on IV heparin and nitro  - Rest of the management as per primary         Renard Lehman MD    12/26/2021 6:52 PM

## 2021-12-26 NOTE — PROGRESS NOTES
Renal Progress Note    Patient :  Kel Batista; 61 y.o. MRN# [id number]  Location:  [facility]/3948-80  Attending:  Johnnie Claude, MD  Admit Date:  2021   Hospital Day: 3      Subjective:     Patient seen and examined at the bedside  Continues to have chest pain without any improvement on nitro infusion. Also reports right-sided weakness without any change. CT head showed old infarct in the left frontotemporal lobes. Plans for stress test followed by cardiac cath. Renal function at baseline. Outpatient Medications:     Medications Prior to Admission: bumetanide (BUMEX) 2 MG tablet, Take 1 tablet by mouth 2 times daily  [] dexamethasone (DECADRON) 6 MG tablet, Take 1 tablet by mouth daily for 6 doses  gabapentin (NEURONTIN) 100 MG capsule, Take 1 capsule by mouth 3 times daily for 30 days.   metoprolol succinate (TOPROL XL) 50 MG extended release tablet, Take 1 tablet by mouth daily  pantoprazole (PROTONIX) 40 MG tablet, Take 1 tablet by mouth every morning (before breakfast)  clopidogrel (PLAVIX) 75 MG tablet, Take 1 tablet by mouth daily  atorvastatin (LIPITOR) 40 MG tablet, Take 1 tablet by mouth daily  tamsulosin (FLOMAX) 0.4 MG capsule, Take 0.4 mg by mouth daily  apixaban (ELIQUIS) 5 MG TABS tablet, Take 5 mg by mouth 2 times daily    Current Medications:     Scheduled Meds:    isosorbide mononitrate  30 mg Oral Daily    sodium bicarbonate  650 mg Oral BID    ticagrelor  90 mg Oral BID    nitroGLYCERIN  0.4 mg SubLINGual Once    tamsulosin  0.4 mg Oral Daily    bumetanide  2 mg Oral BID    gabapentin  100 mg Oral TID    metoprolol succinate  50 mg Oral Daily    sodium chloride flush  5-40 mL IntraVENous 2 times per day    atorvastatin  80 mg Oral Nightly    lidocaine 1 % injection  5 mL IntraDERmal Once    sodium chloride flush  5-40 mL IntraVENous 2 times per day    pantoprazole  40 mg IntraVENous Daily    And    sodium chloride (PF)  10 mL IntraVENous Daily     Continuous Infusions:    heparin (PORCINE) Infusion 22 Units/kg/hr (21 0139)    dextrose 5 % and 0.45 % NaCl 75 mL/hr at 21 2115    nitroGLYCERIN 70 mcg/min (21 0640)    sodium chloride      sodium chloride       PRN Meds:  heparin (porcine), heparin (porcine), sodium chloride flush, sodium chloride, potassium chloride **OR** potassium alternative oral replacement **OR** potassium chloride, ondansetron **OR** ondansetron, acetaminophen **OR** acetaminophen, nitroGLYCERIN, bisacodyl, sodium chloride flush, sodium chloride, diphenhydrAMINE    Input/Output:       I/O last 3 completed shifts: In: 0   Out: 800 [Urine:800]. Patient Vitals for the past 96 hrs (Last 3 readings):   Weight   21 0350 169 lb 1.5 oz (76.7 kg)       Vital Signs:   Temperature:  Temp: 97.4 °F (36.3 °C)  TMax:   Temp (24hrs), Av.7 °F (37.1 °C), Min:97.4 °F (36.3 °C), Max:99.3 °F (37.4 °C)    Respirations:  Resp: 16  Pulse:   Pulse: 70  BP:    BP: 120/73  BP Range: Systolic (90DXT), UBI:872 , Min:114 , QWA:081       Diastolic (33AZL), MXX:00, Min:65, Max:123      Physical Examination:     General:  AAO x 3, speaking in full sentences, no accessory muscle use. HEENT: Atraumatic, normocephalic, no throat congestion, moist mucosa. Eyes:   Pupils equal, round and reactive to light, EOMI. Neck:   Supple  Chest:   Bilateral vesicular breath sounds, no rales or wheezes. Cardiac:  S1 S2 RR, no murmurs, gallops or rubs. Abdomen: Soft, non-tender, no masses or organomegaly, BS audible. :   No suprapubic or flank tenderness. Neuro:  AAO x 3, No FND. SKIN:  No rashes, good skin turgor. Extremities:  No edema.     Labs:       Recent Labs     21  1416 21  0446 21  0520   WBC 4.4 4.5 4.7   RBC 2.91* 3.02* 2.72*   HGB 9.0* 9.4* 8.5*   HCT 28.4* 30.7* 27.2*   MCV 97.6 101.7 100.0   MCH 30.9 31.1 31.3   MCHC 31.7 30.6 31.3   RDW 16.5* 16.9* 16.8*    237 214   MPV 10.1 10.9 10.2      BMP:   Recent Labs     21  0926 12/25/21  0446 12/26/21  0520    137 136   K 4.5 4.6 4.1    110* 109*   CO2 18* 16* 17*   BUN 33* 24* 19   CREATININE 1.83* 1.58* 1.80*   GLUCOSE 81 90 117*   CALCIUM 8.8 8.2* 8.1*      Phosphorus:     Recent Labs     12/24/21  1416   PHOS 3.1     Magnesium:  No results for input(s): MG in the last 72 hours. Albumin:    Recent Labs     12/23/21  1550   LABALBU 2.8*     BNP:    No results found for: BNP  ANNALISE:    No results found for: ANNALISE  SPEP:  Lab Results   Component Value Date    PROT 6.5 12/23/2021    PATH ELECTRONICALLY SIGNED.  Katerine Haynes M.D. 12/09/2021     UPEP:   No results found for: LABPE  C3:   No results found for: C3  C4:   No results found for: C4  MPO ANCA:   No results found for: MPO  PR3 ANCA:   No results found for: PR3  Anti-GBM:   No results found for: GBMABIGG  Hep BsAg:         Lab Results   Component Value Date    HEPBSAG NONREACTIVE 12/09/2021     Hep C AB:          Lab Results   Component Value Date    HEPCAB REACTIVE 12/09/2021       Urinalysis/Chemistries:      Lab Results   Component Value Date    NITRU NEGATIVE 12/11/2021    COLORU Yellow 12/11/2021    PHUR 7.0 12/11/2021    WBCUA 0 TO 2 12/11/2021    RBCUA 0 TO 2 12/11/2021    MUCUS NOT REPORTED 12/11/2021    TRICHOMONAS NOT REPORTED 12/11/2021    YEAST NOT REPORTED 12/11/2021    BACTERIA NOT REPORTED 12/11/2021    SPECGRAV 1.015 12/11/2021    LEUKOCYTESUR NEGATIVE 12/11/2021    UROBILINOGEN Normal 12/11/2021    BILIRUBINUR NEGATIVE 12/11/2021    GLUCOSEU NEGATIVE 12/11/2021    KETUA NEGATIVE 12/11/2021    AMORPHOUS NOT REPORTED 12/11/2021     Urine Sodium:     Lab Results   Component Value Date    SHERMAN <20 12/09/2021     Urine Potassium:  No results found for: KUR  Urine Chloride:  No results found for: CLUR  Urine Osmolarity: No results found for: OSMOU  Urine Protein:   No components found for: TOTALPROTEIN, URINE   Urine Creatinine:     Lab Results   Component Value Date    LABCREA 95.6 12/09/2021     Urine Eosinophils:  No components found for: UEOS    Radiology:     Reviewed. Assessment:     1. NSTEMI  2. CKD stage IIIB with baseline creatinine 1.8-2. 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 3. Acute stroke with past history of CVA  4. Congenital absence of right kidney  5. Ischemic cardiomyopathy with EF of 25-30% on echo from 12/3/2021 S/P AICD  6. Hx of CAD s/p CABG  7. Hx of A. Fib  8. Hypertension  9. Hx of COVID-19 infection  10. Mild to moderate left-sided hydronephrosis -chronic  11. Chronic metabolic acidosis    Plan:     1. Maintain intake output record  2. Avoid nephrotoxic drugs. 3. Continue oral sodium bicarbonate 650 mg twice daily  4. Discussed in detail with patient with regards to the renal effects of heart with leading to JACKSON and even might need hemodialysis  5. Inform nephrology of the schedule so that appropriate MARGARET preventive measures can be initiated. Nutrition   Please ensure that patient is on a renal diet/TF. Avoid nephrotoxic drugs/contrast exposure. We will continue to follow along with you.        Georgia Ramirez MD MD, MRCP Juanis Smith), FACP   12/26/2021 1:18 PM    Nephrology [address] [place] [address]

## 2021-12-26 NOTE — PROGRESS NOTES
[facility]   Progress Note                   Date:   12/26/2021  Patient name: Yumiko Baires  Date of admission:  12/23/2021  2:32 PM  MRN:   [id number]  YOB: 1965  PCP: No primary care provider on file. Reason for Admission: Acute chest pain [R07.9]  NSTEMI (non-ST elevated myocardial infarction) ([facility] [address] [I21.4]  Coronary artery disease involving native coronary artery of native heart with angina pectoris ([facility] [address] [I25.119]    Subjective:       Clinical Changes / Abnormalities: Pt seen and examined in the room. Pt denies any sob but is having some CP today. Pt up walking in room.      Medications:   Scheduled Meds:   isosorbide mononitrate  30 mg Oral Daily    sodium bicarbonate  650 mg Oral BID    ticagrelor  90 mg Oral BID    nitroGLYCERIN  0.4 mg SubLINGual Once    tamsulosin  0.4 mg Oral Daily    bumetanide  2 mg Oral BID    gabapentin  100 mg Oral TID    metoprolol succinate  50 mg Oral Daily    sodium chloride flush  5-40 mL IntraVENous 2 times per day    atorvastatin  80 mg Oral Nightly    lidocaine 1 % injection  5 mL IntraDERmal Once    sodium chloride flush  5-40 mL IntraVENous 2 times per day    pantoprazole  40 mg IntraVENous Daily    And    sodium chloride (PF)  10 mL IntraVENous Daily     Continuous Infusions:   heparin (PORCINE) Infusion 22 Units/kg/hr (12/26/21 0139)    dextrose 5 % and 0.45 % NaCl 75 mL/hr at 12/24/21 2115    nitroGLYCERIN 70 mcg/min (12/26/21 0640)    sodium chloride      sodium chloride       CBC:   Recent Labs     12/24/21  1416 12/25/21  0446 12/26/21  0520   WBC 4.4 4.5 4.7   HGB 9.0* 9.4* 8.5*    237 214     BMP:    Recent Labs     12/23/21  2103 12/25/21  0446 12/26/21  0520    137 136   K 4.5 4.6 4.1    110* 109*   CO2 18* 16* 17*   BUN 33* 24* 19   CREATININE 1.83* 1.58* 1.80*   GLUCOSE 81 90 117*     Hepatic:   Recent Labs     12/23/21  1550   AST 18   ALT 14   BILITOT 0.58   ALKPHOS 68     Troponin: Recent Labs     12/23/21  2236 12/24/21  1416 12/26/21  0520   TROPHS 62* 68* 120*     BNP: No results for input(s): BNP in the last 72 hours. Lipids: No results for input(s): CHOL, HDL in the last 72 hours. Invalid input(s): LDLCALCU  INR:   Recent Labs     12/23/21  2103 12/23/21  2235   INR Unable to perform testing: Specimen clotted. 1.2     Diagnostics:    EKG:Ventricular paced rhythm.      Echo 12/2021  Summary  Left ventricle is enlarged Global left ventricular systolic function is  moderate to severely reduced Estimated ejection fraction is 25-30% . Mild aortic insufficiency. Moderate mitral regurgitation. Moderate tricuspid regurgitation. Moderate pulmonary hypertension. Estimated right ventricular systolic  pressure is 57-54VHHK. No pericardial effusion. Objective:   Vitals: /79   Pulse 70   Temp 97.4 °F (36.3 °C) (Temporal)   Resp 16   Wt 169 lb 1.5 oz (76.7 kg)   SpO2 100%   BMI 24.26 kg/m²   General appearance: alert and cooperative with exam  HEENT: Head: Normocephalic, no lesions, without obvious abnormality. Neck: no JVD, trachea midline, no adenopathy  Lungs: Clear to auscultation  Heart: Regular rate and rhythm, s1/s2 auscultated, no murmurs  Abdomen: soft, non-tender, bowel sounds active  Extremities: no edema  Neurologic: not done        Assessment / Acute Cardiac Problems:   1. CP  2. CHF   3. PAF  4.  CKD     Patient Active Problem List:     JACKSON (acute kidney injury) ([facility] [address]     Atypical chest pain     AICD (automatic cardioverter/defibrillator) present     History of MI (myocardial infarction)     Elevated brain natriuretic peptide (BNP) level     Elevated troponin     Chronic systolic heart failure (HCC)     Hx of CABG     Stage 3 chronic kidney disease ([facility] [address]     COVID-19 virus infection     Hydronephrosis of left kidney     Acute urinary retention     Hepatitis C     Coronary artery disease involving native coronary artery of native heart with angina pectoris ([facility] [address] Solitary kidney, congenital     Homeless single person     Acute right-sided weakness     Acute CVA (cerebrovascular accident) ([facility] [address]      Plan of Treatment:   1. Chest pain with hx of CAD. Type I vs II MI. Continue IV heparin. Will restart Nitro gtt if needed. Add imdur 30mg daily. Continue brilinta, BB, statin   2. ICMP s/p AICD. Continue current medications. No fluid overload today. Continue bumex. 3. CKD. Nephology following     4. Afib. Stable. Continue BB. On heparin gtt. 5. ECHO reviewed. 6. Due to CKD and anemia, will proceed with Lexiscan stress test.  NPO after midnight.        Electronically signed by VERONICA Easton CNP on 12/26/2021 at 9:17 AM  [id number] [address].  [phone]

## 2021-12-26 NOTE — PROGRESS NOTES
In shift report RN was informed patients chest pain had returned and it was under my discretion to restart nitro gtt. At 2055 pt stated chest pain was persisting, 10/10 described as \"pressure\" and pt states \"it feels like my heart is going to pound out of my chest\", /119 map 128 hr 76, spo2 95 on room air. RN messaged on call NP and instructed to restart nitro gtt and contact cardio. Nitro gtt restarted at 2120 at 40 mcg/min, 4 mg morphine given at 2353 and by 0330 titrated up to 60 mcg/min and staying there due to pt complaining of headache. EKG obtained, trops ordered and one time dose of 2 mg morphine given at 0502. Per cardio ekg was unremarkable and instructed to continue with the same treatment we are currently doing. Will continue to assess patients need for nitro gtt and blood pressures.   Electronically signed by Amy Yost RN on 12/26/2021 at 5:44 AM

## 2021-12-26 NOTE — PROGRESS NOTES
[facility]  Office: [address], DO, Von Marshall, DO, Subhash Zaomrar, DO, Luis Amin Blood, DO, Lisa Alexander MD, Christian Strange MD, Frandy Livingston MD, Billie Ludwig MD, Kenneth Xie MD, Kecia Junior MD, Enrique Monson MD, Cassie Soto, DO, Robert Cisneros, DO, Lynn Muñiz MD,  Leesa Maldonado, DO, Deisy Mayers MD, Sarah Valencia MD, Burton Lindsay MD, Tin Kay MD, Kim Billingsley MD, Chandler Lombard, MD, Chapin Phillips MD, Jo García, [facility], [facility], CNP, Nancie Najera, CNP, John Boggs, CNS, Audrey Cárdenas, CNP, Batsheva Santiago, CNP, Thea Childress, CNP, Damaris Buckner, CNP, Charissa Grijalva, CNP, Vincent Kim PA-C, Qamar Baron, DNP, Wendi Viramontes, [facility], Era Foley, CNP, Deena Hawkins, CNP, Eddie Walter, CNP, Benjie Washington, CNP, Topher Wright, [facility], [facility], [address] [place] [address]      Daily Progress Note     Admit Date: 12/23/2021  Bed/Room No.  4622/8704-58  Admitting Physician : Frandy Livingston MD  Code Status :Full Code  Hospital Day:  LOS: 3 days   Chief Complaint:     Chief Complaint   Patient presents with    Chest Pain     Principal Problem:    Coronary artery disease involving native coronary artery of native heart with angina pectoris [facility])  Active Problems:    Elevated troponin    Chronic systolic heart failure ([facility] [address]    Stage 3 chronic kidney disease [facility])    Acute right-sided weakness    Acute CVA (cerebrovascular accident) [facility])    Acute chest pain  Resolved Problems:    * No resolved hospital problems. *    Subjective : Interval History/Significant events :  12/26/21    Patient continues to report right-sided weakness without any change. Patient reported that he had a swallow assessment result of which is pending. He continues to complain of chest pain without any improvement on nitro infusion. Vitals - Stable afebrile  Labs -kidney function stable at 1.83. Troponin 68.   CT head showed old infarct in left frontotemporal lobes, bilateral occipital lobes and minimally in the left cerebellar hemisphere without any acute intracranial abnormality. Ultrasound kidney showed absent right kidney. Nursing notes , Consults notes reviewed. Overnight events and updates discussed with Nursing staff . Background History:         Duglas Kearns is 61 y.o. male  Who was admitted to the hospital on 12/23/2021 for treatment of Coronary artery disease involving native coronary artery of native heart with angina pectoris ([facility] [address] Patient presented to emergency room with chest pain. Patient is new to the area and has recently moved from [place]. He was admitted a month ago at [facility] with stroke like symptoms with right-sided weakness. Patient received TPA. CT head showed supratentorial bilateral cerebral infarcts embolic in origin. He was also found to have A. fib and given Eliquis. Patient had episode of hematemesis in the hospital which required urgent EGD and did not show any bleeding ulcer except mild gastritis. Patient was advised to have Eliquis and Plavix at discharge. Patient is homeless and has been bouncing between hotels. He left hospital at [address] [place] [address] as he was not satisfied with the care. Patient then got admitted at [facility] with similar symptoms. CT head and neck and CT head showed chronic infarcts without any acute bleeding infarct. Patient had negative VQ scan, chest x-ray showed cardiomegaly, creatinine was stable. Patient was also found to have COVID-19 infection and required oxygen. He was treated with Decadron initially. Patient's breathing improved and was saturating well on room air. Patient was also found to have left sided hydronephrosis with 4 mm nonobstructing left kidney stone. Patient has congenital absence of right kidney. He was evaluated by urology and recommended outpatient follow-up.   Patient was uncooperative to during the hospital stay and refusing care and participation chest pain. Negative for palpitations and leg swelling. Gastrointestinal: Negative for abdominal pain, constipation, diarrhea, nausea and vomiting. Genitourinary: Negative for difficulty urinating, dysuria, frequency, penile discharge and testicular pain. Musculoskeletal: Positive for back pain. Skin: Negative for rash. Neurological: Positive for speech difficulty and weakness. Negative for dizziness, light-headedness and headaches. Hematological: Does not bruise/bleed easily. Psychiatric/Behavioral: Positive for agitation and behavioral problems. Negative for confusion, self-injury, sleep disturbance and suicidal ideas. Objective :      Current Vitals : Temp: 97.4 °F (36.3 °C),  Pulse: 70, Resp: 16, BP: 118/74, SpO2: 100 %  Last 24 Hrs Vitals   Patient Vitals for the past 24 hrs:   BP Temp Temp src Pulse Resp SpO2   12/26/21 0900 118/74 -- -- -- -- --   12/26/21 0800 123/79 97.4 °F (36.3 °C) Temporal 70 16 100 %   12/26/21 0730 129/81 -- -- -- -- --   12/26/21 0700 124/79 -- -- -- -- --   12/26/21 0600 116/74 -- -- -- -- --   12/26/21 0400 (!) 148/105 98.7 °F (37.1 °C) Oral 71 17 99 %   12/26/21 0308 -- -- -- 70 -- --   12/26/21 0100 (!) 141/89 -- -- 72 -- 99 %   12/26/21 0000 (!) 142/123 -- -- 77 -- 98 %   12/25/21 2300 (!) 127/91 98.9 °F (37.2 °C) Oral 70 18 91 %   12/25/21 1938 (!) 157/123 -- -- 70 -- --   12/25/21 1937 -- 99.3 °F (37.4 °C) Oral -- 14 --   12/25/21 1609 (!) 114/91 99 °F (37.2 °C) Oral -- 17 --     Intake / output   12/25 0701 - 12/26 0700  In: 0   Out: 800 [Urine:800]  Physical Exam:  Physical Exam  Vitals and nursing note reviewed. Constitutional:       General: He is not in acute distress. HENT:      Head: Normocephalic and atraumatic. Eyes:      Conjunctiva/sclera: Conjunctivae normal.      Pupils: Pupils are equal, round, and reactive to light. Cardiovascular:      Rate and Rhythm: Normal rate and regular rhythm. Heart sounds: No murmur heard.       Pulmonary: Effort: Pulmonary effort is normal. No accessory muscle usage or respiratory distress. Breath sounds: No stridor. No decreased breath sounds, wheezing, rhonchi or rales. Chest:      Comments: Sternotomy scar  Abdominal:      General: Bowel sounds are normal. There is no distension. Palpations: Abdomen is soft. Abdomen is not rigid. Tenderness: There is no abdominal tenderness. There is no guarding. Musculoskeletal:         General: No tenderness. Skin:     General: Skin is warm and dry. Findings: No erythema, lesion or rash. Neurological:      Mental Status: He is alert and oriented to person, place, and time. Cranial Nerves: No cranial nerve deficit. Motor: No seizure activity. Comments: Effort limited right-sided weakness -good resistance and does not allow tomorrow. Facial asymmetry   Psychiatric:         Mood and Affect: Affect is blunt and angry. Speech: Speech normal.         Behavior: Behavior is uncooperative. Laboratory findings:    Recent Labs     12/23/21 2103 12/23/21 2103 12/23/21 2235 12/23/21  2235 12/24/21  1416 12/25/21  0446 12/26/21  0520   WBC Unable to perform testing: Specimen clotted. < > 4.5   < > 4.4 4.5 4.7   HGB Unable to perform testing: Specimen clotted. < > 9.2*   < > 9.0* 9.4* 8.5*   HCT Unable to perform testing: Specimen clotted. < > 28.7*   < > 28.4* 30.7* 27.2*   PLT Unable to perform testing: Specimen clotted. < > 229   < > 228 237 214   INR Unable to perform testing: Specimen clotted.  --  1.2  --   --   --   --     < > = values in this interval not displayed.      Recent Labs     12/23/21 2103 12/24/21  1416 12/25/21  0446 12/26/21  0520     --  137 136   K 4.5  --  4.6 4.1     --  110* 109*   CO2 18*  --  16* 17*   GLUCOSE 81  --  90 117*   BUN 33*  --  24* 19   CREATININE 1.83*  --  1.58* 1.80*   CALCIUM 8.8  --  8.2* 8.1*   PHOS  --  3.1  --   --      Recent Labs     12/23/21  1550 12/23/21 2103   PROT 6.5  --    LABALBU 2.8*  --    AST 18  --    ALT 14  --    ALKPHOS 68  --    BILITOT 0.58  --    CKTOTAL  --  40   CKMB  --  3.3          Specific Gravity, UA   Date Value Ref Range Status   12/11/2021 1.015 1.005 - 1.030 Final     Protein, UA   Date Value Ref Range Status   12/11/2021 1+ (A) NEGATIVE Final     RBC, UA   Date Value Ref Range Status   12/11/2021 0 TO 2 0 - 2 /HPF Final     Bacteria, UA   Date Value Ref Range Status   12/11/2021 NOT REPORTED None Final     Nitrite, Urine   Date Value Ref Range Status   12/11/2021 NEGATIVE NEGATIVE Final     WBC, UA   Date Value Ref Range Status   12/11/2021 0 TO 2 0 - 5 /HPF Final     Leukocyte Esterase, Urine   Date Value Ref Range Status   12/11/2021 NEGATIVE NEGATIVE Final       Imaging / Clinical Data :-   CT HEAD WO CONTRAST    Result Date: 12/23/2021  Unchanged chronic encephalomalacia of the right medial occipital lobe and unchanged chronic encephalomalacia of left inferior parietal lobule. No acute territorial infarction, intracranial hemorrhage or mass lesion. Mild chronic microangiopathic ischemic disease. Mild generalized volume loss. RECOMMENDATIONS: Unavailable     XR CHEST PORTABLE    Result Date: 12/23/2021  Relatively diffuse multifocal bilateral airspace disease most likely representing COVID-19 pneumonia. Pulmonary edema felt less likely. Clinical Course : stable  Assessment and Plan  :        1. Chest pain- atypical -discussed with cardiology attending. .  Currently on heparin infusion , nephrology has been consulted for clearance for possible heart catheterization vs stress test.  2. Right-sided weakness- neurology evaluation. No new strokes on CT. Unable to perform MRI brain due to BiV ICD in place  3. Chronic systolic CHF s/p AICD -continue Bumex. 4. Stage III CKD -stable  5. H/o CVA -with right hemiparesis  6. Chronic left hydronephrosis outpatient urology follow-up  7.  CAD s/p CABG with multiple stents continue Plavix. Eliquis on hold as patient on heparin infusion. 8. Paroxysmal atrial fibrillation -in normal sinus rhythm. 9. Chronic hep C -   10. Homeless -social service consult   11. Acute gastritis-PPI    Swallow assessment start diet if no aspiration. Social service to discuss discharge arrangements. Highly suspect drug-seeking behavior and difficult social situation due to homelessness leading to recurrent hospitalizations. Psych consult. Patient had refused evaluation by psychiatry last admission. N.p.o. after midnight for possible stress test versus cath tomorrow. Continue to monitor vitals , Intake / output ,  Cell count , HGB , Kidney function, oxygenation  as indicated . Plan and updates discussed with patient ,  answers  explained to satisfaction.    Plan discussed with Staff RN     (Please note that portions of this note were completed with a voice recognition program. Efforts were made to edit the dictations but occasionally words are mis-transcribed.)      Luis A Partida MD  12/26/2021

## 2021-12-26 NOTE — PROGRESS NOTES
[facility])  Occupational Therapy Not Seen Note    DATE: 2021    NAME: Winifred Lofton  MRN: [id number]   : 1965      Patient not seen this date for Occupational Therapy due to:    Patient Declined: pt declined therapy this date due to 8/10 chest pain.  pt educated on purpose of eval and benefits of therapy, pt easily agitated, but agreeable to attempt eval tomorrow     Next Scheduled Treatment: check back 2021    Electronically signed by LUIS Guzman on 2021 at 11:33 AM

## 2021-12-26 NOTE — PROCEDURES
INSTRUMENTAL SWALLOW REPORT  MODIFIED BARIUM SWALLOW    NAME: Yumiko Baires   : 1965  MRN: [id number]       Date of Eval: 2021        Radiologist: Dr. Pepito Florian     Referring Diagnosis(es):      Past Medical History:  has a past medical history of AICD (automatic cardioverter/defibrillator) present, CKD (chronic kidney disease), stage III ([address] COVID-19, Myocardial infarction ([address] S/P CABG (coronary artery bypass graft), and Solitary kidney, congenital.  Past Surgical History:  has a past surgical history that includes Coronary artery bypass graft; Coronary artery bypass graft; and pacemaker placement (2020). Current Diet Solid Consistency: NPO  Current Diet Liquid Consistency: NPO    Type of Study: Initial MBS    Recent CXR/CT of Chest: 2021    Impression   Relatively diffuse multifocal bilateral airspace disease most likely   representing COVID-19 pneumonia.  Pulmonary edema felt less likely.           Patient Complaints/Reason for Referral:  Yumiko Baires was referred for a MBS to assess the efficiency of his/her swallow function, assess for aspiration, and to make recommendations regarding safe dietary consistencies, effective compensatory strategies, and safe eating environment. Onset of problem:   Yumiko Baires is a 61 y.o. Non- / non  male who presents with Chest Pain   and is admitted to the hospital for the management of Coronary artery disease involving native coronary artery of native heart with angina pectoris ([facility] [address]    He admitted to the hospital for complaints of chest pain and COVID-19 pneumonia and was discharged on . Patient was noted then to have had a recent CVA of the thromboembolic nature and was treated with TPA at a [address] [place] [address],[address].  He had a right sided deficit noticed at that time. Work-up showed supratentorial bilateral cerebral infarcts. He was also diagnosed with A. fib at that time. MRI was not completed due to patient has an AICD. He is on chronic anticoagulation with Eliquis 5 mg twice daily for atrial fib. Also has a history of CAD with CABG and redo x1. On December 14 he had sudden onset right-sided weakness and decreased responsiveness. Work-up was completed and was negative, and CT head revealed chronic infarcts without any acute bleeding or acute infarcts. Came to the ED today complaining of sudden onset substernal chest pain that radiated to his neck and began approximately 2 hours prior to coming to the ED. X-ray was completed and revealed relatively diffuse multifocal bilateral airspace disease most likely representing COVID-19 pneumonia. He does have a known history of COVID-19. Patient did not receive aspirin as he reports an allergy. Bone was noted to be 58, hemoglobin 9.9. Was decided the patient be admitted to the hospital for further observation and work-up for chest pain/NSTEMI. Cardiology was consulted.     At approximately 2024, stroke alert was called due to patient had sudden right-sided weakness, loss of sensation on the right side and on the right side of his face, right-sided facial droop, and slurred speech. Stroke team was immediately consulted and evaluated. CT the head showed unchanged encephalomalacia of right medial occipital lobe and unchanged chronic encephalomalacia of left inferior. A lobe with no acute hemorrhage. Plan for patient to have an MRI of the brain without contrast, neck and brain MRA, echo with bubble study, continue with Eliquis, continue Plavix, high-dose statin, maintain SBP less than 220, glycemic control with blood glucose less than 180, avoid dextrose containing solutions, PT/OT/speech eval's, neurochecks per protocol and telemetry.     He is being admitted to the hospital for further observation and management of NSTEMI and possible acute stroke.     Behavior/Cognition/Vision/Hearing:  Behavior/Cognition: Cooperative;Lethargic  Vision: Impaired  Hearing: Within functional limits    Impressions:  Patient presents with safe swallow for Dysphagia III: Soft and Bite Sized diet with thin liquids (small, single sips) as evidenced by no aspiration noted with consistencies tested. Pt w/ strong dislike of barium; pt required max encouragement to take PO trials. Only able to assess small single sips of liquids d/t pt's cooperation; recommend small, single sips at bedside w/ all liquid PO. +decreased and extended mastication of soft solid. Osteophytes noted at C3-C6. Noted continued R-side weakness w/ minimal movement, and left lingual deviation. Recommend check for pocketing on R-side. +trace flash penetration, no aspiration x1 trial of NTL by tsp. Recommend small sips and bites, only feed when alert and awake and upright at 90 degrees for all PO intake. Recommend close monitoring for overt/clinical s/s of aspiration and D/C PO intake and complete Modified Barium Swallow Study should they occur. Results and recommendations reported to RN. Patient Position: Lateral and Patient Degrees: 90    Consistencies Administered: Dysphagia Soft and Bite-Sized (Dysphagia III); Dysphagia Pureed (Dysphagia I); Nectar  teaspoon; Thin straw; Thin teaspoon    Dysphagia Outcome Severity Scale: Level 5: Mild dysphagia- Distant supervision. May need one diet consistency restricted  Penetration-Aspiration Scale (PAS): 2 - Material enters the airway, remains above the vocal folds, and is ejected from the airway    Recommended Diet:  Solid consistency: Dysphagia Soft and Bite-Sized (Dysphagia III)  Liquid consistency: Thin  Liquid administration via: Cup;Straw  Medication administration: PO    Safe Swallow Protocol:  Compensatory Swallowing Strategies: Assist feed;Small bites/sips;Upright as possible for all oral intake    Recommendations/Treatment  Requires SLP Intervention: Yes  D/C Recommendations: Further therapy recommended at discharge.     Recommended Exercises:    Therapeutic Interventions: Diet tolerance monitoring;Patient/Family education    Education: Images and recommendations were reviewed with pt, radiologist, and RN following this exam.   Patient Education: yes  Patient Education Response: Demonstrated understanding    Prognosis  Prognosis for safe diet advancement: fair  Duration/Frequency of Treatment  Duration/Frequency of Treatment: 1-2x per week      Goals:    Long Term:   To Maximize safety with intake, optimize nutrition/hydration and minimize risk for aspiration. Short Term:  Dysphagia Goals: The patient will tolerate recommended diet without observed clinical signs of aspiration    Oral Preparation / Oral Phase  Oral Phase: WFL  Oral Phase: +premature spill to vallecula with puree, soft solid, mildly thick liquids and thin liquids. +decreased and extended mastication with soft solid. However, oral transit and bolus manipulation appear WFL for consistencies tested    Pharyngeal Phase  Pharyngeal Phase: WFL  Pharyngeal:   Puree: No penetration, no aspiration, +min residual in vallecula. Soft solid:  No penetration, no aspiration, +min residual in vallecula. Regular solid: NT.   Mildly-thick tsp: +trace flash penetration, no aspiation. Thin tsp: No penetration, no aspiration. Thin straw: No penetration, no aspiration. x2 (small single sips).  +min residual in vallecula with all liquid consistecies    Esophageal Phase  Esophageal Screen: [facility]        Pain   Patient Currently in Pain: No  Pain Level: 10  Pain Type: Acute pain  Pain Location: Chest,Head,Shoulder,Neck      Therapy Time:   Individual Concurrent Group Co-treatment   Time In 0927         Time Out 0938         Minutes 30 TAMARA Wall, 12/26/2021, 10:15 AM

## 2021-12-26 NOTE — PROGRESS NOTES
Physical Therapy        Physical Therapy Cancel Note      DATE: 2021    NAME: Rocio Bocanegra  MRN: [id number]   : 1965      Patient not seen this date for Physical Therapy due to: Other: Pt refused x2 today, 1st attempt at 10:17a:m, wants to come back in an hour, 2nd attempt at 11:27 a:m- pt refused again due to chest pain.       Electronically signed by Sindi Pacheco PT on 2021 at 11:28 AM

## 2021-12-27 ENCOUNTER — APPOINTMENT (OUTPATIENT)
Dept: GENERAL RADIOLOGY | Age: 56
DRG: 287 | End: 2021-12-27
Payer: MEDICARE

## 2021-12-27 ENCOUNTER — APPOINTMENT (OUTPATIENT)
Dept: NUCLEAR MEDICINE | Age: 56
DRG: 287 | End: 2021-12-27
Payer: MEDICARE

## 2021-12-27 LAB
ABSOLUTE EOS #: 0.11 K/UL (ref 0–0.44)
ABSOLUTE IMMATURE GRANULOCYTE: 0.04 K/UL (ref 0–0.3)
ABSOLUTE LYMPH #: 0.7 K/UL (ref 1.1–3.7)
ABSOLUTE MONO #: 0.32 K/UL (ref 0.1–1.2)
ANION GAP SERPL CALCULATED.3IONS-SCNC: 11 MMOL/L (ref 9–17)
BASOPHILS # BLD: 1 % (ref 0–2)
BASOPHILS ABSOLUTE: 0.04 K/UL (ref 0–0.2)
BUN BLDV-MCNC: 18 MG/DL (ref 6–20)
BUN/CREAT BLD: ABNORMAL (ref 9–20)
CALCIUM SERPL-MCNC: 7.6 MG/DL (ref 8.6–10.4)
CHLORIDE BLD-SCNC: 106 MMOL/L (ref 98–107)
CO2: 18 MMOL/L (ref 20–31)
CREAT SERPL-MCNC: 1.81 MG/DL (ref 0.7–1.2)
DIFFERENTIAL TYPE: ABNORMAL
EKG ATRIAL RATE: 65 BPM
EKG ATRIAL RATE: 71 BPM
EKG Q-T INTERVAL: 486 MS
EKG Q-T INTERVAL: 506 MS
EKG QRS DURATION: 174 MS
EKG QRS DURATION: 174 MS
EKG QTC CALCULATION (BAZETT): 535 MS
EKG QTC CALCULATION (BAZETT): 549 MS
EKG R AXIS: -89 DEGREES
EKG R AXIS: -97 DEGREES
EKG T AXIS: 109 DEGREES
EKG T AXIS: 113 DEGREES
EKG VENTRICULAR RATE: 71 BPM
EKG VENTRICULAR RATE: 73 BPM
EOSINOPHILS RELATIVE PERCENT: 3 % (ref 1–4)
GFR AFRICAN AMERICAN: 47 ML/MIN
GFR NON-AFRICAN AMERICAN: 39 ML/MIN
GFR SERPL CREATININE-BSD FRML MDRD: ABNORMAL ML/MIN/{1.73_M2}
GFR SERPL CREATININE-BSD FRML MDRD: ABNORMAL ML/MIN/{1.73_M2}
GLUCOSE BLD-MCNC: 95 MG/DL (ref 70–99)
HCT VFR BLD CALC: 24 % (ref 40.7–50.3)
HEMOGLOBIN: 7.6 G/DL (ref 13–17)
IMMATURE GRANULOCYTES: 1 %
LV EF: 33 %
LVEF MODALITY: NORMAL
LYMPHOCYTES # BLD: 20 % (ref 24–43)
MCH RBC QN AUTO: 31.1 PG (ref 25.2–33.5)
MCHC RBC AUTO-ENTMCNC: 31.7 G/DL (ref 28.4–34.8)
MCV RBC AUTO: 98.4 FL (ref 82.6–102.9)
MONOCYTES # BLD: 9 % (ref 3–12)
MORPHOLOGY: ABNORMAL
MORPHOLOGY: ABNORMAL
NRBC AUTOMATED: 0 PER 100 WBC
PARTIAL THROMBOPLASTIN TIME: 62.7 SEC (ref 20.5–30.5)
PDW BLD-RTO: 16.4 % (ref 11.8–14.4)
PLATELET # BLD: 197 K/UL (ref 138–453)
PLATELET ESTIMATE: ABNORMAL
PMV BLD AUTO: 10 FL (ref 8.1–13.5)
POTASSIUM SERPL-SCNC: 3.9 MMOL/L (ref 3.7–5.3)
RBC # BLD: 2.44 M/UL (ref 4.21–5.77)
RBC # BLD: ABNORMAL 10*6/UL
SEG NEUTROPHILS: 66 % (ref 36–65)
SEGMENTED NEUTROPHILS ABSOLUTE COUNT: 2.29 K/UL (ref 1.5–8.1)
SODIUM BLD-SCNC: 135 MMOL/L (ref 135–144)
WBC # BLD: 3.5 K/UL (ref 3.5–11.3)
WBC # BLD: ABNORMAL 10*3/UL

## 2021-12-27 PROCEDURE — 85025 COMPLETE CBC W/AUTO DIFF WBC: CPT

## 2021-12-27 PROCEDURE — 2580000003 HC RX 258: Performed by: INTERNAL MEDICINE

## 2021-12-27 PROCEDURE — 2709999900 HC NON-CHARGEABLE SUPPLY

## 2021-12-27 PROCEDURE — 6370000000 HC RX 637 (ALT 250 FOR IP): Performed by: STUDENT IN AN ORGANIZED HEALTH CARE EDUCATION/TRAINING PROGRAM

## 2021-12-27 PROCEDURE — 99232 SBSQ HOSP IP/OBS MODERATE 35: CPT | Performed by: FAMILY MEDICINE

## 2021-12-27 PROCEDURE — 6370000000 HC RX 637 (ALT 250 FOR IP): Performed by: INTERNAL MEDICINE

## 2021-12-27 PROCEDURE — B2181ZZ FLUOROSCOPY OF LEFT INTERNAL MAMMARY BYPASS GRAFT USING LOW OSMOLAR CONTRAST: ICD-10-PCS | Performed by: INTERNAL MEDICINE

## 2021-12-27 PROCEDURE — 85730 THROMBOPLASTIN TIME PARTIAL: CPT

## 2021-12-27 PROCEDURE — C1769 GUIDE WIRE: HCPCS

## 2021-12-27 PROCEDURE — 93356 MYOCRD STRAIN IMG SPCKL TRCK: CPT

## 2021-12-27 PROCEDURE — 93010 ELECTROCARDIOGRAM REPORT: CPT | Performed by: INTERNAL MEDICINE

## 2021-12-27 PROCEDURE — 2500000003 HC RX 250 WO HCPCS

## 2021-12-27 PROCEDURE — 96366 THER/PROPH/DIAG IV INF ADDON: CPT

## 2021-12-27 PROCEDURE — B2111ZZ FLUOROSCOPY OF MULTIPLE CORONARY ARTERIES USING LOW OSMOLAR CONTRAST: ICD-10-PCS | Performed by: INTERNAL MEDICINE

## 2021-12-27 PROCEDURE — 74018 RADEX ABDOMEN 1 VIEW: CPT

## 2021-12-27 PROCEDURE — 6360000002 HC RX W HCPCS: Performed by: NURSE PRACTITIONER

## 2021-12-27 PROCEDURE — 2060000000 HC ICU INTERMEDIATE R&B

## 2021-12-27 PROCEDURE — 36415 COLL VENOUS BLD VENIPUNCTURE: CPT

## 2021-12-27 PROCEDURE — 6360000002 HC RX W HCPCS

## 2021-12-27 PROCEDURE — 93306 TTE W/DOPPLER COMPLETE: CPT

## 2021-12-27 PROCEDURE — 96376 TX/PRO/DX INJ SAME DRUG ADON: CPT

## 2021-12-27 PROCEDURE — C9113 INJ PANTOPRAZOLE SODIUM, VIA: HCPCS | Performed by: NURSE PRACTITIONER

## 2021-12-27 PROCEDURE — C1894 INTRO/SHEATH, NON-LASER: HCPCS

## 2021-12-27 PROCEDURE — 99232 SBSQ HOSP IP/OBS MODERATE 35: CPT | Performed by: PSYCHIATRY & NEUROLOGY

## 2021-12-27 PROCEDURE — 83036 HEMOGLOBIN GLYCOSYLATED A1C: CPT

## 2021-12-27 PROCEDURE — 97530 THERAPEUTIC ACTIVITIES: CPT

## 2021-12-27 PROCEDURE — 97162 PT EVAL MOD COMPLEX 30 MIN: CPT

## 2021-12-27 PROCEDURE — C1887 CATHETER, GUIDING: HCPCS

## 2021-12-27 PROCEDURE — 6370000000 HC RX 637 (ALT 250 FOR IP): Performed by: NURSE PRACTITIONER

## 2021-12-27 PROCEDURE — 93455 CORONARY ART/GRFT ANGIO S&I: CPT

## 2021-12-27 PROCEDURE — B2131ZZ FLUOROSCOPY OF MULTIPLE CORONARY ARTERY BYPASS GRAFTS USING LOW OSMOLAR CONTRAST: ICD-10-PCS | Performed by: INTERNAL MEDICINE

## 2021-12-27 PROCEDURE — 2700000000 HC OXYGEN THERAPY PER DAY

## 2021-12-27 PROCEDURE — 4A023N7 MEASUREMENT OF CARDIAC SAMPLING AND PRESSURE, LEFT HEART, PERCUTANEOUS APPROACH: ICD-10-PCS | Performed by: INTERNAL MEDICINE

## 2021-12-27 PROCEDURE — 94761 N-INVAS EAR/PLS OXIMETRY MLT: CPT

## 2021-12-27 PROCEDURE — 80048 BASIC METABOLIC PNL TOTAL CA: CPT

## 2021-12-27 PROCEDURE — C1760 CLOSURE DEV, VASC: HCPCS

## 2021-12-27 RX ORDER — ATROPINE SULFATE 0.1 MG/ML
0.5 INJECTION INTRAVENOUS EVERY 5 MIN PRN
Status: DISCONTINUED | OUTPATIENT
Start: 2021-12-27 | End: 2021-12-27 | Stop reason: ALTCHOICE

## 2021-12-27 RX ORDER — SODIUM CHLORIDE 9 MG/ML
500 INJECTION, SOLUTION INTRAVENOUS CONTINUOUS PRN
Status: DISCONTINUED | OUTPATIENT
Start: 2021-12-27 | End: 2021-12-27 | Stop reason: ALTCHOICE

## 2021-12-27 RX ORDER — SODIUM CHLORIDE 0.9 % (FLUSH) 0.9 %
5-40 SYRINGE (ML) INJECTION EVERY 12 HOURS SCHEDULED
Status: DISCONTINUED | OUTPATIENT
Start: 2021-12-27 | End: 2021-12-29 | Stop reason: HOSPADM

## 2021-12-27 RX ORDER — ISOSORBIDE MONONITRATE 30 MG/1
30 TABLET, EXTENDED RELEASE ORAL DAILY
Qty: 30 TABLET | Refills: 3 | Status: SHIPPED | OUTPATIENT
Start: 2021-12-28

## 2021-12-27 RX ORDER — NITROGLYCERIN 0.4 MG/1
TABLET SUBLINGUAL
Qty: 25 TABLET | Refills: 3 | Status: SHIPPED | OUTPATIENT
Start: 2021-12-27

## 2021-12-27 RX ORDER — METOPROLOL TARTRATE 5 MG/5ML
5 INJECTION INTRAVENOUS EVERY 5 MIN PRN
Status: DISCONTINUED | OUTPATIENT
Start: 2021-12-27 | End: 2021-12-27 | Stop reason: ALTCHOICE

## 2021-12-27 RX ORDER — SODIUM CHLORIDE 0.9 % (FLUSH) 0.9 %
5-40 SYRINGE (ML) INJECTION PRN
Status: DISCONTINUED | OUTPATIENT
Start: 2021-12-27 | End: 2021-12-27 | Stop reason: ALTCHOICE

## 2021-12-27 RX ORDER — AMINOPHYLLINE DIHYDRATE 25 MG/ML
50 INJECTION, SOLUTION INTRAVENOUS PRN
Status: DISCONTINUED | OUTPATIENT
Start: 2021-12-27 | End: 2021-12-27 | Stop reason: ALTCHOICE

## 2021-12-27 RX ORDER — SODIUM CHLORIDE 0.9 % (FLUSH) 0.9 %
5-40 SYRINGE (ML) INJECTION PRN
Status: DISCONTINUED | OUTPATIENT
Start: 2021-12-27 | End: 2021-12-29 | Stop reason: HOSPADM

## 2021-12-27 RX ORDER — ACETAMINOPHEN 325 MG/1
650 TABLET ORAL EVERY 4 HOURS PRN
Status: DISCONTINUED | OUTPATIENT
Start: 2021-12-27 | End: 2021-12-29 | Stop reason: HOSPADM

## 2021-12-27 RX ORDER — ALBUTEROL SULFATE 90 UG/1
2 AEROSOL, METERED RESPIRATORY (INHALATION) PRN
Status: DISCONTINUED | OUTPATIENT
Start: 2021-12-27 | End: 2021-12-27 | Stop reason: ALTCHOICE

## 2021-12-27 RX ORDER — SODIUM BICARBONATE 650 MG/1
650 TABLET ORAL 2 TIMES DAILY
Qty: 60 TABLET | Refills: 0 | Status: SHIPPED | OUTPATIENT
Start: 2021-12-27

## 2021-12-27 RX ORDER — SODIUM CHLORIDE 9 MG/ML
25 INJECTION, SOLUTION INTRAVENOUS PRN
Status: DISCONTINUED | OUTPATIENT
Start: 2021-12-27 | End: 2021-12-29 | Stop reason: HOSPADM

## 2021-12-27 RX ORDER — NITROGLYCERIN 0.4 MG/1
0.4 TABLET SUBLINGUAL EVERY 5 MIN PRN
Status: DISCONTINUED | OUTPATIENT
Start: 2021-12-27 | End: 2021-12-27 | Stop reason: ALTCHOICE

## 2021-12-27 RX ORDER — SODIUM CHLORIDE 9 MG/ML
INJECTION, SOLUTION INTRAVENOUS CONTINUOUS
Status: ACTIVE | OUTPATIENT
Start: 2021-12-27 | End: 2021-12-27

## 2021-12-27 RX ADMIN — SODIUM BICARBONATE 650 MG: 648 TABLET ORAL at 10:19

## 2021-12-27 RX ADMIN — DIPHENHYDRAMINE HYDROCHLORIDE 25 MG: 50 INJECTION, SOLUTION INTRAMUSCULAR; INTRAVENOUS at 10:19

## 2021-12-27 RX ADMIN — SODIUM CHLORIDE: 9 INJECTION, SOLUTION INTRAVENOUS at 10:40

## 2021-12-27 RX ADMIN — DIPHENHYDRAMINE HYDROCHLORIDE 25 MG: 50 INJECTION, SOLUTION INTRAMUSCULAR; INTRAVENOUS at 16:07

## 2021-12-27 RX ADMIN — SODIUM CHLORIDE, PRESERVATIVE FREE 10 ML: 5 INJECTION INTRAVENOUS at 22:15

## 2021-12-27 RX ADMIN — ISOSORBIDE MONONITRATE 30 MG: 30 TABLET ORAL at 10:19

## 2021-12-27 RX ADMIN — ATORVASTATIN CALCIUM 80 MG: 80 TABLET, FILM COATED ORAL at 21:31

## 2021-12-27 RX ADMIN — TAMSULOSIN HYDROCHLORIDE 0.4 MG: 0.4 CAPSULE ORAL at 10:19

## 2021-12-27 RX ADMIN — SODIUM BICARBONATE 650 MG: 648 TABLET ORAL at 21:35

## 2021-12-27 RX ADMIN — DIPHENHYDRAMINE HYDROCHLORIDE 25 MG: 50 INJECTION, SOLUTION INTRAMUSCULAR; INTRAVENOUS at 21:31

## 2021-12-27 RX ADMIN — PANTOPRAZOLE SODIUM 40 MG: 40 INJECTION, POWDER, FOR SOLUTION INTRAVENOUS at 10:19

## 2021-12-27 RX ADMIN — TICAGRELOR 90 MG: 90 TABLET ORAL at 10:19

## 2021-12-27 RX ADMIN — TICAGRELOR 90 MG: 90 TABLET ORAL at 21:31

## 2021-12-27 RX ADMIN — DIPHENHYDRAMINE HYDROCHLORIDE 25 MG: 50 INJECTION, SOLUTION INTRAMUSCULAR; INTRAVENOUS at 03:57

## 2021-12-27 RX ADMIN — METOPROLOL SUCCINATE 50 MG: 50 TABLET, FILM COATED, EXTENDED RELEASE ORAL at 10:19

## 2021-12-27 ASSESSMENT — ENCOUNTER SYMPTOMS
COUGH: 0
CHEST TIGHTNESS: 0
ABDOMINAL PAIN: 0
DIARRHEA: 0
BACK PAIN: 1
SHORTNESS OF BREATH: 0
VOICE CHANGE: 0
SINUS PRESSURE: 0
VOMITING: 0
WHEEZING: 0
RHINORRHEA: 0
CONSTIPATION: 0
NAUSEA: 0
CHOKING: 0

## 2021-12-27 ASSESSMENT — PAIN DESCRIPTION - LOCATION
LOCATION: CHEST;NECK
LOCATION: CHEST
LOCATION: CHEST;NECK

## 2021-12-27 ASSESSMENT — PAIN DESCRIPTION - PROGRESSION

## 2021-12-27 ASSESSMENT — PAIN DESCRIPTION - ONSET
ONSET: ON-GOING
ONSET: ON-GOING

## 2021-12-27 ASSESSMENT — PAIN SCALES - GENERAL
PAINLEVEL_OUTOF10: 5
PAINLEVEL_OUTOF10: 8
PAINLEVEL_OUTOF10: 0
PAINLEVEL_OUTOF10: 0
PAINLEVEL_OUTOF10: 8

## 2021-12-27 ASSESSMENT — PAIN DESCRIPTION - DESCRIPTORS
DESCRIPTORS: ACHING;DISCOMFORT
DESCRIPTORS: ACHING

## 2021-12-27 ASSESSMENT — PAIN DESCRIPTION - FREQUENCY
FREQUENCY: CONTINUOUS
FREQUENCY: CONTINUOUS

## 2021-12-27 ASSESSMENT — PAIN DESCRIPTION - ORIENTATION: ORIENTATION: RIGHT;LEFT

## 2021-12-27 ASSESSMENT — PAIN DESCRIPTION - PAIN TYPE
TYPE: ACUTE PAIN
TYPE: ACUTE PAIN

## 2021-12-27 NOTE — PROGRESS NOTES
[facility]  Office: [address], DO, Sharmaineross Cyr, DO, Nicolasgomez Pedraza, DO, Mahogany Phelps Blood, DO, Bill Grewal MD, Eunice Chilel MD, Luis A Partida MD, Claudia Mathew MD, Chaim Mooney MD, Key Urias MD, Mackenzie Root MD, Angelita Justice, DO, Suni Gonzalez, DO, Ana Martinez MD,  Jarred Weinstein, DO, Bonnie Mccormick MD, Jamilah Govea MD, Laila Carmona MD, Tom Ruiz MD, Ena Wynne MD, Jose Maria Phelps MD, Susan Otoole MD, Derick Wright, [facility], [facility], CNP, Jose French, CNP, Truong Jacinto, CNS, Dayne Salas, CNP, Lucero Dacosta, CNP, Mellissa Douglas, CNP, Jaylene Mathew, CNP, Josias Ruiz, CNP, Marsha Dove PA-C, Franki Lundborg, [facility], Arielle Noriega, [facility], Jerad Espitia, CNP, Jaron Luis, CNP, Irma Rooney, CNP, Zeyad Watt, CNP, Bhupinder Anaya, CNP, Live Qiu, [facility]      Daily Progress Note     Admit Date: 12/23/2021  Bed/Room No.  6468/6608-73  Admitting Physician : Luis A Partida MD  Code Status :Full Code  Hospital Day:  LOS: 4 days   Chief Complaint:     Chief Complaint   Patient presents with    Chest Pain     Principal Problem:    Coronary artery disease involving native coronary artery of native heart with angina pectoris [facility])  Active Problems:    Elevated troponin    Chronic systolic heart failure ([facility] [address]    Stage 3 chronic kidney disease [facility])    Acute right-sided weakness    Acute CVA (cerebrovascular accident) [facility])    Acute chest pain  Resolved Problems:    * No resolved hospital problems. *    Subjective : Interval History/Significant events :  12/27/21    Patient says this his symptoms are unchanged. He is getting Echo done and also waiting for heart cath later today. He is having normal speech. No change in weakness. Vitals - Stable afebrile  Labs -kidney function stable at 1.83. Troponin 68.   CT head showed old infarct in left frontotemporal lobes, bilateral occipital lobes and minimally in the left cerebellar hemisphere without any acute intracranial abnormality. Ultrasound kidney showed absent right kidney. Nursing notes , Consults notes reviewed. Overnight events and updates discussed with Nursing staff . Background History:         Yumiko Baires is 61 y.o. male  Who was admitted to the hospital on 12/23/2021 for treatment of Coronary artery disease involving native coronary artery of native heart with angina pectoris ([address] Patient presented to emergency room with chest pain. Patient is new to the area and has recently moved from [place]. He was admitted a month ago at [facility] [place] with stroke like symptoms with right-sided weakness. Patient received TPA. CT head showed supratentorial bilateral cerebral infarcts embolic in origin. He was also found to have A. fib and given Eliquis. Patient had episode of hematemesis in the hospital which required urgent EGD and did not show any bleeding ulcer except mild gastritis. Patient was advised to have Eliquis and Plavix at discharge. Patient is homeless and has been bouncing between hotels. He left hospital at [facility] as he was not satisfied with the care. Patient then got admitted at [facility] with similar symptoms. CT head and neck and CT head showed chronic infarcts without any acute bleeding infarct. Patient had negative VQ scan, chest x-ray showed cardiomegaly, creatinine was stable. Patient was also found to have COVID-19 infection and required oxygen. He was treated with Decadron initially. Patient's breathing improved and was saturating well on room air. Patient was also found to have left sided hydronephrosis with 4 mm nonobstructing left kidney stone. Patient has congenital absence of right kidney. He was evaluated by urology and recommended outpatient follow-up. Patient was uncooperative to during the hospital stay and refusing care and participation with physical therapy.   He was requesting IV Benadryl and IV opioid medications repeatedly. Medications were stopped and patient got upset and left hospital.  Patient then left and went to [facility] emergency room. Patient has been evaluated by stroke team and recommended to monitor. PMH:  Past Medical History:   Diagnosis Date    AICD (automatic cardioverter/defibrillator) present     CKD (chronic kidney disease), stage III ([address]     COVID-19     Myocardial infarction ([facility] [address]     S/P CABG (coronary artery bypass graft)     x2 separate occasions    Solitary kidney, congenital       Allergies: Allergies   Allergen Reactions    Nsaids Swelling and Other (See Comments)    Aspirin Swelling and Rash     Other reaction(s): Facial Swelling, Swelling of Lip/Tongue/Throat    Adhesive Tape     Ceftriaxone     Ketorolac Tromethamine     Other      Other reaction(s): Difficulty Breathing    Oxycodone Hives and Rash      Medications :  isosorbide mononitrate, 30 mg, Oral, Daily  sodium bicarbonate, 650 mg, Oral, BID  ticagrelor, 90 mg, Oral, BID  nitroGLYCERIN, 0.4 mg, SubLINGual, Once  tamsulosin, 0.4 mg, Oral, Daily  bumetanide, 2 mg, Oral, BID  gabapentin, 100 mg, Oral, TID  metoprolol succinate, 50 mg, Oral, Daily  sodium chloride flush, 5-40 mL, IntraVENous, 2 times per day  atorvastatin, 80 mg, Oral, Nightly  lidocaine 1 % injection, 5 mL, IntraDERmal, Once  sodium chloride flush, 5-40 mL, IntraVENous, 2 times per day  pantoprazole, 40 mg, IntraVENous, Daily   And  sodium chloride (PF), 10 mL, IntraVENous, Daily        Review of Systems   Review of Systems   Constitutional: Negative for activity change, appetite change, fatigue, fever and unexpected weight change. HENT: Negative for congestion, nosebleeds, rhinorrhea, sinus pressure, sneezing and voice change. Respiratory: Negative for cough, choking, chest tightness, shortness of breath and wheezing. Cardiovascular: Positive for chest pain. Negative for palpitations and leg swelling. Gastrointestinal: Negative for abdominal pain, constipation, diarrhea, nausea and vomiting. Genitourinary: Negative for difficulty urinating, dysuria, frequency, penile discharge and testicular pain. Musculoskeletal: Positive for back pain. Skin: Negative for rash. Neurological: Positive for weakness. Negative for dizziness, speech difficulty, light-headedness and headaches. Hematological: Does not bruise/bleed easily. Psychiatric/Behavioral: Positive for agitation and behavioral problems. Negative for confusion, self-injury, sleep disturbance and suicidal ideas. Objective :      Current Vitals : Temp: 97.8 °F (36.6 °C),  Pulse: 70, Resp: 20, BP: 138/81, SpO2: 96 %  Last 24 Hrs Vitals   Patient Vitals for the past 24 hrs:   BP Temp Temp src Pulse Resp SpO2 Weight   12/27/21 0500 -- -- -- -- -- -- 171 lb 1.2 oz (77.6 kg)   12/27/21 0400 138/81 97.8 °F (36.6 °C) Oral 70 20 96 % --   12/27/21 0211 -- -- -- 84 -- -- --   12/26/21 2300 127/85 97.4 °F (36.3 °C) Oral 70 18 98 % --   12/26/21 2017 114/75 98.4 °F (36.9 °C) Oral 72 17 -- --   12/26/21 1700 (!) 143/83 -- -- -- -- -- --   12/26/21 1630 (!) 149/85 -- -- -- -- -- --   12/26/21 1615 126/75 -- -- -- -- -- --   12/26/21 1600 131/67 99.1 °F (37.3 °C) Oral 70 -- -- --   12/26/21 1500 118/65 -- -- -- -- -- --   12/26/21 1330 (!) 111/56 -- -- -- -- -- --   12/26/21 1300 120/73 -- -- -- -- -- --   12/26/21 1245 118/65 -- -- -- -- -- --   12/26/21 0900 118/74 -- -- -- -- -- --   12/26/21 0800 123/79 97.4 °F (36.3 °C) Temporal 70 16 100 % --     Intake / output   12/26 0701 - 12/27 0700  In: 1690.7 [P.O.:350; I.V.:1340.7]  Out: 850 [Urine:850]  Physical Exam:  Physical Exam  Vitals and nursing note reviewed. Constitutional:       General: He is not in acute distress. HENT:      Head: Normocephalic and atraumatic. Eyes:      Conjunctiva/sclera: Conjunctivae normal.      Pupils: Pupils are equal, round, and reactive to light.    Cardiovascular: Rate and Rhythm: Normal rate and regular rhythm. Heart sounds: No murmur heard. Pulmonary:      Effort: Pulmonary effort is normal. No accessory muscle usage or respiratory distress. Breath sounds: No stridor. No decreased breath sounds, wheezing, rhonchi or rales. Chest:      Comments: Sternotomy scar  Abdominal:      General: Bowel sounds are normal. There is no distension. Palpations: Abdomen is soft. Abdomen is not rigid. Tenderness: There is no abdominal tenderness. There is no guarding. Musculoskeletal:         General: No tenderness. Skin:     General: Skin is warm and dry. Findings: No erythema, lesion or rash. Neurological:      Mental Status: He is alert and oriented to person, place, and time. Cranial Nerves: No cranial nerve deficit. Motor: No seizure activity. Comments: Effort limited right-sided weakness -good resistance and does not allow tomorrow. Facial asymmetry   Psychiatric:         Mood and Affect: Affect is blunt and angry. Speech: Speech normal.         Behavior: Behavior is uncooperative. Laboratory findings:    Recent Labs     12/25/21  0446 12/26/21  0520 12/27/21  0632   WBC 4.5 4.7 3.5   HGB 9.4* 8.5* 7.6*   HCT 30.7* 27.2* 24.0*    214 197     Recent Labs     12/24/21  1416 12/25/21  0446 12/26/21  0520   NA  --  137 136   K  --  4.6 4.1   CL  --  110* 109*   CO2  --  16* 17*   GLUCOSE  --  90 117*   BUN  --  24* 19   CREATININE  --  1.58* 1.80*   CALCIUM  --  8.2* 8.1*   PHOS 3.1  --   --      No results for input(s): PROT, LABALBU, LABA1C, P6FEXAE, M1SXHLF, FT4, TSH, AST, ALT, LDH, GGT, ALKPHOS, BILITOT, BILIDIR, AMMONIA, AMYLASE, LIPASE, LACTATE, CHOL, HDL, LDLCHOLESTEROL, CHOLHDLRATIO, TRIG, VLDL, BNP, TROPONINI, CKTOTAL, CKMB, CKMBINDEX, RF, ANNALISE in the last 72 hours.        Specific Gravity, UA   Date Value Ref Range Status   12/11/2021 1.015 1.005 - 1.030 Final     Protein, UA   Date Value Ref Range Status   12/11/2021 1+ (A) NEGATIVE Final     RBC, UA   Date Value Ref Range Status   12/11/2021 0 TO 2 0 - 2 /HPF Final     Bacteria, UA   Date Value Ref Range Status   12/11/2021 NOT REPORTED None Final     Nitrite, Urine   Date Value Ref Range Status   12/11/2021 NEGATIVE NEGATIVE Final     WBC, UA   Date Value Ref Range Status   12/11/2021 0 TO 2 0 - 5 /HPF Final     Leukocyte Esterase, Urine   Date Value Ref Range Status   12/11/2021 NEGATIVE NEGATIVE Final       Imaging / Clinical Data :-   CT HEAD WO CONTRAST    Result Date: 12/23/2021  Unchanged chronic encephalomalacia of the right medial occipital lobe and unchanged chronic encephalomalacia of left inferior parietal lobule. No acute territorial infarction, intracranial hemorrhage or mass lesion. Mild chronic microangiopathic ischemic disease. Mild generalized volume loss. RECOMMENDATIONS: Unavailable     XR CHEST PORTABLE    Result Date: 12/23/2021  Relatively diffuse multifocal bilateral airspace disease most likely representing COVID-19 pneumonia. Pulmonary edema felt less likely. Clinical Course : stable  Assessment and Plan  :        1. Chest pain- atypical -  Currently on heparin infusion, plan for heart cath today. 2. Right-sided weakness- Neurology evaluation. No new strokes on CT. Unable to perform MRI brain due to BiV ICD in place  3. Chronic systolic CHF s/p AICD -continue Bumex. 4. Stage III CKD -stable  5. H/o CVA -with right hemiparesis  6. Chronic left hydronephrosis outpatient urology follow-up  7. CAD s/p CABG with multiple stents continue Plavix. Eliquis on hold as patient on heparin infusion. 8. Paroxysmal atrial fibrillation -in normal sinus rhythm. 9. Chronic hep C -   10. Homeless -social service consult   11. Acute gastritis-PPI    Swallow study normal , restart diet after heart cath . Disposition pending heart cath   Discussed with        Social service to discuss discharge arrangements.   Highly suspect drug-seeking behavior and difficult social situation due to homelessness leading to recurrent hospitalizations. Continue to monitor vitals , Intake / output ,  Cell count , HGB , Kidney function, oxygenation  as indicated . Plan and updates discussed with patient ,  answers  explained to satisfaction.    Plan discussed with Staff RN     (Please note that portions of this note were completed with a voice recognition program. Efforts were made to edit the dictations but occasionally words are mis-transcribed.)      Sierra Mccormick MD  12/27/2021

## 2021-12-27 NOTE — CARE COORDINATION
Social work consult received for [employer]". Met with pt to discuss his discharge plan. Pt states that he feels very weak and knows that he needs help. Pt had a card in his pocket from [facility].  Pt states that he doesn't know what they do so SW explained to him what they do. Asked pt several times what SW could do to help him. He stated that he doesn't know what he needs. Pt stated that he is feeling very weak,  SW suggested to pt that he work with PT and OT to see if he would qualify for a SNF. Pt is agreeable to this plan. Pt stated he will not go to a shelter. Explained to pt that if he does not qualify for a SNF, SW only has a shelter to offer him.

## 2021-12-27 NOTE — PROGRESS NOTES
Patient admitted, consent signed and questions answered. Patient ready for procedure. Call light to reach with side rails up 2 of 2. Bilat groin hairs clipped. History and physical complete. Complains of mid chest and neck pains / states has been having since last night and rates it an 8 on 0-10 scale / Cath  Meli Session notified.

## 2021-12-27 NOTE — CARE COORDINATION
Transitional Planning    Spoke with patient, he is agreeable to SNF. States he has no preference and agreeable to any facility.      Referral sent to [address]

## 2021-12-27 NOTE — PROGRESS NOTES
Patient is not in his room he is down 4 cardiac catheterization. He had a discussion with Dr. Pauline Carnes yesterday regarding potential renal risk associated with cardiac catheterization as per Dr. Dominguez Bread note he consented verbally.

## 2021-12-27 NOTE — PROGRESS NOTES
Physical Therapy    Facility/Department: [facility] 4B STEPDOWN  Initial Assessment    NAME: Cindy Parada  : 1965  MRN: [id number]    Date of Service: 2021    Discharge Recommendations: Further therapy recommended at discharge. Chief Complaint   Patient presents with    Chest Pain     The patient is a 61 y.o.  male who is admitted to the hospital for Chest pain. The patient presented with chest pain, continues, more with deep breathing and coughing, relieved by morphine. Associated with SOB, no dizziness or syncope. Known to have ischemic cardiomyopathy and multiple stents before done in [address] [place] [address],[address]. PT Equipment Recommendations  Equipment Needed: No (CTA with progress)    Assessment   Body structures, Functions, Activity limitations: Decreased functional mobility ; Decreased safe awareness;Decreased balance;Decreased ADL status; Decreased high-level IADLs;Decreased endurance;Decreased strength;Decreased posture;Decreased ROM  Assessment: Pt completes bed mobility with CGA-Luis, sits on EOB x15 minutes with SBA. GERMÁN transfers d/t pt agitated, refusing safety protocols. Pt currently requires 24 hr skilled assistance for attempts at functional mobility. Pt would benefit from continued therapy to promote endurance, balance, and strengthening. Prognosis: Fair  Decision Making: Medium Complexity  PT Education: Goals;PT Role;Plan of Care  Barriers to Learning: agitation  REQUIRES PT FOLLOW UP: Yes  Activity Tolerance  Activity Tolerance: Treatment limited secondary to agitation;Patient limited by endurance       Patient Diagnosis(es): The primary encounter diagnosis was Acute chest pain. A diagnosis of NSTEMI (non-ST elevated myocardial infarction) [facility]) was also pertinent to this visit.      has a past medical history of AICD (automatic cardioverter/defibrillator) present, CAD (coronary artery disease), CKD (chronic kidney disease), stage III ([facility] [address] COVID-19, Myocardial infarction ([facility] [address] PAF (paroxysmal atrial fibrillation) ([facility] [address] S/P CABG (coronary artery bypass graft), and Solitary kidney, congenital.   has a past surgical history that includes Coronary artery bypass graft (2010); Coronary artery bypass graft (2018); pacemaker placement (09/11/2020); Cardiac catheterization (12/27/2021); and Coronary angioplasty with stent. Restrictions  Restrictions/Precautions  Restrictions/Precautions: Fall Risk  Required Braces or Orthoses?: No  Vision/Hearing  Vision: Impaired  Vision Exceptions: Wears glasses for reading  Hearing: Within functional limits     Subjective  General  Patient assessed for rehabilitation services?: Yes  Response To Previous Treatment: Not applicable  Family / Caregiver Present: No  Follows Commands: Impaired  Other (Comment): Pt easily agitated, requires emotional support to calm down. Subjective  Subjective: RN and pt agreeable to PT. pt agreeable, easily agitated and frustrated. Pt supine in bed at start of session. Pain Screening  Patient Currently in Pain: Yes  Pain Assessment  Pain Assessment: 0-10  Pain Level: 5  Pain Type: Acute pain  Pain Location: Chest  Pain Orientation: Right;Left  Pain Descriptors: Aching;Discomfort  Pain Frequency: Continuous  Pain Onset: On-going  Non-Pharmaceutical Pain Intervention(s): Repositioned; Ambulation/Increased Activity  Response to Pain Intervention: Patient Satisfied  Vital Signs  Patient Currently in Pain: Yes       Orientation  Orientation  Overall Orientation Status: Impaired (not formally assessed d/t pt agitated)  Social/Functional History  Social/Functional History  Lives With: Alone  Type of Home: Homeless  Home Equipment:  (no usage of AD at baseline)  ADL Assistance: [facility]: Independent  Homemaking Responsibilities: Yes  Ambulation Assistance: Independent  Transfer Assistance: Independent  Type of occupation: does not work  Cognition   Cognition  Overall Cognitive Status: Exceptions  Following Commands: Follows multistep commands with increased time; Follows multistep commands with repitition  Attention Span: Attends with cues to redirect  Safety Judgement: Decreased awareness of need for assistance;Decreased awareness of need for safety  Problem Solving: Assistance required to generate solutions;Assistance required to identify errors made;Decreased awareness of errors  Initiation: Requires cues for some  Sequencing: Requires cues for some  Cognition Comment: Pt upset with PT d/t PT telling pt that he needs to wear hospital socks to stand. Pt refusing safety protocols. Stating that he is unable to ambulate at this time. Objective          Joint Mobility  Spine: WFL  ROM RLE: AROM hip flexion 91 deg, AROM knee extension lacking 80 deg, PROM knee extension lacking 70 deg, AROM DF lacking 15 deg, PROM DF lacking 5 deg. Otherwise WFL  ROM LLE: WFL  ROM RUE: AROM shoulder flexion to 10 deg, PROM shoulder flexion to 35 deg, elbow extension PROM WFL, elbow flexion PROM to 100 deg. ROM LUE: WFL  Strength RLE  Strength RLE: Exception  R Hip Flexion: 1+/5  R Knee Flexion: 3+/5  R Knee Extension: 1+/5  R Ankle Dorsiflexion: 1+/5  R Ankle Plantar flexion: 2+/5  Strength LLE  Strength LLE: Exception  Comment: grossly 4+/5  Strength RUE  Strength RUE: Exception  Comment: trace contractions, see OT note for futher details.   Strength LUE  Strength LUE: WFL  Tone RLE  RLE Tone: Hypertonic  Tone LLE  LLE Tone: Normotonic  Motor Control  Gross Motor?: WFL  Sensation  Overall Sensation Status: Impaired (R side, foot and arm n/t)  Bed mobility  Supine to Sit: Contact guard assistance  Sit to Supine: Minimal assistance (for RLE)  Scooting: Contact guard assistance  Comment: HOB elevated  Transfers  Sit to Stand: Unable to assess  Stand to sit: Unable to assess  Comment: Pt very agitated, unwilling to follow safety protocols, very decreased ROM and strength of RLE/RUE, unsafe to attempt standing this date.  Ambulation  Ambulation?: No  More Ambulation?: No     Balance  Posture: Fair  Sitting - Static: Good  Sitting - Dynamic: Good;-  Comments: Pt sits on EOB x15 min with SBA        Plan   Plan  Times per week: 5-6x  Current Treatment Recommendations: Vinod Public Re-education,Patient/Caregiver Education & Training,ADL/Self-care Training,Equipment Evaluation, Education, & procurement,Balance Training,IADL Training,Gait Training,Home Exercise Program,Functional Mobility Training,Stair training,Safety Education & Training  Safety Devices  Type of devices:  All fall risk precautions in place,Bed alarm in place,Call light within reach                                                 AM-PAC Score  AM-PAC Inpatient Mobility Raw Score : 10 (12/27/21 1603)  AM-PAC Inpatient T-Scale Score : 32.29 (12/27/21 1603)  Mobility Inpatient CMS 0-100% Score: 76.75 (12/27/21 1603)  Mobility Inpatient CMS G-Code Modifier : CL (12/27/21 1603)          Goals  Short term goals  Time Frame for Short term goals: 14 visits  Short term goal 1: Complete transfers with min A and appropriate device  Short term goal 2: Complete 50 ft of gait with min A and appropriate device  Short term goal 3: Participate in 30 minutes of therapy to promote endurance  Short term goal 4: Complete bed mobility independently       Therapy Time   Individual Concurrent Group Co-treatment   Time In 1520         Time Out 1540         Minutes 20         Timed Code Treatment Minutes: 8 Minutes       Vitaly Holm, PT

## 2021-12-27 NOTE — OP NOTE
[facility]  Cardiac catheterization note        Date:   12/27/2021  Patient name: Cindy Parada  Date of admission:  12/23/2021  2:32 PM  MRN:   [id number]  YOB: 1965    Operators:  Beata Rainey MD (Attending Physician)       Pre Procedure Conscious Sedation Data:    ASA Class:    [] I [x] II [] III [] IV    Mallampati Class:  [] I [x] II [] III [] IV      Indications:   NSTEMI    Procedure:   1. Left heart catheterization   2. Selective left and right coronary angiography    Access:  [x] Femoral  [] Radial  artery       [x] Right  [] Left    Procedure: After informed consent was obtained with explanation of the risks and benefits, patient was brought to the cath lab. The access area was prepped and draped in sterile fashion. 1% lidocaine was used for local block. The artery was cannulated with 6  Fr sheath with brisk arterial blood return. The side port was frequently flushed and aspirated with normal saline. Findings:  LMCA: Distal 30% stenosis     LAD: Mid 99% in-stent occlusion   D1 is large and occluded   LIMA-LAD is patent with mild disease (non-selective injection taken due to   difficulty engaging )   LAD beyond anastomosis is patent with 30-40% diffuse disease (seen through   SVG-Diag injection)   SVG-Diag is patent with mild disease     LCx: Mid 50% stenosis   OM is large, patent stent in the proximal segment with mild disease. RCA: Mild irregularities 20-30%. Large and dominant vessel   Patent stent in distal segment   RPDA/RPL are large patent with 10-20% stenosis      Coronary Tree  Dominance: Right     Estimated Blood Loss:            [x] Minimal 10 cc   [] Minimal < 25 cc      [] Moderate 25-50 cc         [] >50 cc    Conclusions:  1. Multivessel CAD   2. Patent LIMA-LAD and SVG-Diag grafts   3. Patent stent in OM and distal RCA   4. Mild to moderate non-obstructive disease otherwise   5.  LV gram not done due to renal insufficiency         Recommendations Routine Post Diagnostic Cath orders. Optimize medical therapy   Risk factor modification.    IV hydration and monitor for MARGARET       Electronically signed by Ankush Guerrero MD on 12/27/2021 at 12:06 PM  Cardiovascular fellow  [address] [facility]      Attending Physician      Neyda Taylor MD, [facility] - [facility]

## 2021-12-27 NOTE — PROGRESS NOTES
Speech Language Pathology  [facility]  Speech Language Pathology    Date: 12/27/2021  Patient Name: Niurka Nolasco  YOB: 1965   AGE: 61 y.o. MRN: [id number]        Patient Not Available for Speech Therapy     Due to:  [] Testing  [] Hemodialysis  [] Cancelled by RN  [] Surgery   [] Intubation/Sedation/Pain Medication  [] Medical instability  [x] Other: Pt. Leaving for cath lab    Next scheduled treatment: 12/27 in PM if able, 12/28  Completed by: Lara Selby, SLP, M.A.  CCC-SLP

## 2021-12-27 NOTE — PROGRESS NOTES
[facility]   Progress Note                   Date:   12/27/2021  Patient name: Jassi Stephens  Date of admission:  12/23/2021  2:32 PM  MRN:   [id number]  YOB: 1965  PCP: No primary care provider on file. Reason for Admission: Acute chest pain [R07.9]  NSTEMI (non-ST elevated myocardial infarction) ([facility] [address] [I21.4]  Coronary artery disease involving native coronary artery of native heart with angina pectoris ([facility] [address] [I25.119]    Subjective:       Clinical Changes / Abnormalities: Pt seen and examined in the room. States he is feeling \"terrible\" today. States he is having continued chest pain in the middle of his chest and up into his neck. States it is worse with any exertion. Nothing really improves it. He is currently on NTG gtt at 70mcg. Pt denies any SOB. On IV Heparin gtt also along with IVF. Labs, vitals, & tele reviewed.  Paced 1:1.    Medications:   Scheduled Meds:   isosorbide mononitrate  30 mg Oral Daily    sodium bicarbonate  650 mg Oral BID    ticagrelor  90 mg Oral BID    nitroGLYCERIN  0.4 mg SubLINGual Once    tamsulosin  0.4 mg Oral Daily    bumetanide  2 mg Oral BID    gabapentin  100 mg Oral TID    metoprolol succinate  50 mg Oral Daily    sodium chloride flush  5-40 mL IntraVENous 2 times per day    atorvastatin  80 mg Oral Nightly    lidocaine 1 % injection  5 mL IntraDERmal Once    sodium chloride flush  5-40 mL IntraVENous 2 times per day    pantoprazole  40 mg IntraVENous Daily    And    sodium chloride (PF)  10 mL IntraVENous Daily     Continuous Infusions:   heparin (PORCINE) Infusion 26 Units/kg/hr (12/26/21 5663)    dextrose 5 % and 0.45 % NaCl 75 mL/hr at 12/24/21 2115    nitroGLYCERIN 70 mcg/min (12/26/21 0640)    sodium chloride      sodium chloride       CBC:   Recent Labs     12/25/21 0446 12/26/21  0520 12/27/21  0632   WBC 4.5 4.7 3.5   HGB 9.4* 8.5* 7.6*    214 197     BMP:    Recent Labs     12/25/21 0446 12/26/21  0520 12/27/21  0632    136 135   K 4.6 4.1 3.9   * 109* 106   CO2 16* 17* 18*   BUN 24* 19 18   CREATININE 1.58* 1.80* 1.81*   GLUCOSE 90 117* 95     Hepatic:   No results for input(s): AST, ALT, ALB, BILITOT, ALKPHOS in the last 72 hours. Troponin:   Recent Labs     12/24/21  1416 12/26/21  0520   TROPHS 68* 120*     BNP: No results for input(s): BNP in the last 72 hours. Lipids: No results for input(s): CHOL, HDL in the last 72 hours. Invalid input(s): LDLCALCU  INR:   No results for input(s): INR in the last 72 hours. Diagnostics:    EKG:Ventricular paced rhythm.      Echo 12/2021  Summary  Left ventricle is enlarged Global left ventricular systolic function is  moderate to severely reduced Estimated ejection fraction is 25-30% . Mild aortic insufficiency. Moderate mitral regurgitation. Moderate tricuspid regurgitation. Moderate pulmonary hypertension. Estimated right ventricular systolic  pressure is 12-66YNAZ. No pericardial effusion. Objective:   Vitals: /85   Pulse 70   Temp 98.2 °F (36.8 °C) (Oral)   Resp 20   Wt 171 lb 1.2 oz (77.6 kg)   SpO2 97%   BMI 24.55 kg/m²   General appearance: alert and cooperative with exam  HEENT: Head: Normocephalic, no lesions, without obvious abnormality. Neck: no JVD, trachea midline, no adenopathy  Lungs: Clear to auscultation  Heart: Regular rate and rhythm, s1/s2 auscultated, no murmurs  Abdomen: soft, non-tender, bowel sounds active  Extremities: no edema  Neurologic: not done        Assessment / Acute Cardiac Problems:   1. CP  2. Hx of CAD with multiple stents and CABG twice (2010 & [facility]  3. CHF , acute on chronic systolic & diastolic  4. PAF, SSS s/p PPM  5. CKD 3  6.  Chronic Anemia    Patient Active Problem List:     JACKSON (acute kidney injury) ([facility] [address]     Atypical chest pain     AICD (automatic cardioverter/defibrillator) present     History of MI (myocardial infarction)     Elevated brain natriuretic peptide (BNP) level Elevated troponin     Chronic systolic heart failure (HCC)     Hx of CABG     Stage 3 chronic kidney disease ([facility] [address]     COVID-19 virus infection     Hydronephrosis of left kidney     Acute urinary retention     Hepatitis C     Coronary artery disease involving native coronary artery of native heart with angina pectoris (HCC)     Solitary kidney, congenital     Homeless single person     Acute right-sided weakness     Acute CVA (cerebrovascular accident) ([facility] [address]      Plan of Treatment:   1. Chest pain with extensive hx of CAD. Type I vs II MI. Continue IV heparin. Now on NTG gtt at 70mcg with continued chest pain. Labs reviewed. Discussed with Dr. Michael Mishra. Pt. Is refusing a stress test.   2. Given refusal of stress test along with extensive history will proceed with cardiac cath. Will have RN notify nephrology of plans for cath. 3. I have discussed risks (including but not limited to vascular injury, infection, anemia, hematoma, contrast induced kidney dysfunction, CVA and MI), benefits, alternatives in detail. All questions answered. Patient agrees to proceed. 4. ICMP s/p AICD. Continue current medications. No fluid overload today. Continue bumex & BB   5. CKD. Nephology following     6. Afib. Stable, paced rhythm. Continue BB.   On heparin gtt. eliquis at home       Electronically signed by VERONICA Virk CNP on 12/27/2021 at 9:27 [facility].  [phone]

## 2021-12-27 NOTE — PROGRESS NOTES
[facility] Neurology   [address] [facility]    Progress note             Date:   12/27/2021  Patient name:  Jasmina Rich  Date of admission:  12/23/2021  2:32 PM  MRN:   [id number]  Account:  [de-identified]  YOB: 1965  PCP:    No primary care provider on file. Room:   [address] [place] [address]  Code Status:    Full Code    Chief Complaint:     Chief Complaint   Patient presents with    Chest Pain   NSTEMI admitted 12/23/2021 for further work-up acute onset right-sided hemiparesis shortly after being started on nitro  Interval hx: The Patient was seen and examined at bedside  Is vitally stable 129/85, heart rate 70 T-max 98.2 alert oriented to person place and time. Significantly improved mentation from fridays exam.   No acute events overnight  Patient going for cardiac cath this morning as he continues to have chest pain. Neurologically stable no new symptoms some effort dependent weakness in right arm and leg. Brief History of Present Illness: The patient is a 61 y.o.  male who presents with acute onset crushing chest pain 12/23/2021 around 2 in the afternoon admitted to the hospital for NSTEMI and cardiac work-up. Shortly after admission around 9:00 in the afternoon patient had acute onset right-sided hemiparesis with severe dysarthria. Past medical history significant for recent Covid infection with cardioembolic CVA December 7216 received IV TPA at that time found a bilateral cerebral ischemic stroke with new onset A. fib started on Eliquis 5 mg twice daily. Patient also noted to have AICD, CKD 3 with solitary kidney, MI status post CABG. Patient's AICD is not MRI compatible is unable to get MRI.     CT head without 12/24/2021  Impression   No acute intracranial abnormality.       Old infarctions in the left frontotemporal lobes, bilateral occipital lobes   and minimally in the left cerebellar hemisphere, stable.         Carotid ultrasound 12/24/2021  Right:  Internal carotid artery has a mild, <50% stenosis based on velocities.    The vertebral artery is patent with antegrade flow.        Left:    Internal carotid artery has a mild, <50% stenosis based on velocities.    The vertebral artery is patent with antegrade flow. CTA head and neck 12/15/2021  Impression   No flow-limiting arterial stenosis in the head and neck.       Patchy ground-glass infiltrates in the visualized lungs.  Mediastinal   lymphadenopathy. Past Medical History:     Past Medical History:   Diagnosis Date    AICD (automatic cardioverter/defibrillator) present     CKD (chronic kidney disease), stage III ([address]     COVID-19     Myocardial infarction ([address]     S/P CABG (coronary artery bypass graft)     x2 separate occasions    Solitary kidney, congenital         Past Surgical History:     Past Surgical History:   Procedure Laterality Date    CORONARY ARTERY BYPASS GRAFT      CORONARY ARTERY BYPASS GRAFT      PACEMAKER PLACEMENT  09/11/2020    [employer] ICD [id number] RV lead [id number], LV lead [id number]. NOT MRI CONDITIONAL        Medications Prior to Admission:     Prior to Admission medications    Medication Sig Start Date End Date Taking? Authorizing Provider   bumetanide (BUMEX) 2 MG tablet Take 1 tablet by mouth 2 times daily 12/17/21   Vivian Krueger MD   gabapentin (NEURONTIN) 100 MG capsule Take 1 capsule by mouth 3 times daily for 30 days.  12/17/21 1/16/22  Vivian Krueger MD   metoprolol succinate (TOPROL XL) 50 MG extended release tablet Take 1 tablet by mouth daily 12/18/21   Vivian Krueger MD   pantoprazole (PROTONIX) 40 MG tablet Take 1 tablet by mouth every morning (before breakfast) 12/18/21   Vivian Krueger MD   clopidogrel (PLAVIX) 75 MG tablet Take 1 tablet by mouth daily    Historical Provider, MD   atorvastatin (LIPITOR) 40 MG tablet Take 1 tablet by mouth daily    Historical Provider, MD   tamsulosin (FLOMAX) 0.4 MG capsule Take 0.4 mg by mouth daily    Historical Provider, MD   apixaban (ELIQUIS) 5 MG TABS tablet Take 5 mg by mouth 2 times daily    Historical Provider, MD        Allergies:     Nsaids, Aspirin, Adhesive tape, Ceftriaxone, Ketorolac tromethamine, Other, and Oxycodone    Social History:     Tobacco:    reports that he has been smoking. He has been smoking about 0.25 packs per day. He has never used smokeless tobacco.  Alcohol:      reports previous alcohol use. Drug Use:  reports previous drug use. Family History:     Family History   Problem Relation Age of Onset   [de-identified] Cancer Mother     Diabetes Father     Heart Disease Father     Diabetes Paternal Grandmother        Review of Systems:     ROS:  Constitutional  Negative for fever and chills    HEENT  Negative for ear discharge, ear pain, nosebleed    Eyes  Negative for photophobia, pain and discharge    Respiratory  Negative for hemoptysis and sputum    Cardiovascular  Positive for crushing chest pain    Gastrointestinal  Negative for abdominal pain, diarrhea, blood in stool    Musculoskeletal  Negative for joint pain, negative for myalgia    Neurology Negative for seizures, loss of consciousness   Skin  Negative for rash or itching    Endo/heme/allergies  Negative for polydipsia, environmental allergy    Psychiatric/behavioral  Negative for suicidal ideation.  Patient is not anxious        Physical Exam:   /85   Pulse 70   Temp 98.2 °F (36.8 °C) (Oral)   Resp 20   Wt 171 lb 1.2 oz (77.6 kg)   SpO2 97%   BMI 24.55 kg/m²   Temp (24hrs), Av.1 °F (36.7 °C), Min:97.4 °F (36.3 °C), Max:99.1 °F (37.3 °C)    Recent Labs     21   POCGLU 71*       Intake/Output Summary (Last 24 hours) at 2021 0754  Last data filed at 2021 3464  Gross per 24 hour   Intake 1690.66 ml   Output 850 ml   Net 840.66 ml         NEUROLOGIC EXAMINATION  GENERAL  Appears comfortable and in no distress   HEENT  NC/ AT   NECK  Supple and no bruits heard   MENTAL STATUS:  Alert, oriented, intact memory, no confusion, normal speech, normal language, no hallucination or delusion   CRANIAL NERVES: II     -      Visual fields intact to confrontation  III,IV,VI -  EOMs full, no afferent defect, no JOHN, no ptosis  V     -     Normal facial sensation  VII    -     Normal facial symmetry  VIII   -     Intact hearing  IX,X -     Symmetrical palate  XI    -     Symmetrical shoulder shrug  XII   -     Midline tongue, no atrophy    MOTOR FUNCTION:  RUE 4/5 RLE 4-/5   LUE 5/5 LLE 5/5    SENSORY FUNCTION:  decreased sensation right arm and leg    CEREBELLAR FUNCTION:  Intact fine motor control over upper limbs   REFLEX FUNCTION:  Symmetric, no perverted reflex, no Babinski sign   STATION and GAIT  unsteady gait working with PT/ OT effort dependent weakness of right arm and leg        Investigations:      Laboratory Testing:  Recent Results (from the past 24 hour(s))   APTT    Collection Time: 12/26/21  2:01 PM   Result Value Ref Range    PTT 43.8 (H) 20.5 - 30.5 sec   APTT    Collection Time: 12/26/21 10:59 PM   Result Value Ref Range    PTT 35.5 (H) 20.5 - 30.5 sec   Basic Metabolic Panel w/ Reflex to MG    Collection Time: 12/27/21  6:32 AM   Result Value Ref Range    Glucose 95 70 - 99 mg/dL    BUN 18 6 - 20 mg/dL    CREATININE 1.81 (H) 0.70 - 1.20 mg/dL    Bun/Cre Ratio NOT REPORTED 9 - 20    Calcium 7.6 (L) 8.6 - 10.4 mg/dL    Sodium 135 135 - 144 mmol/L    Potassium 3.9 3.7 - 5.3 mmol/L    Chloride 106 98 - 107 mmol/L    CO2 18 (L) 20 - 31 mmol/L    Anion Gap 11 9 - 17 mmol/L    GFR Non-African American 39 (L) >60 mL/min    GFR  47 (L) >60 mL/min    GFR Comment          GFR Staging NOT REPORTED    CBC auto differential    Collection Time: 12/27/21  6:32 AM   Result Value Ref Range    WBC 3.5 3.5 - 11.3 k/uL    RBC 2.44 (L) 4.21 - 5.77 m/uL    Hemoglobin 7.6 (L) 13.0 - 17.0 g/dL    Hematocrit 24.0 (L) 40.7 - 50.3 %    MCV 98.4 82.6 - 102.9 fL    MCH 31.1 25.2 - 33.5 pg    MCHC 31.7 28.4 - 34.8 g/dL    RDW 16. 4 (H) 11.8 - 14.4 %    Platelets 305 457 - 944 k/uL    MPV 10.0 8.1 - 13.5 fL    NRBC Automated 0.0 0.0 per 100 WBC    Differential Type NOT REPORTED     WBC Morphology NOT REPORTED     RBC Morphology NOT REPORTED     Platelet Estimate NOT REPORTED     Immature Granulocytes 1 (H) 0 %    Seg Neutrophils 66 (H) 36 - 65 %    Lymphocytes 20 (L) 24 - 43 %    Monocytes 9 3 - 12 %    Eosinophils % 3 1 - 4 %    Basophils 1 0 - 2 %    Absolute Immature Granulocyte 0.04 0.00 - 0.30 k/uL    Segs Absolute 2.29 1.50 - 8.10 k/uL    Absolute Lymph # 0.70 (L) 1.10 - 3.70 k/uL    Absolute Mono # 0.32 0.10 - 1.20 k/uL    Absolute Eos # 0.11 0.00 - 0.44 k/uL    Basophils Absolute 0.04 0.00 - 0.20 k/uL    Morphology ANISOCYTOSIS PRESENT     Morphology 1+ ELLIPTOCYTES    APTT    Collection Time: 12/27/21  6:32 AM   Result Value Ref Range    PTT 62.7 (H) 20.5 - 30.5 sec         Assessment :      Primary Problem  Coronary artery disease involving native coronary artery of native heart with angina pectoris [facility])    Active Hospital Problems    Diagnosis Date Noted    Elevated troponin [R77.8] 12/08/2021     Priority: High    Stage 3 chronic kidney disease ([facility] [address] [N18.30] 12/08/2021     Priority: Medium    Chronic systolic heart failure (HCC) [I50.22] 12/08/2021     Priority: Medium    Acute chest pain [R07.9]     Acute CVA (cerebrovascular accident) ([facility] [address] [I63.9] 12/23/2021    Acute right-sided weakness [R53.1]     Coronary artery disease involving native coronary artery of native heart with angina pectoris [facility]) [I25.119] 12/13/2021       Plan:   Mayra Keller is a 60 YO  M who presents 12/23/2021 with acute onset crushing chest pain found to have NSTEMI. PMH significant for diffuse ICAD, CAD s/p CABG, AICD non MRI conditional, CKD3 with solitary congenital kidney and Paroxsymal A-fib.  Patient was on eliquis 5mg BID and ASA 81 and plavix 75mg Lipitor 40 prior to admission due to recent CVA in December baseline right sided weakness. This Admission symptoms back to baseline, patient put on brilinta 90mg BID and heparin drip DC by cardiology post cardiac cath 12/27/2021 resume eliquis 5mg BID. Assessment  1. NSTEMI resolving  2. Diffuse Intracranial Atherosclerosis with peripheral vascular disease   3. Paroxysmal A-fib    Plan  -agree with DC asa and plavix continue brilinta 90mg BID   -Cardiology DC heparin 12/27 resume eliquis 5mg BID for A-fib  -LDL 96 goal <70 12/9/2021 continue lipitor 80mg nightly   - adding HBA1C can be followed outpatient  -cardiac echo 12/7/2021  EF 25-30% LV hypokinesis   -cardiology undergoing cardiac cath 12/27/2021 patent [place]-LAD and SVG-diag grafts  -no further workup planned at this time can fu with neurology outpatient       PT/OT/ST  PM&R evaluation   SW-DC planning likely ARU vs SNF           Follow-up further recommendations after discussing the case with attending  The plan was discussed with the patient, patient's family and the medical staff. Consultations:   IP CONSULT TO HOSPITALIST  IP CONSULT TO STROKE TEAM  IP CONSULT TO CARDIOLOGY  IP CONSULT TO NEUROLOGY  IP CONSULT TO NEUROLOGY  IP CONSULT TO NEPHROLOGY  IP CONSULT TO ENDOVASCULAR NEUROSURGERY  IP CONSULT TO SOCIAL WORK  IP CONSULT TO PSYCHIATRY    Patient is admitted as inpatient status because of co-morbidities listed above, severity of signs and symptoms as outlined, requirement for current medical therapies and most importantly because of direct risk to patient if care not provided in a hospital setting. Norman Warren MD   PGY-3 Neurology Resident   12/27/2021  7:54 AM    Copy sent to Dr. George primary care provider on file.

## 2021-12-28 ENCOUNTER — APPOINTMENT (OUTPATIENT)
Dept: GENERAL RADIOLOGY | Age: 56
DRG: 287 | End: 2021-12-28
Payer: MEDICARE

## 2021-12-28 LAB
ABSOLUTE EOS #: 0 K/UL (ref 0–0.4)
ABSOLUTE IMMATURE GRANULOCYTE: 0 K/UL (ref 0–0.3)
ABSOLUTE LYMPH #: 0.54 K/UL (ref 1–4.8)
ABSOLUTE MONO #: 0.32 K/UL (ref 0.1–0.8)
ANION GAP SERPL CALCULATED.3IONS-SCNC: 11 MMOL/L (ref 9–17)
BASOPHILS # BLD: 2 % (ref 0–2)
BASOPHILS ABSOLUTE: 0.09 K/UL (ref 0–0.2)
BUN BLDV-MCNC: 23 MG/DL (ref 6–20)
BUN/CREAT BLD: ABNORMAL (ref 9–20)
CALCIUM SERPL-MCNC: 8 MG/DL (ref 8.6–10.4)
CHLORIDE BLD-SCNC: 108 MMOL/L (ref 98–107)
CO2: 17 MMOL/L (ref 20–31)
CREAT SERPL-MCNC: 1.87 MG/DL (ref 0.7–1.2)
DIFFERENTIAL TYPE: ABNORMAL
EOSINOPHILS RELATIVE PERCENT: 0 % (ref 1–4)
ESTIMATED AVERAGE GLUCOSE: 120 MG/DL
GFR AFRICAN AMERICAN: 45 ML/MIN
GFR NON-AFRICAN AMERICAN: 37 ML/MIN
GFR SERPL CREATININE-BSD FRML MDRD: ABNORMAL ML/MIN/{1.73_M2}
GFR SERPL CREATININE-BSD FRML MDRD: ABNORMAL ML/MIN/{1.73_M2}
GLUCOSE BLD-MCNC: 88 MG/DL (ref 70–99)
HBA1C MFR BLD: 5.8 % (ref 4–6)
HCT VFR BLD CALC: 28.2 % (ref 40.7–50.3)
HEMOGLOBIN: 9 G/DL (ref 13–17)
IMMATURE GRANULOCYTES: 0 %
LYMPHOCYTES # BLD: 12 % (ref 24–44)
MCH RBC QN AUTO: 31.4 PG (ref 25.2–33.5)
MCHC RBC AUTO-ENTMCNC: 31.9 G/DL (ref 28.4–34.8)
MCV RBC AUTO: 98.3 FL (ref 82.6–102.9)
MONOCYTES # BLD: 7 % (ref 1–7)
MORPHOLOGY: ABNORMAL
MORPHOLOGY: ABNORMAL
NRBC AUTOMATED: 0 PER 100 WBC
PDW BLD-RTO: 16.9 % (ref 11.8–14.4)
PLATELET # BLD: 183 K/UL (ref 138–453)
PLATELET ESTIMATE: ABNORMAL
PMV BLD AUTO: 10.1 FL (ref 8.1–13.5)
POTASSIUM SERPL-SCNC: 4.6 MMOL/L (ref 3.7–5.3)
RBC # BLD: 2.87 M/UL (ref 4.21–5.77)
RBC # BLD: ABNORMAL 10*6/UL
SEG NEUTROPHILS: 79 % (ref 36–66)
SEGMENTED NEUTROPHILS ABSOLUTE COUNT: 3.55 K/UL (ref 1.8–7.7)
SODIUM BLD-SCNC: 136 MMOL/L (ref 135–144)
WBC # BLD: 4.5 K/UL (ref 3.5–11.3)
WBC # BLD: ABNORMAL 10*3/UL

## 2021-12-28 PROCEDURE — 6370000000 HC RX 637 (ALT 250 FOR IP): Performed by: INTERNAL MEDICINE

## 2021-12-28 PROCEDURE — 6370000000 HC RX 637 (ALT 250 FOR IP): Performed by: STUDENT IN AN ORGANIZED HEALTH CARE EDUCATION/TRAINING PROGRAM

## 2021-12-28 PROCEDURE — 94761 N-INVAS EAR/PLS OXIMETRY MLT: CPT

## 2021-12-28 PROCEDURE — 6360000002 HC RX W HCPCS: Performed by: NURSE PRACTITIONER

## 2021-12-28 PROCEDURE — 97166 OT EVAL MOD COMPLEX 45 MIN: CPT

## 2021-12-28 PROCEDURE — 92507 TX SP LANG VOICE COMM INDIV: CPT

## 2021-12-28 PROCEDURE — 97535 SELF CARE MNGMENT TRAINING: CPT

## 2021-12-28 PROCEDURE — 2580000003 HC RX 258: Performed by: NURSE PRACTITIONER

## 2021-12-28 PROCEDURE — 6370000000 HC RX 637 (ALT 250 FOR IP): Performed by: NURSE PRACTITIONER

## 2021-12-28 PROCEDURE — 36415 COLL VENOUS BLD VENIPUNCTURE: CPT

## 2021-12-28 PROCEDURE — 2060000000 HC ICU INTERMEDIATE R&B

## 2021-12-28 PROCEDURE — 96376 TX/PRO/DX INJ SAME DRUG ADON: CPT

## 2021-12-28 PROCEDURE — 99232 SBSQ HOSP IP/OBS MODERATE 35: CPT | Performed by: INTERNAL MEDICINE

## 2021-12-28 PROCEDURE — 85025 COMPLETE CBC W/AUTO DIFF WBC: CPT

## 2021-12-28 PROCEDURE — 80048 BASIC METABOLIC PNL TOTAL CA: CPT

## 2021-12-28 PROCEDURE — 2580000003 HC RX 258: Performed by: STUDENT IN AN ORGANIZED HEALTH CARE EDUCATION/TRAINING PROGRAM

## 2021-12-28 PROCEDURE — 99232 SBSQ HOSP IP/OBS MODERATE 35: CPT | Performed by: FAMILY MEDICINE

## 2021-12-28 PROCEDURE — C9113 INJ PANTOPRAZOLE SODIUM, VIA: HCPCS | Performed by: NURSE PRACTITIONER

## 2021-12-28 PROCEDURE — 71045 X-RAY EXAM CHEST 1 VIEW: CPT

## 2021-12-28 PROCEDURE — 97530 THERAPEUTIC ACTIVITIES: CPT

## 2021-12-28 RX ADMIN — TICAGRELOR 90 MG: 90 TABLET ORAL at 20:44

## 2021-12-28 RX ADMIN — SODIUM CHLORIDE, PRESERVATIVE FREE 10 ML: 5 INJECTION INTRAVENOUS at 20:51

## 2021-12-28 RX ADMIN — SODIUM CHLORIDE, PRESERVATIVE FREE 10 ML: 5 INJECTION INTRAVENOUS at 09:28

## 2021-12-28 RX ADMIN — SODIUM CHLORIDE, PRESERVATIVE FREE 10 ML: 5 INJECTION INTRAVENOUS at 20:47

## 2021-12-28 RX ADMIN — DIPHENHYDRAMINE HYDROCHLORIDE 25 MG: 50 INJECTION, SOLUTION INTRAMUSCULAR; INTRAVENOUS at 22:00

## 2021-12-28 RX ADMIN — ACETAMINOPHEN 650 MG: 325 TABLET ORAL at 03:22

## 2021-12-28 RX ADMIN — ATORVASTATIN CALCIUM 80 MG: 80 TABLET, FILM COATED ORAL at 20:44

## 2021-12-28 RX ADMIN — SODIUM BICARBONATE 650 MG: 648 TABLET ORAL at 09:25

## 2021-12-28 RX ADMIN — TICAGRELOR 90 MG: 90 TABLET ORAL at 09:39

## 2021-12-28 RX ADMIN — PANTOPRAZOLE SODIUM 40 MG: 40 INJECTION, POWDER, FOR SOLUTION INTRAVENOUS at 09:25

## 2021-12-28 RX ADMIN — ISOSORBIDE MONONITRATE 30 MG: 30 TABLET ORAL at 09:25

## 2021-12-28 RX ADMIN — TAMSULOSIN HYDROCHLORIDE 0.4 MG: 0.4 CAPSULE ORAL at 09:25

## 2021-12-28 RX ADMIN — DIPHENHYDRAMINE HYDROCHLORIDE 25 MG: 50 INJECTION, SOLUTION INTRAMUSCULAR; INTRAVENOUS at 03:20

## 2021-12-28 RX ADMIN — METOPROLOL SUCCINATE 50 MG: 50 TABLET, FILM COATED, EXTENDED RELEASE ORAL at 09:25

## 2021-12-28 RX ADMIN — DIPHENHYDRAMINE HYDROCHLORIDE 25 MG: 50 INJECTION, SOLUTION INTRAMUSCULAR; INTRAVENOUS at 09:25

## 2021-12-28 RX ADMIN — DIPHENHYDRAMINE HYDROCHLORIDE 25 MG: 50 INJECTION, SOLUTION INTRAMUSCULAR; INTRAVENOUS at 16:06

## 2021-12-28 RX ADMIN — APIXABAN 5 MG: 5 TABLET, FILM COATED ORAL at 20:44

## 2021-12-28 ASSESSMENT — PAIN SCALES - WONG BAKER
WONGBAKER_NUMERICALRESPONSE: 4

## 2021-12-28 ASSESSMENT — ENCOUNTER SYMPTOMS
SINUS PRESSURE: 0
VOMITING: 0
WHEEZING: 0
RHINORRHEA: 0
SHORTNESS OF BREATH: 0
COUGH: 0
DIARRHEA: 0
VOICE CHANGE: 0
CHEST TIGHTNESS: 0
NAUSEA: 0
CONSTIPATION: 0
BACK PAIN: 1
CHOKING: 0
ABDOMINAL PAIN: 0

## 2021-12-28 ASSESSMENT — PAIN DESCRIPTION - ONSET: ONSET: ON-GOING

## 2021-12-28 ASSESSMENT — PAIN DESCRIPTION - DESCRIPTORS
DESCRIPTORS: ACHING;DISCOMFORT

## 2021-12-28 ASSESSMENT — PAIN DESCRIPTION - PROGRESSION

## 2021-12-28 ASSESSMENT — PAIN - FUNCTIONAL ASSESSMENT
PAIN_FUNCTIONAL_ASSESSMENT: PREVENTS OR INTERFERES SOME ACTIVE ACTIVITIES AND ADLS
PAIN_FUNCTIONAL_ASSESSMENT: PREVENTS OR INTERFERES SOME ACTIVE ACTIVITIES AND ADLS

## 2021-12-28 ASSESSMENT — PAIN DESCRIPTION - LOCATION
LOCATION: CHEST
LOCATION: FLANK
LOCATION: LEG;ARM
LOCATION: FLANK

## 2021-12-28 ASSESSMENT — PAIN DESCRIPTION - ORIENTATION
ORIENTATION: LEFT
ORIENTATION: RIGHT
ORIENTATION: LEFT
ORIENTATION: RIGHT

## 2021-12-28 ASSESSMENT — PAIN DESCRIPTION - PAIN TYPE
TYPE: ACUTE PAIN

## 2021-12-28 ASSESSMENT — PAIN SCALES - GENERAL
PAINLEVEL_OUTOF10: 3
PAINLEVEL_OUTOF10: 3
PAINLEVEL_OUTOF10: 4
PAINLEVEL_OUTOF10: 7

## 2021-12-28 ASSESSMENT — PAIN DESCRIPTION - FREQUENCY: FREQUENCY: CONTINUOUS

## 2021-12-28 NOTE — PROGRESS NOTES
[facility]  Office: [address], DO, Adeola Jackson, DO, Latesha Chambers, DO, Corinna Cancino Blood, DO, Abimbola Cantu MD, Mack Dancer, MD, Shane Candelaria MD, Raul Nichols MD, Kevan Griffiths MD, Kam Srivastava MD, Kiah Siegel MD, Elizabeth Brown, DO, Andrae Giron, DO, Christiana Hernandez MD,  Behzad Hicks, DO, Charmaine Hardy MD, Alejandra Roy MD, Isaac Ferrari MD, Tony Urbano MD, Jason Maradiaga MD, Deb Levin MD, Adele Javier MD, Mackenzie Mercado, [facility], [facility], [facility], Unknown Pump, CNP, Britney Siegel, CNS, [facility] Stas, CNP, Larissa Oliver, CNP, Daniela Arce, CNP, Romero Foote, CNP, Laina Richards, CNP, Michael Figueroa PA-C, Olinda Caballero, [facility], Junito Self, [facility], Trae Holley, CNP, Nicole Nina, CNP, Shimon Starkey, CNP, Kunal Padilla, CNP, Nam Herrera, [facility], [facility], [facility]      Daily Progress Note     Admit Date: 12/23/2021  Bed/Room No.  0096/6933-44  Admitting Physician : Shane Candelaria MD  Code Status :Full Code  Hospital Day:  LOS: 5 days   Chief Complaint:     Chief Complaint   Patient presents with    Chest Pain     Principal Problem:    Coronary artery disease involving native coronary artery of native heart with angina pectoris [facility])  Active Problems:    Elevated troponin    Chronic systolic heart failure ([facility] [address]    Stage 3 chronic kidney disease [facility])    Acute right-sided weakness    Acute CVA (cerebrovascular accident) [facility])    Acute chest pain  Resolved Problems:    * No resolved hospital problems. *    Subjective : Interval History/Significant events :  12/28/21    Patient demonstrates garbled speech however has clear speech when gets agitated . He has not been cooperative with physical therapy. He had a negative heart cath. He continues to complain of chest pain and right-sided weakness with strong resistance to passive motion. Vitals - Stable afebrile  Labs -kidney function stable at 1.83. Troponin 68.   CT head showed old infarct in left frontotemporal lobes, bilateral occipital lobes and minimally in the left cerebellar hemisphere without any acute intracranial abnormality. Ultrasound kidney showed absent right kidney. Nursing notes , Consults notes reviewed. Overnight events and updates discussed with Nursing staff . Background History:         Barb Hdz is 61 y.o. male  Who was admitted to the hospital on 12/23/2021 for treatment of Coronary artery disease involving native coronary artery of native heart with angina pectoris ([address] Patient presented to emergency room with chest pain. Patient is new to the area and has recently moved from [place]. He was admitted a month ago at [facility] with stroke like symptoms with right-sided weakness. Patient received TPA. CT head showed supratentorial bilateral cerebral infarcts embolic in origin. He was also found to have A. fib and given Eliquis. Patient had episode of hematemesis in the hospital which required urgent EGD and did not show any bleeding ulcer except mild gastritis. Patient was advised to have Eliquis and Plavix at discharge. Patient is homeless and has been bouncing between hotels. He left hospital at [facility] as he was not satisfied with the care. Patient then got admitted at [facility] with similar symptoms. CT head and neck and CT head showed chronic infarcts without any acute bleeding infarct. Patient had negative VQ scan, chest x-ray showed cardiomegaly, creatinine was stable. Patient was also found to have COVID-19 infection and required oxygen. He was treated with Decadron initially. Patient's breathing improved and was saturating well on room air. Patient was also found to have left sided hydronephrosis with 4 mm nonobstructing left kidney stone. Patient has congenital absence of right kidney. He was evaluated by urology and recommended outpatient follow-up.   Patient was uncooperative to during the hospital stay and refusing care and participation with physical therapy. He was requesting IV Benadryl and IV opioid medications repeatedly. Medications were stopped and patient got upset and left hospital.  Patient then left and went to [facility] emergency room. Patient has been evaluated by stroke team and recommended to monitor. PMH:  Past Medical History:   Diagnosis Date    AICD (automatic cardioverter/defibrillator) present     CAD (coronary artery disease)     CKD (chronic kidney disease), stage III ([facility]     COVID-19     Myocardial infarction ([facility] [address]     PAF (paroxysmal atrial fibrillation) (HCC)     S/P CABG (coronary artery bypass graft)     x2 separate occasions    Solitary kidney, congenital       Allergies: Allergies   Allergen Reactions    Nsaids Swelling and Other (See Comments)    Aspirin Swelling and Rash     Other reaction(s): Facial Swelling, Swelling of Lip/Tongue/Throat    Adhesive Tape     Ceftriaxone     Ketorolac Tromethamine     Other      Other reaction(s): Difficulty Breathing      Medications :  sodium chloride flush, 5-40 mL, IntraVENous, 2 times per day  apixaban, 5 mg, Oral, BID  isosorbide mononitrate, 30 mg, Oral, Daily  sodium bicarbonate, 650 mg, Oral, BID  ticagrelor, 90 mg, Oral, BID  nitroGLYCERIN, 0.4 mg, SubLINGual, Once  tamsulosin, 0.4 mg, Oral, Daily  [Held by provider] bumetanide, 2 mg, Oral, BID  gabapentin, 100 mg, Oral, TID  metoprolol succinate, 50 mg, Oral, Daily  sodium chloride flush, 5-40 mL, IntraVENous, 2 times per day  atorvastatin, 80 mg, Oral, Nightly  lidocaine 1 % injection, 5 mL, IntraDERmal, Once  sodium chloride flush, 5-40 mL, IntraVENous, 2 times per day  pantoprazole, 40 mg, IntraVENous, Daily   And  sodium chloride (PF), 10 mL, IntraVENous, Daily        Review of Systems   Review of Systems   Constitutional: Negative for activity change, appetite change, fatigue, fever and unexpected weight change.    HENT: Negative for congestion, nosebleeds, rhinorrhea, sinus pressure, sneezing and voice change. Respiratory: Negative for cough, choking, chest tightness, shortness of breath and wheezing. Cardiovascular: Positive for chest pain. Negative for palpitations and leg swelling. Gastrointestinal: Negative for abdominal pain, constipation, diarrhea, nausea and vomiting. Genitourinary: Negative for difficulty urinating, dysuria, frequency, penile discharge and testicular pain. Musculoskeletal: Positive for back pain. Skin: Negative for rash. Neurological: Positive for weakness. Negative for dizziness, speech difficulty, light-headedness and headaches. Hematological: Does not bruise/bleed easily. Psychiatric/Behavioral: Positive for agitation and behavioral problems. Negative for confusion, self-injury, sleep disturbance and suicidal ideas. Objective :      Current Vitals : Temp: 98.9 °F (37.2 °C),  Pulse: 70, Resp: 15, BP: (!) 151/99, SpO2: 95 %  Last 24 Hrs Vitals   Patient Vitals for the past 24 hrs:   BP Temp Temp src Pulse Resp SpO2 Weight   12/28/21 1600 (!) 151/99 98.9 °F (37.2 °C) -- 70 15 95 % --   12/28/21 1223 -- -- -- -- 17 97 % --   12/28/21 1207 (!) 135/94 98.5 °F (36.9 °C) Oral 70 20 94 % --   12/28/21 0745 (!) 135/95 98.1 °F (36.7 °C) Oral 70 16 98 % --   12/28/21 0322 -- -- -- -- -- -- 167 lb 12.3 oz (76.1 kg)   12/28/21 0315 (!) 121/96 100.5 °F (38.1 °C) Temporal 71 20 97 % --   12/28/21 0105 -- -- -- 71 -- -- --   12/28/21 0000 (!) 123/97 97.6 °F (36.4 °C) Oral 77 18 95 % --   12/27/21 2000 (!) 140/87 -- -- 70 -- 96 % --   12/27/21 1918 (!) 116/105 97.7 °F (36.5 °C) Oral 82 17 98 % --     Intake / output   12/27 1501 - 12/28 1500  In: 350 [P.O.:350]  Out: 800 [Urine:800]  Physical Exam:  Physical Exam  Vitals and nursing note reviewed. Constitutional:       General: He is not in acute distress. HENT:      Head: Normocephalic and atraumatic.    Eyes:      Conjunctiva/sclera: Conjunctivae normal. Pupils: Pupils are equal, round, and reactive to light. Cardiovascular:      Rate and Rhythm: Normal rate and regular rhythm. Heart sounds: No murmur heard. Pulmonary:      Effort: Pulmonary effort is normal. No accessory muscle usage or respiratory distress. Breath sounds: No stridor. No decreased breath sounds, wheezing, rhonchi or rales. Chest:      Comments: Sternotomy scar  Abdominal:      General: Bowel sounds are normal. There is no distension. Palpations: Abdomen is soft. Abdomen is not rigid. Tenderness: There is no abdominal tenderness. There is no guarding. Musculoskeletal:         General: No tenderness. Skin:     General: Skin is warm and dry. Findings: No erythema, lesion or rash. Neurological:      Mental Status: He is alert and oriented to person, place, and time. Cranial Nerves: No cranial nerve deficit. Motor: No seizure activity. Comments: Strong resistance to passive motion. Facial asymmetry   Psychiatric:         Mood and Affect: Affect is blunt and angry. Speech: Speech normal.         Behavior: Behavior is uncooperative.            Laboratory findings:    Recent Labs     12/26/21  0520 12/27/21  0632 12/28/21  0731   WBC 4.7 3.5 4.5   HGB 8.5* 7.6* 9.0*   HCT 27.2* 24.0* 28.2*    197 183     Recent Labs     12/26/21  0520 12/27/21  0632 12/28/21  0731    135 136   K 4.1 3.9 4.6   * 106 108*   CO2 17* 18* 17*   GLUCOSE 117* 95 88   BUN 19 18 23*   CREATININE 1.80* 1.81* 1.87*   CALCIUM 8.1* 7.6* 8.0*     Recent Labs     12/27/21  0632   LABA1C 5.8          Specific Gravity, UA   Date Value Ref Range Status   12/11/2021 1.015 1.005 - 1.030 Final     Protein, UA   Date Value Ref Range Status   12/11/2021 1+ (A) NEGATIVE Final     RBC, UA   Date Value Ref Range Status   12/11/2021 0 TO 2 0 - 2 /HPF Final     Bacteria, UA   Date Value Ref Range Status   12/11/2021 NOT REPORTED None Final     Nitrite, Urine   Date Value Ref Range Status   12/11/2021 NEGATIVE NEGATIVE Final     WBC, UA   Date Value Ref Range Status   12/11/2021 0 TO 2 0 - 5 /HPF Final     Leukocyte Esterase, Urine   Date Value Ref Range Status   12/11/2021 NEGATIVE NEGATIVE Final       Imaging / Clinical Data :-   CT HEAD WO CONTRAST    Result Date: 12/23/2021  Unchanged chronic encephalomalacia of the right medial occipital lobe and unchanged chronic encephalomalacia of left inferior parietal lobule. No acute territorial infarction, intracranial hemorrhage or mass lesion. Mild chronic microangiopathic ischemic disease. Mild generalized volume loss. RECOMMENDATIONS: Unavailable     XR CHEST PORTABLE    Result Date: 12/23/2021  Relatively diffuse multifocal bilateral airspace disease most likely representing COVID-19 pneumonia. Pulmonary edema felt less likely. Clinical Course : stable  Assessment and Plan  :        1. Chest pain- atypical -acute coronary syndrome ruled out with negative heart cath. .   2. Right-sided weakness- . No new strokes on CT. Unable to perform MRI brain due to BiV ICD in place. Strong resistance to passive motion. 3. Chronic systolic CHF s/p AICD - resume Bumex. 4. Stage III CKD -stable -   5. H/o CVA - with right hemiparesis  6. Chronic left hydronephrosis outpatient urology follow-up  7. CAD s/p CABG with multiple stents continue Plavix. Eliquis on hold as patient on heparin infusion. 8. Paroxysmal atrial fibrillation -in normal sinus rhythm. 9. Chronic hep C -   10. Homeless -social service consult   11. Acute gastritis-PPI    Patient discharged on 12/27/2021 however is homeless and refuses shelter. Patient has limitation of activity and PT recommending continued therapy after discharge. Referral to skilled nursing facility [facility] sent and awaiting response. Discussed with case management.   Social service following      Continue to monitor vitals , Intake / output ,  Cell count , HGB , Kidney function, oxygenation as indicated . Plan and updates discussed with patient ,  answers  explained to satisfaction.    Plan discussed with Staff Blake Hazel RN    (Please note that portions of this note were completed with a voice recognition program. Efforts were made to edit the dictations but occasionally words are mis-transcribed.)      Oly Ardon MD  12/28/2021

## 2021-12-28 NOTE — PROGRESS NOTES
Speech Language Pathology  Speech Language Pathology  [facility]    Speech Language Treatment Note    Date: 12/28/2021  Patients Name: Teressa Quiros  MRN: [id number]  Diagnosis:   Patient Active Problem List   Diagnosis Code    JACKSON ([facility]) ([facility] N17.9    Atypical chest pain R07.89    AICD (automatic cardioverter/defibrillator) present Z95.810    History of MI (myocardial infarction) I25.2    Elevated brain natriuretic peptide (BNP) level R79.89    Elevated troponin R77.8    Chronic systolic heart failure (HCC) I50.22    Hx of CABG Z95.1    Stage 3 chronic kidney disease ([facility] [address] N18.30    COVID-19 virus infection U07.1    Hydronephrosis of left kidney N13.30    Acute urinary retention R33.8    Hepatitis C B19.20    Coronary artery disease involving native coronary artery of native heart with angina pectoris ([facility] I25.119    Solitary kidney, congenital Q60.0    Homeless single person Z59.00    Acute right-sided weakness R53.1    Acute CVA (cerebrovascular accident) ([facility] I63.9    Acute chest pain R07.9       Pain: 0/10    Speech and Language Treatment  Treatment time: 6290-5435    Subjective: [] Alert [] Cooperative     [] Confused     [] Agitated    [x] Lethargic      Objective/Assessment:    Verbal Expression: Automatic associations- food related: 40% improved to 100% with mod-max verbal, phonemic cues    Speech: OMEX for dysarthria/facial weakness: Pt with limited participation, completes x4-5 reps x1 set with max verbal cues and models. Written exercises left at bedside. Verbal education provided regarding strategies for dysarthria (increase volume, slow rate, overarticulation), pt verbalizes understanding however requires max cues to implement during automatic associations task. Other: Pt initially declines participation in exercises, following education and cues agrees to participate in brief session.      Plan:  [x] Continue ST services    []

## 2021-12-28 NOTE — PROGRESS NOTES
Physical Therapy  Facility/Department: [address] STEPDOWN  Daily Treatment Note  NAME: Eugene Flores  : 1965  MRN: [id number]    Date of Service: 2021    Discharge Recommendations: Further therapy recommended at discharge. PT Equipment Recommendations  Equipment Needed: No (CTA with progress)    Assessment   Body structures, Functions, Activity limitations: Decreased functional mobility ; Decreased safe awareness;Decreased balance;Decreased ADL status; Decreased high-level IADLs;Decreased endurance;Decreased strength;Decreased posture;Decreased ROM  Assessment: Pt completes bed mobility with CGA-Luis, sits on EOB x30 minutes with SBA. GERMÁN transfers d/t pt agitated, refusing transfers. Pt currently requires 24 hr skilled assistance for attempts at functional mobility. Pt would benefit from continued therapy to promote endurance, balance, and strengthening. Prognosis: Fair  Decision Making: Medium Complexity  PT Education: Goals;PT Role;Plan of Care;Home Exercise Program;General Safety;Equipment;Transfer Training  Patient Education: Educated on possibility of using SS for transfers but pt refusing. Barriers to Learning: agitation  REQUIRES PT FOLLOW UP: Yes  Activity Tolerance  Activity Tolerance: Treatment limited secondary to agitation;Patient limited by endurance     Patient Diagnosis(es): The primary encounter diagnosis was Acute chest pain. A diagnosis of NSTEMI (non-ST elevated myocardial infarction) [facility]) was also pertinent to this visit. has a past medical history of AICD (automatic cardioverter/defibrillator) present, CAD (coronary artery disease), CKD (chronic kidney disease), stage III ([address] COVID-19, Myocardial infarction ([facility] [address] PAF (paroxysmal atrial fibrillation) ([facility] [address] S/P CABG (coronary artery bypass graft), and Solitary kidney, congenital.   has a past surgical history that includes Coronary artery bypass graft (2010);  Coronary artery bypass graft (2018); pacemaker placement (09/11/2020); Cardiac catheterization (12/27/2021); and Coronary angioplasty with stent. Restrictions  Restrictions/Precautions  Restrictions/Precautions: Fall Risk  Required Braces or Orthoses?: No  Subjective   General  Response To Previous Treatment: Patient with no complaints from previous session. Family / Caregiver Present: No  Subjective  Subjective: RN and pt agreeable to PT. pt agreeable, very easily agitated. Pt supine in bed at start of session. Pain Screening  Patient Currently in Pain: Yes  Pain Assessment  Pain Assessment: Faces  Beckford-Baker Pain Rating: Hurts little more  Pain Type: Acute pain  Pain Location: Leg;Arm  Pain Orientation: Right  Pain Descriptors: Aching;Discomfort  Pain Frequency: Continuous  Pain Onset: On-going  Non-Pharmaceutical Pain Intervention(s): Repositioned; Ambulation/Increased Activity  Response to Pain Intervention: Patient Satisfied  Vital Signs  Patient Currently in Pain: Yes       Orientation  Orientation  Overall Orientation Status: Impaired (not formally assessed d/t pt agitated)  Cognition   Cognition  Overall Cognitive Status: Exceptions  Following Commands: Follows multistep commands with increased time; Follows multistep commands with repitition  Attention Span: Attends with cues to redirect  Safety Judgement: Decreased awareness of need for assistance;Decreased awareness of need for safety  Problem Solving: Assistance required to generate solutions;Assistance required to identify errors made;Decreased awareness of errors  Initiation: Requires cues for some  Sequencing: Requires cues for some  Cognition Comment: Very agitated t/o  Objective   Bed mobility  Supine to Sit: Contact guard assistance  Sit to Supine: Minimal assistance (for RLE)  Scooting: Contact guard assistance  Comment: HOB elevated  Transfers  Sit to Stand: Unable to assess  Stand to sit: Unable to assess  Comment: pt refusing transfers despite maximal education on benefits of attempting transfers. Ambulation  Ambulation?: No  More Ambulation?: No     Balance  Posture: Fair  Sitting - Static: Good  Sitting - Dynamic: Good;-  Comments: Pt sits on EOB x30 min with SBA             PROM RLE x10 knee extension and ankle DF/PF with DF stretch x30 s x2 in sitting                                      AM-PAC Score  AM-PAC Inpatient Mobility Raw Score : 10 (12/28/21 1205)  AM-PAC Inpatient T-Scale Score : 32.29 (12/28/21 1205)  Mobility Inpatient CMS 0-100% Score: 76.75 (12/28/21 1205)  Mobility Inpatient CMS G-Code Modifier : CL (12/28/21 1205)          Goals  Short term goals  Time Frame for Short term goals: 14 visits  Short term goal 1: Complete transfers with min A and appropriate device  Short term goal 2: Complete 50 ft of gait with min A and appropriate device  Short term goal 3: Participate in 30 minutes of therapy to promote endurance  Short term goal 4: Complete bed mobility independently    Plan    Plan  Times per week: 5-6x  Current Treatment Recommendations: Mason Rodriguez Re-education,Patient/Caregiver Education & Training,ADL/Self-care Training,Equipment Evaluation, Education, & procurement,Balance Training,IADL Training,Gait Training,Home Exercise Program,Functional Mobility Training,Stair training,Safety Education & Training  Safety Devices  Type of devices:  All fall risk precautions in place,Bed alarm in place,Call light within reach,Left in bed,Nurse notified     Therapy Time   Individual Concurrent Group Co-treatment   Time In 1040         Time Out 1117         Minutes 37         Timed Code Treatment Minutes: 2017 Vimal Shahid PT

## 2021-12-28 NOTE — PROGRESS NOTES
Occupational Therapy   Occupational Therapy Initial Assessment  Date: 2021   Patient Name: Jacek Ramirez  MRN: [id number]     : 1965    Date of Service: 2021    Chief Complaint   Patient presents with    Chest Pain     Discharge Recommendations:  Patient would benefit from continued therapy after discharge       Assessment   Performance deficits / Impairments: Decreased functional mobility ; Decreased ADL status; Decreased endurance;Decreased coordination;Decreased ROM; Decreased strength;Decreased safe awareness;Decreased cognition;Decreased sensation;Decreased balance;Decreased high-level IADLs;Decreased fine motor control  Assessment: Pt currently limited in performing functional tasks due to above noted deficits, most significantly RUE/RLE deficits. Pt to benefit from continued therapy services while hospitalized and at discharge to maximize pt's safety and independence in performing functional tasks. Prognosis: Good  Decision Making: Medium Complexity  OT Education: OT Role;Plan of Care (ROM/Stretching RUE)  REQUIRES OT FOLLOW UP: Yes  Activity Tolerance  Activity Tolerance: Treatment limited secondary to agitation;Patient limited by pain; Patient limited by fatigue  Safety Devices  Safety Devices in place: Yes  Type of devices: Call light within reach;Nurse notified; Left in bed;Bed alarm in place  Restraints  Initially in place: No           Patient Diagnosis(es): The primary encounter diagnosis was Acute chest pain. A diagnosis of NSTEMI (non-ST elevated myocardial infarction) [facility]) was also pertinent to this visit.      has a past medical history of AICD (automatic cardioverter/defibrillator) present, CAD (coronary artery disease), CKD (chronic kidney disease), stage III ([address] COVID-19, Myocardial infarction ([address] PAF (paroxysmal atrial fibrillation) ([address] S/P CABG (coronary artery bypass graft), and Solitary kidney, congenital.   has a past surgical history that includes Coronary artery bypass graft (2010); Coronary artery bypass graft (2018); pacemaker placement (09/11/2020); Cardiac catheterization (12/27/2021); and Coronary angioplasty with stent. Restrictions  Restrictions/Precautions  Restrictions/Precautions: Fall Risk  Required Braces or Orthoses?: No    Subjective   General  Patient assessed for rehabilitation services?: Yes  Family / Caregiver Present: No  General Comment  Comments: RN ok'd for therapy this AM. Pt initially agreeable to participate in session however easily irritable requiring encouragement and redirection throughout. Patient Currently in Pain: Yes  Pain Assessment  Pain Assessment: Faces  Beckford-Baker Pain Rating: Hurts little more  Pain Type: Acute pain  Pain Location: Flank;Arm;Leg  Pain Orientation: Right  Pain Descriptors: Aching;Discomfort  Pain Frequency: Continuous  Pain Onset: On-going  Functional Pain Assessment: Prevents or interferes some active activities and ADLs  Non-Pharmaceutical Pain Intervention(s): Ambulation/Increased Activity; Distraction;Repositioned  Response to Pain Intervention: Patient Satisfied    Social/Functional History  Social/Functional History  Lives With: Alone  Type of Home: Homeless  Home Equipment:  (no usage of AD at baseline)  ADL Assistance: Independent  Homemaking Assistance: Independent  Homemaking Responsibilities: Yes  Ambulation Assistance: Independent  Transfer Assistance: Independent  Occupation: Unemployed  Type of occupation: does not work  Additional Comments: Pt agitated with questions regarding prior living arrangements therefore limited information was obtained however pt does report being homeless and being independent with ADL tasks and mobility prior to hospitalization.        Objective   Vision: Impaired  Vision Exceptions: Wears glasses for reading  Hearing: Within functional limits    Orientation  Overall Orientation Status: Within Functional Limits        Balance  Sitting Balance: Contact guard assistance (SBA-CGA while seated EOB ~30 minutes, LUE support required throughout)   Comments: Functional transfers not attempted this date due to pt adamant refusal despite max encouragement. ADL  Feeding: Increased time to complete;Scoop assist;Verbal cueing;Setup  Grooming: Increased time to complete;Verbal cueing; Moderate assistance  UE Bathing: Increased time to complete;Verbal cueing; Moderate assistance  LE Bathing: Increased time to complete;Verbal cueing;Maximum assistance  UE Dressing: Increased time to complete;Verbal cueing; Moderate assistance  LE Dressing: Increased time to complete;Verbal cueing;Maximum assistance  Toileting: Increased time to complete; Moderate assistance     Tone RUE  RUE Tone: Hypertonic  Tone LUE  LUE Tone: Normotonic        Bed mobility  Supine to Sit: Contact guard assistance  Sit to Supine: Minimal assistance (for RLE progression)  Scooting: Contact guard assistance  Comment: HOB raised ~60* and use of bed rail; pt with quick impulsive movements requiring min VCs for safety awareness           Cognition  Overall Cognitive Status: Exceptions  Following Commands: Follows multistep commands with increased time; Follows multistep commands with repitition  Attention Span: Attends with cues to redirect; Difficulty attending to directions  Safety Judgement: Decreased awareness of need for assistance;Decreased awareness of need for safety  Problem Solving: Assistance required to identify errors made;Decreased awareness of errors;Assistance required to correct errors made  Insights: Decreased awareness of deficits  Initiation: Requires cues for some  Sequencing: Requires cues for some  Cognition Comment: Very agitated t/o           Sensation  Overall Sensation Status: Impaired (pt reports numbness/tingling to RUE/RLE)        LUE AROM : WFL  Left Hand AROM: WFL    RUE PROM: Exceptions    Significantly limited at shoulder (~0-30* shoulder abduction and ~0-15* shoulder flexion)   Elbow PROM/AROM WFL   Forearm PROM/AROM WFL   Significantly limited at wrist (~0-20* wrist flexion and ~0-10* wrist extension)  Right Hand PROM: Exceptions (full PROM, actively pt with very limited release as pt with increased tone/flexion contracture to hand)  LUE Strength  Gross LUE Strength: WFL (grossly 4+/5)  L Hand General: 4+/5     Pt participated in PROM to RUE to all joints in all planes x5 and prolonged stretch to all digits at all joints with 15 second holds.           Plan   Plan  Times per week: 3-4x/wk  Current Treatment Recommendations: Strengthening,ROM,Balance Training,Functional Mobility Training,Endurance Training,Safety Education & Training,Patient/Caregiver Education & Training,Equipment Evaluation, Education, & procurement,Self-Care / ADL,Home Management Training    AM-PAC Score   AM-[facility] Inpatient Daily Activity Raw Score: 14 (12/28/21 1247)  AM-PAC Inpatient ADL T-Scale Score : 33.39 (12/28/21 1247)  ADL Inpatient CMS 0-100% Score: 59.67 (12/28/21 1247)  ADL Inpatient CMS G-Code Modifier : CK (12/28/21 1247)    Goals  Short term goals  Time Frame for Short term goals: Pt will, by discharge:  Short term goal 1: Perform grooming/UB ADL tasks with setup and use of AE/DME PRN  Short term goal 2: Perform toileting/LB ADL tasks with min A using AE/DME PRN  Short term goal 3: Demo functional transfers with min A using least restrictive AD  Short term goal 4: Demo 15+ minutes tolerance to RUE ROM/prolonged stretching for reduced tone to increase independence with ADL tasks       Therapy Time   Individual Concurrent Group Co-treatment   Time In 1039         Time Out 1121         Minutes 42         Timed Code Treatment Minutes: 12 Minutes       Breanne Childress, OTR/L

## 2021-12-28 NOTE — PROGRESS NOTES
Renal Progress Note    Patient :  Barb Hdz; [address] [place] y.o. MRN# [id number]  Location:  8483/9510-57  Attending:  Maldonado Lee MD  Admit Date:  12/23/2021   Hospital Day: 5      Subjective:     Patient seen and examined at the bedside  No significant issues overnight  Vitals are within normal limits  Total intake 350 mL, urine output 800 mL with 3 unmeasured occurrences  Creatinine is 1.87 which trended up from 1.81 yesterday, sodium 136, potassium 4.6, chloride 108, bicarb 17, anion gap 11  Hb is 9 WBC count is 4.5  Heart cath done yesterday showed multivessel coronary artery disease, patent LIMA-LAD and SVG-diag grafts, patent stent in the OM and distal RCA and mild to moderate nonobstructive disease otherwise. Outpatient Medications:     Medications Prior to Admission: bumetanide (BUMEX) 2 MG tablet, Take 1 tablet by mouth 2 times daily  gabapentin (NEURONTIN) 100 MG capsule, Take 1 capsule by mouth 3 times daily for 30 days.   metoprolol succinate (TOPROL XL) 50 MG extended release tablet, Take 1 tablet by mouth daily  pantoprazole (PROTONIX) 40 MG tablet, Take 1 tablet by mouth every morning (before breakfast)  [DISCONTINUED] dexamethasone (DECADRON) 6 MG tablet, Take 1 tablet by mouth daily for 6 doses  clopidogrel (PLAVIX) 75 MG tablet, Take 1 tablet by mouth daily  atorvastatin (LIPITOR) 40 MG tablet, Take 1 tablet by mouth daily  tamsulosin (FLOMAX) 0.4 MG capsule, Take 0.4 mg by mouth daily  apixaban (ELIQUIS) 5 MG TABS tablet, Take 5 mg by mouth 2 times daily    Current Medications:     Scheduled Meds:    sodium chloride flush  5-40 mL IntraVENous 2 times per day    apixaban  5 mg Oral BID    isosorbide mononitrate  30 mg Oral Daily    sodium bicarbonate  650 mg Oral BID    ticagrelor  90 mg Oral BID    nitroGLYCERIN  0.4 mg SubLINGual Once    tamsulosin  0.4 mg Oral Daily    [Held by provider] bumetanide  2 mg Oral BID    gabapentin  100 mg Oral TID    metoprolol succinate  50 mg Oral Daily    sodium chloride flush  5-40 mL IntraVENous 2 times per day    atorvastatin  80 mg Oral Nightly    lidocaine 1 % injection  5 mL IntraDERmal Once    sodium chloride flush  5-40 mL IntraVENous 2 times per day    pantoprazole  40 mg IntraVENous Daily    And    sodium chloride (PF)  10 mL IntraVENous Daily       Input/Output:       I/O last 3 completed shifts: In: 350 [P.O.:350]  Out: 800 [Urine:800]. Patient Vitals for the past 96 hrs (Last 3 readings):   Weight   21 0322 167 lb 12.3 oz (76.1 kg)   21 0500 171 lb 1.2 oz (77.6 kg)   21 0350 169 lb 1.5 oz (76.7 kg)       Vital Signs:   Temperature:  Temp: 98.5 °F (36.9 °C)  TMax:   Temp (24hrs), Av.5 °F (36.9 °C), Min:97.6 °F (36.4 °C), Max:100.5 °F (38.1 °C)    Respirations:  Resp: 17  Pulse:   Pulse: 70  BP:    BP: (!) 135/94  BP Range: Systolic (44CJO), MBR:531 , Min:116 , AGD:690       Diastolic (31UCF), CNT:08, Min:75, Max:105      Physical Examination:     General:  AAO x 3, speaking in full sentences, no accessory muscle use. HEENT: Atraumatic, normocephalic, no throat congestion, moist mucosa. Eyes:   Pupils equal, round and reactive to light, EOMI. Neck:   Supple  Chest:   Bilateral vesicular breath sounds, no rales or wheezes. Cardiac:  S1 S2 RR, no murmurs, gallops or rubs. Abdomen: Soft, non-tender, no masses or organomegaly, BS audible. :   No suprapubic or flank tenderness. Neuro:  AAO x 3, No FND. SKIN:  No rashes, good skin turgor. Extremities:  No edema.     Labs:       Recent Labs     21  0520 21  0632 21  0731   WBC 4.7 3.5 4.5   RBC 2.72* 2.44* 2.87*   HGB 8.5* 7.6* 9.0*   HCT 27.2* 24.0* 28.2*   .0 98.4 98.3   MCH 31.3 31.1 31.4   MCHC 31.3 31.7 31.9   RDW 16.8* 16.4* 16.9*    197 183   MPV 10.2 10.0 10.1      BMP:   Recent Labs     21  0520 21  0632 21  0731    135 136   K 4.1 3.9 4.6   * 106 108*   CO2 17* 18* 17*   BUN 19 18 23*   CREATININE 1.80* 1.81* 1.87*   GLUCOSE 117* 95 88   CALCIUM 8.1* 7.6* 8.0*    ANNALISE:    No results found for: ANNALISE  SPEP:  Lab Results   Component Value Date    PROT 6.5 12/23/2021    PATH ELECTRONICALLY SIGNED. Jacky Eng M.D. 12/09/2021   Hep BsAg:         Lab Results   Component Value Date    HEPBSAG NONREACTIVE 12/09/2021     Hep C AB:          Lab Results   Component Value Date    HEPCAB REACTIVE 12/09/2021       Urinalysis/Chemistries:      Lab Results   Component Value Date    NITRU NEGATIVE 12/11/2021    COLORU Yellow 12/11/2021    PHUR 7.0 12/11/2021    WBCUA 0 TO 2 12/11/2021    RBCUA 0 TO 2 12/11/2021    MUCUS NOT REPORTED 12/11/2021    TRICHOMONAS NOT REPORTED 12/11/2021    YEAST NOT REPORTED 12/11/2021    BACTERIA NOT REPORTED 12/11/2021    SPECGRAV 1.015 12/11/2021    LEUKOCYTESUR NEGATIVE 12/11/2021    UROBILINOGEN Normal 12/11/2021    BILIRUBINUR NEGATIVE 12/11/2021    GLUCOSEU NEGATIVE 12/11/2021    KETUA NEGATIVE 12/11/2021    AMORPHOUS NOT REPORTED 12/11/2021     Urine Sodium:     Lab Results   Component Value Date    SHERMAN <20 12/09/2021     Urine Creatinine:     Lab Results   Component Value Date    LABCREA 95.6 12/09/2021     Radiology:     Reviewed. Assessment:     1. NSTEMI  2. CKD stage IIIB with baseline creatinine 1.8-2. 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 3. Acute stroke with past history of CVA  4. Congenital absence of right kidney  5. Ischemic cardiomyopathy with EF of 25-30% on echo from 12/3/2021 S/P AICD  6. Hx of CAD s/p CABG  7. Hx of A. Fib  8. Hypertension  9. Hx of COVID-19 infection  10. Mild to moderate left-sided hydronephrosis -chronic  11. Chronic metabolic acidosis  12. Multivessel coronary artery disease on repeat heart cath yesterday    Plan:   1. Stop IV fluids  2. BMP in a.m. Nutrition   Please ensure that patient is on a renal diet/TF. Avoid nephrotoxic drugs/contrast exposure. We will continue to follow along with you.        Byron Sam, MD  Rotating resident nephrology, PGY [address]   1:46 PM 12/28/21  Attending Physician Statement  I have discussed the care of Colletta Pepper, including pertinent history and exam findings with the resident. I have reviewed the key elements of all parts of the encounter with the resident. Note was updated and recorded changes were made I have seen and examined the patient with the resident. I agree with the assessment and plan and status of the problem list as documented.   Addiitionally I recommend okay to discharge from renal standpoint    Mel Nguyen MD

## 2021-12-28 NOTE — PROGRESS NOTES
Pt called out complaining of chest pain, describing it as \"feeling like my heart if jumping out of my chest\". This is the complaint the pt has noted the past few days. Pt had cardiac cath today that was clean and nitro and heparin gtt were discontinued. Pts bp is 130/75 map 89, hr 70, spo2 95 on room air. RN messaged cardiology fellow about these complaints and asked about the hemoglobin of 7.6 from this AM. RN was instructed that we would check cbc in tomorrow morning and to continue to monitor for now.    Electronically signed by Benjamin Lentz RN on 12/27/2021 at 11:43 PM

## 2021-12-28 NOTE — PLAN OF CARE
Problem: Falls - Risk of:  Goal: Absence of physical injury  Description: Absence of physical injury  Outcome: Ongoing     Problem: Pain:  Goal: Control of acute pain  Description: Control of acute pain  Outcome: Ongoing     Problem: Skin Integrity:  Goal: Absence of new skin breakdown  Description: Absence of new skin breakdown  Outcome: Ongoing

## 2021-12-28 NOTE — CARE COORDINATION
Transitional Planning    Spoke with patient, updated him that in order for CM to help get him placement in SNF he needs to work with OT today. Pt replies \"I'll try\"    [phone] PT/OT notes entered. Spoke with Bharat Griffin [phone] at [facility] regarding referral status. Bharat Griffin states [facility] did an onsite eval and they are aware pt is homeless and refusing shelter.  Bharat Griffin states they are waiting on [facility]'s response on whether or not to accept patient    [address] [employer], states she has not received response from [facility] on acceptance

## 2021-12-28 NOTE — PROGRESS NOTES
[facility]   Progress Note                   Date:   12/28/2021  Patient name: Colletta Pepper  Date of admission:  12/23/2021  2:32 PM  MRN:   [id number]  YOB: 1965  PCP: No primary care provider on file. Reason for Admission: Acute chest pain [R07.9]  NSTEMI (non-ST elevated myocardial infarction) ([facility] [I21.4]  Coronary artery disease involving native coronary artery of native heart with angina pectoris ([facility] [I25.119]    Subjective:       Clinical Changes / Abnormalities: Pt seen and examined in the room after discussing with RN. No new CV issues/concerns. No post cath issues/concerns. Labs, vitals, & tele reviewed.      Medications:   Scheduled Meds:   sodium chloride flush  5-40 mL IntraVENous 2 times per day    [Held by provider] apixaban  5 mg Oral BID    isosorbide mononitrate  30 mg Oral Daily    sodium bicarbonate  650 mg Oral BID    ticagrelor  90 mg Oral BID    nitroGLYCERIN  0.4 mg SubLINGual Once    tamsulosin  0.4 mg Oral Daily    [Held by provider] bumetanide  2 mg Oral BID    gabapentin  100 mg Oral TID    metoprolol succinate  50 mg Oral Daily    sodium chloride flush  5-40 mL IntraVENous 2 times per day    atorvastatin  80 mg Oral Nightly    lidocaine 1 % injection  5 mL IntraDERmal Once    sodium chloride flush  5-40 mL IntraVENous 2 times per day    pantoprazole  40 mg IntraVENous Daily    And    sodium chloride (PF)  10 mL IntraVENous Daily     Continuous Infusions:   sodium chloride      dextrose 5 % and 0.45 % NaCl 75 mL/hr at 12/24/21 2115    sodium chloride      sodium chloride       CBC:   Recent Labs     12/26/21  0520 12/27/21  0632 12/28/21  0731   WBC 4.7 3.5 4.5   HGB 8.5* 7.6* 9.0*    197 183     BMP:    Recent Labs     12/26/21  0520 12/27/21  0632 12/28/21  0731    135 136   K 4.1 3.9 4.6   * 106 108*   CO2 17* 18* 17*   BUN 19 18 23*   CREATININE 1.80* 1.81* 1.87*   GLUCOSE 117* 95 88     Hepatic:   No results for input(s): AST, ALT, ALB, BILITOT, ALKPHOS in the last 72 hours. Troponin:   Recent Labs     12/26/21  0520   TROPHS 120*     BNP: No results for input(s): BNP in the last 72 hours. Lipids: No results for input(s): CHOL, HDL in the last 72 hours. Invalid input(s): LDLCALCU  INR:   No results for input(s): INR in the last 72 hours. Diagnostics:    EKG:Ventricular paced rhythm.      Echo 12/2021  Summary  Left ventricle is enlarged Global left ventricular systolic function is  moderate to severely reduced Estimated ejection fraction is 25-30% . Mild aortic insufficiency. Moderate mitral regurgitation. Moderate tricuspid regurgitation. Moderate pulmonary hypertension. Estimated right ventricular systolic  pressure is 11-33TXEM. No pericardial effusion. Objective:   Vitals: BP (!) 135/95   Pulse 70   Temp 98.1 °F (36.7 °C) (Oral)   Resp 16   Wt 167 lb 12.3 oz (76.1 kg)   SpO2 98%   BMI 24.07 kg/m²   General appearance: alert and cooperative with exam  HEENT: Head: Normocephalic, no lesions, without obvious abnormality. Neck: no JVD, trachea midline, no adenopathy  Lungs: Clear to auscultation  Heart: Regular rate and rhythm, s1/s2 auscultated, no murmurs  Abdomen: soft, non-tender, bowel sounds active  Extremities: no edema  Neurologic: not done    Cardiac Cath 12/27/21  Findings:  LMCA: Distal 30% stenosis     LAD: Mid 99% in-stent occlusion   D1 is large and occluded   LIMA-LAD is patent with mild disease (non-selective injection taken due to   difficulty engaging )   LAD beyond anastomosis is patent with 30-40% diffuse disease (seen through   SVG-Diag injection)   SVG-Diag is patent with mild disease     LCx: Mid 50% stenosis   OM is large, patent stent in the proximal segment with mild disease. RCA: Mild irregularities 20-30%. Large and dominant vessel   Patent stent in distal segment   RPDA/RPL are large patent with 10-20% stenosis      Coronary Tree  Dominance: Right      Estimated Blood Loss:            [x]? ? Minimal 10 cc   []? ? Minimal < 25 cc      []? ? Moderate 25-50 cc         []?? >50 cc     Conclusions:  1. Multivessel CAD   2. Patent LIMA-LAD and SVG-Diag grafts   3. Patent stent in OM and distal RCA   4. Mild to moderate non-obstructive disease otherwise   5. LV gram not done due to renal insufficiency         Recommendations   Routine Post Diagnostic Cath orders. Optimize medical therapy   Risk factor modification. IV hydration and monitor for MARGARET     Assessment / Acute Cardiac Problems:   1. CP  2. Hx of CAD with multiple stents and CABG twice (2010 & 2018 @ [facility])  3. CHF , acute on chronic systolic & diastolic  4. PAF, SSS s/p PPM  5. CKD 3  6. Chronic Anemia    Patient Active Problem List:     JACKSON (acute kidney injury) ([address]     Atypical chest pain     AICD (automatic cardioverter/defibrillator) present     History of MI (myocardial infarction)     Elevated brain natriuretic peptide (BNP) level     Elevated troponin     Chronic systolic heart failure (HCC)     Hx of CABG     Stage 3 chronic kidney disease ([address]     COVID-19 virus infection     Hydronephrosis of left kidney     Acute urinary retention     Hepatitis C     Coronary artery disease involving native coronary artery of native heart with angina pectoris ([address]     Solitary kidney, congenital     Homeless single person     Acute right-sided weakness     Acute CVA (cerebrovascular accident) ([address]      Plan of Treatment:   1. Cath as above. Continue med management. 2. ICMP s/p AICD. Continue current medications. No fluid overload today. Continue BB. Resume Bumex at nephro discretion. 3. CKD. Nephology following     4. Afib. Stable, paced rhythm. Continue BB. Resume Eliquis.     5. F/U with established cardiologist [facility]) in 2 weeks upon discharge    Electronically signed by VERONICA Virk CNP on 12/28/2021 at [address]  [phone]

## 2021-12-29 VITALS
DIASTOLIC BLOOD PRESSURE: 84 MMHG | WEIGHT: 168.65 LBS | OXYGEN SATURATION: 99 % | RESPIRATION RATE: 16 BRPM | TEMPERATURE: 98.8 F | SYSTOLIC BLOOD PRESSURE: 128 MMHG | HEART RATE: 70 BPM | BODY MASS INDEX: 24.2 KG/M2

## 2021-12-29 LAB
ABSOLUTE EOS #: 0.05 K/UL (ref 0–0.4)
ABSOLUTE IMMATURE GRANULOCYTE: 0 K/UL (ref 0–0.3)
ABSOLUTE LYMPH #: 0.62 K/UL (ref 1–4.8)
ABSOLUTE MONO #: 0.36 K/UL (ref 0.1–0.8)
ANION GAP SERPL CALCULATED.3IONS-SCNC: 10 MMOL/L (ref 9–17)
BASOPHILS # BLD: 1 % (ref 0–2)
BASOPHILS ABSOLUTE: 0.05 K/UL (ref 0–0.2)
BUN BLDV-MCNC: 22 MG/DL (ref 6–20)
BUN/CREAT BLD: ABNORMAL (ref 9–20)
CALCIUM SERPL-MCNC: 8.2 MG/DL (ref 8.6–10.4)
CHLORIDE BLD-SCNC: 104 MMOL/L (ref 98–107)
CO2: 19 MMOL/L (ref 20–31)
CREAT SERPL-MCNC: 1.93 MG/DL (ref 0.7–1.2)
DIFFERENTIAL TYPE: ABNORMAL
EOSINOPHILS RELATIVE PERCENT: 1 % (ref 1–4)
GFR AFRICAN AMERICAN: 44 ML/MIN
GFR NON-AFRICAN AMERICAN: 36 ML/MIN
GFR SERPL CREATININE-BSD FRML MDRD: ABNORMAL ML/MIN/{1.73_M2}
GFR SERPL CREATININE-BSD FRML MDRD: ABNORMAL ML/MIN/{1.73_M2}
GLUCOSE BLD-MCNC: 90 MG/DL (ref 70–99)
HCT VFR BLD CALC: 28.9 % (ref 40.7–50.3)
HEMOGLOBIN: 9.2 G/DL (ref 13–17)
IMMATURE GRANULOCYTES: 0 %
LYMPHOCYTES # BLD: 12 % (ref 24–44)
MCH RBC QN AUTO: 31.3 PG (ref 25.2–33.5)
MCHC RBC AUTO-ENTMCNC: 31.8 G/DL (ref 28.4–34.8)
MCV RBC AUTO: 98.3 FL (ref 82.6–102.9)
MONOCYTES # BLD: 7 % (ref 1–7)
MORPHOLOGY: ABNORMAL
NRBC AUTOMATED: 0 PER 100 WBC
PDW BLD-RTO: 17.2 % (ref 11.8–14.4)
PLATELET # BLD: 198 K/UL (ref 138–453)
PLATELET ESTIMATE: ABNORMAL
PMV BLD AUTO: 10.2 FL (ref 8.1–13.5)
POTASSIUM SERPL-SCNC: 4.2 MMOL/L (ref 3.7–5.3)
RBC # BLD: 2.94 M/UL (ref 4.21–5.77)
RBC # BLD: ABNORMAL 10*6/UL
SEG NEUTROPHILS: 79 % (ref 36–66)
SEGMENTED NEUTROPHILS ABSOLUTE COUNT: 4.12 K/UL (ref 1.8–7.7)
SODIUM BLD-SCNC: 133 MMOL/L (ref 135–144)
WBC # BLD: 5.2 K/UL (ref 3.5–11.3)
WBC # BLD: ABNORMAL 10*3/UL

## 2021-12-29 PROCEDURE — 99239 HOSP IP/OBS DSCHRG MGMT >30: CPT | Performed by: FAMILY MEDICINE

## 2021-12-29 PROCEDURE — C9113 INJ PANTOPRAZOLE SODIUM, VIA: HCPCS | Performed by: NURSE PRACTITIONER

## 2021-12-29 PROCEDURE — 96376 TX/PRO/DX INJ SAME DRUG ADON: CPT

## 2021-12-29 PROCEDURE — 2700000000 HC OXYGEN THERAPY PER DAY

## 2021-12-29 PROCEDURE — 94760 N-INVAS EAR/PLS OXIMETRY 1: CPT

## 2021-12-29 PROCEDURE — 6360000002 HC RX W HCPCS: Performed by: NURSE PRACTITIONER

## 2021-12-29 PROCEDURE — 6370000000 HC RX 637 (ALT 250 FOR IP): Performed by: NURSE PRACTITIONER

## 2021-12-29 PROCEDURE — 2580000003 HC RX 258: Performed by: NURSE PRACTITIONER

## 2021-12-29 PROCEDURE — 6370000000 HC RX 637 (ALT 250 FOR IP): Performed by: INTERNAL MEDICINE

## 2021-12-29 PROCEDURE — 2580000003 HC RX 258: Performed by: STUDENT IN AN ORGANIZED HEALTH CARE EDUCATION/TRAINING PROGRAM

## 2021-12-29 PROCEDURE — 85025 COMPLETE CBC W/AUTO DIFF WBC: CPT

## 2021-12-29 PROCEDURE — 80048 BASIC METABOLIC PNL TOTAL CA: CPT

## 2021-12-29 PROCEDURE — 6370000000 HC RX 637 (ALT 250 FOR IP): Performed by: STUDENT IN AN ORGANIZED HEALTH CARE EDUCATION/TRAINING PROGRAM

## 2021-12-29 PROCEDURE — 36415 COLL VENOUS BLD VENIPUNCTURE: CPT

## 2021-12-29 RX ORDER — LORAZEPAM 2 MG/ML
1 INJECTION INTRAMUSCULAR ONCE
Status: COMPLETED | OUTPATIENT
Start: 2021-12-29 | End: 2021-12-29

## 2021-12-29 RX ADMIN — SODIUM CHLORIDE, PRESERVATIVE FREE 10 ML: 5 INJECTION INTRAVENOUS at 08:43

## 2021-12-29 RX ADMIN — DIPHENHYDRAMINE HYDROCHLORIDE 25 MG: 50 INJECTION, SOLUTION INTRAMUSCULAR; INTRAVENOUS at 09:56

## 2021-12-29 RX ADMIN — METOPROLOL SUCCINATE 50 MG: 50 TABLET, FILM COATED, EXTENDED RELEASE ORAL at 08:38

## 2021-12-29 RX ADMIN — PANTOPRAZOLE SODIUM 40 MG: 40 INJECTION, POWDER, FOR SOLUTION INTRAVENOUS at 08:38

## 2021-12-29 RX ADMIN — LORAZEPAM 1 MG: 2 INJECTION INTRAMUSCULAR; INTRAVENOUS at 00:41

## 2021-12-29 RX ADMIN — SODIUM CHLORIDE, PRESERVATIVE FREE 10 ML: 5 INJECTION INTRAVENOUS at 08:40

## 2021-12-29 RX ADMIN — TAMSULOSIN HYDROCHLORIDE 0.4 MG: 0.4 CAPSULE ORAL at 08:38

## 2021-12-29 RX ADMIN — ACETAMINOPHEN 650 MG: 325 TABLET ORAL at 08:38

## 2021-12-29 RX ADMIN — DIPHENHYDRAMINE HYDROCHLORIDE 25 MG: 50 INJECTION, SOLUTION INTRAMUSCULAR; INTRAVENOUS at 03:55

## 2021-12-29 RX ADMIN — TICAGRELOR 90 MG: 90 TABLET ORAL at 08:38

## 2021-12-29 RX ADMIN — APIXABAN 5 MG: 5 TABLET, FILM COATED ORAL at 08:38

## 2021-12-29 ASSESSMENT — PAIN DESCRIPTION - ORIENTATION: ORIENTATION: LEFT

## 2021-12-29 ASSESSMENT — ENCOUNTER SYMPTOMS
CHOKING: 0
COUGH: 0
BACK PAIN: 1
CHEST TIGHTNESS: 0
ABDOMINAL PAIN: 0
VOICE CHANGE: 0
VOMITING: 0
DIARRHEA: 0
RHINORRHEA: 0
SHORTNESS OF BREATH: 0
WHEEZING: 0
NAUSEA: 0
SINUS PRESSURE: 0
CONSTIPATION: 0

## 2021-12-29 ASSESSMENT — PAIN DESCRIPTION - PAIN TYPE
TYPE: ACUTE PAIN
TYPE: ACUTE PAIN

## 2021-12-29 ASSESSMENT — PAIN SCALES - GENERAL
PAINLEVEL_OUTOF10: 6
PAINLEVEL_OUTOF10: 8

## 2021-12-29 ASSESSMENT — PAIN DESCRIPTION - LOCATION
LOCATION: FLANK;CHEST
LOCATION: FLANK;CHEST

## 2021-12-29 NOTE — DISCHARGE SUMMARY
[facility]  Office: [phone]  Edvin Mckenzie Blood DO, Edie Galvan MD, Anurag Perez MD, Maldonado Lee MD, Kaur Garcia MD, Marlyn Sim MD, Porsche Andrea MD, Dayron Carrasco MD, Tootie Bergeron MD, Vika Chavez MD, Jeancarlos Magallon DO, Jaya Demarco MD, Kimberly Newsome MD, Morales Bui DO, Kelsie Ring MD,  Zaida Baig DO, Tong White MD, Terrell Maloney MD, Juan Antonio Rivera CNP, [facility] CNP, Sherrille Carbo CNP, [facility], [facility], Roberta Castillo CNP, Joye Duane CNP, Jannette Felix CNP, Sukumar Llanos CNP, Patricia Valentino PA-C, Missael López CNP, Jane German CNP, Delroy Weaver CNP, Kayy Hoyt CNP, Lesley Rollins CNP, Marcelo Bazan, [address] [place] [address]      Discharge Summary     Patient ID: Barb Hdz  :  1965   MRN: [id number]     ACCOUNT:  [de-identified]   Patient Location : [address] [place] [address]  Patient's PCP: No primary care provider on file. Admit Date: 2021   Discharge Date: 2021     Length of Stay: 6  Code Status:  Full Code  Admitting Physician: No admitting provider for patient encounter.   Discharge Physician: Maldonado Lee MD     Active Discharge Diagnosis :     Primary Problem  Coronary artery disease involving native coronary artery of native heart with angina pectoris [facility])      [facility] Problems    Diagnosis Date Noted    Elevated troponin [R77.8] 2021     Priority: High    Stage 3 chronic kidney disease ([facility] [N18.30] 2021     Priority: Medium    Chronic systolic heart failure ([facility] [I50.22] 2021     Priority: Medium    Acute chest pain [R07.9]     Acute CVA (cerebrovascular accident) ([facility] [I63.9] 2021    Acute right-sided weakness [R53.1]     Coronary artery disease involving native coronary artery of native heart with angina pectoris ([facility] [I25.119] 2021       Admission Condition:  fair     Discharged room.  Patient had repeat CT head, CT head and neck which did not show any acute abnormality. He was treated with heparin infusion for NSTEMI and nitro infusion for chest pain relief however it did not improve his pain. Patient underwent cardiac cath on 12/27/2021 and showed multivessel disease with patent LIMA to LAD and SVG to diagonal graft, patent stent in OM and distal RCA mild to moderate nonobstructive disease. Medical management was recommended. He continued to demonstrate right-sided weakness and persistent pain symptoms. Patient was discharged to skilled nursing facility. Significant therapeutic interventions:   1. Chest pain- atypical - acute coronary syndrome ruled out with negative heart cath. .   2. Right-sided weakness- . No new strokes on CT. Unable to perform MRI brain due to BiV ICD in place. Strong resistance to passive motion. 3. Chronic systolic CHF s/p AICD - resume Bumex at discharge   4. Stage III CKD -stable -   5. H/o CVA - with right hemiparesis  6. Chronic left hydronephrosis outpatient urology follow-up  7. CAD s/p CABG with multiple stents continue Plavix. Eliquis on hold as patient on heparin infusion. 8. Paroxysmal atrial fibrillation -in normal sinus rhythm. 9. Chronic hep C -   10. Homeless -social service consult   11.  Acute gastritis-PPI      Significant Diagnostic Studies:   Labs / Micro:/Radiology  Recent Labs     12/29/21  0635 12/28/21  0731 12/27/21  0632   WBC 5.2 4.5 3.5   HGB 9.2* 9.0* 7.6*   HCT 28.9* 28.2* 24.0*   MCV 98.3 98.3 98.4    183 197     Labs Renal Latest Ref Rng & Units 12/29/2021 12/28/2021 12/27/2021 12/26/2021 12/25/2021   BUN 6 - 20 mg/dL 22(H) 23(H) 18 19 24(H)   Cr 0.70 - 1.20 mg/dL 1.93(H) 1.87(H) 1.81(H) 1.80(H) 1.58(H)   K 3.7 - 5.3 mmol/L 4.2 4.6 3.9 4.1 4.6   Na 135 - 144 mmol/L 133(L) 136 135 136 137     Lab Results   Component Value Date    ALT 14 12/23/2021    AST 18 12/23/2021    ALKPHOS 68 12/23/2021    BILITOT 0.58 12/23/2021     No results found for: TSHFT4, TSH  Lab Results   Component Value Date    HEPAIGM NONREACTIVE 12/09/2021    HEPBIGM NONREACTIVE 12/09/2021    HEPCAB REACTIVE (A) 12/09/2021     Lab Results   Component Value Date    COLORU Yellow 12/11/2021    NITRU NEGATIVE 12/11/2021    GLUCOSEU NEGATIVE 12/11/2021    KETUA NEGATIVE 12/11/2021    UROBILINOGEN Normal 12/11/2021    BILIRUBINUR NEGATIVE 12/11/2021     Lab Results   Component Value Date    LABA1C 5.8 12/27/2021     Lab Results   Component Value Date     12/27/2021     Lab Results   Component Value Date    INR 1.2 12/23/2021    INR Unable to perform testing: Specimen clotted. 12/23/2021    INR 1.1 12/09/2021    PROTIME 12.7 (H) 12/23/2021    PROTIME Unable to perform testing: Specimen clotted. 12/23/2021    PROTIME 14.3 (H) 12/09/2021       XR ABDOMEN (KUB) (SINGLE AP VIEW)    Result Date: 12/27/2021  1. No acute abdominal abnormality by radiograph. 2.  Cardiac pacing device is partially visualized. CT HEAD WO CONTRAST    Result Date: 12/24/2021  No acute intracranial abnormality. 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 CT HEAD WO CONTRAST    Result Date: 12/23/2021  Unchanged chronic encephalomalacia of the right medial occipital lobe and unchanged chronic encephalomalacia of left inferior parietal lobule. No acute territorial infarction, intracranial hemorrhage or mass lesion. Mild chronic microangiopathic ischemic disease. Mild generalized volume loss. RECOMMENDATIONS: Unavailable     XR CHEST PORTABLE    Result Date: 12/28/2021  Interval increase in the bilateral pulmonary opacities, concerning for worsening pneumonia or possibly superimposed edema. Cardiomegaly. XR CHEST PORTABLE    Result Date: 12/23/2021  Relatively diffuse multifocal bilateral airspace disease most likely representing COVID-19 pneumonia.   Pulmonary edema felt less likely. US RETROPERITONEAL LIMITED    Result Date: 12/24/2021  Similar mild-to-moderate left hydronephrosis. Absent right kidney RECOMMENDATIONS: Unavailable     FL MODIFIED BARIUM SWALLOW W VIDEO    Result Date: 12/27/2021  No evidence of aspiration or significant penetration. Please see separate speech pathology report for full discussion of findings and recommendations. Consultations:    Consults:     Final Specialist Recommendations/Findings:   IP CONSULT TO HOSPITALIST  IP CONSULT TO STROKE TEAM  IP CONSULT TO CARDIOLOGY  IP CONSULT TO NEUROLOGY  IP CONSULT TO NEPHROLOGY  IP CONSULT TO ENDOVASCULAR NEUROSURGERY  IP CONSULT TO SOCIAL WORK  IP CONSULT TO PSYCHIATRY      The patient was seen and examined on day of discharge and this discharge summary is in conjunction with any daily progress note from day of discharge. Discharge plan:     Disposition: Skilled nursing facility. Physician Follow Up: Mari Thompson MD  [place] [id number] [phone]    Schedule an appointment as soon as possible for a visit in 2 weeks  for hospital f/u with your established cardiologist       Requiring Further Evaluation/Follow Up POST HOSPITALIZATION/Incidental Findings: Medication as advised. Diet: cardiac diet    Activity: As tolerated. Instructions to Patient: Aggressive PT OT. Discharge Medications:      Medication List      START taking these medications    isosorbide mononitrate 30 MG extended release tablet  Commonly known as: IMDUR  Take 1 tablet by mouth daily     nitroGLYCERIN 0.4 MG SL tablet  Commonly known as: NITROSTAT  up to max of 3 total doses. If no relief after 1 dose, call 911.      sodium bicarbonate 650 MG tablet  Take 1 tablet by mouth 2 times daily        CONTINUE taking these medications    atorvastatin 40 MG tablet  Commonly known as: LIPITOR     bumetanide 2 MG tablet  Commonly known as: BUMEX  Take 1 tablet by mouth 2 times daily     clopidogrel 75 MG tablet  Commonly known as: PLAVIX     Eliquis 5 MG Tabs tablet  Generic drug: apixaban     gabapentin 100 MG capsule  Commonly known as: NEURONTIN  Take 1 capsule by mouth 3 times daily for 30 days. metoprolol succinate 50 MG extended release tablet  Commonly known as: TOPROL XL  Take 1 tablet by mouth daily     pantoprazole 40 MG tablet  Commonly known as: PROTONIX  Take 1 tablet by mouth every morning (before breakfast)     tamsulosin 0.4 MG capsule  Commonly known as: FLOMAX        STOP taking these medications    dexamethasone 6 MG tablet  Commonly known as: DECADRON           Where to Get Your Medications      These medications were sent to [facility] [address] [facility], [address], [place] [address]    Phone: [phone]   · isosorbide mononitrate 30 MG extended release tablet  · nitroGLYCERIN 0.4 MG SL tablet  · sodium bicarbonate 650 MG tablet         Time Spent on discharge is  33 mins in patient examination, evaluation, counseling as well as medication reconciliation, prescriptions for required medications, discharge plan and follow up. Electronically signed by   Ousmane Erazo MD  12/29/2021        Thank you Dr. Tayler Bustamante primary care provider on file. for the opportunity to be involved in this patient's care.

## 2021-12-29 NOTE — PROGRESS NOTES
[facility]  Office: [address], DO, Olivia Yulissa, DO, Demarcus Phlegm, DO, Reji Abernathy Blood, DO, Tonja Hernandez MD, Lazara Marin MD, Car Craig MD, Tori Mar MD, Caitlin Cadet MD, Keren Taylor MD, Amira Starkey MD, Maureen Kraft, DO, Shanae Dasilva, DO, Sebas Way MD,  Fahad Andrews DO, Kalen Kaba MD, Chantel Chaney MD, Mercedes Tipton MD, Claude Renee MD, Miguel Mcmillan MD, Stephanie Stanley MD, Sue Ha MD, Edward Dumont, [facility], [facility] Silvia, CNP, Анна Turner, CNP, Trinidad Lechuga, CNS, Gabriel Soni, CNP, Lesley Sharma, CNP, Sadie Bolden, CNP, Shanice Manning, CNP, Bao Fernandez, CNP, Mona Gresham PA-C, Bandar Luevano, DNP, Ursula Corona, DNP, Davi John, CNP, Radha Dillard, CNP, Dahlia Boswell, CNP, Jami Torres, CNP, Junie Trent, [facility], Latasha Hart, [facility]      Daily Progress Note     Admit Date: 12/23/2021  Bed/Room No.  7741/5492-99  Admitting Physician : Car Craig MD  Code Status :Full Code  Hospital Day:  LOS: 6 days   Chief Complaint:     Chief Complaint   Patient presents with    Chest Pain     Principal Problem:    Coronary artery disease involving native coronary artery of native heart with angina pectoris [facility])  Active Problems:    Elevated troponin    Chronic systolic heart failure ([facility]    Stage 3 chronic kidney disease [facility])    Acute right-sided weakness    Acute CVA (cerebrovascular accident) [facility])    Acute chest pain  Resolved Problems:    * No resolved hospital problems. *    Subjective : Interval History/Significant events :  12/29/21    Patient has no new complaints , not motivated for PT. Breaths on room air   Multiple pain symptoms in chest and flank     Vitals - Stable afebrile  Labs -kidney function stable at 1.83. Troponin 68.   CT head showed old infarct in left frontotemporal lobes, bilateral occipital lobes and minimally in the left cerebellar hemisphere without any acute intracranial abnormality. Ultrasound kidney showed absent right kidney. Nursing notes , Consults notes reviewed. Overnight events and updates discussed with Nursing staff . Background History:         Kel Batista is 61 y.o. male  Who was admitted to the hospital on 12/23/2021 for treatment of Coronary artery disease involving native coronary artery of native heart with angina pectoris ([facility] Patient presented to emergency room with chest pain. Patient is new to the area and has recently moved from [place]. He was admitted a month ago at [facility] with stroke like symptoms with right-sided weakness. Patient received TPA. CT head showed supratentorial bilateral cerebral infarcts embolic in origin. He was also found to have A. fib and given Eliquis. Patient had episode of hematemesis in the hospital which required urgent EGD and did not show any bleeding ulcer except mild gastritis. Patient was advised to have Eliquis and Plavix at discharge. Patient is homeless and has been bouncing between hotels. He left hospital at [address] [place] [address] as he was not satisfied with the care. Patient then got admitted at [facility] with similar symptoms. CT head and neck and CT head showed chronic infarcts without any acute bleeding infarct. Patient had negative VQ scan, chest x-ray showed cardiomegaly, creatinine was stable. Patient was also found to have COVID-19 infection and required oxygen. He was treated with Decadron initially. Patient's breathing improved and was saturating well on room air. Patient was also found to have left sided hydronephrosis with 4 mm nonobstructing left kidney stone. Patient has congenital absence of right kidney. He was evaluated by urology and recommended outpatient follow-up. Patient was uncooperative to during the hospital stay and refusing care and participation with physical therapy. He was requesting IV Benadryl and IV opioid medications repeatedly. Medications were stopped and patient got upset and left hospital.  Patient then left and went to [facility] emergency room. Patient has been evaluated by stroke team and recommended to monitor. PMH:  Past Medical History:   Diagnosis Date    AICD (automatic cardioverter/defibrillator) present     CAD (coronary artery disease)     CKD (chronic kidney disease), stage III ([facility]     COVID-19     Myocardial infarction ([facility]     [facility]oxysmal [facility]     S/P CABG (coronary artery bypass graft)     x2 separate occasions    Solitary kidney, congenital       Allergies: Allergies   Allergen Reactions    Nsaids Swelling and Other (See Comments)    Aspirin Swelling and Rash     Other reaction(s): Facial Swelling, Swelling of Lip/Tongue/Throat    Adhesive Tape     Ceftriaxone     Ketorolac Tromethamine     Other      Other reaction(s): Difficulty Breathing      Medications :  sodium chloride flush, 5-40 mL, IntraVENous, 2 times per day  apixaban, 5 mg, Oral, BID  isosorbide mononitrate, 30 mg, Oral, Daily  sodium bicarbonate, 650 mg, Oral, BID  ticagrelor, 90 mg, Oral, BID  nitroGLYCERIN, 0.4 mg, SubLINGual, Once  tamsulosin, 0.4 mg, Oral, Daily  bumetanide, 2 mg, Oral, BID  gabapentin, 100 mg, Oral, TID  metoprolol succinate, 50 mg, Oral, Daily  sodium chloride flush, 5-40 mL, IntraVENous, 2 times per day  atorvastatin, 80 mg, Oral, Nightly  lidocaine 1 % injection, 5 mL, IntraDERmal, Once  sodium chloride flush, 5-40 mL, IntraVENous, 2 times per day  pantoprazole, 40 mg, IntraVENous, Daily   And  sodium chloride (PF), 10 mL, IntraVENous, Daily        Review of Systems   Review of Systems   Constitutional: Negative for activity change, appetite change, fatigue, fever and unexpected weight change. HENT: Negative for congestion, nosebleeds, rhinorrhea, sinus pressure, sneezing and voice change.     Respiratory: Negative for cough, choking, chest tightness, shortness of breath and wheezing. Cardiovascular: Positive for chest pain. Negative for palpitations and leg swelling. Gastrointestinal: Negative for abdominal pain, constipation, diarrhea, nausea and vomiting. Genitourinary: Negative for difficulty urinating, dysuria, frequency, penile discharge and testicular pain. Musculoskeletal: Positive for back pain. Skin: Negative for rash. Neurological: Positive for weakness. Negative for dizziness, speech difficulty, light-headedness and headaches. Hematological: Does not bruise/bleed easily. Psychiatric/Behavioral: Negative for agitation, behavioral problems, confusion, self-injury, sleep disturbance and suicidal ideas. Objective :      Current Vitals : Temp: 100.7 °F (38.2 °C),  Pulse: 73, Resp: 16, BP: 133/77, SpO2: 99 %  Last 24 Hrs Vitals   Patient Vitals for the past 24 hrs:   BP Temp Temp src Pulse Resp SpO2 Weight   12/29/21 0800 133/77 100.7 °F (38.2 °C) Oral 73 16 99 % --   12/29/21 0600 -- -- -- -- -- -- 168 lb 10.4 oz (76.5 kg)   12/29/21 0322 133/83 99.1 °F (37.3 °C) Oral 71 18 95 % --   12/29/21 0000 124/79 98.6 °F (37 °C) Oral 71 16 96 % --   12/28/21 2000 -- -- -- 71 -- -- --   12/28/21 1911 139/88 98.8 °F (37.1 °C) Oral 70 16 96 % --   12/28/21 1600 (!) 151/99 98.9 °F (37.2 °C) -- 70 15 95 % --   12/28/21 1223 -- -- -- -- 17 97 % --   12/28/21 1207 (!) 135/94 98.5 °F (36.9 °C) Oral 70 20 94 % --     Intake / output   12/28 0701 - 12/29 0700  In: -   Out: 800 [Urine:800]  Physical Exam:  Physical Exam  Vitals and nursing note reviewed. Constitutional:       General: He is not in acute distress. HENT:      Head: Normocephalic and atraumatic. Eyes:      Conjunctiva/sclera: Conjunctivae normal.      Pupils: Pupils are equal, round, and reactive to light. Cardiovascular:      Rate and Rhythm: Normal rate and regular rhythm. Heart sounds: No murmur heard.       Pulmonary:      Effort: Pulmonary effort is normal. No accessory muscle usage or respiratory distress. Breath sounds: No stridor. No decreased breath sounds, wheezing, rhonchi or rales. Chest:      Comments: Sternotomy scar  Abdominal:      General: Bowel sounds are normal. There is no distension. Palpations: Abdomen is soft. Abdomen is not rigid. Tenderness: There is no abdominal tenderness. There is no guarding. Musculoskeletal:         General: No tenderness. Skin:     General: Skin is warm and dry. Findings: No erythema, lesion or rash. Neurological:      Mental Status: He is alert and oriented to person, place, and time. Cranial Nerves: No cranial nerve deficit. Motor: No seizure activity. Comments: Strong resistance to passive motion. Facial asymmetry   Psychiatric:         Mood and Affect: Affect is blunt and angry. Speech: Speech normal.         Behavior: Behavior is uncooperative.            Laboratory findings:    Recent Labs     12/27/21  0632 12/28/21  0731 12/29/21  0635   WBC 3.5 4.5 5.2   HGB 7.6* 9.0* 9.2*   HCT 24.0* 28.2* 28.9*    183 198     Recent Labs     12/27/21  0632 12/28/21  0731 12/29/21  0635    136 133*   K 3.9 4.6 4.2    108* 104   CO2 18* 17* 19*   GLUCOSE 95 88 90   BUN 18 23* 22*   CREATININE 1.81* 1.87* 1.93*   CALCIUM 7.6* 8.0* 8.2*     Recent Labs     12/27/21  0632   LABA1C 5.8          Specific Gravity, UA   Date Value Ref Range Status   12/11/2021 1.015 1.005 - 1.030 Final     Protein, UA   Date Value Ref Range Status   12/11/2021 1+ (A) NEGATIVE Final     RBC, UA   Date Value Ref Range Status   12/11/2021 0 TO 2 0 - 2 /HPF Final     Bacteria, UA   Date Value Ref Range Status   12/11/2021 NOT REPORTED None Final     Nitrite, Urine   Date Value Ref Range Status   12/11/2021 NEGATIVE NEGATIVE Final     WBC, UA   Date Value Ref Range Status   12/11/2021 0 TO 2 0 - 5 /HPF Final     Leukocyte Esterase, Urine   Date Value Ref Range Status   12/11/2021 NEGATIVE NEGATIVE Final       Imaging / Clinical Data :-   CT HEAD WO CONTRAST    Result Date: 12/23/2021  Unchanged chronic encephalomalacia of the right medial occipital lobe and unchanged chronic encephalomalacia of left inferior parietal lobule. No acute territorial infarction, intracranial hemorrhage or mass lesion. Mild chronic microangiopathic ischemic disease. Mild generalized volume loss. RECOMMENDATIONS: Unavailable     XR CHEST PORTABLE    Result Date: 12/23/2021  Relatively diffuse multifocal bilateral airspace disease most likely representing COVID-19 pneumonia. Pulmonary edema felt less likely. Clinical Course : stable  Assessment and Plan  :        1. Chest pain- atypical - acute coronary syndrome ruled out with negative heart cath. .   2. Right-sided weakness- . No new strokes on CT. Unable to perform MRI brain due to BiV ICD in place. Strong resistance to passive motion. 3. Chronic systolic CHF s/p AICD - resume Bumex at discharge   4. Stage III CKD -stable -   5. H/o CVA - with right hemiparesis  6. Chronic left hydronephrosis outpatient urology follow-up  7. CAD s/p CABG with multiple stents continue Plavix. Eliquis on hold as patient on heparin infusion. 8. Paroxysmal atrial fibrillation -in normal sinus rhythm. 9. Chronic hep C -   10. Homeless -social service consult   11. Acute gastritis-PPI    Patient discharged on 12/27/2021 [facility] following and will be discharged today        Plan and updates discussed with patient ,  answers  explained to satisfaction.    Plan discussed with Staff  RN    (Please note that portions of this note were completed with a voice recognition program. Efforts were made to edit the dictations but occasionally words are mis-transcribed.)      Dc Torres MD  12/29/2021

## 2021-12-29 NOTE — DISCHARGE INSTR - DIET

## 2021-12-29 NOTE — PLAN OF CARE
Problem: Falls - Risk of:  Goal: Will remain free from falls  Description: Will remain free from falls  12/29/2021 0720 by Emiliano Pathak RN  Outcome: Ongoing     Problem: Pain:  Goal: Pain level will decrease  Description: Pain level will decrease  12/29/2021 0720 by Emiliano Pathak RN  Outcome: Ongoing     Problem: Skin Integrity:  Goal: Will show no infection signs and symptoms  Description: Will show no infection signs and symptoms  12/29/2021 0720 by Emiliano Pathak RN  Outcome: Ongoing     Problem: Skin Integrity:  Goal: Absence of new skin breakdown  Description: Absence of new skin breakdown  12/29/2021 0720 by Emiliano Pathak RN  Outcome: Ongoing

## 2021-12-29 NOTE — DISCHARGE INSTR - COC
Continuity of Care Form    Patient Name: Torrie Noel   :  1965  MRN:  [id number]    Admit date:  2021  Discharge date:  2021    Code Status Order: Full Code   Advance Directives:      Admitting Physician:  No admitting provider for patient encounter. PCP: No primary care provider on file. Discharging Nurse: [facility] Unit/Room#: 4413/0146-37  Discharging Unit Phone Number: [phone]    Emergency Contact:   No emergency contact information on file. Past Surgical History:  Past Surgical History:   Procedure Laterality Date    CARDIAC CATHETERIZATION  2021    CORONARY ANGIOPLASTY WITH STENT PLACEMENT      Hx of CAD with multiple stents and [facility]    CORONARY ARTERY BYPASS GRAFT      CORONARY ARTERY BYPASS GRAFT  2018    PACEMAKER PLACEMENT  2020    Medtronic ICD [id number] RV lead 6935-m55, LV lead 4598-88. NOT MRI CONDITIONAL       Immunization History: There is no immunization history on file for this patient.     Active Problems:  Patient Active Problem List   Diagnosis Code    JACKSON (acute kidney injury) ([facility] [address] N17.9    Atypical chest pain R07.89    AICD (automatic cardioverter/defibrillator) present Z95.810    History of MI (myocardial infarction) I25.2    Elevated brain natriuretic peptide (BNP) level R79.89    Elevated troponin R77.8    Chronic systolic heart failure (HCC) I50.22    Hx of CABG Z95.1    Stage 3 chronic kidney disease (HCC) N18.30    COVID-19 virus infection U07.1    Hydronephrosis of left kidney N13.30    Acute urinary retention R33.8    Hepatitis C B19.20    Coronary artery disease involving native coronary artery of native heart with angina pectoris ([facility] [address] I25.119    Solitary kidney, congenital Q60.0    Homeless single person Z59.00    Acute right-sided weakness R53.1    Acute CVA (cerebrovascular accident) ([facility] [address] I63.9    Acute chest pain R07.9       Isolation/Infection:   Isolation            No Isolation Patient Infection Status       Infection Onset Added Last Indicated Last Indicated By Review Planned Expiration Resolved Resolved By    None active    Resolved    COVID-19 21 COVID-19, Rapid   21     S/s     COVID-19 (Rule Out) 21 COVID-19, Rapid (Ordered)   21 Rule-Out Test Resulted            Nurse Assessment:  Last Vital Signs: /77   Pulse 73   Temp 100.7 °F (38.2 °C) (Oral)   Resp 16   Wt 168 lb 10.4 oz (76.5 kg)   SpO2 99%   BMI 24.20 kg/m²     Last documented pain score (0-10 scale): Pain Level:  (T 100.7)  Last Weight:   Wt Readings from Last 1 Encounters:   21 168 lb 10.4 oz (76.5 kg)     Mental Status:  disoriented, oriented, and alert    IV Access:  - None    Nursing Mobility/ADLs:  Walking   Assisted  Transfer  Assisted  Bathing  Assisted  Dressing  Assisted  Toileting  Assisted  Feeding  Independent  Med Admin  Assisted  Med Delivery   whole    Wound Care Documentation and Therapy:        Elimination:  Continence: Bowel: Yes  Bladder: Yes  Urinary Catheter: None   Colostomy/Ileostomy/Ileal Conduit: No       Date of Last BM: 2021    Intake/Output Summary (Last 24 hours) at 2021 1117  Last data filed at 2021 0845  Gross per 24 hour   Intake --   Output 1125 ml   Net -1125 ml     I/O last 3 completed shifts:  In: -   Out: 800 [Urine:800]    Safety Concerns:     None    Impairments/Disabilities:      None    Nutrition Therapy:  Current Nutrition Therapy:   - Oral Diet:  General    Routes of Feeding: Oral  Liquids: No Restrictions  Daily Fluid Restriction: no  Last Modified Barium Swallow with Video (Video Swallowing Test): not done    Treatments at the Time of Hospital Discharge:   Respiratory Treatments: ***  Oxygen Therapy:  is not on home oxygen therapy.   Ventilator:    - No ventilator support    Rehab Therapies: Physical Therapy and Occupational Therapy  Weight Bearing Status/Restrictions: No weight bearing restirctions  Other Medical Equipment (for information only, NOT a DME order):  wheelchair  Other Treatments: ***    Patient's personal belongings (please select all that are sent with patient):  None    RN SIGNATURE:  Electronically signed by Roslyn Roy RN on 12/29/21 at 12:28 PM EST    CASE MANAGEMENT/SOCIAL WORK SECTION    Inpatient Status Date: 12/23/21    Readmission Risk Assessment Score:  Readmission Risk              Risk of Unplanned Readmission:  25           Discharging to Facility/ Agency   Name:   [facility] Details  [address], [address] [facility] [address]       Phone: [phone]        Address:  Phone:  Fax:    Dialysis Facility (if applicable)   Name:  Address:  Dialysis Schedule:  Phone:  Fax:    / signature: Electronically signed by Merlyn Steel RN on 12/29/21 at 11:23 AM EST    PHYSICIAN SECTION    Prognosis: Fair    Condition at Discharge: Stable    Rehab Potential (if transferring to Rehab): Fair    Recommended Labs or Other Treatments After Discharge: PT OT . Follow up with cardiology and neurology     Physician Certification: I certify the above information and transfer of Barb Hdz  is necessary for the continuing treatment of the diagnosis listed and that he requires [facility] for less 30 days.      Update Admission H&P: No change in H&P    PHYSICIAN SIGNATURE:  Electronically signed by Maldonado Lee MD on 12/29/21 at 11:59 AM EST

## 2021-12-29 NOTE — PROGRESS NOTES
IntraVENous Daily    And    sodium chloride (PF)  10 mL IntraVENous Daily       Input/Output:       I/O last 3 completed shifts:  In: -   Out: 800 [Urine:800]. Patient Vitals for the past 96 hrs (Last 3 readings):   Weight   21 0600 168 lb 10.4 oz (76.5 kg)   21 0322 167 lb 12.3 oz (76.1 kg)   21 0500 171 lb 1.2 oz (77.6 kg)       Vital Signs:   Temperature:  Temp: 98.8 °F (37.1 °C)  TMax:   Temp (24hrs), Av.2 °F (37.3 °C), Min:98.6 °F (37 °C), Max:100.7 °F (38.2 °C)    Respirations:  Resp: 16  Pulse:   Pulse: 70  BP:    BP: 128/84  BP Range: Systolic (03WBZ), ZRQ:928 , Min:124 , MIRTHA:890       Diastolic (05BXX), LGB:92, Min:77, Max:99      Physical Examination:     General:  AAO x 3, speaking in full sentences, no accessory muscle use. HEENT: Atraumatic, normocephalic, no throat congestion, moist mucosa. Eyes:   Pupils equal, round and reactive to light, EOMI. Neck:   Supple  Chest:   Bilateral vesicular breath sounds, no rales or wheezes. Cardiac:  S1 S2 RR, no murmurs, gallops or rubs. Abdomen: Soft, non-tender, no masses or organomegaly, BS audible. :   No suprapubic or flank tenderness. Neuro:  AAO x 3, No FND. SKIN:  No rashes, good skin turgor. Extremities:  No edema. Labs:       Recent Labs     21  0632 21  0731 21  0635   WBC 3.5 4.5 5.2   RBC 2.44* 2.87* 2.94*   HGB 7.6* 9.0* 9.2*   HCT 24.0* 28.2* 28.9*   MCV 98.4 98.3 98.3   MCH 31.1 31.4 31.3   MCHC 31.7 31.9 31.8   RDW 16.4* 16.9* 17.2*    183 198   MPV 10.0 10.1 10.2      BMP:   Recent Labs     21  0632 21  0731 21  0635    136 133*   K 3.9 4.6 4.2    108* 104   CO2 18* 17* 19*   BUN 18 23* 22*   CREATININE 1.81* 1.87* 1.93*   GLUCOSE 95 88 90   CALCIUM 7.6* 8.0* 8.2*    ANNALISE:    No results found for: ANNALISE  SPEP:  Lab Results   Component Value Date    PROT 6.5 2021    PATH ELECTRONICALLY SIGNED.  Marlene Lechuga M.D. 2021   Hep BsAg:         Lab

## 2021-12-29 NOTE — CARE COORDINATION
TRansitional Planning    Called Donnie Grant [phone] and left VM with Nisa requesting return phone call. Returned phone call to [employer] at [facility] and she relates she saw patient and he was able to answer questionnaire for Medicaid. They can accept patient, no covid test needed, HENS and AVS needed. [place] to print    [address] at [facility] and told her patient is set for  at 3pm. Report # [phone], Fax# [phone] CM told patient he is set for  at 3pm to go to [facility].

## 2021-12-30 ENCOUNTER — HOSPITAL ENCOUNTER (INPATIENT)
Age: 56
LOS: 14 days | Discharge: NURSING, REHAB OR LONG-TERM CARE | DRG: 882 | End: 2022-01-14
Attending: EMERGENCY MEDICINE | Admitting: FAMILY MEDICINE
Payer: MEDICARE

## 2021-12-30 DIAGNOSIS — R53.1 ACUTE RIGHT-SIDED WEAKNESS: ICD-10-CM

## 2021-12-30 DIAGNOSIS — I21.4 NSTEMI (NON-ST ELEVATED MYOCARDIAL INFARCTION) (HCC): Primary | ICD-10-CM

## 2021-12-30 DIAGNOSIS — I63.9 ACUTE CVA (CEREBROVASCULAR ACCIDENT) (HCC): ICD-10-CM

## 2021-12-30 DIAGNOSIS — U07.1 COVID-19: ICD-10-CM

## 2021-12-30 DIAGNOSIS — I50.9 OTHER CONGESTIVE HEART FAILURE (HCC): ICD-10-CM

## 2021-12-30 LAB
ABSOLUTE EOS #: 0.1 K/UL (ref 0–0.4)
ABSOLUTE IMMATURE GRANULOCYTE: ABNORMAL K/UL (ref 0–0.3)
ABSOLUTE LYMPH #: 0.5 K/UL (ref 1–4.8)
ABSOLUTE MONO #: 0.4 K/UL (ref 0.1–1.3)
ALBUMIN SERPL-MCNC: 3.1 G/DL (ref 3.5–5.2)
ALBUMIN/GLOBULIN RATIO: ABNORMAL (ref 1–2.5)
ALP BLD-CCNC: 78 U/L (ref 40–129)
ALT SERPL-CCNC: 9 U/L (ref 5–41)
AMYLASE: 37 U/L (ref 28–100)
ANION GAP SERPL CALCULATED.3IONS-SCNC: 10 MMOL/L (ref 9–17)
AST SERPL-CCNC: 12 U/L
BASOPHILS # BLD: 1 % (ref 0–2)
BASOPHILS ABSOLUTE: 0 K/UL (ref 0–0.2)
BILIRUB SERPL-MCNC: 0.43 MG/DL (ref 0.3–1.2)
BNP INTERPRETATION: ABNORMAL
BUN BLDV-MCNC: 24 MG/DL (ref 6–20)
BUN/CREAT BLD: ABNORMAL (ref 9–20)
C-REACTIVE PROTEIN: 47.9 MG/L (ref 0–5)
CALCIUM SERPL-MCNC: 8.5 MG/DL (ref 8.6–10.4)
CHLORIDE BLD-SCNC: 106 MMOL/L (ref 98–107)
CO2: 21 MMOL/L (ref 20–31)
CREAT SERPL-MCNC: 2.02 MG/DL (ref 0.7–1.2)
D-DIMER QUANTITATIVE: 2.37 MG/L FEU (ref 0–0.59)
DIFFERENTIAL TYPE: ABNORMAL
EOSINOPHILS RELATIVE PERCENT: 3 % (ref 0–4)
GFR AFRICAN AMERICAN: 41 ML/MIN
GFR NON-AFRICAN AMERICAN: 34 ML/MIN
GFR SERPL CREATININE-BSD FRML MDRD: ABNORMAL ML/MIN/{1.73_M2}
GFR SERPL CREATININE-BSD FRML MDRD: ABNORMAL ML/MIN/{1.73_M2}
GLUCOSE BLD-MCNC: 103 MG/DL (ref 70–99)
HCT VFR BLD CALC: 30.2 % (ref 41–53)
HEMOGLOBIN: 10.1 G/DL (ref 13.5–17.5)
IMMATURE GRANULOCYTES: ABNORMAL %
INR BLD: 1.3
LACTIC ACID, SEPSIS WHOLE BLOOD: NORMAL MMOL/L (ref 0.5–1.9)
LACTIC ACID, SEPSIS: 1.1 MMOL/L (ref 0.5–1.9)
LIPASE: 25 U/L (ref 13–60)
LYMPHOCYTES # BLD: 13 % (ref 24–44)
MCH RBC QN AUTO: 31.2 PG (ref 26–34)
MCHC RBC AUTO-ENTMCNC: 33.3 G/DL (ref 31–37)
MCV RBC AUTO: 93.7 FL (ref 80–100)
MONOCYTES # BLD: 10 % (ref 1–7)
NRBC AUTOMATED: ABNORMAL PER 100 WBC
PARTIAL THROMBOPLASTIN TIME: 39.9 SEC (ref 24–36)
PDW BLD-RTO: 18 % (ref 11.5–14.9)
PLATELET # BLD: 231 K/UL (ref 150–450)
PLATELET ESTIMATE: ABNORMAL
PMV BLD AUTO: 7.6 FL (ref 6–12)
POTASSIUM SERPL-SCNC: 4.3 MMOL/L (ref 3.7–5.3)
PRO-BNP: ABNORMAL PG/ML
PROTHROMBIN TIME: 16.4 SEC (ref 11.8–14.6)
RBC # BLD: 3.23 M/UL (ref 4.5–5.9)
RBC # BLD: ABNORMAL 10*6/UL
SARS-COV-2, RAPID: DETECTED
SEDIMENTATION RATE, ERYTHROCYTE: 55 MM (ref 0–20)
SEG NEUTROPHILS: 73 % (ref 36–66)
SEGMENTED NEUTROPHILS ABSOLUTE COUNT: 3.1 K/UL (ref 1.3–9.1)
SODIUM BLD-SCNC: 137 MMOL/L (ref 135–144)
SPECIMEN DESCRIPTION: ABNORMAL
TOTAL PROTEIN: 6.4 G/DL (ref 6.4–8.3)
TROPONIN INTERP: ABNORMAL
TROPONIN T: ABNORMAL NG/ML
TROPONIN, HIGH SENSITIVITY: 69 NG/L (ref 0–22)
WBC # BLD: 4.2 K/UL (ref 3.5–11)
WBC # BLD: ABNORMAL 10*3/UL

## 2021-12-30 PROCEDURE — 85025 COMPLETE CBC W/AUTO DIFF WBC: CPT

## 2021-12-30 PROCEDURE — 83880 ASSAY OF NATRIURETIC PEPTIDE: CPT

## 2021-12-30 PROCEDURE — 6370000000 HC RX 637 (ALT 250 FOR IP): Performed by: EMERGENCY MEDICINE

## 2021-12-30 PROCEDURE — 83605 ASSAY OF LACTIC ACID: CPT

## 2021-12-30 PROCEDURE — 85610 PROTHROMBIN TIME: CPT

## 2021-12-30 PROCEDURE — 80053 COMPREHEN METABOLIC PANEL: CPT

## 2021-12-30 PROCEDURE — 82150 ASSAY OF AMYLASE: CPT

## 2021-12-30 PROCEDURE — 96374 THER/PROPH/DIAG INJ IV PUSH: CPT

## 2021-12-30 PROCEDURE — 6360000002 HC RX W HCPCS: Performed by: EMERGENCY MEDICINE

## 2021-12-30 PROCEDURE — 83690 ASSAY OF LIPASE: CPT

## 2021-12-30 PROCEDURE — 85379 FIBRIN DEGRADATION QUANT: CPT

## 2021-12-30 PROCEDURE — 85652 RBC SED RATE AUTOMATED: CPT

## 2021-12-30 PROCEDURE — 86140 C-REACTIVE PROTEIN: CPT

## 2021-12-30 PROCEDURE — 84484 ASSAY OF TROPONIN QUANT: CPT

## 2021-12-30 PROCEDURE — 85730 THROMBOPLASTIN TIME PARTIAL: CPT

## 2021-12-30 PROCEDURE — 36415 COLL VENOUS BLD VENIPUNCTURE: CPT

## 2021-12-30 PROCEDURE — 93005 ELECTROCARDIOGRAM TRACING: CPT | Performed by: EMERGENCY MEDICINE

## 2021-12-30 PROCEDURE — 87635 SARS-COV-2 COVID-19 AMP PRB: CPT

## 2021-12-30 RX ORDER — DEXAMETHASONE SODIUM PHOSPHATE 10 MG/ML
6 INJECTION, SOLUTION INTRAMUSCULAR; INTRAVENOUS ONCE
Status: COMPLETED | OUTPATIENT
Start: 2021-12-30 | End: 2021-12-31

## 2021-12-30 RX ORDER — MORPHINE SULFATE 4 MG/ML
4 INJECTION, SOLUTION INTRAMUSCULAR; INTRAVENOUS ONCE
Status: COMPLETED | OUTPATIENT
Start: 2021-12-31 | End: 2021-12-31

## 2021-12-30 RX ORDER — HYDROCODONE BITARTRATE AND ACETAMINOPHEN 5; 325 MG/1; MG/1
2 TABLET ORAL ONCE
Status: COMPLETED | OUTPATIENT
Start: 2021-12-30 | End: 2021-12-30

## 2021-12-30 RX ORDER — FUROSEMIDE 10 MG/ML
40 INJECTION INTRAMUSCULAR; INTRAVENOUS ONCE
Status: COMPLETED | OUTPATIENT
Start: 2021-12-30 | End: 2021-12-30

## 2021-12-30 RX ADMIN — HYDROCODONE BITARTRATE AND ACETAMINOPHEN 2 TABLET: 5; 325 TABLET ORAL at 21:57

## 2021-12-30 RX ADMIN — FUROSEMIDE 40 MG: 10 INJECTION, SOLUTION INTRAVENOUS at 23:59

## 2021-12-30 ASSESSMENT — ENCOUNTER SYMPTOMS
SHORTNESS OF BREATH: 1
ABDOMINAL PAIN: 0
EYE PAIN: 0
WHEEZING: 0
STRIDOR: 0
DIARRHEA: 0
EYE DISCHARGE: 0
COUGH: 0
NAUSEA: 0
COLOR CHANGE: 0
CONSTIPATION: 0
VOMITING: 0
EYE REDNESS: 0
SORE THROAT: 0

## 2021-12-30 ASSESSMENT — PAIN DESCRIPTION - PAIN TYPE: TYPE: ACUTE PAIN

## 2021-12-30 ASSESSMENT — PAIN SCALES - GENERAL: PAINLEVEL_OUTOF10: 8

## 2021-12-30 ASSESSMENT — PAIN DESCRIPTION - LOCATION: LOCATION: CHEST

## 2021-12-31 ENCOUNTER — APPOINTMENT (OUTPATIENT)
Dept: NUCLEAR MEDICINE | Age: 56
DRG: 882 | End: 2021-12-31
Payer: MEDICARE

## 2021-12-31 PROBLEM — I50.9 CONGESTIVE HEART FAILURE OF UNKNOWN ETIOLOGY (HCC): Status: ACTIVE | Noted: 2021-12-31

## 2021-12-31 LAB
LACTIC ACID, SEPSIS WHOLE BLOOD: NORMAL MMOL/L (ref 0.5–1.9)
LACTIC ACID, SEPSIS: 0.6 MMOL/L (ref 0.5–1.9)
SARS-COV-2, RAPID: NOT DETECTED
SPECIMEN DESCRIPTION: NORMAL
TROPONIN INTERP: ABNORMAL
TROPONIN T: ABNORMAL NG/ML
TROPONIN, HIGH SENSITIVITY: 51 NG/L (ref 0–22)

## 2021-12-31 PROCEDURE — A9540 TC99M MAA: HCPCS | Performed by: EMERGENCY MEDICINE

## 2021-12-31 PROCEDURE — 83605 ASSAY OF LACTIC ACID: CPT

## 2021-12-31 PROCEDURE — 3430000000 HC RX DIAGNOSTIC RADIOPHARMACEUTICAL: Performed by: EMERGENCY MEDICINE

## 2021-12-31 PROCEDURE — 78580 LUNG PERFUSION IMAGING: CPT

## 2021-12-31 PROCEDURE — 6370000000 HC RX 637 (ALT 250 FOR IP): Performed by: EMERGENCY MEDICINE

## 2021-12-31 PROCEDURE — 2580000003 HC RX 258: Performed by: EMERGENCY MEDICINE

## 2021-12-31 PROCEDURE — 96375 TX/PRO/DX INJ NEW DRUG ADDON: CPT

## 2021-12-31 PROCEDURE — 1200000000 HC SEMI PRIVATE

## 2021-12-31 PROCEDURE — 6370000000 HC RX 637 (ALT 250 FOR IP): Performed by: FAMILY MEDICINE

## 2021-12-31 PROCEDURE — 6360000002 HC RX W HCPCS: Performed by: EMERGENCY MEDICINE

## 2021-12-31 PROCEDURE — 36415 COLL VENOUS BLD VENIPUNCTURE: CPT

## 2021-12-31 PROCEDURE — 6360000002 HC RX W HCPCS: Performed by: FAMILY MEDICINE

## 2021-12-31 PROCEDURE — 84484 ASSAY OF TROPONIN QUANT: CPT

## 2021-12-31 PROCEDURE — 99285 EMERGENCY DEPT VISIT HI MDM: CPT

## 2021-12-31 RX ORDER — SODIUM CHLORIDE 9 MG/ML
25 INJECTION, SOLUTION INTRAVENOUS PRN
Status: DISCONTINUED | OUTPATIENT
Start: 2021-12-31 | End: 2022-01-07

## 2021-12-31 RX ORDER — MORPHINE SULFATE 4 MG/ML
4 INJECTION, SOLUTION INTRAMUSCULAR; INTRAVENOUS
Status: DISCONTINUED | OUTPATIENT
Start: 2021-12-31 | End: 2022-01-01

## 2021-12-31 RX ORDER — SODIUM CHLORIDE 0.9 % (FLUSH) 0.9 %
5-40 SYRINGE (ML) INJECTION PRN
Status: DISCONTINUED | OUTPATIENT
Start: 2021-12-31 | End: 2022-01-07

## 2021-12-31 RX ORDER — ACETAMINOPHEN 325 MG/1
650 TABLET ORAL EVERY 4 HOURS PRN
Status: DISCONTINUED | OUTPATIENT
Start: 2021-12-31 | End: 2022-01-14 | Stop reason: HOSPADM

## 2021-12-31 RX ORDER — ONDANSETRON 2 MG/ML
4 INJECTION INTRAMUSCULAR; INTRAVENOUS EVERY 6 HOURS PRN
Status: DISCONTINUED | OUTPATIENT
Start: 2021-12-31 | End: 2022-01-07

## 2021-12-31 RX ORDER — SODIUM CHLORIDE 9 MG/ML
INJECTION, SOLUTION INTRAVENOUS CONTINUOUS
Status: DISCONTINUED | OUTPATIENT
Start: 2021-12-31 | End: 2022-01-07

## 2021-12-31 RX ORDER — CARVEDILOL 12.5 MG/1
12.5 TABLET ORAL 2 TIMES DAILY WITH MEALS
Status: DISCONTINUED | OUTPATIENT
Start: 2021-12-31 | End: 2022-01-14 | Stop reason: HOSPADM

## 2021-12-31 RX ORDER — SODIUM CHLORIDE 0.9 % (FLUSH) 0.9 %
10 SYRINGE (ML) INJECTION PRN
Status: DISCONTINUED | OUTPATIENT
Start: 2021-12-31 | End: 2022-01-07

## 2021-12-31 RX ORDER — LISINOPRIL 10 MG/1
10 TABLET ORAL DAILY
Status: DISCONTINUED | OUTPATIENT
Start: 2021-12-31 | End: 2022-01-14 | Stop reason: HOSPADM

## 2021-12-31 RX ORDER — MORPHINE SULFATE 2 MG/ML
2 INJECTION, SOLUTION INTRAMUSCULAR; INTRAVENOUS
Status: DISCONTINUED | OUTPATIENT
Start: 2021-12-31 | End: 2022-01-01

## 2021-12-31 RX ORDER — ONDANSETRON 4 MG/1
4 TABLET, ORALLY DISINTEGRATING ORAL EVERY 8 HOURS PRN
Status: DISCONTINUED | OUTPATIENT
Start: 2021-12-31 | End: 2022-01-14 | Stop reason: HOSPADM

## 2021-12-31 RX ORDER — CLOPIDOGREL BISULFATE 75 MG/1
75 TABLET ORAL DAILY
Status: DISCONTINUED | OUTPATIENT
Start: 2021-12-31 | End: 2022-01-14 | Stop reason: HOSPADM

## 2021-12-31 RX ORDER — AMLODIPINE BESYLATE 5 MG/1
5 TABLET ORAL DAILY
Status: DISCONTINUED | OUTPATIENT
Start: 2021-12-31 | End: 2022-01-14 | Stop reason: HOSPADM

## 2021-12-31 RX ORDER — DIPHENHYDRAMINE HCL 25 MG
50 TABLET ORAL 2 TIMES DAILY
Status: DISCONTINUED | OUTPATIENT
Start: 2021-12-31 | End: 2022-01-14 | Stop reason: HOSPADM

## 2021-12-31 RX ORDER — SODIUM CHLORIDE 0.9 % (FLUSH) 0.9 %
5-40 SYRINGE (ML) INJECTION EVERY 12 HOURS SCHEDULED
Status: DISCONTINUED | OUTPATIENT
Start: 2021-12-31 | End: 2022-01-07

## 2021-12-31 RX ORDER — CARVEDILOL 6.25 MG/1
6.25 TABLET ORAL 2 TIMES DAILY
Status: ON HOLD | COMMUNITY
End: 2022-01-02 | Stop reason: HOSPADM

## 2021-12-31 RX ORDER — CLONAZEPAM 1 MG/1
1 TABLET ORAL DAILY
Status: DISCONTINUED | OUTPATIENT
Start: 2021-12-31 | End: 2022-01-14 | Stop reason: HOSPADM

## 2021-12-31 RX ORDER — HYDRALAZINE HYDROCHLORIDE 20 MG/ML
10 INJECTION INTRAMUSCULAR; INTRAVENOUS EVERY 6 HOURS PRN
Status: DISCONTINUED | OUTPATIENT
Start: 2021-12-31 | End: 2022-01-07

## 2021-12-31 RX ORDER — DIPHENHYDRAMINE HCL 25 MG
TABLET ORAL
Status: DISPENSED
Start: 2021-12-31 | End: 2021-12-31

## 2021-12-31 RX ORDER — FUROSEMIDE 10 MG/ML
40 INJECTION INTRAMUSCULAR; INTRAVENOUS ONCE
Status: COMPLETED | OUTPATIENT
Start: 2021-12-31 | End: 2021-12-31

## 2021-12-31 RX ADMIN — FUROSEMIDE 40 MG: 10 INJECTION, SOLUTION INTRAMUSCULAR; INTRAVENOUS at 13:33

## 2021-12-31 RX ADMIN — APIXABAN 5 MG: 5 TABLET, FILM COATED ORAL at 20:29

## 2021-12-31 RX ADMIN — SODIUM CHLORIDE, PRESERVATIVE FREE 10 ML: 5 INJECTION INTRAVENOUS at 20:36

## 2021-12-31 RX ADMIN — MORPHINE SULFATE 4 MG: 4 INJECTION, SOLUTION INTRAMUSCULAR; INTRAVENOUS at 20:31

## 2021-12-31 RX ADMIN — MORPHINE SULFATE 4 MG: 4 INJECTION, SOLUTION INTRAMUSCULAR; INTRAVENOUS at 00:00

## 2021-12-31 RX ADMIN — SODIUM CHLORIDE, PRESERVATIVE FREE 10 ML: 5 INJECTION INTRAVENOUS at 10:27

## 2021-12-31 RX ADMIN — ONDANSETRON 4 MG: 2 INJECTION INTRAMUSCULAR; INTRAVENOUS at 10:25

## 2021-12-31 RX ADMIN — SODIUM CHLORIDE, PRESERVATIVE FREE 10 ML: 5 INJECTION INTRAVENOUS at 07:40

## 2021-12-31 RX ADMIN — ACETAMINOPHEN 650 MG: 325 TABLET, FILM COATED ORAL at 15:43

## 2021-12-31 RX ADMIN — MORPHINE SULFATE 4 MG: 4 INJECTION, SOLUTION INTRAMUSCULAR; INTRAVENOUS at 10:25

## 2021-12-31 RX ADMIN — CARVEDILOL 12.5 MG: 12.5 TABLET, FILM COATED ORAL at 16:34

## 2021-12-31 RX ADMIN — DIPHENHYDRAMINE HCL 50 MG: 25 TABLET ORAL at 20:28

## 2021-12-31 RX ADMIN — CLONAZEPAM 1 MG: 1 TABLET ORAL at 13:32

## 2021-12-31 RX ADMIN — Medication 6 MILLICURIE: at 00:14

## 2021-12-31 RX ADMIN — MORPHINE SULFATE 2 MG: 2 INJECTION, SOLUTION INTRAMUSCULAR; INTRAVENOUS at 07:40

## 2021-12-31 RX ADMIN — MORPHINE SULFATE 2 MG: 2 INJECTION, SOLUTION INTRAMUSCULAR; INTRAVENOUS at 13:32

## 2021-12-31 RX ADMIN — MORPHINE SULFATE 2 MG: 2 INJECTION, SOLUTION INTRAMUSCULAR; INTRAVENOUS at 16:35

## 2021-12-31 RX ADMIN — LISINOPRIL 10 MG: 10 TABLET ORAL at 13:32

## 2021-12-31 RX ADMIN — SODIUM CHLORIDE, PRESERVATIVE FREE 10 ML: 5 INJECTION INTRAVENOUS at 13:33

## 2021-12-31 RX ADMIN — SODIUM CHLORIDE, PRESERVATIVE FREE 10 ML: 5 INJECTION INTRAVENOUS at 00:15

## 2021-12-31 RX ADMIN — DIPHENHYDRAMINE HCL 50 MG: 25 TABLET ORAL at 06:24

## 2021-12-31 RX ADMIN — CLOPIDOGREL 75 MG: 75 TABLET, FILM COATED ORAL at 13:32

## 2021-12-31 RX ADMIN — MORPHINE SULFATE 4 MG: 4 INJECTION, SOLUTION INTRAMUSCULAR; INTRAVENOUS at 22:59

## 2021-12-31 RX ADMIN — MORPHINE SULFATE 4 MG: 4 INJECTION, SOLUTION INTRAMUSCULAR; INTRAVENOUS at 18:30

## 2021-12-31 RX ADMIN — DEXAMETHASONE SODIUM PHOSPHATE 6 MG: 10 INJECTION, SOLUTION INTRAMUSCULAR; INTRAVENOUS at 03:20

## 2021-12-31 RX ADMIN — SODIUM CHLORIDE, PRESERVATIVE FREE 10 ML: 5 INJECTION INTRAVENOUS at 00:16

## 2021-12-31 ASSESSMENT — PAIN SCALES - GENERAL
PAINLEVEL_OUTOF10: 8
PAINLEVEL_OUTOF10: 10
PAINLEVEL_OUTOF10: 5
PAINLEVEL_OUTOF10: 8
PAINLEVEL_OUTOF10: 0
PAINLEVEL_OUTOF10: 8
PAINLEVEL_OUTOF10: 8
PAINLEVEL_OUTOF10: 9
PAINLEVEL_OUTOF10: 7
PAINLEVEL_OUTOF10: 7
PAINLEVEL_OUTOF10: 6
PAINLEVEL_OUTOF10: 8
PAINLEVEL_OUTOF10: 2
PAINLEVEL_OUTOF10: 9

## 2021-12-31 ASSESSMENT — PAIN DESCRIPTION - LOCATION: LOCATION: CHEST

## 2021-12-31 ASSESSMENT — PAIN DESCRIPTION - FREQUENCY: FREQUENCY: CONTINUOUS

## 2021-12-31 ASSESSMENT — PAIN DESCRIPTION - PAIN TYPE: TYPE: ACUTE PAIN

## 2021-12-31 NOTE — ED PROVIDER NOTES
[address]      Pt Name: Jewell Baez  MRN: [id number]  [place] 1965  Date of evaluation: 12/30/2021  Provider: Javier Cristobal MD    CHIEF COMPLAINT       Chief Complaint   Patient presents with    Chest Pain       CRITICAL CARE TIME   Total Critical Care time was 35 minutes, excluding separately reportable procedures. There was a high probability of clinically significant/life threatening deterioration in the patient's condition which required my urgent intervention. HISTORY OF PRESENT ILLNESS  (Location/Symptom, Timing/Onset, Context/Setting, Quality, Duration, Modifying Factors, Severity.)   Jewell Baez is a 61 y.o. male who presents to the emergency department complaining of chest pain that is midsternal.  He is brought in with "[employer] 8 from nursing [place]. He had recent CVA and was admitted last week. Patient tells me to [facility]. Marv's. He tells me he does feel short of breath. He denies any cough cold or congestion. He just keeps repeating that he does not feel well. Nursing Notes were reviewed. REVIEW OF SYSTEMS    (2-9 systems for level 4, 10 or more for level 5)     Review of Systems   Constitutional: Positive for fatigue. Negative for activity change, appetite change, chills and fever. HENT: Negative for congestion, ear pain and sore throat. Eyes: Negative for pain, discharge and redness. Respiratory: Positive for shortness of breath. Negative for cough, wheezing and stridor. Cardiovascular: Positive for chest pain. Gastrointestinal: Negative for abdominal pain, constipation, diarrhea, nausea and vomiting. Genitourinary: Negative for decreased urine volume and difficulty urinating. Musculoskeletal: Negative for arthralgias and myalgias. Skin: Negative for color change and rash. Neurological: Negative for dizziness, weakness and headaches. Psychiatric/Behavioral: Negative for behavioral problems and confusion. Except as noted above the remainder of the review of systems was reviewed and negative. PAST MEDICAL HISTORY     Past Medical History:   Diagnosis Date    AICD (automatic cardioverter/defibrillator) present     CAD (coronary artery disease)     CKD (chronic [facility] disease), stage III [facility] [address]     COVID-19     Myocardial infarction ([facility] [address]     PAF (paroxysmal atrial fibrillation) ([facility] [address]     S/P CABG (coronary artery bypass graft)     x2 separate occasions    Solitary kidney, congenital        SURGICAL HISTORY       Past Surgical History:   Procedure Laterality Date    CARDIAC CATHETERIZATION  12/27/2021    CORONARY ANGIOPLASTY WITH STENT PLACEMENT      Hx of CAD with multiple stents and CABG twice (2010 & [facility] @ [facility])    CORONARY ARTERY BYPASS GRAFT  2010    CORONARY ARTERY BYPASS GRAFT  2018    PACEMAKER PLACEMENT  09/11/2020    Medtronic ICD [id number] RV lead 6935-m55, LV lead 4598-88. NOT MRI CONDITIONAL       CURRENT MEDICATIONS       Current Discharge Medication List      CONTINUE these medications which have NOT CHANGED    Details   carvedilol (COREG) 6.25 MG tablet Take 6.25 mg by mouth 2 times daily      metoprolol succinate (TOPROL XL) 50 MG extended release tablet Take 1 tablet by mouth daily  Qty: 30 tablet, Refills: 3      pantoprazole (PROTONIX) 40 MG tablet Take 1 tablet by mouth every morning (before breakfast)  Qty: 30 tablet, Refills: 3      clopidogrel (PLAVIX) 75 MG tablet Take 1 tablet by mouth daily      atorvastatin (LIPITOR) 40 MG tablet Take 1 tablet by mouth daily      tamsulosin (FLOMAX) 0.4 MG capsule Take 0.4 mg by mouth daily      apixaban (ELIQUIS) 5 MG TABS tablet Take 5 mg by mouth 2 times daily      isosorbide mononitrate (IMDUR) 30 MG extended release tablet Take 1 tablet by mouth daily  Qty: 30 tablet, Refills: 3      nitroGLYCERIN (NITROSTAT) 0.4 MG SL tablet up to max of 3 total doses. If no relief after 1 dose, call 911.   Qty: 25 tablet, Refills: 3      sodium bicarbonate 650 MG tablet Take 1 tablet by mouth 2 times daily  Qty: 60 tablet, Refills: 0      bumetanide (BUMEX) 2 MG tablet Take 1 tablet by mouth 2 times daily  Qty: 30 tablet, Refills: 3      gabapentin (NEURONTIN) 100 MG capsule Take 1 capsule by mouth 3 times daily for 30 days. Qty: 90 capsule, Refills: 0             ALLERGIES     Nsaids, Aspirin, Adhesive tape, Ceftriaxone, Ketorolac tromethamine, and Other    FAMILY HISTORY           Problem Relation Age of Onset    Cancer Mother     Diabetes Father     Heart Disease Father     Diabetes Paternal Grandmother      Family Status   Relation Name Status    Mother  (Not Specified)    Father  (Not Specified)    PGM  (Not Specified)        SOCIAL HISTORY      reports that he has been smoking. He has been smoking about 0.25 packs per day. He has never used smokeless tobacco. He reports previous alcohol use. He reports previous drug use. PHYSICAL EXAM    (up to 7 for level 4, 8 or more for level 5)     ED Triage Vitals [12/30/21 2101]   BP Temp Temp Source Pulse Resp SpO2 Height Weight   132/89 98.1 °F (36.7 °C) Oral 78 18 96 % 5' 9\" (1.753 m) 166 lb (75.3 kg)     Physical Exam  Vitals and nursing note reviewed. Constitutional:       General: He is not in acute distress. Appearance: He is well-developed. He is not ill-appearing, toxic-appearing or diaphoretic. HENT:      Head: Normocephalic and atraumatic. Right Ear: External ear normal.      Left Ear: External ear normal.      Nose: Nose normal.      Mouth/Throat:      Mouth: Mucous membranes are moist.   Eyes:      General:         Right eye: No discharge. Left eye: No discharge. Conjunctiva/sclera: Conjunctivae normal.      Pupils: Pupils are equal, round, and reactive to light. Cardiovascular:      Rate and Rhythm: Normal rate and regular rhythm. Heart sounds: Normal heart sounds. No murmur heard.       Pulmonary:      Effort: Pulmonary effort is normal. No respiratory distress. Breath sounds: Normal breath sounds. No wheezing, rhonchi or rales. Chest:      Chest wall: No tenderness. Abdominal:      General: Bowel sounds are normal. There is no distension. Palpations: Abdomen is soft. There is no mass. Tenderness: There is no abdominal tenderness. There is no guarding or rebound. Musculoskeletal:         General: Normal range of motion. Cervical back: Normal range of motion and neck supple. Right lower leg: No edema. Left lower leg: No edema. Lymphadenopathy:      Cervical: No cervical adenopathy. Skin:     General: Skin is warm. Findings: No rash. Neurological:      General: No focal deficit present. Mental Status: He is alert and oriented to person, place, and time. Motor: No abnormal muscle tone. Psychiatric:         Mood and Affect: Mood normal.         Behavior: Behavior normal.         DIAGNOSTIC RESULTS     EKG: All EKG's are interpreted by the Emergency Department Physician who either signs or Co-signs this chart in the absence of a cardiologist.    EKG Interpretation    Interpreted by me    Rhythm: Paced rhythm  Rate: Paced at 72  Axis: normal  Ectopy: none  Conduction: normal  ST Segments: Nonspecific change  T Waves: Nonspecific change  Q Waves: none    Clinical Impression: Nonspecific ventricular paced rhythm and abnormal EKG    RADIOLOGY:   Non-plain film images such as CT, Ultrasound and MRI are read by the radiologist. Plain radiographic images are visualized and preliminarily interpreted by the emergency physician with the below findings:    Interpretation per the Radiologist below, if available at the time of this note:    [address]   Final Result   Low Probability for Pulmonary Embolus.                ED BEDSIDE ULTRASOUND:   Performed by ED Physician - none    LABS:  Labs Reviewed   COVID-19, RAPID - Abnormal; Notable for the following components:       Result Value    SARS-CoV-2, Rapid DETECTED (*)     All other components within normal limits   CBC WITH AUTO DIFFERENTIAL - Abnormal; Notable for the following components:    RBC 3.23 (*)     Hemoglobin 10.1 (*)     Hematocrit 30.2 (*)     RDW 18.0 (*)     Seg Neutrophils 73 (*)     Lymphocytes 13 (*)     Monocytes 10 (*)     Absolute Lymph # 0.50 (*)     All other components within normal limits   COMPREHENSIVE METABOLIC PANEL W/ REFLEX TO MG FOR LOW K - Abnormal; Notable for the following components:    Glucose 103 (*)     BUN 24 (*)     CREATININE 2.02 (*)     Calcium 8.5 (*)     Albumin 3.1 (*)     GFR Non- 34 (*)     GFR  41 (*)     All other components within normal limits   TROPONIN - Abnormal; Notable for the following components:    Troponin, High Sensitivity 69 (*)     All other components within normal limits   BRAIN NATRIURETIC PEPTIDE - Abnormal; Notable for the following components:    Pro-BNP 54,902 (*)     All other components within normal limits   PROTIME-INR - Abnormal; Notable for the following components:    Protime 16.4 (*)     All other components within normal limits   APTT - Abnormal; Notable for the following components:    PTT 39.9 (*)     All other components within normal limits   D-DIMER, QUANTITATIVE - Abnormal; Notable for the following components:    D-Dimer, Quant 2.37 (*)     All other components within normal limits   SEDIMENTATION RATE - Abnormal; Notable for the following components:    Sed Rate 55 (*)     All other components within normal limits   C-REACTIVE PROTEIN - Abnormal; Notable for the following components:    CRP 47.9 (*)     All other components within normal limits   COVID-19, RAPID   LIPASE   AMYLASE   LACTATE, SEPSIS   LACTATE, SEPSIS   URINE RT REFLEX TO CULTURE       All other labs were within normal range or not returned as of this dictation.     EMERGENCY DEPARTMENT COURSE and DIFFERENTIAL DIAGNOSIS/MDM:   Vitals:    Vitals:    12/31/21 4136 12/31/21 5828 12/31/21 0634 12/31/21 0708   BP: (!) 141/102 (!) 141/102 (!) 123/93 (!) 130/100   Pulse: 76 76 78 73   Resp: 19  20 22   Temp: 98.4 °F (36.9 °C)  97.7 °F (36.5 °C) 97.7 °F (36.5 °C)   TempSrc:   Oral Oral   SpO2: 95%  95% 93%   Weight:       Height:         Admitting to family medicine for CHF, NSTEMI and Covid. Nursing staff relates to me that Covid swab was not performed on this patient and so this initial positive result is not this patient's. We will actually swab him now and make sure of his Covid status. I did speak with Dr. Amber Chairez who accepts the patient. No further orders for us in the emergency department. CONSULTS:  IP CONSULT TO FAMILY MEDICINE    PROCEDURES:  None    FINAL IMPRESSION      1. NSTEMI (non-ST elevated myocardial infarction) ([facility] [address]    2. Other congestive heart failure ([facility] [address]    3. COVID-19          DISPOSITION/PLAN   DISPOSITION  admit      PATIENT REFERRED TO:  No follow-up provider specified.     DISCHARGE MEDICATIONS:  Current Discharge Medication List          (Please note that portions of this note were completed with a voice recognition program.  Efforts were made to edit the dictations but occasionally words are mis-transcribed.)    Bhupinder Santana MD  Attending Emergency Physician        Bhupinder Santana MD  12/31/21 5328

## 2021-12-31 NOTE — FLOWSHEET NOTE
Continues to have ongoing complaints of chest pain but states it feels different than earlier because it moved slightly to the left. REpeat Troponin level is rending down. EKG showed ventricular paced rhythm, but abnormal.  No ST elevation or depression noted. BP is improving. Shortness of breath has decreased and O2 Sat maintains in upper to mid 90s on oxygen at 1L/min. Medicated with Tylenol at this time to see if it might help more than morphine. Patient has been pleasant and cooperative all day, but is anxious and is particularly worried that people will not believe him or that they won't listen to him. Will continue to monitor and administer morphine if needed.

## 2021-12-31 NOTE — ED NOTES
PT was going to be given 2 norco by writer, than pt stated that nobody care about him, no  One has seen him and norco \"don't do a fucking thing for him\". Writer than asked pt what he would like instead and pt stated he \"didn't know but those or percocet's don't work\". Pt then proceeded to stand up, and rip off cardiac wires, blood pressure cuff and SPO2% monitor, but left in IV's. It was explained to pt that we had done many blood work tests, and were waiting for them all to result, he had xrays, and at least 2 other people had been in to see him.       Dioni Lucio RN  12/30/21 9159       Dioni Lucio RN  12/30/21 7288

## 2021-12-31 NOTE — PROGRESS NOTES
RN called into the room because patient was found on the floor with his pillow & call light. - patient laying on the floor, complaining of chest pain 8/10 and \"feels like someone is sitting on my chest\" patient helped to bed with 2 assist. Dr. Eusebio Agustin paged. Patient denies any other symptoms, vital taken & documented.

## 2021-12-31 NOTE — PROGRESS NOTES
Pt complained of itching and started digging into skin. Dr. Jacquie Andres notified and benadryl was ordered. Pt started yelling saying he wanted IV not by mouth. Pt then became aggressive and police was called. Pt began to calm down and agreed to take the benadryl by mouth.

## 2021-12-31 NOTE — ED NOTES
Report given to Salvador Darby RN from er   Report method BY [id number]}   The following was reviewed with receiving RN:   Current vital signs:  /80   Pulse 89   Temp 98.1 °F (36.7 °C) (Oral)   Resp 16   Ht 5' 9\" (1.753 m)   Wt 166 lb (75.3 kg)   SpO2 97%   BMI 24.51 kg/m²                MEWS Score: 1     Any medication or safety alerts were reviewed. Any pending diagnostics and notifications were also reviewed, as well as any safety concerns or issues, abnormal labs, abnormal imaging, and abnormal assessment findings. Questions were answered.             Zane Polk RN  12/31/21 0551

## 2021-12-31 NOTE — DISCHARGE INSTR - COC
Continuity of Care Form    Patient Name: Magdalena Shin   :  1965  MRN:  [id number]    Admit date:  2021  Discharge date:      Code Status Order: Full Code   Advance Directives:      Admitting Physician:  Chela Pierre MD  PCP: Shanice Clifton MD    Discharging Nurse: Rupesh Singh [facility] Unit/Room#: 7512/9052-76  Discharging Unit Phone Number: [phone]    Emergency Contact:   No emergency contact information on file. Past Surgical History:  Past Surgical History:   Procedure Laterality Date    CARDIAC CATHETERIZATION  2021    CORONARY ANGIOPLASTY WITH STENT PLACEMENT      Hx of CAD with multiple stents and CABG twice ( & 2018 @ [facility])    CORONARY ARTERY BYPASS GRAFT      CORONARY ARTERY BYPASS GRAFT      PACEMAKER PLACEMENT  2020    Medtronic ICD [id number] RV lead 6935-m55, LV lead 4598-88. NOT MRI CONDITIONAL       Immunization History: There is no immunization history on file for this patient.     Active Problems:  Patient Active Problem List   Diagnosis Code    JACKSON (acute kidney injury) ([facility] [address] N17.9    Atypical chest pain R07.89    AICD (automatic cardioverter/defibrillator) present Z95.810    History of MI (myocardial infarction) I25.2    Elevated brain natriuretic peptide (BNP) level R79.89    Elevated troponin R77.8    Chronic systolic heart failure (HCC) I50.22    Hx of CABG Z95.1    Stage 3 chronic kidney disease (HCC) N18.30    COVID-19 virus infection U07.1    Hydronephrosis of left kidney N13.30    Acute urinary retention R33.8    Hepatitis C B19.20    Coronary artery disease involving native coronary artery of native heart with angina pectoris ([facility] [address] I25.119    Solitary kidney, congenital Q60.0    Homeless single person Z59.00    Acute right-sided weakness R53.1    Acute CVA (cerebrovascular accident) ([facility] [address] I63.9    Acute chest pain R07.9    Congestive heart failure of unknown etiology ([facility] [address] I50.9       Isolation/Infection:   Isolation            No Therapy  Weight Bearing Status/Restrictions: No weight bearing restirctions  Other Medical Equipment (for information only, NOT a DME order):  wheelchair  Other Treatments: Skilled Nursing Assessment Per Protocol. Medication Education & Monitoring. Resume previous services as PTA. Patient's personal belongings (please select all that are sent with patient):  None    RN SIGNATURE:  Electronically signed by Beth Stanford RN on 1/2/22 at 9:16 AM EST    CASE MANAGEMENT/SOCIAL WORK SECTION    Inpatient Status Date: 12/31/21    Readmission Risk Assessment Score:  Readmission Risk              Risk of Unplanned Readmission:  23           Discharging to Facility/ [facility] [address] [place], [address]  Phone: [phone]    / signature: Electronically signed by Annalee Boyle RN on 12/31/21 at 12:41 PM EST    PHYSICIAN SECTION    Prognosis: Fair    Condition at Discharge: Stable    Rehab Potential (if transferring to Rehab): Fair    Recommended Labs or Other Treatments After Discharge: SEE ABOVE    Physician Certification: I certify the above information and transfer of Eliot Youssef  is necessary for the continuing treatment of the diagnosis listed and that he requires [facility] for greater 30 days.      Update Admission H&P: No change in H&P    PHYSICIAN SIGNATURE:  Electronically signed by Luciana Pedraza MD on 1/2/22 at 10:12 AM EST

## 2021-12-31 NOTE — CARE COORDINATION
CASE MANAGEMENT NOTE:    Admission Date:  12/30/2021 Ophelia Banuelos is a 61 y.o.  male    Admitted for : NSTEMI (non-ST elevated myocardial infarction) ([facility] [address] [I21.4]  Other congestive heart failure ([facility] [address] [I50.9]  Congestive heart failure of unknown etiology ([facility] [address] [I50.9]  COVID-19 [U07.1]    Met with:  Patient    PCP:  Tan Jordan                                Insurance:  [employer]      Is patient alert and oriented at time of discussion:  Yes    Current Residence/ Living Arrangements:  in nursing home             SNF needed: Yes    Freedom of choice and list provided: Pt is a Bedhold at [address] [place] [address], can return any time. LSW following. Pharmacy:  Has Scripts waiting at [facility] [address]       Does Patient want to use MEDS to BEDS? No    Is patient currently receiving oral anticoagulation therapy? Eliquis listed on Home Med list    Is the Patient an IVONNE MARCANO [facility] [place], operated by [facility] with Readmission Risk Score greater than 14%? No  If yes, pt needs a follow up appointment made within 7 days. Family Members/Caregivers that pt would like involved in their care:    No    Transportation Provider:  Ambulance             Discharge Plan:  12/31/21 [employer] [place] Pt. Came from [facility]. Pt. Is a bedhold & can return any time. LSW is following. +Covid on 12/30 & Neg on same date. Eliquis listed on Home med list. Cardio. Pt. States, he has been homeless for a long time, prior to [place]. Scripts still waiting to be picked up at [facility].  Tera will need signed/completed, will follow//KB                 Electronically signed by: Elijah Bruno RN on 12/31/2021 at 12:36 PM

## 2021-12-31 NOTE — ED NOTES
Pt states he was just discharged from the hospital to rehab xs 2 days ago, was receiving Morphine for pain at the hospital. At [facility] Pt stated he has been \"calling the nurses for pain meds, waiting for hours and he walked down to the nurses station and called them a joke. They are not helping me either. \" Pt apologize to Writer for writer about yelling for pain medications, but Phuong Vazquez is in pain and the norco do not do anything for him\", he states he knows we are busy, but he is in pain to. Writer sympathic to Pt and his needs and expressed again about what medication the ER Dr. Ana Estes. Pt still not wanting \"weak medication\".       Armond Moe RN  12/30/21 1932

## 2021-12-31 NOTE — FLOWSHEET NOTE
Patient rating pain level at 6, down from 9 on 0-10 painscale. Breathing is easier. Will place another call to Dr. Mahan Liner for orders. Patient beginning to c/o itching due to anxiety.

## 2021-12-31 NOTE — PLAN OF CARE
Patient has been alert, oriented and cooperative all day. No attempts to get out of bed and no falls. Uses urinal appropriately. STated he didn't actually fall this morning, he needed to get himself to the floor because he got dizzy due to the chest pain he was having. No longer digging at skin either. Has anxiety which causes him to pick at skin. Started on clonazepam today. Chest pain has been ongoing but gets a little better after Morphine. Have given Morphine in varying doses and tried tylenol once to see if it would be effective- it was not. BP improved after resuming some of his home meds and adding Lisinopril- although it still runs a little zeus. O2 sat has been stable on 1 liter/min. Will try to dc O2 and see if it makes a difference in his pain level. Rests quietly on et off.

## 2021-12-31 NOTE — FLOWSHEET NOTE
C/o low midsternal chest pain and shortness of breath. O2 applied at 2l/min per nasal cannula. VS as charted, but patient is very hypertensive. One episode of near emesis. Call Placed to Dr. Mahan Liner. Will administer Morphine and Zofran. Patient states that he cannot take Nitroglycerin products because of the extreme headaches they give him. Await returned call to go over orders with Dr. Mahan Liner.

## 2021-12-31 NOTE — PROGRESS NOTES
Pt is a bed hold from [facility] and can return at anytime. SW confirmed this with Caitie Sabillon from the facility. Upon discharge call the building at [phone]. If problems occur call Caitie Sabillon at [phone]. Fax completed orders to [phone].

## 2021-12-31 NOTE — ED TRIAGE NOTES
Mode of arrival (squad #, walk in, police, etc) : squad        Chief complaint(s): chest pain      Arrival Note (brief scenario, treatment PTA, etc). : pt laying in bed and had onset of chest pain that feels like his heart is going to beat of his chest. Pt also states that he just generally doesn't feel good. C= \"Have you ever felt that you should Cut down on your drinking? \" no  A= \"Have people Annoyed you by criticizing your drinking? \" no  G= \"Have you ever felt bad or Guilty about your drinking? \" no  E= \"Have you ever had a drink as an Eye-opener first thing in the morning to steady your nerves or to help a hangover? \"  no     Deferred []      Reason for deferring: na    *If yes to two or more: probable alcohol abuse. *

## 2021-12-31 NOTE — ED NOTES
Pt was found by another staff member at door with shirt on, IV's still in and trying to walk out. Pt was trying to go back to the [facility] where he is in rehab. Security called and pt walked back to room. It was explained to pt there is many other sick people in hosp like him and all the staff have been in different rooms. Pt called down slightly, but AMA paperwork was offered to PT and he refused to sign because he needs a ride home.       Nato Rivera RN  12/30/21 [id number]

## 2022-01-01 ENCOUNTER — APPOINTMENT (OUTPATIENT)
Dept: GENERAL RADIOLOGY | Age: 57
DRG: 882 | End: 2022-01-01
Payer: MEDICARE

## 2022-01-01 PROBLEM — J43.9 PULMONARY EMPHYSEMA (HCC): Status: ACTIVE | Noted: 2022-01-01

## 2022-01-01 PROBLEM — I48.11 LONGSTANDING PERSISTENT ATRIAL FIBRILLATION (HCC): Status: ACTIVE | Noted: 2022-01-01

## 2022-01-01 PROBLEM — I10 ESSENTIAL HYPERTENSION: Status: ACTIVE | Noted: 2022-01-01

## 2022-01-01 PROBLEM — I69.30 HISTORY OF CVA WITH RESIDUAL DEFICIT: Status: ACTIVE | Noted: 2022-01-01

## 2022-01-01 LAB
-: ABNORMAL
AMORPHOUS: ABNORMAL
BACTERIA: ABNORMAL
BILIRUBIN URINE: NEGATIVE
CASTS UA: ABNORMAL /LPF
COLOR: YELLOW
COMMENT UA: ABNORMAL
CRYSTALS, UA: ABNORMAL /HPF
EPITHELIAL CELLS UA: ABNORMAL /HPF
GLUCOSE URINE: NEGATIVE
KETONES, URINE: NEGATIVE
LEUKOCYTE ESTERASE, URINE: ABNORMAL
MUCUS: ABNORMAL
NITRITE, URINE: NEGATIVE
OTHER OBSERVATIONS UA: ABNORMAL
PH UA: 7.5 (ref 5–8)
PROTEIN UA: NEGATIVE
RBC UA: ABNORMAL /HPF
RENAL EPITHELIAL, UA: ABNORMAL /HPF
SPECIFIC GRAVITY UA: 1.01 (ref 1–1.03)
TRICHOMONAS: ABNORMAL
TURBIDITY: CLEAR
URINE HGB: NEGATIVE
UROBILINOGEN, URINE: NORMAL
WBC UA: ABNORMAL /HPF
YEAST: ABNORMAL

## 2022-01-01 PROCEDURE — 81001 URINALYSIS AUTO W/SCOPE: CPT

## 2022-01-01 PROCEDURE — 1200000000 HC SEMI PRIVATE

## 2022-01-01 PROCEDURE — 71045 X-RAY EXAM CHEST 1 VIEW: CPT

## 2022-01-01 PROCEDURE — 6360000002 HC RX W HCPCS: Performed by: FAMILY MEDICINE

## 2022-01-01 PROCEDURE — 6370000000 HC RX 637 (ALT 250 FOR IP): Performed by: FAMILY MEDICINE

## 2022-01-01 PROCEDURE — 2580000003 HC RX 258: Performed by: EMERGENCY MEDICINE

## 2022-01-01 PROCEDURE — 6360000002 HC RX W HCPCS: Performed by: EMERGENCY MEDICINE

## 2022-01-01 RX ORDER — TRAMADOL HYDROCHLORIDE 50 MG/1
50 TABLET ORAL EVERY 6 HOURS PRN
Status: DISCONTINUED | OUTPATIENT
Start: 2022-01-01 | End: 2022-01-14 | Stop reason: HOSPADM

## 2022-01-01 RX ORDER — OXYCODONE HYDROCHLORIDE AND ACETAMINOPHEN 5; 325 MG/1; MG/1
1 TABLET ORAL EVERY 4 HOURS PRN
Status: DISCONTINUED | OUTPATIENT
Start: 2022-01-01 | End: 2022-01-03

## 2022-01-01 RX ORDER — TRAMADOL HYDROCHLORIDE 50 MG/1
100 TABLET ORAL EVERY 6 HOURS PRN
Status: DISCONTINUED | OUTPATIENT
Start: 2022-01-01 | End: 2022-01-14 | Stop reason: HOSPADM

## 2022-01-01 RX ORDER — FUROSEMIDE 10 MG/ML
40 INJECTION INTRAMUSCULAR; INTRAVENOUS ONCE
Status: COMPLETED | OUTPATIENT
Start: 2022-01-01 | End: 2022-01-01

## 2022-01-01 RX ADMIN — MORPHINE SULFATE 4 MG: 4 INJECTION, SOLUTION INTRAMUSCULAR; INTRAVENOUS at 07:48

## 2022-01-01 RX ADMIN — APIXABAN 5 MG: 5 TABLET, FILM COATED ORAL at 10:12

## 2022-01-01 RX ADMIN — MORPHINE SULFATE 2 MG: 2 INJECTION, SOLUTION INTRAMUSCULAR; INTRAVENOUS at 01:30

## 2022-01-01 RX ADMIN — CLONAZEPAM 1 MG: 1 TABLET ORAL at 10:12

## 2022-01-01 RX ADMIN — SODIUM CHLORIDE: 9 INJECTION, SOLUTION INTRAVENOUS at 17:03

## 2022-01-01 RX ADMIN — SODIUM CHLORIDE: 9 INJECTION, SOLUTION INTRAVENOUS at 07:48

## 2022-01-01 RX ADMIN — DIPHENHYDRAMINE HCL 50 MG: 25 TABLET ORAL at 10:11

## 2022-01-01 RX ADMIN — SODIUM CHLORIDE, PRESERVATIVE FREE 10 ML: 5 INJECTION INTRAVENOUS at 07:53

## 2022-01-01 RX ADMIN — CARVEDILOL 12.5 MG: 12.5 TABLET, FILM COATED ORAL at 10:10

## 2022-01-01 RX ADMIN — SODIUM CHLORIDE, PRESERVATIVE FREE 10 ML: 5 INJECTION INTRAVENOUS at 07:55

## 2022-01-01 RX ADMIN — DIPHENHYDRAMINE HCL 50 MG: 25 TABLET ORAL at 05:41

## 2022-01-01 RX ADMIN — CLOPIDOGREL 75 MG: 75 TABLET, FILM COATED ORAL at 10:12

## 2022-01-01 RX ADMIN — MORPHINE SULFATE 2 MG: 2 INJECTION, SOLUTION INTRAMUSCULAR; INTRAVENOUS at 04:09

## 2022-01-01 RX ADMIN — LISINOPRIL 10 MG: 10 TABLET ORAL at 10:12

## 2022-01-01 RX ADMIN — ONDANSETRON 4 MG: 2 INJECTION INTRAMUSCULAR; INTRAVENOUS at 07:48

## 2022-01-01 RX ADMIN — FUROSEMIDE 40 MG: 10 INJECTION, SOLUTION INTRAMUSCULAR; INTRAVENOUS at 11:20

## 2022-01-01 ASSESSMENT — PAIN DESCRIPTION - PROGRESSION

## 2022-01-01 ASSESSMENT — PAIN DESCRIPTION - LOCATION
LOCATION: CHEST;FLANK
LOCATION: CHEST;FLANK

## 2022-01-01 ASSESSMENT — PAIN DESCRIPTION - PAIN TYPE
TYPE: CHRONIC PAIN
TYPE: ACUTE PAIN

## 2022-01-01 ASSESSMENT — PAIN SCALES - GENERAL
PAINLEVEL_OUTOF10: 7
PAINLEVEL_OUTOF10: 0
PAINLEVEL_OUTOF10: 6
PAINLEVEL_OUTOF10: 5
PAINLEVEL_OUTOF10: 6

## 2022-01-01 ASSESSMENT — PAIN DESCRIPTION - DESCRIPTORS: DESCRIPTORS: SHARP;CONSTANT

## 2022-01-01 ASSESSMENT — PAIN DESCRIPTION - ORIENTATION
ORIENTATION: RIGHT
ORIENTATION: LEFT

## 2022-01-01 ASSESSMENT — PAIN DESCRIPTION - FREQUENCY
FREQUENCY: CONTINUOUS
FREQUENCY: CONTINUOUS

## 2022-01-01 ASSESSMENT — PAIN DESCRIPTION - ONSET
ONSET: ON-GOING
ONSET: ON-GOING

## 2022-01-01 NOTE — H&P
[facility]        History and Physical Examination     CHIEF COMPLAINT: Chest Pain    Reason for Admission: Elevated Trops and BNP    History Obtained From:  Patient     HISTORY OF PRESENT ILLNESS:      The patient is a  61 y.o. male with significant medical history of with H/O CAD, CHF, CKD, recent CVA who came to the ER from [address] [place] [address] with CP w/o any neck/jaw/Arm/Shoulder/LUE pain, no SOB. Pt has chronically elevated BNP  and his Trops were high and now they are down trending. Pt is hungry and wants to eat and drink. Pt is a smoker and a poor historian , he is new in [facility], homeless and staying in hotels. Pt has been stable since admission. Patient underwent cardiac cath on 12/27/2021 and showed multivessel disease with patent LIMA to LAD and SVG to diagonal graft, patent stent in OM and distal RCA mild to moderate nonobstructive disease. Medical management was recommended. Past Medical History:        Diagnosis Date    AICD (automatic cardioverter/defibrillator) present     CAD (coronary artery disease)     CKD (chronic kidney disease), stage III ([address]     COVID-19     Myocardial infarction ([facility] [address]     [facility]paroxysmal [facility] fibrillation) ([facility])     S/P CABG (coronary artery bypass graft)     x2 separate occasions    Solitary kidney, congenital        Past Surgical History:        Procedure Laterality Date    CARDIAC CATHETERIZATION  12/27/2021    CORONARY ANGIOPLASTY WITH STENT PLACEMENT      Hx of CAD with multiple stents and CABG [facility] & [facility]    CORONARY ARTERY BYPASS GRAFT  2010    CORONARY ARTERY BYPASS GRAFT  2018    PACEMAKER PLACEMENT  09/11/2020    Medtronic ICD [id number] RV lead 6935-m55, LV lead 4598-88.  NOT MRI CONDITIONAL       Medications Prior to Admission:    Medications Prior to Admission: carvedilol (COREG) 6.25 MG tablet, Take 6.25 mg by mouth 2 times daily  metoprolol succinate (TOPROL XL) 50 MG extended release tablet, Take 1 tablet by mouth daily  pantoprazole (PROTONIX) 40 MG tablet, Take 1 tablet by mouth every morning (before breakfast)  clopidogrel (PLAVIX) 75 MG tablet, Take 1 tablet by mouth daily  atorvastatin (LIPITOR) 40 MG tablet, Take 1 tablet by mouth daily  tamsulosin (FLOMAX) 0.4 MG capsule, Take 0.4 mg by mouth daily  apixaban (ELIQUIS) 5 MG TABS tablet, Take 5 mg by mouth 2 times daily  isosorbide mononitrate (IMDUR) 30 MG extended release tablet, Take 1 tablet by mouth daily  nitroGLYCERIN (NITROSTAT) 0.4 MG SL tablet, up to max of 3 total doses. If no relief after 1 dose, call 911. sodium bicarbonate 650 MG tablet, Take 1 tablet by mouth 2 times daily  bumetanide (BUMEX) 2 MG tablet, Take 1 tablet by mouth 2 times daily  gabapentin (NEURONTIN) 100 MG capsule, Take 1 capsule by mouth 3 times daily for 30 days. Allergies:  Nsaids, Aspirin, Adhesive tape, Ceftriaxone, Ketorolac tromethamine, and Other    Immunizations: There is no immunization history on file for this patient.       Social History:  Patient smokes, previous ETOH/Drug use, Homeless      Family History:       Problem Relation Age of Onset    Cancer Mother     Diabetes Father     Heart Disease Father     Diabetes Paternal Grandmother        REVIEW OF SYSTEMS:  Constitutional:  negative for fevers, chills, sweats, fatigue, weight loss  HEENT:  negative for vision, hearing changes, runny nose, throat pain  Respiratory:  negative for shortness of breath, cough, congestion  Cardiovascular:  negative for chest pain, palpitations  Gastrointestinal:  negative for nausea, vomiting, diarrhea, constipation, abdominal pain  Genitourinary:  negative for frequency, dysuria  Integument/Breast:  negative for rash, skin lesions  Musculoskeletal:  negative for muscle aches, joint pain  Neurological:  negative for headaches, dizziness, lightheadedness, numbness, pain, tingling extremities  Behavior/Psych:  negative for depression, anxiety    Vitals:  BP (!) 132/92   Pulse 70 Temp 98.1 °F (36.7 °C) (Oral)   Resp 22   Ht 5' 9\" (1.753 m)   Wt 166 lb (75.3 kg)   SpO2 95%   BMI 24.51 kg/m²     PHYSICAL EXAM:  General appearance - alert, well appearing, and in no acute distress  Mental status - oriented to person, place, and time with normal affect  Head - normocephalic and atraumatic  Eyes - pupils equal and reactive, extraocular eye movements intact, conjunctiva clear  Ears - hearing appears to be intact  Nose - no drainage noted  Mouth - mucous membranes moist  Neck - supple, no carotid bruits, thyroid not palpable  Chest - clear to auscultation, normal effort  Heart - normal rate, regular rhythm, no murmur  Abdomen - soft, nontender, nondistended, bowel sounds present all four quadrants, no masses, hepatomegaly or splenomegaly  Neurological - normal speech, no focal findings or movement disorder noted, cranial nerves II through XII grossly intact  Extremities - peripheral pulses palpable, no pedal edema or calf pain with palpation  Skin - no gross lesions, rashes, or induration noted      DATA:  Hematology:  Recent Labs     12/29/21  0635 12/30/21 2119   WBC 5.2 4.2   HGB 9.2* 10.1*   HCT 28.9* 30.2*    231   SEDRATE  --  55*   CRP  --  47.9*   INR  --  1.3     Chemistry:  Recent Labs     12/29/21  0635 12/30/21  2119   * 137   K 4.2 4.3    106   CO2 19* 21   GLUCOSE 90 103*   BUN 22* 24*   CREATININE 1.93* 2.02*   ANIONGAP 10 10   LABGLOM 36* 34*   GFRAA 44* 41*   CALCIUM 8.2* 8.5*     Recent Labs     12/30/21  2119   PROT 6.4   LABALBU 3.1*   AST 12   ALT 9   ALKPHOS 78   BILITOT 0.43   AMYLASE 37   LIPASE 25           ASSESSMENT:  · CP  · Elevated Trops  · Elevated BNP  · CKD, Solitary kidney congenital  · CAD s/p CABG  · S/P AICD  · Recent COVID infection, still +ve  · Anemia  · H/O CVA  · H/O GI Bleed    PLAN:  · Restart home meds, Consult Card,     Electronically signed by Ernie Suazo MD on 12/31/2021 at 8:53 PM

## 2022-01-01 NOTE — PROGRESS NOTES
Received callback from Dr. Amanuel Perdomo regarding urinalysis results. No new orders at this time.

## 2022-01-01 NOTE — PROGRESS NOTES
This writer contacted Dr. Addy Villalobos in regards to any new orders for pain medication other than tylenol. patient complaining of  left flank pain \"feeling like knives in back on left side\". This writer also notified Dr. Gabe Torres urine specimen still needs to be collected.

## 2022-01-01 NOTE — PROGRESS NOTES
Patient has been back on telemetry monitor for about an hour. Resting with eyes closed, calm and cooperative.

## 2022-01-01 NOTE — CONSULTS
[facility]   Consult Note                 Date:   1/1/2022  Date of admission:  12/30/2021  8:15 PM  MRN:   [id number]  YOB: 1965    Reason for Consult: Cardiac evaluation    HISTORY OF PRESENT ILLNESS:    The patient is a 61 y.o.  male who is admitted to the hospital for chest pain which he described that heart get out of his chest patient has been demanding morphine. Patient was just discharged from [facility] has a cath done on 12/27/2021 details as below  Patient has been going to different hospital chest pain and demanding morphine as has been written in his medical record  At present patient still demanded morphine does not want to take nitro. Patient do not have any chest pain at this time no palpitation no ankle swelling. Past Medical History:   has a past medical history of AICD (automatic cardioverter/defibrillator) present, CAD (coronary artery disease), CKD (chronic kidney disease), stage III ([address] COVID-19, Myocardial infarction ([facility] PAF (paroxysmal atrial fibrillation) ([facility] S/P CABG (coronary artery bypass graft), and Solitary kidney, congenital.    Past Surgical History:   has a past surgical history that includes Coronary artery bypass graft (2010); Coronary artery bypass graft (2018); pacemaker placement (09/11/2020); Cardiac catheterization (12/27/2021); and Coronary angioplasty with stent. Home Medications:    Prior to Admission medications    Medication Sig Start Date End Date Taking?  Authorizing Provider   carvedilol (COREG) 6.25 MG tablet Take 6.25 mg by mouth 2 times daily   Yes Historical Provider, MD   metoprolol succinate (TOPROL XL) 50 MG extended release tablet Take 1 tablet by mouth daily 12/18/21  Yes Jm South MD   pantoprazole (PROTONIX) 40 MG tablet Take 1 tablet by mouth every morning (before breakfast) 12/18/21  Yes Jm South MD   clopidogrel (PLAVIX) 75 MG tablet Take 1 tablet by mouth daily   Yes Historical Provider, MD   atorvastatin (LIPITOR) 40 MG tablet Take 1 tablet by mouth daily   Yes Historical Provider, MD   tamsulosin (FLOMAX) 0.4 MG capsule Take 0.4 mg by mouth daily   Yes Historical Provider, MD   apixaban (ELIQUIS) 5 MG TABS tablet Take 5 mg by mouth 2 times daily   Yes Historical Provider, MD   isosorbide mononitrate (IMDUR) 30 MG extended release tablet Take 1 tablet by mouth daily 12/28/21   Edward Delong MD   nitroGLYCERIN (NITROSTAT) 0.4 MG SL tablet up to max of 3 total doses. If no relief after 1 dose, call 911. 12/27/21   Edward Delong MD   sodium bicarbonate 650 MG tablet Take 1 tablet by mouth 2 times daily 12/27/21   Edward Delong MD   bumetanide (BUMEX) 2 MG tablet Take 1 tablet by mouth 2 times daily 12/17/21   Edward Delong MD   gabapentin (NEURONTIN) 100 MG capsule Take 1 capsule by mouth 3 times daily for 30 days. 12/17/21 1/16/22  Edward Delong MD       Current Medications: Scheduled Meds:   sodium chloride flush  5-40 mL IntraVENous 2 times per day    diphenhydrAMINE  50 mg Oral BID    apixaban  5 mg Oral BID    carvedilol  12.5 mg Oral BID WC    clopidogrel  75 mg Oral Daily    lisinopril  10 mg Oral Daily    clonazePAM  1 mg Oral Daily    amLODIPine  5 mg Oral Daily     Continuous Infusions:   sodium chloride      sodium chloride       PRN Meds:.sodium chloride flush, sodium chloride flush, sodium chloride, acetaminophen, ondansetron **OR** ondansetron, morphine **OR** morphine, hydrALAZINE     Allergies:  Nsaids, Aspirin, Adhesive tape, Ceftriaxone, Ketorolac tromethamine, and Other    Social History:   reports that he has been smoking. He has been smoking about 0.25 packs per day. He has never used smokeless tobacco. He reports previous alcohol use. He reports previous drug use. Family History: family history includes Cancer in his mother; Diabetes in his father and paternal grandmother; Heart Disease in his father.      Review of Systems   CONSTITUTIONAL:  negative for fevers, chills, fatigue and malaise    EYES:  negative for discharge    HEENT:  negative for epistaxis and sore throat    RESPIRATORY:  negative for cough, shortness of breath, wheezing    CARDIOVASCULAR:  negative for chest pain, palpitations, syncope, edema    GASTROINTESTINAL:  negative for nausea, vomiting, diarrhea, constipation, abdominal pain    GENITOURINARY:  negative for incontinence    MUSCULOSKELETAL:  negative for neck or back pain    NEUROLOGICAL:  negative for headaches, seizures and double vision   PSYCHIATRIC:  negative               PHYSICAL EXAM:    Blood pressure 100/68, pulse 71, temperature 97.9 °F (36.6 °C), temperature source Axillary, resp. rate 16, height 5' 9\" (1.753 m), weight 166 lb (75.3 kg), SpO2 96 %. Patient refused to be examined. Medical staff was also in the room      DATA:    ECG:V paced , QTc 540 nsec    Echo 12/2021  Summary  Left ventricle is enlarged Global left ventricular systolic function is  moderate to severely reduced Estimated ejection fraction is 25-30% . Mild aortic insufficiency. Moderate mitral regurgitation. Moderate tricuspid regurgitation. Moderate pulmonary hypertension. Estimated right ventricular systolic  pressure is [id number]. No pericardial effusion. Cardiac Cath 12/27/21  Findings:  LMCA: Distal 30% stenosis     LAD: Mid 99% in-stent occlusion   D1 is large and occluded   LIMA-LAD is patent with mild disease (non-selective injection taken due to   difficulty engaging )   LAD beyond anastomosis is patent with 30-40% diffuse disease (seen through   SVG-Diag injection)   SVG-Diag is patent with mild disease     LCx: Mid 50% stenosis   OM is large, patent stent in the proximal segment with mild disease. RCA: Mild irregularities 20-30%. Large and dominant vessel   Patent stent in distal segment   RPDA/RPL are large patent with 10-20% stenosis      Coronary Tree  Dominance: Right     Labs:     CBC:   Recent Labs     12/30/21  2119   WBC 4.2 HGB 10.1*   HCT 30.2*        BMP:   Recent Labs     12/30/21 2119      K 4.3   CO2 21   BUN 24*   CREATININE 2.02*   LABGLOM 34*   GLUCOSE 103*     BNP: No results for input(s): BNP in the last 72 hours. PT/INR:   Recent Labs     12/30/21 2119   PROTIME 16.4*   INR 1.3     APTT:  Recent Labs     12/30/21 2119   APTT 39.9*     CARDIAC ENZYMES:No results for input(s): CKTOTAL, CKMB, CKMBINDEX, TROPONINI in the last 72 hours. FASTING LIPID PANEL:  Lab Results   Component Value Date    HDL 48 12/09/2021    TRIG 83 12/09/2021     LIVER PROFILE:  Recent Labs     12/30/21 2119   AST 12   ALT 9   LABALBU 3.1*       Intake/Output Summary (Last 24 hours) at 1/1/2022 7482  Last data filed at 1/1/2022 4737  Gross per 24 hour   Intake 10 ml   Output 2150 ml   Net -2140 ml       IMPRESSION:    Patient Active Problem List   Diagnosis    JACKSON (acute kidney injury) ([address]    Atypical chest pain    AICD (automatic cardioverter/defibrillator) present    History of MI (myocardial infarction)    Elevated brain natriuretic peptide (BNP) level    Elevated troponin    Chronic systolic heart failure ([address]    Hx of CABG    Stage 3 chronic kidney disease ([address]    COVID-19 virus infection    Hydronephrosis of left kidney    Acute urinary retention    Hepatitis C    Coronary artery disease involving native coronary artery of native heart with angina pectoris ([address]    Solitary kidney, congenital    Homeless single person    Acute right-sided weakness    Acute CVA (cerebrovascular accident) ([address]    Acute chest pain    Congestive heart failure of unknown etiology ([address]           RECOMMENDATIONS:  CAD CABG with recent heart cath details as above on 12/27/2021  Atypical chest pain, chronically elevated tropes  Patient has been demanding morphine. Patient has been going to different hospital 1 after the other as patient was just discharged from [facility]  I talk in detail regarding to go to pain clinic.   We cannot give too much morphine it will make him more dependent  Patient is very upset agitated does not allow me to examine  Patient is given the option that we will cover him for 1 month patient need to find other cardiologist  At this time we will continue home medications        Discussed with patient and nursing.     Kenya Yadav MD, MD  [facility]        [phone]

## 2022-01-01 NOTE — PROGRESS NOTES
Progress Note    1/1/2022 10:46 AM  Subjective: Interval History: Last 24 hrs he has been stable;has NC O2 for comfort only    Diet: ADULT DIET; Regular; Low Fat/Low Chol/High Fiber/2 gm Na    Medications:   Reviewed medications    Labs:   CBC: Recent Labs     12/30/21 2119   WBC 4.2   HGB 10.1*        BMP:    Recent Labs     12/30/21 2119      K 4.3      CO2 21   BUN 24*   CREATININE 2.02*   GLUCOSE 103*     Hepatic:   Recent Labs     12/30/21 2119   AST 12   ALT 9   BILITOT 0.43   ALKPHOS 78     Troponin: No results for input(s): TROPONINI in the last 72 hours. BNP: No results for input(s): BNP in the last 72 hours. Lipids: No results for input(s): CHOL, HDL in the last 72 hours. Invalid input(s): LDLCALCU  INR:   Recent Labs     12/30/21 2119   INR 1.3         Objective:   Vitals: /61   Pulse 71   Temp 97.9 °F (36.6 °C) (Axillary)   Resp 16   Ht 5' 9\" (1.753 m)   Wt 166 lb (75.3 kg)   SpO2 96%   BMI 24.51 kg/m²   General appearance: alert and cooperative with exam  Neck: no adenopathy, no carotid bruit, no JVD, supple, symmetrical, trachea midline and thyroid not enlarged, symmetric, no tenderness/mass/nodules  Lungs: clear to auscultation bilaterally;BS diminished throughout  Heart: regular rate and rhythm, S1, S2 normal, no murmur, click, rub or gallop  Abdomen: soft, non-tender; bowel sounds normal; no masses,  no organomegaly  Extremities: extremities normal, atraumatic, no cyanosis or edema  Neurologic: Mental status: Alert, oriented, thought content appropriate; Rt hemiparesis    Assessment:     Patient Active Problem List    Diagnosis Date Noted    Hydronephrosis of left kidney 12/09/2021    Acute urinary retention 12/09/2021    Elevated troponin 12/08/2021    Hepatitis C 12/09/2021    Chronic systolic heart failure ([facility] 12/08/2021    Hx of CABG 12/08/2021    Stage 3 chronic kidney disease ([facility] 12/08/2021    COVID-19 virus infection 12/08/2021    History of CVA with residual deficit 01/01/2022    Longstanding persistent atrial fibrillation ([address] 01/01/2022    Essential hypertension 01/01/2022    Pulmonary emphysema ([address] 01/01/2022    Congestive heart failure of unknown etiology ([address] 12/31/2021    Acute chest pain     Acute CVA (cerebrovascular accident) ([address] 12/23/2021    Acute right-sided weakness     Solitary kidney, congenital 12/14/2021    Homeless single person 12/14/2021    Coronary artery disease involving native coronary artery of native heart with angina pectoris ([address] 12/13/2021    JACKSON (acute kidney injury) ([facility] 12/08/2021    Atypical chest pain 12/08/2021    AICD (automatic cardioverter/defibrillator) present 12/08/2021    History of MI (myocardial infarction) 12/08/2021    Elevated brain natriuretic peptide (BNP) level 12/08/2021       Stage 3 b chronic kidney disease     Plan:   1. Repeat lasix 40 mg IV x1    2. Repeat chest x ray  3. BMP  4.  Wean NC O2 as tolerated  Electronically signed by John Waldron MD on 1/1/2022 at 10:46 AM

## 2022-01-01 NOTE — PLAN OF CARE
Problem: Falls - Risk of:  Goal: Will remain free from falls  Description: Will remain free from falls  1/1/2022 1442 by Elida Kate RN  Outcome: Ongoing-all standard fall precautions remain in place. Bedside table with water and phone within patient's reach, call light within reach, urinal within reach. Problem: Pain:  Goal: Pain level will decrease  Description: Pain level will decrease  1/1/2022 1442 by Elida Kate RN  Outcome: Ongoing-patient reported adequate pain control with IV morphine which has now been discontinued and is refusing oral pain medications. Problem: Pain:  Goal: Control of acute pain  Description: Control of acute pain  1/1/2022 1442 by Elida Kate RN  Outcome: Ongoing-    Problem: Pain:  Goal: Control of chronic pain  Description: Control of chronic pain  1/1/2022 1442 by Elida Kate RN  Outcome: Ongoing    IV morphine has been discontinued, patient refusing oral pain medications.

## 2022-01-01 NOTE — PROGRESS NOTES
Dr. Brandin Quiros returned call to this writer at 1140AM on 1/1/2022 and Gave orders for tramadol 50 mg  1-2 tablets by mouth  PRN every 6 hours for pain, patient refused tramadol while this writer still on phone with Dr. Brandin Quiros. Dr. Brandin Quiros then gave order for percocet 5/325 mg by mouth 1 tablet every 4 hours for pain. Patient refused percocet as well. Then became agitated with orders and the  discontinuation of morphine. Patient was attempting to get out of bed without assistance, pulling at Telemetry wires. This writer and [address] [place] [address] were able to calm patient down and redirect patient and maintain patient safety. Call light was returned to within patient's reach, bedside table and lunch within reach, urinal within patient's reach. All standard fall precautions in place, and bed alarm on.

## 2022-01-02 LAB
ANION GAP SERPL CALCULATED.3IONS-SCNC: 13 MMOL/L (ref 9–17)
BUN BLDV-MCNC: 35 MG/DL (ref 6–20)
BUN/CREAT BLD: ABNORMAL (ref 9–20)
CALCIUM SERPL-MCNC: 7.6 MG/DL (ref 8.6–10.4)
CHLORIDE BLD-SCNC: 106 MMOL/L (ref 98–107)
CO2: 21 MMOL/L (ref 20–31)
CREAT SERPL-MCNC: 2.35 MG/DL (ref 0.7–1.2)
EKG ATRIAL RATE: 65 BPM
EKG Q-T INTERVAL: 494 MS
EKG QRS DURATION: 170 MS
EKG QTC CALCULATION (BAZETT): 540 MS
EKG R AXIS: -86 DEGREES
EKG T AXIS: 98 DEGREES
EKG VENTRICULAR RATE: 72 BPM
GFR AFRICAN AMERICAN: 35 ML/MIN
GFR NON-AFRICAN AMERICAN: 29 ML/MIN
GFR SERPL CREATININE-BSD FRML MDRD: ABNORMAL ML/MIN/{1.73_M2}
GFR SERPL CREATININE-BSD FRML MDRD: ABNORMAL ML/MIN/{1.73_M2}
GLUCOSE BLD-MCNC: 104 MG/DL (ref 70–99)
POTASSIUM SERPL-SCNC: 4.6 MMOL/L (ref 3.7–5.3)
SODIUM BLD-SCNC: 140 MMOL/L (ref 135–144)

## 2022-01-02 PROCEDURE — 80048 BASIC METABOLIC PNL TOTAL CA: CPT

## 2022-01-02 PROCEDURE — 1200000000 HC SEMI PRIVATE

## 2022-01-02 PROCEDURE — 36415 COLL VENOUS BLD VENIPUNCTURE: CPT

## 2022-01-02 PROCEDURE — 6360000002 HC RX W HCPCS: Performed by: EMERGENCY MEDICINE

## 2022-01-02 PROCEDURE — 93010 ELECTROCARDIOGRAM REPORT: CPT | Performed by: INTERNAL MEDICINE

## 2022-01-02 PROCEDURE — 2580000003 HC RX 258: Performed by: EMERGENCY MEDICINE

## 2022-01-02 PROCEDURE — 6370000000 HC RX 637 (ALT 250 FOR IP): Performed by: FAMILY MEDICINE

## 2022-01-02 RX ORDER — CARVEDILOL 12.5 MG/1
12.5 TABLET ORAL 2 TIMES DAILY WITH MEALS
Qty: 60 TABLET | Refills: 3 | Status: SHIPPED | OUTPATIENT
Start: 2022-01-02

## 2022-01-02 RX ORDER — TRAMADOL HYDROCHLORIDE 50 MG/1
100 TABLET ORAL EVERY 6 HOURS PRN
Qty: 28 TABLET | Refills: 0 | Status: SHIPPED | OUTPATIENT
Start: 2022-01-02 | End: 2022-01-14

## 2022-01-02 RX ADMIN — APIXABAN 5 MG: 5 TABLET, FILM COATED ORAL at 09:42

## 2022-01-02 RX ADMIN — ONDANSETRON 4 MG: 2 INJECTION INTRAMUSCULAR; INTRAVENOUS at 02:43

## 2022-01-02 RX ADMIN — SODIUM CHLORIDE, PRESERVATIVE FREE 10 ML: 5 INJECTION INTRAVENOUS at 09:50

## 2022-01-02 RX ADMIN — CLONAZEPAM 1 MG: 1 TABLET ORAL at 09:41

## 2022-01-02 RX ADMIN — CLOPIDOGREL 75 MG: 75 TABLET, FILM COATED ORAL at 09:41

## 2022-01-02 RX ADMIN — DIPHENHYDRAMINE HCL 50 MG: 25 TABLET ORAL at 22:45

## 2022-01-02 RX ADMIN — SODIUM CHLORIDE, PRESERVATIVE FREE 10 ML: 5 INJECTION INTRAVENOUS at 09:49

## 2022-01-02 RX ADMIN — OXYCODONE HYDROCHLORIDE AND ACETAMINOPHEN 1 TABLET: 5; 325 TABLET ORAL at 02:58

## 2022-01-02 RX ADMIN — DIPHENHYDRAMINE HCL 50 MG: 25 TABLET ORAL at 09:45

## 2022-01-02 ASSESSMENT — PAIN DESCRIPTION - PROGRESSION

## 2022-01-02 ASSESSMENT — PAIN DESCRIPTION - PAIN TYPE: TYPE: CHRONIC PAIN

## 2022-01-02 ASSESSMENT — PAIN SCALES - GENERAL
PAINLEVEL_OUTOF10: 10
PAINLEVEL_OUTOF10: 10

## 2022-01-02 ASSESSMENT — PAIN DESCRIPTION - ORIENTATION: ORIENTATION: LEFT

## 2022-01-02 ASSESSMENT — PAIN DESCRIPTION - LOCATION: LOCATION: CHEST;FLANK

## 2022-01-02 NOTE — PROGRESS NOTES
Pt was refusing all oral pain medications but was persistently stating \"morphine is the only thing that works\". After discussing with the patient that morphine has been discontinued and will not be given, he became visibily agitated. Pt started grabbing his chest saying \"my heart is beating out of my chest\". Writer assessed patient and found his heart rate at 76 bpm and no physical signs of distress. After telling the pt my findings he began to \"twitch\" his body when he \"felt his heart beat out of his chest. Once pt realized I was not reacting to his twitching he stopped and stated \"I'm going to take the percocet just so I can tell these jokers it doesn't work\".

## 2022-01-02 NOTE — PROGRESS NOTES
Pt is now refusing IV fluids and telemetry monitoring. Pt has since developed several new pains but theirt consistent and confusing when I go in the room to check on him. Pt has stated \"I just want to leave since no one is going to do a damn thing for me\". Writer asked the patient why he felt this way, and he stated \"I want morphine or dilaudid, none if this pill shit. You guys aren't going to give me what I need. I'm dying over here. \" Writer then took vitals and they showed no signs of distress or elevation. Writer also did a full body assessment and showed no changes to earlier assessment. Writer then explained to the patient that if he \"felt as bad as he has expressed, then leaving would be a dangerous idea\". Writer also expressed that \"all medications he could take, has been offered but refused\".

## 2022-01-02 NOTE — CARE COORDINATION
ONGOING DISCHARGE PLAN:    Writer made aware by  Ivon Brito and bedside RN that the patient has a discharge order and is supposed to discharge to [address] [place] [address] today but the patient is asking to appeal their discharge and wanted to see the Medicare letter he got at admission. Writer to bedside and provided patient admission IMM letter and discharge IMM letter. Patient signed letter at 1030am and stated to writer he wanted to appeal discharge. Writer asked patient if there was anything that needed addressed or better explained regarding his discharge. Patient stated that his heart was racing and he was not ready to go and that he fired all his doctors. Per bedside RN patients HR has been in the 01J and systolic BP in the 59E. Writer informed patient that Dr Dayton Taylor was ready to discharge him and that he has a discharge order in. Patient told it was his right to appeal, given hospital contact information and given the number to call to request appeal.  At this time he was only able to leave a message with a call back number to request appeal.  Writer called number as well to verify that there was not a person that could be spoken with at this time. Electronically signed by Jose Walter RN on 1/2/2022 at 10:46 AM    Update 1210- At this time writer asked patient if he received a call back from [facility] and he did not. Writer asked patient to call number back and he was unable to get through. Writer in room to assist patient with process and when representative got on phone patient did not have his card to give ID number stating it was stolen. Representative unable to look up the patient at this time without that number. While patient being assisted by writer patient was very loud, yelling and using inappropriate language.      Electronically signed by Jose Walter RN on 1/2/2022 at 12:35 PM     Update 1330- Patient Medicare ID # [id number], patient filed appeal, writer in communication with Lucila Fishman and Case # is [id number] and the EMR Code is UMGUVT. Writer preparing all required documentation and will submit for appeal process at this time. Electronically signed by Priscilla Lehman RN on 1/2/2022 at 1:36 PM     Update 1410- All needed documentation submitted to [employer] site.     Electronically signed by Priscilla Lehman RN on 1/2/2022 at 2:12 PM

## 2022-01-02 NOTE — PROGRESS NOTES
Patient's bed alarm had been going off, patient found sitting on side of bed. \"trying to relieve back/neck/head pain. \" when PCAs responded to bed alarm, patient became agitated with their presence in the room. Security was called to room to assist with patient. This writer was able to calm patient down prior to arrival of security. Patient allowed this writer to assist him back into bed, however he refused to have the bed alarm set. This writer educated patient on safety concerns and fall precautions. Patient verbalized understanding and still refused to have alarm set.

## 2022-01-02 NOTE — PLAN OF CARE
Problem: Falls - Risk of:  Goal: Will remain free from falls  Description: Will remain free from falls  Outcome: Ongoing-patient has refused to have bed alarm on this afternoon (see nursing notes). Patient educated on safety precautions. Problem: Falls - Risk of:  Goal: Absence of physical injury  Description: Absence of physical injury  Outcome: Ongoing     Problem: Pain:  Goal: Pain level will decrease  Description: Pain level will decrease  Outcome: Ongoing-patient has refused all oral pain meds offered this shift. Patient not satisfied with pain control regimens. Doctors aware.      Problem: Pain:  Goal: Control of acute pain  Description: Control of acute pain  Outcome: Ongoing     Problem: Pain:  Goal: Control of chronic pain  Description: Control of chronic pain  Outcome: Ongoing

## 2022-01-02 NOTE — CARE COORDINATION
Social work: spoke to care coordinator and RN await medicare appeal process to complete. Placed ambulance at [facility] on \"will call\" for tomorrow. Will need to change the date on the ambulance form if pt goes tomorrow. Form is On the floor.  Mayuri alcantara

## 2022-01-02 NOTE — CARE COORDINATION
Social work: spoke to [employer] today ok to send pt back to [address] [place] [address] whenever ready. Call [phone] to advise of a time and they can pull pedrito or fax to: [phone].  Nataly alcantara

## 2022-01-02 NOTE — PROGRESS NOTES
Went to see the patient in the room medical staff in the room with me patient refused to talk to me refused to be examined. In this scenario it will be very hard to treat him. According to the medical staff patient also threatening to leave AMA as patient had history of leaving AMA when patient is not giving pain medications. At this time patient is given the option that I will sign off and he need to find other cardiologist currently in the hospital.  Patient may be benefit from psych, pain clinic follow-up.

## 2022-01-02 NOTE — PROGRESS NOTES
Pt continues to state he will \"go somewhere else if he won't get the care that he needs\", but \"will only stay because he likes me\". After reviewing several notes from his previous hospital encounters, this appears to be a repeated behavior at other facilities he has been to. Pt has history of leaving AMA.

## 2022-01-02 NOTE — PLAN OF CARE
Problem: Falls - Risk of:  Goal: Will remain free from falls  1/2/2022 0041 by Sofia Pedraza RN  Outcome: Not Met This Shift  1/1/2022 1442 by Jefferson Palomino RN  Outcome: Ongoing  Goal: Absence of physical injury  1/2/2022 0041 by Sofia Pedraza RN  Outcome: Not Met This Shift  Note: Pt remained free of any injury or fall throughout the shift. Bed remained in lowest position, side rails up x2, call light within reach. 1/1/2022 1442 by Jefferson Palomino RN  Outcome: Ongoing     Problem: Pain:  Goal: Pain level will decrease  1/2/2022 0041 by Sofia Pedraza RN  Outcome: Not Met This Shift  1/1/2022 1442 by Jefferson Palomino RN  Outcome: Ongoing  Goal: Control of acute pain  1/2/2022 0041 by Sofia Pedraza RN  Outcome: Not Met This Shift  Note: Patient was offered medication and non pharmacological care to help control pain and increase comfort. Patient tolerating well.        1/1/2022 1442 by Jefferson Palomino RN  Outcome: Ongoing  Goal: Control of chronic pain  1/2/2022 0041 by Sofia Pedraza RN  Outcome: Not Met This Shift  1/1/2022 1442 by Jefferson Palomino RN  Outcome: Ongoing

## 2022-01-02 NOTE — PROGRESS NOTES
Pt refusing IV fluids and stated \"it is giving me a headache and making me dizzy. \" Writer explained that the fluids are the same as they have been all day and that they are to help maintain his BP and hydration.  Pt became agitated and stated \"that's a bunch of crap,

## 2022-01-02 NOTE — PROGRESS NOTES
Patient dismissed both Dr. Markie Richards and Dr. Yuliet Dyson this morning and refused to allow them to examine him or discuss plan of care. Patient has expressed wanting new doctors.  Tonja SHETH has explained to patient how he needs to follow up and find his own new doctors. Patient has requested to appeal his discharge, and Dr. Pablo Nunez has been notified.  Marcela Matrinez has been notified of patient appealing discharge. Patient is interrupting staff during attempted education and refuses to allow staff to complete education.

## 2022-01-03 LAB
ANION GAP SERPL CALCULATED.3IONS-SCNC: 10 MMOL/L (ref 9–17)
BNP INTERPRETATION: ABNORMAL
BUN BLDV-MCNC: 38 MG/DL (ref 6–20)
BUN/CREAT BLD: ABNORMAL (ref 9–20)
CALCIUM SERPL-MCNC: 8.4 MG/DL (ref 8.6–10.4)
CHLORIDE BLD-SCNC: 106 MMOL/L (ref 98–107)
CO2: 24 MMOL/L (ref 20–31)
CREAT SERPL-MCNC: 2.35 MG/DL (ref 0.7–1.2)
GFR AFRICAN AMERICAN: 35 ML/MIN
GFR NON-AFRICAN AMERICAN: 29 ML/MIN
GFR SERPL CREATININE-BSD FRML MDRD: ABNORMAL ML/MIN/{1.73_M2}
GFR SERPL CREATININE-BSD FRML MDRD: ABNORMAL ML/MIN/{1.73_M2}
GLUCOSE BLD-MCNC: 126 MG/DL (ref 70–99)
POTASSIUM SERPL-SCNC: 5.1 MMOL/L (ref 3.7–5.3)
PRO-BNP: ABNORMAL PG/ML
SODIUM BLD-SCNC: 140 MMOL/L (ref 135–144)
TROPONIN INTERP: ABNORMAL
TROPONIN T: ABNORMAL NG/ML
TROPONIN, HIGH SENSITIVITY: 48 NG/L (ref 0–22)

## 2022-01-03 PROCEDURE — 36415 COLL VENOUS BLD VENIPUNCTURE: CPT

## 2022-01-03 PROCEDURE — 2580000003 HC RX 258: Performed by: EMERGENCY MEDICINE

## 2022-01-03 PROCEDURE — 84484 ASSAY OF TROPONIN QUANT: CPT

## 2022-01-03 PROCEDURE — 83880 ASSAY OF NATRIURETIC PEPTIDE: CPT

## 2022-01-03 PROCEDURE — 1200000000 HC SEMI PRIVATE

## 2022-01-03 PROCEDURE — 6370000000 HC RX 637 (ALT 250 FOR IP): Performed by: FAMILY MEDICINE

## 2022-01-03 PROCEDURE — 80048 BASIC METABOLIC PNL TOTAL CA: CPT

## 2022-01-03 RX ORDER — LORAZEPAM 1 MG/1
1 TABLET ORAL ONCE
Status: COMPLETED | OUTPATIENT
Start: 2022-01-03 | End: 2022-01-03

## 2022-01-03 RX ADMIN — SODIUM CHLORIDE, PRESERVATIVE FREE 10 ML: 5 INJECTION INTRAVENOUS at 18:15

## 2022-01-03 RX ADMIN — CARVEDILOL 12.5 MG: 12.5 TABLET, FILM COATED ORAL at 18:14

## 2022-01-03 RX ADMIN — CARVEDILOL 12.5 MG: 12.5 TABLET, FILM COATED ORAL at 18:15

## 2022-01-03 RX ADMIN — DIPHENHYDRAMINE HCL 50 MG: 25 TABLET ORAL at 18:14

## 2022-01-03 RX ADMIN — LORAZEPAM 1 MG: 1 TABLET ORAL at 19:34

## 2022-01-03 RX ADMIN — APIXABAN 5 MG: 5 TABLET, FILM COATED ORAL at 18:15

## 2022-01-03 NOTE — CARE COORDINATION
CHEYANNE APPEAL:    Current status of appeal:    \"Physician Review Completed  Review by a Mary De León physician reviewer has been completed. \"    Electronically signed by Brock Miller RN on 1/3/2022 at 7:54 AM    Update 1540 - Received call from [address] [place] [address] with appeal outcome. Mary Danvers and Venita agrees with termination of services. In the mean time, pt has fired Dr. Mari Taylor, and Dr. Hemant Arzola assumed care. Cardiology consult to Dr. Iris Guardado has been made and discharge order removed.     Electronically signed by Brock Miller RN on 1/3/2022 at 3:45 PM

## 2022-01-03 NOTE — DISCHARGE SUMMARY
[address]                 [address] [facility], [address]                               DISCHARGE SUMMARY    PATIENT NAME: Mayi Caldera                        :        1965  MED REC NO:   [id number]                              ROOM:       2055  ACCOUNT NO:   [de-identified]                           ADMIT DATE: 2021  PROVIDER:     Jose Way DATE:    PRINCIPAL DIAGNOSIS:  Congestive heart failure. SECONDARY DIAGNOSES:  Include CVA with residual deficit, coronary artery  disease, CABG presence of pacer defibrillator, atrial fibrillation,  essential hypertension and stage IIIB chronic kidney disease. OPERATIONS AND PROCEDURES:  None. HOSPITAL COURSE:  This 41-year-old male was admitted with complaints of  chest pain and shortness of breath. He had recently been discharged  from [facility] after having had a cardiac cath and told  that he had no lesions requiring stenting. Chest x-ray showed  congestive heart failure and his brain natriuretic peptide was elevated  so he was started on IV Lasix. His carvedilol was increased and  amlodipine continued for blood pressure control. He complained of  anxiety and clonazepam was administered. Eliquis was continued due to  his underlying atrial fibrillation. He proved to be quite difficult to  take care of having basically refused cardiology evaluation and  providing himself as a real problem for nursing care. At times, he  would become quite hostile and take himself out of bed and verbally  quite hostile to attending nursing and medical staff. Nonetheless, his  labs were reevaluated and were stable. He was off of oxygen and chest  x-ray showed minimal signs of remaining fluid at the bases of his lungs. His O2 sats were well into the 90s on room air. CONDITION ON DISCHARGE:  Improved.     DISCHARGE MEDICATIONS:  Medications and dosage per med reconciliation  sheet. ACTIVITY LEVEL:  No limitations. DIET:  Cardiac. OFFICE FOLLOW UP NECESSARY:  No.    DISPOSITION:  Discharged back to [facility].         TONYA NATION    D: 01/02/2022 10:30:27       T: 01/02/2022 10:34:15     [email or web address]  Job#: [id number]     Doc#: [id number]    CC:

## 2022-01-03 NOTE — PROGRESS NOTES
Writer speaks to Dr Zakia Cai regarding this patient requesting a new attending doctor.  Writer goes through lab values and medications with . Dr Zakia Cai agrees to assume care of pt while pt is in the hospital.

## 2022-01-03 NOTE — PROGRESS NOTES
Messaged Dr. Obdulia Stone via [employer], \"Patient is complaining of chest pain 7/10. Patient describes pain as pressure. Patient refusing vital signs, assessment, telemetry monitoring at this time. Would you like to order an EKG? Please advise. \"  Will continue to monitor patient. Awaiting response.

## 2022-01-03 NOTE — PLAN OF CARE
Problem: Falls - Risk of:  Goal: Will remain free from falls  Description: Will remain free from falls  1/3/2022 0327 by García Stokes RN  Outcome: Ongoing     Problem: Falls - Risk of:  Goal: Absence of physical injury  Description: Absence of physical injury  1/3/2022 0327 by García Stokes RN  Outcome: Ongoing     Problem: Pain:  Goal: Pain level will decrease  Description: Pain level will decrease  1/3/2022 0327 by García Stokes RN  Outcome: Ongoing     Problem: Pain:  Goal: Control of acute pain  Description: Control of acute pain  1/3/2022 0327 by García Stokes RN  Outcome: Ongoing     Problem: Pain:  Goal: Control of chronic pain  Description: Control of chronic pain  1/3/2022 0327 by García Stokes RN  Outcome: Ongoing

## 2022-01-03 NOTE — PROGRESS NOTES
Patient able to eat manjit crackers with no signs of swallowing difficulty. Writer will continue to monitor.

## 2022-01-03 NOTE — PROGRESS NOTES
Patient got himself dressed. Writer approached Kayleen Ramírez RN to ask for assistance in calming patient. Writer does not want patient to leave AMA due to freezing outdoor temperature and patient is homeless. Kisha SHETH able to calm patient, get patient to take benadryl, and convince patient to stay until morning. Patient resting comfortably in bed. Writer will continue to monitor.

## 2022-01-03 NOTE — PROGRESS NOTES
Writer attempted to assess patient, obtain vitals, and administer morning medications x2. Patient refused. Attempted to educate patient on the importance of tasks in order to give proper care.

## 2022-01-03 NOTE — PROGRESS NOTES
Patient able to be calmed by Writer and Kisha RN. Vitals completed. Patient refusing medication at this time and assessment. Writer offered pills. Patient states he has trouble swallowing at night. Writer offered to give pills whole or crushed in pudding or applesauce. Patient refusing all medication administration options at this time. Patient still untrusting of staff. Medication not given. Will continue to monitor patient.

## 2022-01-03 NOTE — PROGRESS NOTES
Patient refusing bed alarm. Patient physically and verbally abusive to staff. Writer informed patient he can fall if he insistent on not using bed alarm and staff assistance. Patient still verbally abusive and telling staff to leave. Writer exiting room per patient request after educating patient on fall risk.

## 2022-01-03 NOTE — PROGRESS NOTES
Patient agitated being verbally abusive. Patient refusing to allow writer to take vitals. Patient stating that he doesn't know writer isn't here to help him. Patient stating he's been treating him badly. Patient refusing to allow writer to do assessment or vitals at this time. Writer will continue to monitor patient.

## 2022-01-03 NOTE — PROGRESS NOTES
Notified Dr. Jany Montenegro that per patient request, a new provider has been chosen to assume care.

## 2022-01-04 LAB
EKG ATRIAL RATE: 74 BPM
EKG Q-T INTERVAL: 482 MS
EKG QRS DURATION: 172 MS
EKG QTC CALCULATION (BAZETT): 520 MS
EKG R AXIS: -82 DEGREES
EKG T AXIS: 114 DEGREES
EKG VENTRICULAR RATE: 70 BPM

## 2022-01-04 PROCEDURE — 2500000003 HC RX 250 WO HCPCS: Performed by: FAMILY MEDICINE

## 2022-01-04 PROCEDURE — 1200000000 HC SEMI PRIVATE

## 2022-01-04 PROCEDURE — 6370000000 HC RX 637 (ALT 250 FOR IP): Performed by: FAMILY MEDICINE

## 2022-01-04 PROCEDURE — 90792 PSYCH DIAG EVAL W/MED SRVCS: CPT | Performed by: PSYCHIATRY & NEUROLOGY

## 2022-01-04 PROCEDURE — 6360000002 HC RX W HCPCS

## 2022-01-04 RX ORDER — MORPHINE SULFATE 2 MG/ML
INJECTION, SOLUTION INTRAMUSCULAR; INTRAVENOUS
Status: COMPLETED
Start: 2022-01-04 | End: 2022-01-04

## 2022-01-04 RX ORDER — MORPHINE SULFATE 2 MG/ML
1 INJECTION, SOLUTION INTRAMUSCULAR; INTRAVENOUS
Status: ACTIVE | OUTPATIENT
Start: 2022-01-04 | End: 2022-01-04

## 2022-01-04 RX ORDER — LISINOPRIL 10 MG/1
10 TABLET ORAL DAILY
Qty: 30 TABLET | Refills: 3 | Status: SHIPPED | OUTPATIENT
Start: 2022-01-05

## 2022-01-04 RX ORDER — MORPHINE SULFATE 2 MG/ML
1 INJECTION, SOLUTION INTRAMUSCULAR; INTRAVENOUS DAILY PRN
Status: COMPLETED | OUTPATIENT
Start: 2022-01-04 | End: 2022-01-04

## 2022-01-04 RX ORDER — MORPHINE SULFATE 2 MG/ML
1 INJECTION, SOLUTION INTRAMUSCULAR; INTRAVENOUS ONCE
Status: COMPLETED | OUTPATIENT
Start: 2022-01-04 | End: 2022-01-04

## 2022-01-04 RX ORDER — ISOSORBIDE DINITRATE 20 MG/1
20 TABLET ORAL 3 TIMES DAILY
Status: DISCONTINUED | OUTPATIENT
Start: 2022-01-04 | End: 2022-01-14 | Stop reason: HOSPADM

## 2022-01-04 RX ADMIN — APIXABAN 5 MG: 5 TABLET, FILM COATED ORAL at 10:48

## 2022-01-04 RX ADMIN — MORPHINE SULFATE 1 MG: 2 INJECTION, SOLUTION INTRAMUSCULAR; INTRAVENOUS at 03:05

## 2022-01-04 RX ADMIN — DIPHENHYDRAMINE HCL 50 MG: 25 TABLET ORAL at 20:11

## 2022-01-04 RX ADMIN — APIXABAN 5 MG: 5 TABLET, FILM COATED ORAL at 20:10

## 2022-01-04 RX ADMIN — DIPHENHYDRAMINE HCL 50 MG: 25 TABLET ORAL at 10:48

## 2022-01-04 RX ADMIN — MORPHINE SULFATE 1 MG: 2 INJECTION, SOLUTION INTRAMUSCULAR; INTRAVENOUS at 16:13

## 2022-01-04 RX ADMIN — CLOPIDOGREL 75 MG: 75 TABLET, FILM COATED ORAL at 10:48

## 2022-01-04 ASSESSMENT — PAIN SCALES - GENERAL
PAINLEVEL_OUTOF10: 8
PAINLEVEL_OUTOF10: 10

## 2022-01-04 NOTE — PROGRESS NOTES
Hospitalist Progress Note  1/3/2022 9:34 PM  Subjective:   Admit Date: 12/30/2021  PCP: Kallie Tavares MD     Full Code      C/c:  Chief Complaint   Patient presents with    Chest Pain         Interval History: I was advised to assume care of this pt due to asking his physician to be off of his case, saw pt this evening, advised pt needs to listen to us as we will also listen tohim, pt says he is having some palpitation, montor ,does not show any tachycardia,  During interview pt was slightly angry    Diet: ADULT DIET; Regular; Low Fat/Low Chol/High Fiber/2 gm Na                                ip days:3  Medications:   Scheduled Meds:   sodium chloride flush  5-40 mL IntraVENous 2 times per day    diphenhydrAMINE  50 mg Oral BID    apixaban  5 mg Oral BID    carvedilol  12.5 mg Oral BID WC    clopidogrel  75 mg Oral Daily    lisinopril  10 mg Oral Daily    clonazePAM  1 mg Oral Daily    amLODIPine  5 mg Oral Daily     Continuous Infusions:   sodium chloride      sodium chloride Stopped (01/02/22 0430)     PRN Meds:. HYDROmorphone, traMADol, traMADol, sodium chloride flush, sodium chloride flush, sodium chloride, acetaminophen, ondansetron **OR** ondansetron, hydrALAZINE     CBC: No results for input(s): WBC, HGB, PLT in the last 72 hours. BMP:    Recent Labs     01/02/22  0725 01/03/22 2039    140   K 4.6 5.1    106   CO2 21 24   BUN 35* 38*   CREATININE 2.35* 2.35*   GLUCOSE 104* 126*     Hepatic: No results for input(s): AST, ALT, ALB, BILITOT, ALKPHOS in the last 72 hours. Troponin: No results for input(s): TROPONINI in the last 72 hours. BNP: No results for input(s): BNP in the last 72 hours. Lipids: No results for input(s): CHOL, HDL in the last 72 hours. Invalid input(s): LDLCALCU  INR: No results for input(s): INR in the last 72 hours.     Objective:   Vitals: BP (!) 150/95   Pulse 71   Temp 98.6 °F (37 °C) (Oral)   Resp 18   Ht 5' 9\" (1.753 m)   Wt 166 lb (75.3 kg)   SpO2 99%   BMI 24.51 kg/m²   General appearance: alert, appears stated age and cooperative  Skin: Skin color, texture, turgor normal. No rashes or lesions  Lungs: clear to auscultation bilaterally  Heart: regular rate and rhythm, S1, S2 normal, no murmur, click, rub or gallop  Abdomen: soft, non-tender; bowel sounds normal; no masses,  no organomegaly  Extremities: extremities normal, atraumatic, no cyanosis or edema  Neurologic: Mental status: Alert, oriented, thought content appropriate    Prophylaxis:   DVT with  [] lovenox        [] heparin        [] Scd        [x] apixaban    Radiology:  NM LUNG SCAN PERFUSION ONLY    Result Date: 12/31/2021  EXAMINATION: NUCLEAR MEDICINE PERFUSION SCAN. 12/31/2021 TECHNIQUE: Ventilation not performed as part of COVID-19 safety precautions. 6.0 millicuries of Tc 03Q MAA was administered intravenously prior to planar imaging of the lungs in multiple projections. COMPARISON: Chest radiograph dated 12/28/2021. HISTORY: ORDERING SYSTEM PROVIDED HISTORY: SOB, elev DD TECHNOLOGIST PROVIDED HISTORY: SOB, elev DD Decision Support Exception - unselect if not a suspected or confirmed emergency medical condition->Emergency Medical Condition (MA) Reason for Exam: SOB, elevated D-Dimer Additional signs and symptoms: SOB while laying down, stroke 2 weeks ago, has had 3 strokes, CP, heart feels like its beating out of his chest, coughing, was coughing up black one week ago, No hx of lung dx, No hx of blood clots, smokes 1 pack/3 days for 15-17 years, D-Dimer 12/30/21: 2.37 FINDINGS: PERFUSION: Distribution of radiotracer is homogeneous. No segmental defects identified. CHEST RADIOGRAPH: No focal areas of consolidation or significant effusions on recent chest radiograph. Low Probability for Pulmonary Embolus. Assessment :   1. Paced rhythm/eliquis  2. S/p aicd  3. Atypical chest pain     Plan:   1. Will get psych consult  2.   One time dose of 1mg clonezepam    Patient Active Problem List:     JACKSON (acute kidney injury) ([facility]     Atypical chest pain     AICD (automatic cardioverter/defibrillator) present     History of MI (myocardial infarction)     Elevated brain natriuretic peptide (BNP) level     Elevated troponin     Chronic systolic heart failure (HCC)     Hx of CABG     Stage 3 chronic kidney disease ([address]     COVID-19 virus infection     Hydronephrosis of left kidney     Acute urinary retention     Hepatitis C     Coronary artery disease involving native coronary artery of native heart with angina pectoris ([address]     Solitary kidney, congenital     Homeless single person     Acute right-sided weakness     Acute CVA (cerebrovascular accident) ([address]     Acute chest pain     Congestive heart failure of unknown etiology ([address]     History of CVA with residual deficit     Longstanding persistent atrial fibrillation ([address]     Essential hypertension     Pulmonary emphysema ([address]      Anticipated Disposition upon discharge: [] Home                                                                         [] Home with Home Health                                                                         [] [facility]                                                                         [] [address] ,[address]      Patient is admitted as inpatient status because of co-morbidities listed above, severity of signs and symptoms as outlined, requirement for current medical therapies and most importantly because of direct risk to patient if care not provided in a hospital setting.           Stefan Sauceda MD, MD  [facility] Hospitalist

## 2022-01-04 NOTE — PROGRESS NOTES
Writer spoke with Dr. Tammy Gabriel. Wants Morphine discontinued, okay to start the discharge process with discharge planning, and wants a 1:1 at bedside.      Electronically signed by Rudy Long RN on 1/4/22 at 10:40 AM EST

## 2022-01-04 NOTE — PLAN OF CARE
Problem: Falls - Risk of:  Goal: Will remain free from falls  Description: Will remain free from falls  Outcome: Ongoing  Note: Patient remains free of incidence/ injury. Bed remains in low position. Side rails up x2. Problem: Pain:  Goal: Control of acute pain  Description: Control of acute pain  Outcome: Ongoing  Note: Patient expresses relief following administration of prn pain medication.

## 2022-01-04 NOTE — CARE COORDINATION
DERIAN called Ginette Ramirez in admissions for [facility] as this patient did come from the facility in [place]. Ginette Ramirez reported that she is not clear if they are accepting this patient back or not. DERIAN attempted to call Breanne liaison for [facility]. DERIAN spoke with her and Mark . Nish Singh reported that he is not really a paid bed hold and they do not want to take him back due to his non compliance. DERIAN pointed out that during his stay here Admissions had been telling SW that the patient is a bed hold and can return whenever he is ready and on the day of DC, they are unwilling to accept him back. Mark asked about a psych stay and DERIAN pointed out that there was really no reason for a psych stay, however, psych did see the patient and there is no reason for a psych stay and that the patient is able to make his own decisions. DERIAN spoke with Jarrell Severs again and she reported that she has been speaking with the [facility] folks and she really had  Not gotten a straight answer as to what they were going to do. Jarrell Severs explained that he is not an actual paid bed hold due to them not being able to complete the Medicaid documentation/application. SW reminded Jarrell Severs that they had been telling this facility that he was a bed hold. If DERIAN had known any of this SW could have been looking for alternative placement. Currently, Jarrell Severs was going to speak with the top person for admissions at [facility] and then she was going to come and try to see the patient again as that is what the facility had wanted her to do. She did come earlier to see the patient but the RN did not want her to disturb him as he had just gone to sleep. Therefore, currently waiting on Breanne to come back and see the patient, prior to knowing if he will be able to return.

## 2022-01-04 NOTE — PROGRESS NOTES
Patient notified of plans to discharge, patient refusing to put legs in bed, or move over, as he was hanging on the edge. Patient yelled obscenities at [address], stating \"get out of my room, and send me back to where I was. \"  Security called for assistance in getting patient back to bed and removing IVs, however patient adamantly refused.

## 2022-01-04 NOTE — PROGRESS NOTES
Writer asked unit clerk, Micheline Bourne, to call security. Patient refuses to go back to his room.     Electronically signed by Alo Madrid RN on 1/4/22 at 3:34 PM EST

## 2022-01-04 NOTE — DISCHARGE SUMMARY
Hospitalist Discharge Summary    Beth Brooks  :  1965  MRN:  [id number]    Admit date:  2021  Discharge date:  22    Admitting Physician:  Hammad Gregory MD    Discharge Diagnoses:   Patient Active Problem List   Diagnosis    JACKSON [facility]) ([facility]    Atypical chest pain    AICD (automatic cardioverter/defibrillator) present    History of MI (myocardial infarction)    Elevated brain natriuretic peptide (BNP) level    Elevated troponin    Chronic systolic heart failure ([facility]    Hx of CABG    Stage 3 chronic kidney disease ([facility]    COVID-19 virus infection    Hydronephrosis of left kidney    Acute urinary retention    Hepatitis C    Coronary artery disease involving native coronary artery of native heart with angina pectoris ([facility]    Solitary kidney, congenital    Homeless single person    Acute right-sided weakness    Acute CVA (cerebrovascular accident) ([facility]    Acute chest pain    Congestive heart failure of unknown etiology ([facility]    History of CVA with residual deficit    Longstanding persistent atrial fibrillation ([facility]    Essential hypertension    Pulmonary emphysema ([facility]        Admission Condition:  fair      Discharged Condition:  fair    Hospital Course/Treatments   Admitted with chest pain, pt who was in rehab came to us, pt has  Had labs done which was wnl, pt very hostile to nursing  And physician, pt labs hv improved, for complete details of his stay please see d/c from dr Jose Mcgarry    Discharge Medications:         Medication List      START taking these medications    lisinopril 10 MG tablet  Commonly known as: PRINIVIL;ZESTRIL  Take 1 tablet by mouth daily  Start taking on: 2022     traMADol 50 MG tablet  Commonly known as: ULTRAM  Take 2 tablets by mouth every 6 hours as needed for Pain for up to 7 days.         CHANGE how you take these medications    carvedilol 12.5 MG tablet  Commonly known as: COREG  Take 1 tablet by mouth 2 times daily (with meals)  What changed:   · medication strength  · how much to take  · when to take this        CONTINUE taking these medications    atorvastatin 40 MG tablet  Commonly known as: LIPITOR     bumetanide 2 MG tablet  Commonly known as: BUMEX  Take 1 tablet by mouth 2 times daily     clopidogrel 75 MG tablet  Commonly known as: PLAVIX     Eliquis 5 MG Tabs tablet  Generic drug: apixaban     gabapentin 100 MG capsule  Commonly known as: NEURONTIN  Take 1 capsule by mouth 3 times daily for 30 days. isosorbide mononitrate 30 MG extended release tablet  Commonly known as: IMDUR  Take 1 tablet by mouth daily     nitroGLYCERIN 0.4 MG SL tablet  Commonly known as: NITROSTAT  up to max of 3 total doses. If no relief after 1 dose, call 911. pantoprazole 40 MG tablet  Commonly known as: PROTONIX  Take 1 tablet by mouth every morning (before breakfast)     sodium bicarbonate 650 MG tablet  Take 1 tablet by mouth 2 times daily     tamsulosin 0.4 MG capsule  Commonly known as: FLOMAX        STOP taking these medications    metoprolol succinate 50 MG extended release tablet  Commonly known as: TOPROL XL           Where to Get Your Medications      These medications were sent to [facility] [address] [facility], [address]  [address] [place] [address]    Phone: [phone]   · lisinopril 10 MG tablet     You can get these medications from any pharmacy    Bring a paper prescription for each of these medications  · carvedilol 12.5 MG tablet  · traMADol 50 MG tablet         Consults:  IP CONSULT TO FAMILY MEDICINE  IP CONSULT TO CARDIOLOGY  IP CONSULT TO CARDIOLOGY  IP CONSULT TO PSYCHIATRY    Significant Diagnostic Studies:  NM LUNG SCAN PERFUSION ONLY    Result Date: 12/31/2021  EXAMINATION: NUCLEAR MEDICINE PERFUSION SCAN. 12/31/2021 TECHNIQUE: Ventilation not performed as part of COVID-19 safety precautions.   6.0 millicuries of Tc 10M MAA was administered intravenously prior to planar imaging of the lungs in multiple projections. COMPARISON: Chest radiograph dated 12/28/2021. HISTORY: ORDERING SYSTEM PROVIDED HISTORY: SOB, elev DD TECHNOLOGIST PROVIDED HISTORY: SOB, elev DD Decision Support Exception - unselect if not a suspected or confirmed emergency medical condition->Emergency Medical Condition (MA) Reason for Exam: SOB, elevated D-Dimer Additional signs and symptoms: SOB while laying down, stroke 2 weeks ago, has had 3 strokes, CP, heart feels like its beating out of his chest, coughing, was coughing up black one week ago, No hx of lung dx, No hx of blood clots, smokes 1 pack/3 days for 15-17 years, D-Dimer 12/30/21: 2.37 FINDINGS: PERFUSION: Distribution of radiotracer is homogeneous. No segmental defects identified. CHEST RADIOGRAPH: No focal areas of consolidation or significant effusions on recent chest radiograph. Low Probability for Pulmonary Embolus. Disposition:   SNF    Discharge Instructions: Activity: activity as tolerated  Diet:  cardiac diet    Follow up with Keira Douglas MD in 1 weeks.     Signed:  Rita Figueroa MD  1/4/2022, 12:39 PM    Time spent in discharge of this pt is more than 30 minutes in examination,evaluvation,  counseling and review of medication and discharge plan

## 2022-01-04 NOTE — PROGRESS NOTES
Patient still in hallway, continues to be verbally abusive to staff. Unit manager, Saeid Melendez, also in hallway trying to talk to patient.     Electronically signed by Terry Olmstead RN on 1/4/22 at 3:37 PM EST

## 2022-01-04 NOTE — CONSULTS
Department of Psychiatry   Psychiatric Assessment      Thank you very much for allowing us to participate in the care of this patient. Reason for Consult: Anxiety    HISTORY OF PRESENT ILLNESS:    Patient is a 79-year-old male with history of congestive heart failure admitted for chest pain. Psychiatry was consulted to help with severe anxiety. Social work from the floor texted me stating that patient is threatening to leave AMA and they needed an assessment from psychiatry before he could make the decision. He is oriented to time place person and situation. Reports that he has been dealing with severe pain in his chest and has not been receiving any help here. Was very irritable and was calling all the physicians crazy here. Mentions that he will go back to the rehab facility where he was coming from. Reports that he was staying there for 2 days following a stroke. Mentions that he has no other place to live at at this time. Denies any suicidal or homicidal ideation plan or intent. Discussed with him at length about the risks of going on to the streets and being homeless with his medical conditions versus going to a rehab facility from here. Patient clearly understand the risk versus benefits and is recently able to mentally the situation back to me. PSYCHIATRIC HISTORY:  Patient mentions that he is not \"crazy\". Refusing to answer any further questions regarding his previous psychiatric history    Lifetime Psychiatric Review of Systems      ·    Obsessions and Compulsions: Denies    ·    Brenda or Hypomania: Denies  ·    Hallucinations: Denies  ·    Panic Attacks:  Denies  ·    Delusions:  Denies  ·    Phobias:  Denies  ·    Trauma: Denies    Prior to Admission medications    Medication Sig Start Date End Date Taking?  Authorizing Provider   lisinopril (PRINIVIL;ZESTRIL) 10 MG tablet Take 1 tablet by mouth daily 1/5/22  Yes Carolina Truong MD   carvedilol (COREG) 12.5 MG tablet Take 1 tablet by mouth 2 times daily (with meals) 1/2/22  Yes Aissatou Vallecillo MD   traMADol (ULTRAM) 50 MG tablet Take 2 tablets by mouth every 6 hours as needed for Pain for up to 7 days. 1/2/22 1/9/22 Yes Aissatou Vallecillo MD   pantoprazole (PROTONIX) 40 MG tablet Take 1 tablet by mouth every morning (before breakfast) 12/18/21  Yes Keira Ambrosio MD   clopidogrel (PLAVIX) 75 MG tablet Take 1 tablet by mouth daily   Yes Historical Provider, MD   atorvastatin (LIPITOR) 40 MG tablet Take 1 tablet by mouth daily   Yes Historical Provider, MD   tamsulosin (FLOMAX) 0.4 MG capsule Take 0.4 mg by mouth daily   Yes Historical Provider, MD   apixaban (ELIQUIS) 5 MG TABS tablet Take 5 mg by mouth 2 times daily   Yes Historical Provider, MD   isosorbide mononitrate (IMDUR) 30 MG extended release tablet Take 1 tablet by mouth daily 12/28/21   Keira Ambrosio MD   nitroGLYCERIN (NITROSTAT) 0.4 MG SL tablet up to max of 3 total doses. If no relief after 1 dose, call 911. 12/27/21   Keira Ambrosio MD   sodium bicarbonate 650 MG tablet Take 1 tablet by mouth 2 times daily 12/27/21   Keira Ambrosio MD   bumetanide (BUMEX) 2 MG tablet Take 1 tablet by mouth 2 times daily 12/17/21   Keira Ambrosio MD   gabapentin (NEURONTIN) 100 MG capsule Take 1 capsule by mouth 3 times daily for 30 days.  12/17/21 1/16/22  Keira Ambrosio MD        Medications:    Current Facility-Administered Medications: morphine (PF) injection 1 mg, 1 mg, IntraVENous, Once PRN  isosorbide dinitrate (ISORDIL) tablet 20 mg, 20 mg, Oral, TID  traMADol (ULTRAM) tablet 50 mg, 50 mg, Oral, Q6H PRN  traMADol (ULTRAM) tablet 100 mg, 100 mg, Oral, Q6H PRN  sodium chloride flush 0.9 % injection 10 mL, 10 mL, IntraVENous, PRN  sodium chloride flush 0.9 % injection 5-40 mL, 5-40 mL, IntraVENous, 2 times per day  sodium chloride flush 0.9 % injection 5-40 mL, 5-40 mL, IntraVENous, PRN  0.9 % sodium chloride infusion, 25 mL, IntraVENous, PRN  acetaminophen (TYLENOL) tablet 650 mg, 650 mg, Oral, Q4H PRN  ondansetron (ZOFRAN-ODT) disintegrating tablet 4 mg, 4 mg, Oral, Q8H PRN **OR** ondansetron (ZOFRAN) injection 4 mg, 4 mg, IntraVENous, Q6H PRN  0.9 % sodium chloride infusion, , IntraVENous, Continuous  diphenhydrAMINE (BENADRYL) tablet 50 mg, 50 mg, Oral, BID  apixaban (ELIQUIS) tablet 5 mg, 5 mg, Oral, BID  carvedilol (COREG) tablet 12.5 mg, 12.5 mg, Oral, BID WC  clopidogrel (PLAVIX) tablet 75 mg, 75 mg, Oral, Daily  lisinopril (PRINIVIL;ZESTRIL) tablet 10 mg, 10 mg, Oral, Daily  clonazePAM (KLONOPIN) tablet 1 mg, 1 mg, Oral, Daily  amLODIPine (NORVASC) tablet 5 mg, 5 mg, Oral, Daily  hydrALAZINE (APRESOLINE) injection 10 mg, 10 mg, IntraVENous, Q6H PRN     Past Medical History:        Diagnosis Date    AICD (automatic cardioverter/defibrillator) present     CAD (coronary artery disease)     [facility]     COVID-19     My[facility] infarction [facility]     PAF (paroxysmal atrial fibrillation) [facility]     S/P CABG (coronary artery bypass graft)     x2 separate occasions    Solitary kidney, congenital        Past Surgical History:        Procedure Laterality Date    CARDIAC CATHETERIZATION  12/27/2021    CORONARY ANGIOPLASTY WITH STENT PLACEMENT      Hx of CAD with multiple stents and CABG twice (2010 & 2018 @ [facility])    CORONARY ARTERY BYPASS GRAFT  2010    CORONARY ARTERY BYPASS GRAFT  2018    PACEMAKER PLACEMENT  09/11/2020    [employer] ICD [id number] RV lead 6935-m55, LV lead 4598-88. NOT MRI CONDITIONAL       Allergies: Nsaids, Aspirin, Adhesive tape, Ceftriaxone, Ketorolac tromethamine, and Other      Social History:    Reports that he is currently homeless and he has no support. Unwilling to share any of his social history. Has been living at the rehab facility for last few days.     SUBSTANCE USE HISTORY: He is unwilling to provide any information regarding his previous substance use history    Family Medical and Psychiatric History:           Problem Relation Age of Onset    Cancer Mother     Diabetes Father     Heart Disease Father     Diabetes Paternal Grandmother        Physical  BP (!) 150/95   Pulse 82   Temp 98.6 °F (37 °C) (Oral)   Resp 18   Ht 5' 9\" (1.753 m)   Wt 166 lb (75.3 kg)   SpO2 99%   BMI 24.51 kg/m²     Mental Status Examination:  Level of consciousness:  Within normal limits  Appearance: Sitting in a chair in the hallway  Behavior/Motor: Psychomotor agitation  Attitude toward examiner: Indifferent, poor eye contact  Speech: Pressured  Mood: Anxious  Affect: Labile  Thought processes:  Linear, goal directed, coherent  Thought content: Denies suicidal ideations   Denies homicidal ideations    Denies hallucinations   Denies delusions  Cognition:  Oriented to self, situation, location, date  Concentration clinically adequate  Memory age appropriate  Insight & Judgment:  fair    DSM-5 DIAGNOSIS:  Mood disorder unspecified    Stressors     Severity of stressors is mild  Source of stressors include: Other psychosocial and environmental stressors    PLAN:    At this time patient has capacity to make his decisions. Patient can leave AMA if there is a decision he makes. We will sign off. Please call back with any questions. Thank you very much for allowing us to participate in the care of this patient.       Electronically signed by Huy Goss MD on 1/4/22 at 3:09 PM EST

## 2022-01-04 NOTE — FLOWSHEET NOTE
present to support pt and staff as he was in the [place] very loud and trying to leave staff was working on his discharge but he would not go back to his room. 01/04/22 1452   Encounter Summary   Services provided to: Patient  (staff support)   Referral/Consult From: Rounding; Other disciplines   Support System Unknown   Continue Visiting   (1/4/2022)   Complexity of Encounter High   Length of Encounter 30 minutes   Routine   Type Initial   Assessment   (very aggresive)   Intervention Sustaining presence/ Ministry of presence   Outcome Expressed regrets; Venting emotion

## 2022-01-04 NOTE — PROGRESS NOTES
Security arrived to try to get patient back to his room. Patient is verbally abusive to nursing staff and security.       Electronically signed by Susana Todd RN on 1/4/22 at 3:35 PM EST

## 2022-01-04 NOTE — PROGRESS NOTES
Patient has finally agreed to sit in the doorway of a different room. Security off unit.     Electronically signed by Estrella Serrano RN on 1/4/22 at 3:38 PM EST

## 2022-01-04 NOTE — PROGRESS NOTES
Patient back in hallway stating that the morphine did nothing for him.     Electronically signed by Annette Mejia RN on 1/4/22 at 5:45 PM EST

## 2022-01-04 NOTE — PROGRESS NOTES
Pt noted to be out in hallway, bed alarm that had been set by this RN was no longer on. Pt gotten back to bed. Verbally aggressive with RN, stating \"nobody cares. I'm hurting real bad. Nobody will give me anything. \" RN offered Tramadol and pt became agitated and more angry, stating \"Tramadol don't do nothing, it's junk. \" Pt very verbally inappropriate, cursing and aggressive. Pt specifically requesting morphine. RN told pt telemetry monitor has been being watched carefully throughout shift. Pt called RN a liar. RN checked pt's heart rate manually and found it to be 74 and reported this to pt. Pt stated that is not accurate. Pt refusing any medication available per his MAR.    01:30 Pt sitting on very edge of bed, bed alarm sounding. Three RNs in room, attempting to assist pt in sitting back into bed further from the edge. Pt resistant and scooting forward. Pt not complying with nurses requests to back up onto bed. Pt again reporting \"I hurt. You're not listening, I hurt. \" Tramadol again offered and pt became verbally abusive, refusing Tramadol. Will continue to monitor. Security called s/t pt being unwilling to return to room and escalating agitation and verbal threats. Security arrived. Pt allowed staff to use WC and take him back to room. Once in room, pt was unwilling to get out of WC. He then became angry and went into bathroom, sat on the floor in the dark, unwilling to come out. Dr. Chelsea de la cruz. See next note.

## 2022-01-04 NOTE — PROGRESS NOTES
Patient still refusing to go back into room. Verbally abusive to nursing staff and security.     Electronically signed by Rudy Long RN on 1/4/22 at 3:36 PM EST

## 2022-01-04 NOTE — PROGRESS NOTES
Writer walked into room, patient was sitting on edge of the bed, patient had ripped both IV's out. Writer attempted to clean patients hand and arm, he wouldn't allow writer to clean him up.      Electronically signed by Rudy Long RN on 1/4/22 at 1:09 PM EST

## 2022-01-04 NOTE — PROGRESS NOTES
Patient in bed with eyes closed.      Electronically signed by Danii Perry RN on 1/4/22 at 9:29 AM EST

## 2022-01-04 NOTE — PROGRESS NOTES
Pt sitting on side of bed since start of shift, requesting snacks and cans of pop frequently. Pt has had 6 cans of pop and one juice as well as 16 packages of manjit crackers and 2 jellos so far this shift. RN tried to encourage water, but pt refused. Pt c/o no pain and does not appear to be painful.

## 2022-01-04 NOTE — PLAN OF CARE
Problem: Falls - Risk of:  Goal: Will remain free from falls  Description: Will remain free from falls  1/4/2022 0102 by Lucero Nobles RN  Outcome: Ongoing  Note: No falls noted this shift. Bed kept in low position. Safe environment maintained. Bedside table & call light in reach. 1/3/2022 1936 by Delroy Chew RN  Outcome: Ongoing  Note: Patient remains free of incidence/ injury. Bed remains in low position. Side rails up x2. Goal: Absence of physical injury  Description: Absence of physical injury  Outcome: Ongoing     Problem: Pain:  Goal: Pain level will decrease  Description: Pain level will decrease  Outcome: Ongoing  Note:   Assess the location, characteristics, onset, duration, frequency, quality, and severity of pain. Encourage immediate report of pain. Use appropriate pain scale to rate pain. Manage pain using nonpharmacologic/pharmacologic interventions. Goal: Control of acute pain  Description: Control of acute pain  1/4/2022 0102 by Lucero Nobles RN  Outcome: Ongoing  1/3/2022 1936 by Delroy Chew RN  Outcome: Ongoing  Note: Patient expresses relief following administration of prn pain medication.    Goal: Control of chronic pain  Description: Control of chronic pain  Outcome: Ongoing

## 2022-01-04 NOTE — PROGRESS NOTES
RN paged Dr. Byron Rush. Answering service stated message would be sent through with a return call shortly. Awaiting return call. [phone] Second page sent out. Spoke with Dr. Byron Rush. New orders received. See MAR. RN administered one-time dose of morphine 1mg. Pt immediately then stated \"I should be able to have my IV Benadryl now. \" Pt reminded his Benadryl is PO and scheduled BID. He received his last dose at ~18:30. Next dose due later this date.

## 2022-01-04 NOTE — PROGRESS NOTES
Patient c/o pain, refuses Tramadol, wants Morphine. Dr. Tiffanie Shrestha to see when he rounds.      Electronically signed by Terry Olmstead RN on 1/4/22 at 12:10 PM EST

## 2022-01-04 NOTE — PROGRESS NOTES
Patient walked to nurses station, refuses to go back to his room.     Electronically signed by Jacqueline Roca RN on 1/4/2022 at 3:33 PM

## 2022-01-04 NOTE — PROGRESS NOTES
Patient refused vital signs, all bp meds,and Tramadol.      Electronically signed by Orion Pressley RN on 1/4/22 at 10:56 AM EST

## 2022-01-04 NOTE — CONSULTS
Date:   1/4/2022  Patient name: Kyle Cano  Date of admission:  12/30/2021  8:15 PM  MRN:   [id number]  YOB: 1965  PCP: Helyn Cushing, MD    Reason for Admission: NSTEMI (non-ST elevated myocardial infarction) [facility]) [I21.4]  Other congestive heart failure ([facility] [I50.9]  Congestive heart failure of unknown etiology ([facility] [I50.9]  COVID-19 [U07.1]    Cardiology consult: Chest pain       Impression    Chest pain  Borderline abnormal high-sensitivity troponin, type II, demand/supply imbalance  Ischemic cardiomyopathy, dilated LV 6.5 cm, ejection fraction 30 to 35%  Grade 3 LV diastolic dysfunction  Severely dilated LA, severe mitral regurgitation, moderate tricuspid regurgitation  Moderate to severe pulmonary hypertension, right ventricular systolic pressure 65 mmHg  Atrial fibrillation, ventricular paced rhythm  Status post AICD  Cardiac cath 12/28/2021: Multivessel CAD, patent LIMA to LAD and SVG to diagonal 1, patent stent in the OM and distal RCA  Renal insufficiency, creatinine 2.35    History of present illness    65-year-old male with a past medical history of ischemic cardiomyopathy, systolic and diastolic heart failure got admitted with severe retrosternal chest pain like a heaviness. His electrocardiogram showed A. fib, ventricular paced rhythm and high-sensitivity troponins were borderline abnormal, trending downward. He recently had a cardiac cath and it showed patent grafts no coronary intervention required. He continues to complain of constant chest pain. He is ambulating in the room without any significant distress. He has been very emotional.  He told me that nobody listening to him and they do not believe about his chest pain. No AICD discharge. According to the nurses reported he has been demanding morphine all the time.     Current evaluation  Patient seen and examined medications and labs   Middle-aged slim male  He was resting on the edge of bed  He was very emotional  He said his pain is better at present  He was not diaphoretic, no tachypnea  Medically his heart failure appeared to be compensated      Lab work 1/4/2021  proBNP 20,337, high-sensitivity troponin 48  Sodium 140, potassium 5.1, BUN 38, creatinine 2.35, GFR 29, glucose 126  Lab work 12/30/2021  proBNP 54,902, high-sensitivity troponin 69 and 51  Hemoglobin 10.1, platelets 413, WBC four-point    ECG 1/3/2021  A. fib, ventricular paced, heart rate 70    Chest x-ray 1/1/2022  Nonspecific pulmonary infiltrate, small left pleural effusion  Audio megaly, calcific atherosclerosis aorta      2D echo 12/27/2021   Dilated LV 6.5 cm, moderately increased LV wall thickness  Ejection fraction 30 to 39%, grade 3 diastolic dysfunction  Severe mitral regurgitation, moderate tricuspid regurgitation, mild to moderate AR  Moderate to severe pulmonary hypertension RVSP 64 mmHg  ICD leads noted in the right heart chambers  Severely dilated LA        Medications:   Scheduled Meds:   sodium chloride flush  5-40 mL IntraVENous 2 times per day    diphenhydrAMINE  50 mg Oral BID    apixaban  5 mg Oral BID    carvedilol  12.5 mg Oral BID WC    clopidogrel  75 mg Oral Daily    lisinopril  10 mg Oral Daily    clonazePAM  1 mg Oral Daily    amLODIPine  5 mg Oral Daily     Continuous Infusions:   sodium chloride      sodium chloride Stopped (01/02/22 0430)     CBC: No results for input(s): WBC, HGB, PLT in the last 72 hours. BMP:    Recent Labs     01/02/22  0725 01/03/22 2039    140   K 4.6 5.1    106   CO2 21 24   BUN 35* 38*   CREATININE 2.35* 2.35*   GLUCOSE 104* 126*     Hepatic: No results for input(s): AST, ALT, ALB, BILITOT, ALKPHOS in the last 72 hours. Troponin: No results for input(s): TROPONINI in the last 72 hours. BNP: No results for input(s): BNP in the last 72 hours. Lipids: No results for input(s): CHOL, HDL in the last 72 hours.     Invalid input(s): LDLCALCU  INR: No results for input(s): INR in the last 72 hours.    Objective:   Vitals: BP (!) 150/95   Pulse 82   Temp 98.6 °F (37 °C) (Oral)   Resp 18   Ht 5' 9\" (1.753 m)   Wt 166 lb (75.3 kg)   SpO2 99%   BMI 24.51 kg/m²   General appearance: [de-identified] middle-aged male very emotional  HEENT: Head: Normal, normocephalic, atraumatic. Neck: no JVD and supple, symmetrical, trachea midline  Lungs: diminished breath sounds bibasilar  Heart: Cardiac apical impulse not palpable heart sounds distant  Abdomen: Soft bowel sounds present  Extremities: Homans sign is negative, no sign of DVT  Neurologic: Mental status: Alert, oriented, thought content appropriate    EKG: A. fib, ventricular paced. ECHO: reviewed. Ejection fraction: 30-35% 1 dilated LV 6.7 cm, severely dilated LA, severe MR, RVSP 60  Stress Test: not obtained. Cardiac Angiography: reviewed.         Assessment / Acute Cardiac Problems:   Constant retrosternal chest pain like a heaviness, difficult to assess etiology of the pain because patient is hemodynamically stable, no diaphoresis, no tachypnea, oxygen saturation 99%  Borderline abnormal high-sensitivity troponin trending downward most likely type II, demand/supply in bowel  Ischemic cardiomyopathy, multivessel coronary artery disease, CABG  Recent cath showed patent LIMA to LAD, patent stent in the OM, patent SVG to diagonal 1  CKD  Status post AICD, ventricle paced rhythm underlying rhythm is A. fib      Patient Active Problem List:     JACKSON (acute kidney injury) ([facility]     Atypical chest pain     AICD (automatic cardioverter/defibrillator) present     History of MI (myocardial infarction)     Elevated brain natriuretic peptide (BNP) level     Elevated troponin     Chronic systolic heart failure (HCC)     Hx of CABG     Stage 3 chronic kidney disease ([address]     COVID-19 virus infection     Hydronephrosis of left kidney     Acute urinary retention     Hepatitis C     Coronary artery disease involving native coronary artery of native heart with angina pectoris ([facility]     Solitary kidney, congenital     Homeless single person     Acute right-sided weakness     Acute CVA (cerebrovascular accident) ([facility]     Acute chest pain     Congestive heart failure of unknown etiology ([address]     History of CVA with residual deficit     Longstanding persistent atrial fibrillation (HCC)     Essential hypertension     Pulmonary emphysema ([address]      Plan of Treatment:   Medications reviewed  Continue current dose of Eliquis, carvedilol, lisinopril, amlodipine  Add isosorbide dinitrate 20 mg 3 times a day  Discussed with the patient about changes in her treatment  He wanted the onset of his constant chest pain at present difficult to assess etiology  Patient probably needs psychiatric help    Electronically signed by Bari Chanel MD on 1/4/2022 at 12:41 PM

## 2022-01-04 NOTE — PROGRESS NOTES
Breanne from [facility] saw patient. Writer informed they are unsure if the patient can go to their facility as he was \"not active in therapy, and he does not currently have a payer source. \"  Ita Nichols to discuss with Alfredo Ellis in the morning.

## 2022-01-05 PROCEDURE — 97162 PT EVAL MOD COMPLEX 30 MIN: CPT

## 2022-01-05 PROCEDURE — 1200000000 HC SEMI PRIVATE

## 2022-01-05 PROCEDURE — 6370000000 HC RX 637 (ALT 250 FOR IP): Performed by: FAMILY MEDICINE

## 2022-01-05 PROCEDURE — 97166 OT EVAL MOD COMPLEX 45 MIN: CPT

## 2022-01-05 RX ADMIN — DIPHENHYDRAMINE HCL 50 MG: 25 TABLET ORAL at 11:55

## 2022-01-05 RX ADMIN — APIXABAN 5 MG: 5 TABLET, FILM COATED ORAL at 11:55

## 2022-01-05 RX ADMIN — CLONAZEPAM 1 MG: 1 TABLET ORAL at 11:55

## 2022-01-05 RX ADMIN — CARVEDILOL 12.5 MG: 12.5 TABLET, FILM COATED ORAL at 11:55

## 2022-01-05 RX ADMIN — APIXABAN 5 MG: 5 TABLET, FILM COATED ORAL at 20:58

## 2022-01-05 RX ADMIN — DIPHENHYDRAMINE HCL 50 MG: 25 TABLET ORAL at 20:58

## 2022-01-05 RX ADMIN — CLOPIDOGREL 75 MG: 75 TABLET, FILM COATED ORAL at 11:55

## 2022-01-05 ASSESSMENT — PAIN SCALES - GENERAL
PAINLEVEL_OUTOF10: 7
PAINLEVEL_OUTOF10: 7

## 2022-01-05 ASSESSMENT — PAIN DESCRIPTION - ORIENTATION
ORIENTATION: LEFT
ORIENTATION: LEFT

## 2022-01-05 ASSESSMENT — PAIN DESCRIPTION - PROGRESSION: CLINICAL_PROGRESSION: NOT CHANGED

## 2022-01-05 ASSESSMENT — PAIN DESCRIPTION - LOCATION
LOCATION: CHEST;FLANK
LOCATION: CHEST;FLANK

## 2022-01-05 NOTE — PROGRESS NOTES
Spoke with pt about additional SNF choices. Referrals sent to [address] [place] [address] and [address] [place] [address]. Denise French from [address] [place] [address] is reviewing pt and is also having the sister-facility, [facility] review pt.

## 2022-01-05 NOTE — PROGRESS NOTES
Physical Therapy    Facility/Department: [facility] MED SURG  Initial Assessment    NAME: Gomez Graham  : 1965  MRN: [id number]    Date of Service: 2022    Discharge Recommendations:  Patient would benefit from continued therapy after discharge        Assessment   Body structures, Functions, Activity limitations: Decreased functional mobility ; Decreased strength;Decreased safe awareness;Decreased endurance;Decreased balance;Decreased coordination; Increased pain;Decreased posture  Assessment: Pt requires assistnace for mobility, unabel to take any steps, mobility effected due to R LE/R UE weakness from recent CVA in [place] . Pt will benfit from conitnued therapy at discharge. Treatment Diagnosis: Impaired fucntion  Specific instructions for Next Treatment: Co-tx with PATEL/OTR, try Hemiwalker  Prognosis: Fair  Decision Making: Medium Complexity  Barriers to Learning: Easily gets fustrated  REQUIRES PT FOLLOW UP: Yes  Activity Tolerance  Activity Tolerance: Patient limited by fatigue;Patient limited by endurance  Other Comments  Comments: Pt does not let therapist use gait belt. Patient Diagnosis(es): The primary encounter diagnosis was NSTEMI (non-ST elevated myocardial infarction) ([address] Diagnoses of Other congestive heart failure ([address] COVID-19, Acute CVA (cerebrovascular accident) ([address] and Acute right-sided weakness were also pertinent to this visit. has a past medical history of AICD (automatic cardioverter/defibrillator) present, CAD (coronary artery disease), CKD (chronic kidney disease), stage III ([address] COVID-19, Myocardial infarction ([address] PAF (paroxysmal atrial fibrillation) ([address] S/P CABG (coronary artery bypass graft), and Solitary kidney, congenital.   has a past surgical history that includes Coronary artery bypass graft (); Coronary artery bypass graft (); pacemaker placement (2020);  Cardiac catheterization (2021); and Coronary angioplasty with stent. Restrictions  Restrictions/Precautions  Restrictions/Precautions: Fall Risk  Required Braces or Orthoses?: No  Implants present? : Metal implants (AICD)  Vision/Hearing  Vision: Impaired  Vision Exceptions:  (reports difficulty seeing to his right since CVA)  Hearing: Within functional limits     Subjective  General  Patient assessed for rehabilitation services?: Yes  Referral Date : 01/05/22  Diagnosis: NSTEMI, Recent CVA  Follows Commands: Impaired  Subjective  Subjective:  \"I'm sorry, I'm just not up to par\" patient agitated during assessment due to his impaired R UE/R LE function. However, apologizes at end of assessment for frustration. Pain Screening  Patient Currently in Pain: Yes  Pain Assessment  Pain Assessment: 0-10  Pain Level: 7  Pain Location: Chest;Flank  Pain Orientation: Left  Clinical Progression: Not changed  Response to Pain Intervention: Other (Comment) (pt is refusing to give response, but physical/visible symptoms of pain are no longer present)  Vital Signs  BP Location: Right upper arm  Level of Consciousness: Alert (0)  Patient Currently in Pain: Yes  Oxygen Therapy  O2 Device: None (Room air)       Orientation     Social/Functional History  Social/Functional History  Type of Home: Homeless  ADL Assistance: Independent  Homemaking Assistance: Independent  Homemaking Responsibilities: Yes  Ambulation Assistance: Independent  Transfer Assistance: Independent  Additional Comments: Limited prior level of function questions due to patient's agitation this date.   Cognition        Objective          PROM RLE (degrees)  RLE General PROM: WFL, slight swelling noted  AROM RLE (degrees)  RLE General AROM: Trace muscle contractions noted at Hip/knee, PF  AROM LLE (degrees)  LLE AROM : WFL  PROM RUE (degrees)  RUE General PROM: See OT eval, impaired active ROM   Strength RUE  Comment: grossly Hip 1_ to 2-/5, Knee extensiion 1+ to 2-/5, knee flexion 1+/5, able to wiggle R toes, Trace PF Short term goals: 7 to 9 visits  Short term goal 1: Pt able to perform Pt to demonstrate safe technique for supine<>sit SBA  Short term goal 2: Pt able to perform sit <>stand with proper R LE psoitoning, at [facility] term goal 3: Pt able to progress to taking 5 to 15 steps with use of hemiwalker on L UE, at min A x 2  Short term goal 4: Pt to participate in strengthening ex's/activiities to improve R LE strength  Patient Goals   Patient goals : Did not state       Therapy Time   Individual Concurrent Group Co-treatment   Time In 1342         Time Out 1351         Minutes 9                 Jaylene Prado, PT

## 2022-01-05 NOTE — PROGRESS NOTES
Patient notified with writer and  Abhilash Stratton that he is not able to go back to facility. Other facility choices received, patient notified he will need to work with therapy. Patient agreeable at this time.

## 2022-01-05 NOTE — PROGRESS NOTES
SW has sent referrals to multiple SNFs- all unable to accept. DERIAN is waiting to hear back from [facility] and [facility].

## 2022-01-05 NOTE — ADT AUTH CERT
No case was created for ER admit on 12/23 - 12/29 holiday weekend. Will resend clinicals ( previously sent on 12/27 ). Thank you.

## 2022-01-05 NOTE — PROGRESS NOTES
Patient begins to purposely/slowly slide down side of bed to get to floor, Writer steps in to help, which patient refuses but Writer stays and assists patient to floor gently. Patient sits on floor for a few minutes and gets back up into bed.

## 2022-01-05 NOTE — PLAN OF CARE
Problem: Pain:  Goal: Pain level will decrease  Description: Pain level will decrease  1/5/2022 1432 by Arjun Powers RN  Outcome: Ongoing  1/5/2022 0407 by Venice Greenberg RN  Outcome: Ongoing   Continue to educate pt on pain and pain management

## 2022-01-05 NOTE — PROGRESS NOTES
Pt was awake and was asked if he would like to take his medications. He stated he did not want is medications and would not allow the staff to take his vital signs or do an assessment on him. He stated he just wanted to be left alone. He continues not to wear his oxygen and telemetry monitor.

## 2022-01-05 NOTE — PROGRESS NOTES
The pt agreed to take his medications after this nurse went over what was due to be taken. He refused his Norvasc, Isordil and Lisinopril. Pt c/o chest pain and flank pain and this nurse informed him he has Tramadol ordered for pain. He stated he would not take this and called it junk. At this time the pt began to become agitated about this.

## 2022-01-05 NOTE — PROGRESS NOTES
[facility]    Occupational Therapy Evaluation  Date: 22  Patient Name: Magdalena Shin       Room: 8300/2578-73  MRN: [id number]  Account: [de-identified]   : 1965  (61 y.o.) Gender: male     Discharge Recommendations:  Further Occupational Therapy is recommended upon facility discharge. Referring Practitioner: Ozzy Pineda MD          Treatment Diagnosis: Impaired self-care status. Past Medical History:  has a past medical history of AICD (automatic cardioverter/defibrillator) present, CAD (coronary artery disease), CKD (chronic kidney disease), stage III ([address] COVID-19, Myocardial infarction ([facility] PAF (paroxysmal atrial fibrillation) ([facility] S/P CABG (coronary artery bypass graft), and Solitary kidney, congenital.  Past Surgical History:   has a past surgical history that includes Coronary artery bypass graft (); Coronary artery bypass graft (); pacemaker placement (2020); Cardiac catheterization (2021); and Coronary angioplasty with stent. Restrictions  Restrictions/Precautions: Fall Risk  Implants present? : Metal implants (AICD)  Required Braces or Orthoses?: No     Vitals  Temp: 98.2 °F (36.8 °C)  Pulse: 71  Resp: 16  BP: (!) 147/99  Height: 5' 9\" (175.3 cm)  Weight: 166 lb (75.3 kg)  BMI (Calculated): 24.6  Oxygen Therapy  SpO2: 98 %  Pulse Oximeter Device Mode: Intermittent  O2 Device: None (Room air)  Skin Assessment: Clean, dry, & intact  O2 Flow Rate (L/min): 4 L/min  Level of Consciousness: Alert (0)    Subjective  Subjective: \"I'm sorry, I'm just not up to par\" patient agitated during assessment due to his impaired R UE/R LE function. However, apologizes at end of assessment for frustration.      Vision  Vision: Impaired  Vision Exceptions:  (reports difficulty seeing to his right since CVA)  Hearing  Hearing: Within functional limits  Social/Functional History  Type of Home: Homeless  ADL Assistance: Independent  Homemaking Assistance: Independent  Homemaking Responsibilities: Yes  Ambulation Assistance: Independent  Transfer Assistance: Independent  Additional Comments: Limited prior level of function questions due to patient's agitation this date. Pain Assessment  Pain Assessment: 0-10  Pain Level: 7  Pain Location: Chest,Flank  Pain Orientation: Left    Objective  Vision - Basic Assessment  Prior Vision:  (Reports right sided visual deficits - unclear what deficits)   Cognition  Overall Cognitive Status: Impaired  Arousal/Alertness: Delayed responses to stimuli  Attention Span: Attends with cues to redirect  Memory: Decreased short term memory,Decreased recall of recent events  Following Commands: Follows one step commands with increased time  Safety Judgement: Decreased awareness of need for safety  Awareness of Errors: Decreased awareness of errors  Insights: Decreased awareness of deficits  Sequencing and Organization: Assistance required to implement solutions,Assistance required to generate solutions,Assistance required to identify errors made   Perception  Overall Perceptual Status: Impaired  Unilateral Attention: Cues to attend right visual field  Initiation: Cues to initiate tasks  Motor Planning: Cues to use objects appropriately  Sensation  Overall Sensation Status: Impaired (reports numbness R forearm/hand and foot)   ADL  Feeding: Setup  Grooming: Minimal assistance  UE Bathing: Minimal assistance  LE Bathing: Moderate assistance  UE Dressing: Moderate assistance  LE Dressing: Maximum assistance  Toileting: Maximum assistance  Additional Comments: ADL scores based on skilled observations and clinical reasoning unless otherwise noted. Patient adamently refused shoes and hospital  socks this date despite extensive education regarding importance of wearing shoes/socks to reduce fall risk.     UE Function  Hand Dominance  Hand Dominance: Right        LUE Strength  Gross LUE Strength: WFL  L Hand General: 4+/5  LUE Strength Comment: Grossly 4+/5     LUE Tone: Normotonic  LUE PROM (degrees)  LUE PROM: WFL  LUE AROM (degrees)  LUE AROM : WFL  Left Hand PROM (degrees)  Left Hand PROM: WFL  Left Hand AROM (degrees)  Left Hand AROM: WFL  RUE Strength  Gross RUE Strength: Exceptions to [facility]  R Hand General: 2-/5  RUE Strength Comment: Shoulder 0/5, elbow 1/5      RUE Tone: Hypertonic  RUE PROM (degrees)  RUE PROM: Exceptions  RUE General PROM: Shoulder PROM significantly limited due to hypertonicity. Shoulder Flexion ~0-30 degrees, shoulder Abduction ~0-30 degrees. Elbow flexion ~60% PROM. RUE AROM (degrees)  RUE AROM : Exceptions  RUE General AROM: No movement noted at shoulder. Trace movement at elbow. ~5% AROM of supination/pronation  Right Hand PROM (degrees)  Right Hand PROM: Exceptions  Right Hand General PROM: Unable to fully extend MCPs with PROM. Thumb in full flexion - unable to passively range thumb due to tone.   Right Hand AROM (degrees)  Right Hand AROM: Exceptions  Right Hand General AROM: ~5% composite grasp/release noted    Fine Motor Skills  Coordination  Movements Are Fluid And Coordinated: No  Coordination and Movement description: Right UE,Fine motor impairments,Gross motor impairments (minimal movement R UE)                           Mobility  Supine to Sit: Minimal assistance (assist at R LE)  Sit to Supine: Stand by assistance (patient self-assisted R LE)       Balance  Sitting Balance: Stand by assistance  Standing Balance:  (Min A x 2)  Standing Balance  Time: 1-2 minutes  Activity: hand-in-hand assist  Functional Mobility  Functional - Mobility Device:  (hand-in-hand assist)  Activity:  (1-2 steps with L LE - unable to advance R LE)  Assist Level:  (Minimal assist x 2)  Bed mobility  Supine to Sit: Minimal assistance (assist at R LE)  Sit to Supine: Stand by assistance (patient self-assisted R LE)     Transfers  Sit to stand: Minimal assistance  Stand to sit: Minimal assistance  Transfer Comments: Patient adamently refused gait belt this date despite therapists' education regarding necessity of use for safety/fall prevention. Patient agreeable to therapist holding onto his jealesia at the waist for safety  Functional Activity Tolerance  Functional Activity Tolerance: Tolerates < 10 Min exercise, no significant change in vital signs  Additional Comments: due to significantly impaired frustration tolerance     Assessment  Assessment  Performance deficits / Impairments: Decreased functional mobility ,Decreased ADL status,Decreased strength,Decreased ROM,Decreased safe awareness,Decreased cognition,Decreased endurance,Decreased sensation,Decreased balance,Decreased vision/visual deficit,Decreased high-level IADLs,Decreased fine motor control,Decreased coordination,Decreased posture  Treatment Diagnosis: Impaired self-care status. Prognosis: Fair,Good  Decision Making: Medium Complexity  REQUIRES OT FOLLOW UP: Yes  Discharge Recommendations: Patient would benefit from continued therapy after discharge  Activity Tolerance: Patient Tolerated treatment well,Treatment limited secondary to agitation         Functional Outcome Measures  -PAC Daily Activity Inpatient   How much help for putting on and taking off regular lower body clothing?: A Lot  How much help for Bathing?: A Lot  How much help for Toileting?: A Lot  How much help for putting on and taking off regular upper body clothing?: A Lot  How much help for taking care of personal grooming?: A Little  How much help for eating meals?: A Little  AM-[facility] Inpatient Daily Activity Raw Score: 14  AM-PAC Inpatient ADL T-Scale Score : 33.39  ADL Inpatient CMS 0-100% Score: 59.67  ADL Inpatient CMS G-Code Modifier : CK       Goals  Patient Goals   Patient goals : To discharge to facility for therapy  Short term goals  Time Frame for Short term goals: By discharge  Short term goal 1: Patient will perform upper body bathing/dressing with CGA and use of AE/modified technique PRN.   Short term goal 2: Patient will perform lower body bathing/dressing and toileting tasks with Minimal assist and use of AE/modified techniques PRN. Short term goal 3: Patient will verbalize/demonstrate Good understanding of assistive equipment/durable medical equipment/modified techniques for increased safety and IND with self-care and mobility. Short term goal 4: Patient will perform stand pivot transfer to bedside commode/bedside chair with Minimal assist x 2 and Fair safety. Short term goal 5: Patient will actively participate in 15-25 minutes of therapeutic exercise/functional activities to promote increased IND with self-care and mobility. Plan  Safety Devices  Safety Devices in place: Yes  Type of devices:  All fall risk precautions in place,Call light within reach,Patient at risk for falls,Left in bed,Nurse notified (refused gait belt, bed alarm, and  socks/shoes; RN aware)     Plan  Times per week: 4-6  Times per day: Daily  Current Treatment Recommendations: Self-Care / ADL,Strengthening,ROM,Balance Training,Functional Mobility Training,Endurance Training,Safety Education & Training,Patient/Caregiver Education & Training,Equipment Evaluation, Education, & procurement          OT Individual Minutes  Time In: 4782  Time Out: 5505  Minutes: 9    Electronically signed by Vika Jacob OT on 1/5/22 at 2:27 PM EST

## 2022-01-05 NOTE — PROGRESS NOTES
Pt is resting quietly in bed with eyes closed. At this time the pt will remain undisturbed and allow to rest until he awakens.

## 2022-01-05 NOTE — PROGRESS NOTES
Unable to see pt, still agitated, social service planning to find a place for him, will continue to follow at distance

## 2022-01-05 NOTE — PROGRESS NOTES
DERIAN spoke with Frankie, Director of clinical services and he informed DERIAN that [address] [place] [address] would not be able to accept pt back due to his inability to cooperate with the care team and therapy. DERIAN attempted to discuss SNF choices with pt but pt would not communicate with DERIAN. DERIAN will try again in the next hour.

## 2022-01-06 PROCEDURE — 1200000000 HC SEMI PRIVATE

## 2022-01-06 PROCEDURE — 97110 THERAPEUTIC EXERCISES: CPT

## 2022-01-06 PROCEDURE — 97530 THERAPEUTIC ACTIVITIES: CPT

## 2022-01-06 PROCEDURE — 6370000000 HC RX 637 (ALT 250 FOR IP): Performed by: FAMILY MEDICINE

## 2022-01-06 RX ADMIN — DIPHENHYDRAMINE HCL 50 MG: 25 TABLET ORAL at 22:29

## 2022-01-06 RX ADMIN — CLOPIDOGREL 75 MG: 75 TABLET, FILM COATED ORAL at 09:58

## 2022-01-06 RX ADMIN — APIXABAN 5 MG: 5 TABLET, FILM COATED ORAL at 09:58

## 2022-01-06 RX ADMIN — CARVEDILOL 12.5 MG: 12.5 TABLET, FILM COATED ORAL at 10:00

## 2022-01-06 RX ADMIN — DIPHENHYDRAMINE HCL 50 MG: 25 TABLET ORAL at 09:58

## 2022-01-06 RX ADMIN — APIXABAN 5 MG: 5 TABLET, FILM COATED ORAL at 22:29

## 2022-01-06 RX ADMIN — CLONAZEPAM 1 MG: 1 TABLET ORAL at 09:57

## 2022-01-06 ASSESSMENT — PAIN DESCRIPTION - DESCRIPTORS
DESCRIPTORS: SHARP
DESCRIPTORS: SHARP

## 2022-01-06 ASSESSMENT — PAIN DESCRIPTION - PAIN TYPE
TYPE: CHRONIC PAIN
TYPE: CHRONIC PAIN

## 2022-01-06 ASSESSMENT — PAIN - FUNCTIONAL ASSESSMENT: PAIN_FUNCTIONAL_ASSESSMENT: ACTIVITIES ARE NOT PREVENTED

## 2022-01-06 ASSESSMENT — PAIN DESCRIPTION - LOCATION
LOCATION: FLANK
LOCATION: FLANK

## 2022-01-06 ASSESSMENT — PAIN DESCRIPTION - FREQUENCY
FREQUENCY: INTERMITTENT
FREQUENCY: INTERMITTENT

## 2022-01-06 ASSESSMENT — PAIN SCALES - GENERAL
PAINLEVEL_OUTOF10: 7
PAINLEVEL_OUTOF10: 7

## 2022-01-06 ASSESSMENT — PAIN DESCRIPTION - ONSET: ONSET: ON-GOING

## 2022-01-06 ASSESSMENT — PAIN DESCRIPTION - ORIENTATION
ORIENTATION: LEFT;LOWER
ORIENTATION: LEFT;LOWER

## 2022-01-06 ASSESSMENT — PAIN DESCRIPTION - PROGRESSION
CLINICAL_PROGRESSION: NOT CHANGED
CLINICAL_PROGRESSION: NOT CHANGED

## 2022-01-06 NOTE — PROGRESS NOTES
Date:   1/6/2022  Patient name: Kassandra Dickerson  Date of admission:  12/30/2021  8:15 PM  MRN:   [id number]  YOB: 1965  PCP: Tadeo Sears MD    Reason for Admission: NSTEMI (non-ST elevated myocardial infarction) [facility]) [I21.4]  Other congestive heart failure ([facility] [I50.9]  Congestive heart failure of unknown etiology ([facility] [I50.9]  COVID-19 [U07.1]    Cardiology follow-up: Chest pain, multivessel CAD, ischemic cardiomyopathy, CHF systolic and diastolic       Impression     Chest pain  Borderline abnormal high-sensitivity troponin, type II, demand/supply imbalance  Ischemic cardiomyopathy, dilated LV 6.5 cm, ejection fraction 30 to 35%  Grade 3 LV diastolic dysfunction  Severely dilated LA, severe mitral regurgitation, moderate tricuspid regurgitation  Moderate to severe pulmonary hypertension, right ventricular systolic pressure 65 mmHg  Atrial fibrillation, ventricular paced rhythm  Status post AICD  Cardiac cath 12/28/2021: Multivessel CAD, patent LIMA to LAD and SVG to diagonal 1, patent stent in the OM and distal RCA  Renal insufficiency, creatinine 2.35  CVA, right-sided weakness     History of present illness     48-year-old male with a past medical history of ischemic cardiomyopathy, systolic and diastolic heart failure got admitted with severe retrosternal chest pain like a heaviness. His electrocardiogram showed A. fib, ventricular paced rhythm and high-sensitivity troponins were borderline abnormal, trending downward. He recently had a cardiac cath and it showed patent grafts no coronary intervention required. He continues to complain of constant chest pain. He is ambulating in the room without any significant distress. He has been very emotional.  He told me that nobody listening to him and they do not believe about his chest pain. No AICD discharge.   According to the nurses reported he has been demanding morphine all the time.     Current evaluation  Patient seen and examined medications and labs  reviewed  He was resting with 1 pillow  No chest pain at present  Hemodynamically stable  Afebrile, oxygen saturation 97%  Patient continues to refuse many medications        Medications:   Scheduled Meds:   isosorbide dinitrate  20 mg Oral TID    sodium chloride flush  5-40 mL IntraVENous 2 times per day    diphenhydrAMINE  50 mg Oral BID    apixaban  5 mg Oral BID    carvedilol  12.5 mg Oral BID WC    clopidogrel  75 mg Oral Daily    lisinopril  10 mg Oral Daily    clonazePAM  1 mg Oral Daily    amLODIPine  5 mg Oral Daily     Continuous Infusions:   sodium chloride      sodium chloride Stopped (01/02/22 0430)     CBC: No results for input(s): WBC, HGB, PLT in the last 72 hours. BMP:    Recent Labs     01/03/22 2039      K 5.1      CO2 24   BUN 38*   CREATININE 2.35*   GLUCOSE 126*     Hepatic: No results for input(s): AST, ALT, ALB, BILITOT, ALKPHOS in the last 72 hours. Troponin: No results for input(s): TROPONINI in the last 72 hours. BNP: No results for input(s): BNP in the last 72 hours. Lipids: No results for input(s): CHOL, HDL in the last 72 hours. Invalid input(s): LDLCALCU  INR: No results for input(s): INR in the last 72 hours. Objective:   Vitals: /87   Pulse 69   Temp 97.7 °F (36.5 °C) (Oral)   Resp 18   Ht 5' 9\" (1.753 m)   Wt 166 lb (75.3 kg)   SpO2 97%   BMI 24.51 kg/m²   General appearance: alert and cooperative with exam  HEENT: Head: Normal, normocephalic, atraumatic. Neck: no JVD and supple, symmetrical, trachea midline  Lungs: clear to auscultation bilaterally  Heart: Heart sounds are distant  Abdomen: Soft bowel sounds present  Extremities: Homans sign is negative, no sign of DVT  Neurologic: Mental status: Alert, oriented, thought content appropriate    EKG: A. fib, ventricular paced. ECHO: reviewed.    Ejection fraction: 30-35%, dilated LV 6.7 cm, severely dilated LA, severe MR, RVSP 60 mmHg  Stress Test: not obtained. Cardiac Angiography: reviewed. Assessment / Acute Cardiac Problems:   Constant retrosternal chest pain like a heaviness, difficult to assess etiology of the pain because patient is hemodynamically stable, no diaphoresis, no tachypnea, oxygen saturation 99%  Borderline abnormal high-sensitivity troponin trending downward most likely type II, demand/supply in bowel  Ischemic cardiomyopathy, multivessel coronary artery disease, CABG  Recent cath showed patent LIMA to LAD, patent stent in the OM, patent SVG to diagonal 1  CKD  Status post AICD, ventricle paced rhythm underlying rhythm is A.  Fib  CVA, right-sided weakness    1/6/2022 hemodynamically stable, CHF appears compensated    Patient Active Problem List:     JACKSON (acute kidney injury) ([address]     Atypical chest pain     AICD (automatic cardioverter/defibrillator) present     History of MI (myocardial infarction)     Elevated brain natriuretic peptide (BNP) level     Elevated troponin     Chronic systolic heart failure (HCC)     Hx of CABG     Stage 3 chronic kidney disease ([address]     COVID-19 virus infection     Hydronephrosis of left kidney     Acute urinary retention     Hepatitis C     Coronary artery disease involving native coronary artery of native heart with angina pectoris ([address]     Solitary kidney, congenital     Homeless single person     Acute right-sided weakness     Acute CVA (cerebrovascular accident) ([address]     Acute chest pain     Congestive heart failure of unknown etiology ([address]     History of CVA with residual deficit     Longstanding persistent atrial fibrillation (HCC)     Essential hypertension     Pulmonary emphysema ([address]      Plan of Treatment:   Medications reviewed  Continue current medication  Patient continues to refuse multiple medications  Waiting for replacement    Electronically signed by Halima Vital MD on 1/6/2022 at 4:41 PM

## 2022-01-06 NOTE — PROGRESS NOTES
Physical Therapy  Facility/Department: [facility] MED SURG  Daily Treatment Note  NAME: Ophelia Banuelos  : 1965  MRN: [id number]    Date of Service: 2022    Discharge Recommendations:  Patient would benefit from continued therapy after discharge   PT Equipment Recommendations  Other: TBD    Assessment   Body structures, Functions, Activity limitations: Decreased functional mobility ; Decreased strength;Decreased safe awareness;Decreased endurance;Decreased balance;Decreased coordination; Increased pain;Decreased posture;Decreased ADL status  Assessment: Pt requires assist but doing well - pt eager to get up and move but has decreased safety awareness, refusing gait belt despite education. Pt was agreeable to  socks today. Pt would benefit frmo continued PT. Pt can at time become agitated  quickly but agreeable and wants to get better. Treatment Diagnosis: Impaired fucntion  Specific instructions for Next Treatment: Co-tx with PATEL/OTR, try Hemiwalker, stabilize R ankle, pre gait progressing to gait activities as able  Prognosis: Fair  Decision Making: Medium Complexity  Clinical Presentation: Pt alert, agreeable, can become agitated  Barriers to Learning: Easily gets fustrated  REQUIRES PT FOLLOW UP: Yes  Activity Tolerance  Activity Tolerance: Patient Tolerated treatment well;Treatment limited secondary to agitation  Other Comments  Comments: Pt refuses gait belt - agreeable to  socks today     Patient Diagnosis(es): The primary encounter diagnosis was NSTEMI (non-ST elevated myocardial infarction) ([address] Diagnoses of Other congestive heart failure ([address] COVID-19, Acute CVA (cerebrovascular accident) ([address] and Acute right-sided weakness were also pertinent to this visit.      has a past medical history of AICD (automatic cardioverter/defibrillator) present, CAD (coronary artery disease), CKD (chronic kidney disease), stage III ([address] COVID-19, Myocardial infarction ([address] PAF (paroxysmal atrial fibrillation) ([facility] [address] [facility] S/P CABG (coronary artery bypass graft), and Solitary kidney, congenital.   has a past surgical history that includes Coronary artery bypass graft (2010); Coronary artery bypass graft (2018); pacemaker placement (09/11/2020); Cardiac catheterization (12/27/2021); and Coronary angioplasty with stent. Restrictions  Restrictions/Precautions  Restrictions/Precautions: Fall Risk  Required Braces or Orthoses?: No  Implants present? : Metal implants (AICD)  Subjective   General  Chart Reviewed: Yes  Additional Pertinent Hx: easily agitated  Family / Caregiver Present: No  Subjective  Subjective: Pt in bed, agreeable to PT OT. MERYL Dial  General Comment  Comments: Pt is R handed  Pain Screening  Patient Currently in Pain: Yes  Pain Assessment  Pain Assessment: 0-10  Pain Level: 7  Pain Type: Chronic pain  Pain Location: Flank  Pain Orientation: Left; Lower  Pain Descriptors: Sharp  Pain Frequency: Intermittent  Pain Onset: On-going  Clinical Progression: Not changed  Functional Pain Assessment: Activities are not prevented  Non-Pharmaceutical Pain Intervention(s): Ambulation/Increased Activity; Distraction;Repositioned; Rest  Response to Pain Intervention: None  Vital Signs  Patient Currently in Pain: Yes  Oxygen Therapy  O2 Device: None (Room air)  Patient Observation  Observations: R side weakness, inversion R ankle       Orientation  Orientation  Overall Orientation Status:  (appears oriented - however easily agitated)  Cognition      Objective   Bed mobility  Rolling to Left: Stand by assistance  Supine to Sit: Stand by assistance  Sit to Supine: Stand by assistance  Scooting: Stand by assistance  Comment: Head of bed elevated, pt has good bed mobility and advances to edge of bed. No dizziness. Pt back in bed end of session wtih call light and bed alarm. Transfers  Sit to Stand: Contact guard assistance  Stand to sit: Contact guard assistance  Comment: Pt stands at bedside with hemiwalker on L UE.  Cues for safe and proper technique. Pt performs standing transfers x6. Ambulation  Ambulation?: Yes (limited distance - pt declines gait belt (limiting distance))  Ambulation 1  Surface: level tile  Device: Hemiwalker  Assistance: Contact guard assistance;2 Person assistance  Quality of Gait: unsteady gait, no RLE advancement actively, no LOB  Gait Deviations: Decreased step length;Decreased step height;Staggers  Distance: 3' forward, retro, lateral at bedside  Comments: Limited distance as pt refusing gait belt and can be impulsive at times. Stairs/Curb  Stairs?: No     Balance  Posture: Fair  Sitting - Static: Good  Sitting - Dynamic: Good;-  Standing - Static: Fair  Standing - Dynamic: Poor;+ (CGA x2 for safety)  Comments: Fall risk, standing balance with hemiwalker and 2 assist for safety  Other exercises  Other exercises?: Yes  Other exercises 1: Static standing x2 minutes, CGA  Other exercises 2: Weight shifting (lateral) x15 with hemiwalker CGA, writer blocking R LE to stabilize/neutralize R ankle  Other exercises 3: STS x5 with hemiwalker  Other exercises 4: AAROM x10 R LE, AROM x10 LLE seated exercise (hip flexion, knee flexion/extension, ankle pumps)  Other exercises 5: Seated edge of bed x20 minutes, close SBA for safety.   Other exercises 6: Bed mobility x2   PROM RLE (degrees)  RLE General PROM: WFL, slight swelling noted  AROM RLE (degrees)  RLE General AROM: Trace muscle contractions noted at Hip/knee, PF  AROM LLE (degrees)  LLE AROM : WFL  PROM RUE (degrees)  RUE General PROM: See OT eval, impaired active ROM   Strength RUE  Comment: grossly Hip 1_ to 2-/5, Knee extensiion 1+ to 2-/5, knee flexion 1+/5, able to wiggle R toes, Trace PF felt  Strength LUE  Comment: Grossly 4-/5                 G-Code     OutComes Score                                                     AM-PAC Score             Goals  Short term goals  Time Frame for Short term goals: 7 to 9 visits  Short term goal 1: Pt able to perform Pt to demonstrate safe technique for supine<>sit SBA  Short term goal 2: Pt able to perform sit <>stand with proper R LE psoitoning, at [address] term goal 3: Pt able to progress to taking 5 to 15 steps with use of hemiwalker on L UE, at min A x 2  Short term goal 4: Pt to participate in strengthening ex's/activiities to improve R LE strength  Patient Goals   Patient goals : Did not state    Plan    Plan  Times per week: 5 to 6x/week  Specific instructions for Next Treatment: Co-tx with PATEL/OTR, try Hemiwalker, stabilize R ankle, pre gait progressing to gait activities as able  Current Treatment Recommendations: Strengthening,Balance Training,Functional Mobility Training,Transfer Training,Endurance Training,Gait Training,Safety Education & Training,Patient/Caregiver Education & Training,Equipment Evaluation, Education, & procurement  Safety Devices  Type of devices: Call light within reach,All fall risk precautions in place,Patient at risk for falls,Left in bed,Bed alarm in place,Nurse notified (MERYL Magana)     Therapy Time   Individual Concurrent Group Co-treatment   Time In 1357         Time Out 1426         Minutes 29         Timed Code Treatment Minutes: 29 Minutes       Jimena Flores, PT

## 2022-01-06 NOTE — PLAN OF CARE
Problem: Falls - Risk of:  Goal: Will remain free from falls  Description: Will remain free from falls  1/6/2022 0356 by Mayank Dale RN  Outcome: Ongoing  Note: Patient remains free of incidence/ injury. Bed remains in low position. Side rails up x2. Bed alarm on. Problem: Pain:  Goal: Control of acute pain  Description: Control of acute pain  1/6/2022 0356 by Mayank Dale RN  Outcome: Ongoing  Note: Pt continues to c/o midsternal chest pain. However, pt refuses the Tramadol offered to him.

## 2022-01-06 NOTE — PLAN OF CARE
Problem: Falls - Risk of:  Goal: Absence of physical injury  Description: Absence of physical injury  Note: Pt able to call out but does not at times and refuses bed alarm     Problem: Pain:  Description: Pain management should include both nonpharmacologic and pharmacologic interventions.   Goal: Control of chronic pain  Description: Control of chronic pain  Note: Refusing to take oral pain pills     Problem: Musculor/Skeletal Functional Status  Intervention: OT Evaluation/treatment  Note: Pt did work with PT with rusty walker,weakness to rt side     Problem: Coping:  Goal: Ability to verbalize frustrations and anger appropriately will improve  Description: Ability to verbalize frustrations and anger appropriately will improve  Note: Pt cooperative at time but becomes anger easily and non-compliant

## 2022-01-06 NOTE — PROGRESS NOTES
Pt continues to be non co operative,pt vitals stable, trying to find place for him, will continue to follow at a distance, spoke with nursing staff
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
## 2022-01-06 NOTE — PROGRESS NOTES
[facility]: [facility]   INPATIENT OCCUPATIONAL THERAPY  PROGRESS NOTE  Date: 2022  Patient Name: Emmanuel Brown      Room: 2210/5929-93  MRN: [id number]    : 1965  (57 y.o.) Gender: male     Discharge Recommendations:  Further Occupational Therapy is recommended upon facility discharge. Referring Practitioner: Loli Chaney MD     General  Chart Reviewed: Honorio Pham and Physical  Patient assessed for rehabilitation services?: Yes  Family / Caregiver Present: No  Referring Practitioner: Loli Chaney MD    Restrictions  Restrictions/Precautions: Fall Risk  Implants present? : Metal implants (AICD)  Required Braces or Orthoses?: No      Subjective  Subjective: pt becomes easily agitated and states that he is just frustrated because of the situation that he's in. Comments: co-tx natalie Bennett PT, Adeola RN oks therapy  Patient Currently in Pain: Yes  Pain Level: 7  Pain Location: Flank  Pain Orientation: Left; Lower  Overall Orientation Status: Within Functional Limits  Patient Observation  Observations: trace movements in R elbow, fingers, no movement in R shoulder or thumb. pt demo RUE tightness, R hand contractured  Pain Assessment  Pain Assessment: 0-10  Pain Level: 7  Pain Type: Chronic pain  Pain Location: Flank  Pain Orientation: Left,Lower  Pain Descriptors: Sharp  Pain Frequency: Intermittent  Clinical Progression: Not changed    Objective  Cognition  Overall Cognitive Status: Impaired  Arousal/Alertness: Delayed responses to stimuli  Attention Span: Attends with cues to redirect  Memory: Decreased short term memory;Decreased recall of recent events  Following Commands:  Follows one step commands with increased time  Safety Judgement: Decreased awareness of need for safety  Awareness of Errors: Decreased awareness of errors  Insights: Decreased awareness of deficits  Sequencing and Organization: Assistance required to implement solutions;Assistance required to generate solutions;Assistance required to identify errors made  Bed mobility  Supine to Sit: Stand by assistance  Sit to Supine: Stand by assistance  Scooting: Stand by assistance  Comment: HOB elevated, pt has good bed mobility and advances to edge of bed. No dizziness. Balance  Sitting Balance: Stand by assistance (tolerates sitting EOB [id number] minutes, close SBA for safety)  Standing Balance: Contact guard assistance (CGA x 2)  Standing Balance  Time: ~1 min x 2 with rusty-walker  Activity: static stand, wt shifting  Comment: pt does not allow writer to upt on gait belt for safety      ADL  Feeding: Setup  Grooming: Minimal assistance  UE Bathing: Minimal assistance  LE Bathing: Moderate assistance  UE Dressing: Moderate assistance  LE Dressing: Setup; Increased time to complete;Stand by assistance (footies only this date)  Toileting: Maximum assistance  Additional Comments: pt adamantly refuses all UB/LB bathing this dateADL scores based on skilled observations and clinical reasoning unless otherwise noted. pt donned footies by placing feet on floor andleaning forward. writer expressed fall risk concerns and attempted to educate pt on safer way to sandrita socks by bringing foot onto bed. pt became angry and states \"don't talk to me like I'm stupid! I know what I'm doing! \" Mayo Bazan stands close by during tasks     Transfers  Sit to stand: Minimal assistance  Stand to sit: Minimal assistance  Transfer Comments: Patient adamently refused gait belt this date despite therapists' education regarding necessity of use for safety/fall prevention.  Pt agreeable to therapist holding onto his jealesia at the waist for safety  Type of ROM/Therapeutic Exercise  Type of ROM/Therapeutic Exercise: AAROM;PROM  Comment: RUE, x 10 reps for joint protection and to prevent contractures  Exercises  Scapular Protraction: x, PROM  Scapular Retraction: x, PROM  Shoulder Flexion: x  Shoulder Extension: x  Shoulder ABduction: x  Shoulder ADduction: x  Elbow Flexion: x  Elbow Extension: x  Supination: x  Pronation: x  Finger Flexion: x  Finger Extension: x  Other: PROM for thumb, pt educated on self ROM and importnce of completing exercises. pt responds that he knows what to do. writer offered rolld up wash cloth to place into hand and pt says \"no\"                   Positioning  Bed Positioning Type: Upper extremity  Bed Postion Comment: pt educated on safe support of RUE to decrease chances of R shoulder subluxation by placing pillow under RUE. pt declines pillow placement after returning to bed       Assessment  Performance deficits / Impairments: Decreased functional mobility ; Decreased ADL status; Decreased strength;Decreased ROM; Decreased safe awareness;Decreased cognition;Decreased endurance;Decreased sensation;Decreased balance;Decreased vision/visual deficit; Decreased high-level IADLs;Decreased fine motor control;Decreased coordination;Decreased posture  Prognosis: Fair;Good  Discharge Recommendations: Patient would benefit from continued therapy after discharge  Activity Tolerance: Patient Tolerated treatment well;Treatment limited secondary to agitation  Safety Devices in place: Yes  Type of devices: All fall risk precautions in place; bed alarm placed;Call light within reach; Patient at risk for falls; Left in bed;Nurse notified (refused gait belt)             Patient Education:  Patient Education: OT POC, safety and compensatory tecch during ADLs, activity promotion, rusty-walker safety and tech, self ROM, safe positioning of RUE  Learner:patient  Method: demonstration and explanation       Outcome: needs reinforcement     Plan  Safety Devices  Safety Devices in place: Yes  Type of devices: All fall risk precautions in place; bed alarm placed;Call light within reach; Patient at risk for falls; Left in bed;Nurse notified (refused gait belt)(refused gait belt)  Plan  Times per week: 4-6  Times per day: Daily  Current Treatment Recommendations: Self-Care / ADL,Strengthening,ROM,Balance Training,Functional Mobility Training,Endurance Training,Safety Education & Training,Patient/Caregiver Education & Training,Equipment Evaluation, Education, & procurement      Goals  Short term goals  Time Frame for Short term goals: By discharge  Short term goal 1: Patient will perform upper body bathing/dressing with CGA and use of AE/modified technique PRN. Short term goal 2: Patient will perform lower body bathing/dressing and toileting tasks with Minimal assist and use of AE/modified techniques PRN. Short term goal 3: Patient will verbalize/demonstrate Good understanding of assistive equipment/durable medical equipment/modified techniques for increased safety and IND with self-care and mobility. Short term goal 4: Patient will perform stand pivot transfer to bedside commode/bedside chair with Minimal assist x 2 and Fair safety. Short term goal 5: Patient will actively participate in 15-25 minutes of therapeutic exercise/functional activities to promote increased IND with self-care and mobility.     OT Individual Minutes  Time In: 8621  Time Out: [address] 159  Minutes: 29      Electronically signed by KANCHAN Dickson on 1/6/22 at 4:37 PM EST

## 2022-01-07 PROCEDURE — 6370000000 HC RX 637 (ALT 250 FOR IP): Performed by: FAMILY MEDICINE

## 2022-01-07 PROCEDURE — 51798 US URINE CAPACITY MEASURE: CPT

## 2022-01-07 PROCEDURE — 6370000000 HC RX 637 (ALT 250 FOR IP): Performed by: UROLOGY

## 2022-01-07 PROCEDURE — 6370000000 HC RX 637 (ALT 250 FOR IP): Performed by: EMERGENCY MEDICINE

## 2022-01-07 PROCEDURE — 1200000000 HC SEMI PRIVATE

## 2022-01-07 RX ORDER — LIDOCAINE HYDROCHLORIDE 20 MG/ML
JELLY TOPICAL ONCE
Status: COMPLETED | OUTPATIENT
Start: 2022-01-07 | End: 2022-01-07

## 2022-01-07 RX ORDER — HALOPERIDOL 5 MG/ML
5 INJECTION INTRAMUSCULAR ONCE
Status: DISCONTINUED | OUTPATIENT
Start: 2022-01-07 | End: 2022-01-14 | Stop reason: HOSPADM

## 2022-01-07 RX ADMIN — CARVEDILOL 12.5 MG: 12.5 TABLET, FILM COATED ORAL at 07:31

## 2022-01-07 RX ADMIN — APIXABAN 5 MG: 5 TABLET, FILM COATED ORAL at 07:23

## 2022-01-07 RX ADMIN — TRAMADOL HYDROCHLORIDE 100 MG: 50 TABLET, COATED ORAL at 21:05

## 2022-01-07 RX ADMIN — LIDOCAINE HYDROCHLORIDE: 20 JELLY TOPICAL at 02:06

## 2022-01-07 RX ADMIN — CLONAZEPAM 1 MG: 1 TABLET ORAL at 07:35

## 2022-01-07 RX ADMIN — TRAMADOL HYDROCHLORIDE 50 MG: 50 TABLET, COATED ORAL at 07:29

## 2022-01-07 RX ADMIN — DIPHENHYDRAMINE HCL 50 MG: 25 TABLET ORAL at 07:31

## 2022-01-07 RX ADMIN — TRAMADOL HYDROCHLORIDE 100 MG: 50 TABLET, COATED ORAL at 15:01

## 2022-01-07 RX ADMIN — CLOPIDOGREL 75 MG: 75 TABLET, FILM COATED ORAL at 07:35

## 2022-01-07 RX ADMIN — APIXABAN 5 MG: 5 TABLET, FILM COATED ORAL at 21:06

## 2022-01-07 RX ADMIN — ONDANSETRON 4 MG: 4 TABLET, ORALLY DISINTEGRATING ORAL at 07:31

## 2022-01-07 RX ADMIN — ONDANSETRON 4 MG: 4 TABLET, ORALLY DISINTEGRATING ORAL at 15:01

## 2022-01-07 RX ADMIN — DIPHENHYDRAMINE HCL 50 MG: 25 TABLET ORAL at 21:06

## 2022-01-07 ASSESSMENT — PAIN SCALES - GENERAL
PAINLEVEL_OUTOF10: 8
PAINLEVEL_OUTOF10: 6
PAINLEVEL_OUTOF10: 0

## 2022-01-07 NOTE — PROGRESS NOTES
Pt still in room, refusing to be examined, social service finding a place for him, spoke with nursing, labs are better

## 2022-01-07 NOTE — PROGRESS NOTES
Pt rests peacefully, Nurse alert mat cont. Door way.   R/T pt's impulsiveness, and confusion off and on

## 2022-01-07 NOTE — PROGRESS NOTES
Pt complained of bladder pain, bladder scan done,  X 1---was 700 ml, pt.  Refused to be cathed at this time

## 2022-01-07 NOTE — FLOWSHEET NOTE
Writer pagemariangel Knox, explained the situation. Advised to straight cath when pt is willing. Also ordered cath with urojet jelly.

## 2022-01-07 NOTE — PLAN OF CARE
Problem: Falls - Risk of:  Goal: Absence of physical injury  Description: Absence of physical injury  1/7/2022 0326 by Rudy Crabtree RN  Outcome: Met This Shift  1/6/2022 1811 by Eather Fabry, RN  Note: Pt able to call out but does not at times and refuses bed alarm     Problem: Pain:  Goal: Pain level will decrease  Description: Pain level will decrease  Outcome: Met This Shift  Goal: Control of acute pain  Description: Control of acute pain  Outcome: Met This Shift  Goal: Control of chronic pain  Description: Control of chronic pain  1/7/2022 0326 by Rudy Crabtree RN  Outcome: Met This Shift  1/6/2022 1811 by Eather Fabry, RN  Note: Refusing to take oral pain pills     Problem: Musculor/Skeletal Functional Status  Goal: Highest potential functional level  Outcome: Met This Shift  Goal: Absence of falls  Outcome: Met This Shift     Problem: Coping:  Goal: Ability to verbalize frustrations and anger appropriately will improve  Description: Ability to verbalize frustrations and anger appropriately will improve  1/7/2022 0326 by Rudy Crbatree RN  Outcome: Met This Shift  1/6/2022 1811 by Eather Fabry, RN  Note: Pt cooperative at time but becomes anger easily and non-compliant     Problem: Health Behavior:  Goal: Ability to verbalize precipitating factors for violent behavior will improve  Description: Ability to verbalize precipitating factors for violent behavior will improve  Outcome: Met This Shift     Problem: Safety:  Goal: Ability to demonstrate self-control will improve  Description: Ability to demonstrate self-control will improve  Outcome: Met This Shift  Goal: Ability to implement measures to prevent violent behavior in the future will improve  Description: Ability to implement measures to prevent violent behavior in the future will improve  Outcome: Met This Shift  Goal: Ability to redirect hostility and anger into socially appropriate behaviors will improve  Description: Ability to redirect hostility and anger into socially appropriate behaviors will improve  Outcome: Met This Shift     Problem: Non-Violent Restraints  Goal: Removal from restraints as soon as assessed to be safe  Outcome: Met This Shift  Goal: No harm/injury to patient while restraints in use  Outcome: Met This Shift  Goal: Patient's dignity will be maintained  Outcome: Met This Shift

## 2022-01-07 NOTE — FLOWSHEET NOTE
Pt is complaining of flank tenderness rating pain at an 8/10. Pt says he has not been able to pass urine since this AM. Bladder scan done and shows 999mls of urine retained.  Paged Dr. Ramirez Batch

## 2022-01-07 NOTE — PROGRESS NOTES
[facility]. Yoana spoke with Torito, from [facility] and she confirmed they can accept pt and they will be started pre-cert on this day, 2/6/6125.

## 2022-01-07 NOTE — PROGRESS NOTES
DERIAN spoke with Aubrey Bowling from [facility]. Aubrey Bowling reported that they were able to verify pt's insurance and [facility] is currently reviewing pt's clinicals. SW will hear back from Aubrey Bowling once they have a decision.

## 2022-01-07 NOTE — PROGRESS NOTES
[address]   OCCUPATIONAL THERAPY MISSED TREATMENT NOTE   INPATIENT   Date: 22  Patient Name: Eliot Youssef       Room:   MRN: [id number]   Account #: [de-identified]    : 1965  (61 y.o.)  Gender: male   Referring Practitioner: Joy Roberts MD             REASON FOR MISSED TREATMENT:  pt sleeping at this time. did not attempt to rouse 2* pt hx of extreme agitation. will continue to follow.       KANCHAN Mccabe

## 2022-01-07 NOTE — PROGRESS NOTES
Pt sits up at side of bed, cont to watch  TV, Nurse alert mat cont.  At doorway of pt's room,  R/T impulsiveness

## 2022-01-07 NOTE — PROGRESS NOTES
Nutrition Assessment     Type and Reason for Visit: Initial (length of stay)    Nutrition Recommendations/Plan: Will continue to provide Cardiac diet     Nutrition Assessment:  Pt was admitted due to MI/CHF/COVID. He has been mostly consuming more than 75% of Cardiac diet provided suggesting nutrition needs are being met. Malnutrition Assessment:  Malnutrition Status: No malnutrition    Current Nutrition Therapies:    ADULT DIET;  Regular; Low Fat/Low Chol/High Fiber/2 gm Na    Anthropometric Measures:  · Height: 5' 9\" (175.3 cm)  · Current Body Wt: 167 lb (75.8 kg)   · BMI: 24.7    Nutrition Diagnosis:   No nutrition diagnosis at this time     Nutrition Interventions:   Food and/or Nutrient Delivery:  Continue Current Diet  Food/Nutrient Intake Outcomes:  Food and Nutrient Intake    Electronically signed by Lex Anderson RD, FALGUNI on 1/7/22 at 3:19 PM EST    Contact: [phone]
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
## 2022-01-07 NOTE — FLOWSHEET NOTE
Pt has refused the daniel or straight cath, pt is getting aggressive. He reports pain at 9/10 and he has refused all oral pain meds saying they don't work for him. Anju Sinclair has educated the patient. Pt being aggressive to rest of the staff, security called.

## 2022-01-08 PROCEDURE — 1200000000 HC SEMI PRIVATE

## 2022-01-08 PROCEDURE — 6370000000 HC RX 637 (ALT 250 FOR IP): Performed by: FAMILY MEDICINE

## 2022-01-08 RX ADMIN — APIXABAN 5 MG: 5 TABLET, FILM COATED ORAL at 21:57

## 2022-01-08 RX ADMIN — CLONAZEPAM 1 MG: 1 TABLET ORAL at 08:03

## 2022-01-08 RX ADMIN — CARVEDILOL 12.5 MG: 12.5 TABLET, FILM COATED ORAL at 08:04

## 2022-01-08 RX ADMIN — APIXABAN 5 MG: 5 TABLET, FILM COATED ORAL at 08:02

## 2022-01-08 RX ADMIN — TRAMADOL HYDROCHLORIDE 100 MG: 50 TABLET, COATED ORAL at 08:02

## 2022-01-08 RX ADMIN — TRAMADOL HYDROCHLORIDE 100 MG: 50 TABLET, COATED ORAL at 22:01

## 2022-01-08 RX ADMIN — DIPHENHYDRAMINE HCL 50 MG: 25 TABLET ORAL at 21:57

## 2022-01-08 RX ADMIN — DIPHENHYDRAMINE HCL 50 MG: 25 TABLET ORAL at 08:03

## 2022-01-08 RX ADMIN — CLOPIDOGREL 75 MG: 75 TABLET, FILM COATED ORAL at 08:03

## 2022-01-08 ASSESSMENT — PAIN SCALES - GENERAL
PAINLEVEL_OUTOF10: 9
PAINLEVEL_OUTOF10: 9

## 2022-01-08 NOTE — PROGRESS NOTES
Patient refused vital signs except blood pressure, patient educated. Patient continues to refuse heart monitor, patient educated.

## 2022-01-08 NOTE — PROGRESS NOTES
[address] [facility]    Progress Note    1/8/2022    8:59 AM    Name:   Rivas Abdul  MRN:     [id number]     Acct:      [de-identified]   Room:   2110/2110-01   Day:  8  Admit Date:  12/30/2021  8:15 PM    PCP:   Michelle Kendrick MD  Code Status:  Full Code    Subjective:     C/C:   Chief Complaint   Patient presents with    Chest Pain     Interval History Status: not changed. Patient continues to complain of pain. Also states that he has flank pain and difficulty urination, he has refused straight catheterization    Afebrile  BP stable  Respiratory status stable on room air  No leucocytosis  Hemoglobin stable  Platelets stable  Creatine increasing      Review of Systems:     Constitutional:  negative for chills, fevers, sweats  Respiratory:  negative for cough, dyspnea on exertion, shortness of breath, wheezing  Cardiovascular:  negative for  lower extremity edema, palpitations  Gastrointestinal:  negative for  diarrhea, nausea, vomiting      Medications: Allergies:     Allergies   Allergen Reactions    Nsaids Swelling and Other (See Comments)    Aspirin Swelling and Rash     Other reaction(s): Facial Swelling, Swelling of Lip/Tongue/Throat    Adhesive Tape     Ceftriaxone     Ketorolac Tromethamine     Other      Other reaction(s): Difficulty Breathing       Current Meds:   Scheduled Meds:    haloperidol lactate  5 mg IntraMUSCular Once    isosorbide dinitrate  20 mg Oral TID    diphenhydrAMINE  50 mg Oral BID    apixaban  5 mg Oral BID    carvedilol  12.5 mg Oral BID WC    clopidogrel  75 mg Oral Daily    lisinopril  10 mg Oral Daily    clonazePAM  1 mg Oral Daily    amLODIPine  5 mg Oral Daily     Continuous Infusions:   PRN Meds: traMADol, traMADol, acetaminophen, ondansetron **OR** [DISCONTINUED] ondansetron    Data:     Past Medical History:   has a past medical history of AICD (automatic cardioverter/defibrillator) present, CAD (coronary artery disease), CKD (chronic kidney disease), stage III ([facility] [address] COVID-19, Myocardial infarction ([facility] [address] PAF (paroxysmal atrial fibrillation) ([facility] [address] S/P CABG (coronary artery bypass graft), and Solitary kidney, congenital.    Social History:   reports that he has been smoking. He has been smoking about 0.25 packs per day. He has never used smokeless tobacco. He reports previous alcohol use. He reports previous drug use. Family History:   Family History   Problem Relation Age of Onset   Yany Springer Cancer Mother     Diabetes Father     Heart Disease Father     Diabetes Paternal Grandmother        Vitals:  BP (!) 132/97   Pulse 83   Temp 98 °F (36.7 °C) (Oral)   Resp 16   Ht 5' 9\" (1.753 m)   Wt 167 lb 15.9 oz (76.2 kg)   SpO2 96%   BMI 24.81 kg/m²   Temp (24hrs), Av °F (36.7 °C), Min:98 °F (36.7 °C), Max:98 °F (36.7 °C)    No results for input(s): POCGLU in the last 72 hours. I/O (24Hr): Intake/Output Summary (Last 24 hours) at 2022 0859  Last data filed at 2022 0826  Gross per 24 hour   Intake 1245 ml   Output 425 ml   Net 820 ml       Labs:  Hematology:No results for input(s): WBC, RBC, HGB, HCT, MCV, MCH, MCHC, RDW, PLT, MPV, SEDRATE, CRP, INR, DDIMER, QB2IZUEW, LABABSO in the last 72 hours. Invalid input(s): PT  Chemistry:No results for input(s): NA, K, CL, CO2, GLUCOSE, BUN, CREATININE, MG, ANIONGAP, LABGLOM, GFRAA, CALCIUM, CAION, PHOS, PSA, PROBNP, TROPHS, CKTOTAL, CKMB, CKMBINDEX, MYOGLOBIN, DIGOXIN, LACTACIDWB in the last 72 hours. No results for input(s): PROT, LABALBU, LABA1C, R7ERGYR, E0TECGW, FT4, TSH, AST, ALT, LDH, GGT, ALKPHOS, LABGGT, BILITOT, BILIDIR, AMMONIA, AMYLASE, LIPASE, LACTATE, CHOL, HDL, LDLCHOLESTEROL, CHOLHDLRATIO, TRIG, VLDL, CCW78BF, PHENYTOIN, PHENYF, URICACID, POCGLU in the last 72 hours.   ABG:  Lab Results   Component Value Date    POCPH 7.441 2021    POCPCO2 40.6 2021    POCPO2 57.6 2021    POCHCO3 27.7 2021    NBEA NOT REPORTED 2021 PBEA 3 12/12/2021    SMQ6VCK NOT REPORTED 12/12/2021    SGMB3QWI 90 12/12/2021    FIO2 21.0 12/12/2021     Lab Results   Component Value Date/Time    SPECIAL RT HAND,10ML 12/14/2021 04:08 PM     Lab Results   Component Value Date/Time    CULTURE NO GROWTH 5 DAYS 12/14/2021 04:08 PM       Radiology:  XR CHEST PORTABLE    Result Date: 1/1/2022  1. Nonspecific pulmonary infiltrates can be seen with atypical/viral pneumonia. Small left pleural effusion evident. Appearance is very similar to prior exam 12/28/2021. 2. Calcific atherosclerosis aorta. 3. Cardiomegaly. Physical Examination:        General appearance:  alert,   and no distress  Mental Status:  oriented to person, place and time and normal affect  Lungs:  clear to auscultation bilaterally, normal effort  Abdomen:  soft, nontender, nondistended, normal bowel sounds,   Extremities:  no edema, redness, tenderness in the calves       Assessment:        Hospital Problems           Last Modified POA    Hx of CABG 1/1/2022 Yes    AICD (automatic cardioverter/defibrillator) present 1/1/2022 Yes    Congestive heart failure of unknown etiology ([address] 12/31/2021 Yes    History of CVA with residual deficit 1/1/2022 Yes    Longstanding persistent atrial fibrillation ([address] 1/1/2022 Yes    Essential hypertension 1/1/2022 Yes    Pulmonary emphysema ([address] 1/1/2022 Yes          Plan:        1. Chest pain, h/o CAD, CVA, Afib-  Continue Imdur, carvedilol, Plavix, lisinopril, Eliquis. Patient also on amlodipine  2. Tramadol PRN for pain  3.  intermittent difficulty  Urination, flank pain-  Urinalysis is pending. Renal ultrasound was done around 2 weeks ago which did not show any new finding. If UA negative he can follow up with Urology as outpatient  4.  Creatinine elevation seems to be chronic  5. [address]rel MD  1/8/2022  8:59 AM

## 2022-01-08 NOTE — PROGRESS NOTES
Pt cont. To refuse to wear telemetry  Pt. Has no c/o of pain or SOB  Pt. Repositions himself in bed  Dr. Yesenia Guerra aware of pt.  Refusing to wear telemetry

## 2022-01-08 NOTE — PROGRESS NOTES
Bedside reporting done, per Uziel Smith RN  Pt. Cont. To refuse to have a daniel inserted at this time  Bed alarm on  Nurse alert mat cont. At doorway  Pt. Cont.  To refuse telemetry

## 2022-01-08 NOTE — PROGRESS NOTES
Date:   1/8/2022  Patient name: Ruba Hernández  Date of admission:  12/30/2021  8:15 PM  MRN:   [id number]  YOB: 1965  PCP: Tomas Lewis MD    Reason for Admission: NSTEMI (non-ST elevated myocardial infarction) [facility]) [I21.4]  Other congestive heart failure ([facility] [address] [I50.9]  [facility] [I50.9]  COVID-19 [U07.1]    Cardiology follow-up: Chest pain, multivessel CAD, ischemic cardiomyopathy, CHF systolic and diastolic       Impression     Chest pain  Borderline abnormal high-sensitivity troponin, type II, demand/supply imbalance  Ischemic cardiomyopathy, dilated LV 6.5 cm, ejection fraction 30 to 35%  Grade 3 LV diastolic dysfunction  2D echo 12/27/2021 severely dilated LA, severe mitral regurgitation, moderate tricuspid regurgitation, mild to moderate AR, ICD leads, RVSP 64 mmHg  Moderate to severe pulmonary hypertension, right ventricular systolic pressure 65 mmHg  Atrial fibrillation, ventricular paced rhythm  Status post AICD  Cardiac cath 12/28/2021: Multivessel CAD, patent LIMA to LAD and SVG to diagonal 1, patent stent in the OM and distal RCA  Renal insufficiency, creatinine 2.35  CVA, right-sided weakness      History of present illness     67-year-old male with a past medical history of ischemic cardiomyopathy, systolic and diastolic heart failure got admitted with severe retrosternal chest pain like a heaviness.  His electrocardiogram showed A. fib, ventricular paced rhythm and high-sensitivity troponins were borderline abnormal, trending downward.  He recently had a cardiac cath and it showed patent grafts no coronary intervention required.  He continues to complain of constant chest pain.  He is ambulating in the room without any significant distress.  He has been very emotional.  He told me that nobody listening to him and they do not believe about his chest pain.  No AICD discharge.  According to the nurses reported he has been demanding morphine all the Ciara Bates MD on 1/8/2022 at 1:56 PM

## 2022-01-09 LAB
-: ABNORMAL
AMORPHOUS: ABNORMAL
ANION GAP SERPL CALCULATED.3IONS-SCNC: 10 MMOL/L (ref 9–17)
BACTERIA: ABNORMAL
BILIRUBIN URINE: NEGATIVE
BUN BLDV-MCNC: 28 MG/DL (ref 6–20)
BUN/CREAT BLD: ABNORMAL (ref 9–20)
CALCIUM SERPL-MCNC: 8.4 MG/DL (ref 8.6–10.4)
CASTS UA: ABNORMAL /LPF
CHLORIDE BLD-SCNC: 104 MMOL/L (ref 98–107)
CO2: 24 MMOL/L (ref 20–31)
COLOR: YELLOW
COMMENT UA: ABNORMAL
CREAT SERPL-MCNC: 2.09 MG/DL (ref 0.7–1.2)
CRYSTALS, UA: ABNORMAL /HPF
EPITHELIAL CELLS UA: ABNORMAL /HPF
GFR AFRICAN AMERICAN: 40 ML/MIN
GFR NON-AFRICAN AMERICAN: 33 ML/MIN
GFR SERPL CREATININE-BSD FRML MDRD: ABNORMAL ML/MIN/{1.73_M2}
GFR SERPL CREATININE-BSD FRML MDRD: ABNORMAL ML/MIN/{1.73_M2}
GLUCOSE BLD-MCNC: 88 MG/DL (ref 70–99)
GLUCOSE URINE: NEGATIVE
KETONES, URINE: NEGATIVE
LEUKOCYTE ESTERASE, URINE: ABNORMAL
MUCUS: ABNORMAL
NITRITE, URINE: NEGATIVE
OTHER OBSERVATIONS UA: ABNORMAL
PH UA: 6.5 (ref 5–8)
POTASSIUM SERPL-SCNC: 4.9 MMOL/L (ref 3.7–5.3)
PROTEIN UA: NEGATIVE
RBC UA: ABNORMAL /HPF
RENAL EPITHELIAL, UA: ABNORMAL /HPF
SODIUM BLD-SCNC: 138 MMOL/L (ref 135–144)
SPECIFIC GRAVITY UA: 1 (ref 1–1.03)
TRICHOMONAS: ABNORMAL
TURBIDITY: CLEAR
URINE HGB: NEGATIVE
UROBILINOGEN, URINE: NORMAL
WBC UA: ABNORMAL /HPF
YEAST: ABNORMAL

## 2022-01-09 PROCEDURE — 1200000000 HC SEMI PRIVATE

## 2022-01-09 PROCEDURE — 87086 URINE CULTURE/COLONY COUNT: CPT

## 2022-01-09 PROCEDURE — 80048 BASIC METABOLIC PNL TOTAL CA: CPT

## 2022-01-09 PROCEDURE — 36415 COLL VENOUS BLD VENIPUNCTURE: CPT

## 2022-01-09 PROCEDURE — 81001 URINALYSIS AUTO W/SCOPE: CPT

## 2022-01-09 PROCEDURE — 6370000000 HC RX 637 (ALT 250 FOR IP): Performed by: FAMILY MEDICINE

## 2022-01-09 RX ORDER — CIPROFLOXACIN 500 MG/1
500 TABLET, FILM COATED ORAL EVERY 12 HOURS SCHEDULED
Status: DISCONTINUED | OUTPATIENT
Start: 2022-01-09 | End: 2022-01-12

## 2022-01-09 RX ADMIN — CLONAZEPAM 1 MG: 1 TABLET ORAL at 09:40

## 2022-01-09 RX ADMIN — APIXABAN 5 MG: 5 TABLET, FILM COATED ORAL at 09:40

## 2022-01-09 RX ADMIN — CARVEDILOL 12.5 MG: 12.5 TABLET, FILM COATED ORAL at 09:40

## 2022-01-09 RX ADMIN — CLOPIDOGREL 75 MG: 75 TABLET, FILM COATED ORAL at 09:41

## 2022-01-09 RX ADMIN — DIPHENHYDRAMINE HCL 50 MG: 25 TABLET ORAL at 20:24

## 2022-01-09 RX ADMIN — CARVEDILOL 12.5 MG: 12.5 TABLET, FILM COATED ORAL at 16:31

## 2022-01-09 RX ADMIN — APIXABAN 5 MG: 5 TABLET, FILM COATED ORAL at 20:24

## 2022-01-09 RX ADMIN — DIPHENHYDRAMINE HCL 50 MG: 25 TABLET ORAL at 09:40

## 2022-01-09 NOTE — PROGRESS NOTES
[address] [facility]    Progress Note    1/9/2022    11:04 AM    Name:   Chavo Bauer  MRN:     [id number]     Acct:      [de-identified]   Room:   2110/2110-01   Day:  9  Admit Date:  12/30/2021  8:15 PM    PCP:   Leo Calderon MD  Code Status:  Full Code    Subjective:     C/C:   Chief Complaint   Patient presents with    Chest Pain     Interval History Status: not changed. Patient continues to complain of flank pain. Also states that he has  difficulty urination, he has refused straight catheterization  Urinalysis positive for leucocyte esterase    Afebrile  BP stable  Respiratory status stable on room air  Creatine improved      Review of Systems:     Constitutional:  negative for chills, fevers, sweats  Respiratory:  negative for cough, dyspnea on exertion, shortness of breath, wheezing  Cardiovascular:  negative for  lower extremity edema, palpitations  Gastrointestinal:  negative for  diarrhea, nausea, vomiting      Medications: Allergies:     Allergies   Allergen Reactions    Nsaids Swelling and Other (See Comments)    Aspirin Swelling and Rash     Other reaction(s): Facial Swelling, Swelling of Lip/Tongue/Throat    Adhesive Tape     Ceftriaxone     Ketorolac Tromethamine     Other      Other reaction(s): Difficulty Breathing       Current Meds:   Scheduled Meds:    haloperidol lactate  5 mg IntraMUSCular Once    isosorbide dinitrate  20 mg Oral TID    diphenhydrAMINE  50 mg Oral BID    apixaban  5 mg Oral BID    carvedilol  12.5 mg Oral BID WC    clopidogrel  75 mg Oral Daily    lisinopril  10 mg Oral Daily    clonazePAM  1 mg Oral Daily    amLODIPine  5 mg Oral Daily     Continuous Infusions:   PRN Meds: traMADol, traMADol, acetaminophen, ondansetron **OR** [DISCONTINUED] ondansetron    Data:     Past Medical History:   has a past medical history of AICD (automatic cardioverter/defibrillator) present, CAD (coronary artery disease), CKD (chronic kidney disease), stage III ([facility] [address] COVID-19, Myocardial infarction ([facility] [address] [facility] S/P CABG (coronary artery bypass graft), and Solitary kidney, congenital.    Social History:   reports that he has been smoking. He has been smoking about 0.25 packs per day. He has never used smokeless tobacco. He reports previous alcohol use. He reports previous drug use. Family History:   Family History   Problem Relation Age of Onset   Ac [place] Cancer Mother     Diabetes Father     Heart Disease Father     Diabetes Paternal Grandmother        Vitals:  BP (!) 143/94   Pulse 70   Temp 99 °F (37.2 °C) (Oral)   Resp 16   Ht 5' 9\" (1.753 m)   Wt 170 lb 3.1 oz (77.2 kg)   SpO2 97%   BMI 25.13 kg/m²   Temp (24hrs), Av.5 °F (36.9 °C), Min:97.4 °F (36.3 °C), Max:99 °F (37.2 °C)    No results for input(s): POCGLU in the last 72 hours. I/O (24Hr): Intake/Output Summary (Last 24 hours) at 2022 1104  Last data filed at 2022 0847  Gross per 24 hour   Intake 895 ml   Output --   Net 895 ml       Labs:  Hematology:No results for input(s): WBC, RBC, HGB, HCT, MCV, MCH, MCHC, RDW, PLT, MPV, SEDRATE, CRP, INR, DDIMER, [id number], LABABSO in the last 72 hours. Invalid input(s): PT  Chemistry:  Recent Labs     22  0712      K 4.9      CO2 24   GLUCOSE 88   BUN 28*   CREATININE 2.09*   ANIONGAP 10   LABGLOM 33*   GFRAA 40*   CALCIUM 8.4*   No results for input(s): PROT, LABALBU, LABA1C, J9KVOWU, Q3MRBKR, FT4, TSH, AST, ALT, LDH, GGT, ALKPHOS, LABGGT, BILITOT, BILIDIR, AMMONIA, AMYLASE, LIPASE, LACTATE, CHOL, HDL, LDLCHOLESTEROL, CHOLHDLRATIO, TRIG, VLDL, GDM39WU, PHENYTOIN, PHENYF, URICACID, POCGLU in the last 72 hours.   ABG:  Lab Results   Component Value Date    POCPH 7.441 2021    POCPCO2 40.6 2021    POCPO2 57.6 2021    POCHCO3 27.7 2021    NBEA NOT REPORTED 2021    PBEA 3 2021    AAO4FTP NOT REPORTED 2021    XWMB9JAB 90 12/12/2021    FIO2 21.0 12/12/2021     Lab Results   Component Value Date/Time    SPECIAL RT HAND,10ML 12/14/2021 04:08 PM     Lab Results   Component Value Date/Time    CULTURE NO GROWTH 5 DAYS 12/14/2021 04:08 PM       Radiology:  XR CHEST PORTABLE    Result Date: 1/1/2022  1. Nonspecific pulmonary infiltrates can be seen with atypical/viral pneumonia. Small left pleural effusion evident. Appearance is very similar to prior exam 12/28/2021. 2. Calcific atherosclerosis aorta. 3. Cardiomegaly. Physical Examination:        General appearance:  alert,   and no distress  Mental Status:  oriented to person, place and time and normal affect  Lungs:  clear to auscultation bilaterally, normal effort  Abdomen:  soft, nontender, nondistended, normal bowel sounds,   Extremities:  no edema, redness, tenderness in the calves       Assessment:        Hospital Problems           Last Modified POA    Hx of CABG 1/1/2022 Yes    AICD (automatic cardioverter/defibrillator) present 1/1/2022 Yes    Congestive heart failure of unknown etiology ([address] 12/31/2021 Yes    History of CVA with residual deficit 1/1/2022 Yes    Longstanding persistent atrial fibrillation ([address] 1/1/2022 Yes    Essential hypertension 1/1/2022 Yes    Pulmonary emphysema ([address] 1/1/2022 Yes          Plan:        1. Chest pain, h/o CAD, CVA, Afib-  Continue Imdur, carvedilol, Plavix, lisinopril, Eliquis. Patient also on amlodipine  2. Tramadol PRN for pain  3.  intermittent difficulty  Urination, flank pain-  Urinalysis positive for leucocyte esterase. Will start oral cipro. Patient allergic to Ceftriaxone. Renal ultrasound was done around 2 weeks ago which did not show any new finding,But will repeat the ultrasound given the persistent flank pain  4. CKD- Creatinine  Seems to be at baseline.   Fahad Sarah MD  1/9/2022  11:04 AM

## 2022-01-09 NOTE — PLAN OF CARE
Problem: Falls - Risk of:  Goal: Will remain free from falls  Description: Will remain free from falls  Outcome: Ongoing  Note: Patient remains free of falls and injuries throughout shift. Bed remains in the lowest position, wheels locked, call light and bedside table are within reach. Goal: Absence of physical injury  Description: Absence of physical injury  Outcome: Ongoing     Problem: Pain:  Goal: Pain level will decrease  Description: Pain level will decrease  Outcome: Ongoing  Goal: Control of acute pain  Description: Control of acute pain  Outcome: Ongoing  Note: Assess the location, characteristics, onset, duration, frequency, quality, and severity of pain. Encourage immediate report of pain. Use appropriate pain scale to rate pain. Manage pain using nonpharmacologic/pharmacologic interventions.    Goal: Control of chronic pain  Description: Control of chronic pain  Outcome: Ongoing     Problem: Musculor/Skeletal Functional Status  Goal: Highest potential functional level  Outcome: Ongoing  Goal: Absence of falls  Outcome: Ongoing     Problem: Coping:  Goal: Ability to verbalize frustrations and anger appropriately will improve  Description: Ability to verbalize frustrations and anger appropriately will improve  Outcome: Ongoing     Problem: Health Behavior:  Goal: Ability to verbalize precipitating factors for violent behavior will improve  Description: Ability to verbalize precipitating factors for violent behavior will improve  Outcome: Ongoing     Problem: Safety:  Goal: Ability to demonstrate self-control will improve  Description: Ability to demonstrate self-control will improve  Outcome: Ongoing  Goal: Ability to implement measures to prevent violent behavior in the future will improve  Description: Ability to implement measures to prevent violent behavior in the future will improve  Outcome: Ongoing  Goal: Ability to redirect hostility and anger into socially appropriate behaviors will improve  Description: Ability to redirect hostility and anger into socially appropriate behaviors will improve  Outcome: Ongoing     Problem: Non-Violent Restraints  Goal: Removal from restraints as soon as assessed to be safe  Outcome: Ongoing  Goal: No harm/injury to patient while restraints in use  Outcome: Ongoing  Goal: Patient's dignity will be maintained  Outcome: Ongoing

## 2022-01-09 NOTE — PROGRESS NOTES
Date:   1/9/2022  Patient name: Jewell Baez  Date of admission:  12/30/2021  8:15 PM  MRN:   [id number]  YOB: 1965  PCP: Kim Nixon MD    Reason for Admission: NSTEMI (non-ST elevated myocardial infarction) [facility]) [I21.4]  Other congestive heart failure ([facility] [address] [I50.9]  Congestive heart failure of unknown etiology ([facility] [address] [I50.9]  COVID-19 [U07.1]    Cardiology follow-up: Chest pain, multivessel CAD, ischemic cardiomyopathy, CHF systolic and diastolic       Impression     Chest pain  Borderline abnormal high-sensitivity troponin, type II, demand/supply imbalance  Ischemic cardiomyopathy, dilated LV 6.5 cm, ejection fraction 30 to 35%  Grade 3 LV diastolic dysfunction  2D echo 12/27/2021 severely dilated LA, severe mitral regurgitation, moderate tricuspid regurgitation, mild to moderate AR, ICD leads, RVSP 64 mmHg  Moderate to severe pulmonary hypertension, right ventricular systolic pressure 65 mmHg  Atrial fibrillation, ventricular paced rhythm  Status post AICD  Cardiac cath 12/28/2021: Multivessel CAD, patent LIMA to LAD and SVG to diagonal 1, patent stent in the OM and distal RCA  Renal insufficiency, creatinine 2.35  CVA, right-sided weakness        History of present illness     53-year-old male with a past medical history of ischemic cardiomyopathy, systolic and diastolic heart failure got admitted with severe retrosternal chest pain like a heaviness.  His electrocardiogram showed A. fib, ventricular paced rhythm and high-sensitivity troponins were borderline abnormal, trending downward.  He recently had a cardiac cath and it showed patent grafts no coronary intervention required.  He continues to complain of constant chest pain.  He is ambulating in the room without any significant distress.  He has been very emotional.  He told me that nobody listening to him and they do not believe about his chest pain.  No AICD discharge.  According to the nurses reported he has been demanding morphine all the time. Current evaluation  Patient seen and examined , discussed with the patient  Patient is a still refusing his medication, did not take lisinopril and isosorbide dinitrate  He was resting with couple of pillows  Not appear in any significant distress  I tried to wake him up but he want to sleep  Afebrile, heart rate 70, blood pressure stable    Improvement in the serum creatinine to 2.09, potassium 4.9    Medications:   Scheduled Meds:   haloperidol lactate  5 mg IntraMUSCular Once    isosorbide dinitrate  20 mg Oral TID    diphenhydrAMINE  50 mg Oral BID    apixaban  5 mg Oral BID    carvedilol  12.5 mg Oral BID WC    clopidogrel  75 mg Oral Daily    lisinopril  10 mg Oral Daily    clonazePAM  1 mg Oral Daily    amLODIPine  5 mg Oral Daily     Continuous Infusions:  CBC: No results for input(s): WBC, HGB, PLT in the last 72 hours. BMP:    Recent Labs     01/09/22  0712      K 4.9      CO2 24   BUN 28*   CREATININE 2.09*   GLUCOSE 88     Hepatic: No results for input(s): AST, ALT, ALB, BILITOT, ALKPHOS in the last 72 hours. Troponin: No results for input(s): TROPONINI in the last 72 hours. BNP: No results for input(s): BNP in the last 72 hours. Lipids: No results for input(s): CHOL, HDL in the last 72 hours. Invalid input(s): LDLCALCU  INR: No results for input(s): INR in the last 72 hours. Objective:   Vitals: /86   Pulse 91   Temp 98.3 °F (36.8 °C) (Oral)   Resp 16   Ht 5' 9\" (1.753 m)   Wt 170 lb 3.1 oz (77.2 kg)   SpO2 99%   BMI 25.13 kg/m²   General appearance: alert and cooperative with exam  HEENT: Head: Normal, normocephalic, atraumatic. Neck: no JVD and supple, symmetrical, trachea midline  Lungs: diminished breath sounds bibasilar  Heart: regular rate and rhythm  Abdomen: soft, non-tender; bowel sounds normal; no masses,  no organomegaly  Extremities: Homans sign is negative, no sign of DVT      EKG: A. fib V paced. ECHO: reviewed.    Ejection fraction: 30-35%, dilated LV 6.7 cm, severely dilated LA, severe MR, RVSP 60, 2D echo 12/27/2021    Assessment / Acute Cardiac Problems:   Constant retrosternal chest pain like a heaviness, difficult to assess etiology of the pain because patient is hemodynamically stable, no diaphoresis, no tachypnea, oxygen saturation 99%  Borderline abnormal high-sensitivity troponin trending downward most likely type II, demand/supply in bowel  Ischemic cardiomyopathy, multivessel coronary artery disease, CABG  Recent cath showed patent LIMA to LAD, patent stent in the OM, patent SVG to diagonal 1  CKD  Status post AICD, ventricle paced rhythm underlying rhythm is A.  Fib  CVA, right-sided weakness     1/9/2021 CHF is compensated, still refusing medications did not take lisinopril and isosorbide dinitrate    Patient Active Problem List:     JACKSON (acute kidney injury) ([facility])     Atypical chest pain     AICD (automatic cardioverter/defibrillator) present     History of MI (myocardial infarction)     Elevated brain natriuretic peptide (BNP) level     Elevated troponin     Chronic systolic heart failure (HCC)     Hx of CABG     Stage 3 chronic kidney disease ([facility])     COVID-19 virus infection     Hydronephrosis of left kidney     Acute urinary retention     Hepatitis C     Coronary artery disease involving native coronary artery of native heart with angina pectoris ([facility])     Solitary kidney, congenital     Homeless single person     Acute right-sided weakness     Acute CVA (cerebrovascular accident) ([facility])     Acute chest pain     Congestive heart failure of unknown etiology ([facility])     History of CVA with residual deficit     Longstanding persistent atrial fibrillation (HCC)     Essential hypertension     Pulmonary emphysema ([facility])      Plan of Treatment:   Medications reviewed  Continue current medication  Encourage patient to take his medication  Patient need placement      Electronically signed by Pilar Evans MD on 1/9/2022 at 2:41 PM

## 2022-01-09 NOTE — PROGRESS NOTES
Physical Therapy        Physical Therapy Cancel Note      DATE: 2022    NAME: Kyle Cano  MRN: [id number]   : 1965      Patient not seen this date for Physical Therapy due to:    Patient Declined: First attempt at [id number], patient asleep upon arrival. Pt reports 8/10 flank pain and asks writer to return after lunch requesting to rest until then. Pt agreeable for therapy this PM. Pankaj Asia will re-attempt later this date. Second attempt at 1320, pt education of PT POC and purpose of physical therapy given to patient. Pt continues to decline any mobility at this time. Will continue to follow. MERYL Ledbetter notified.        Electronically signed by Jp Stanford PTA on 2022 at 10:31 AM

## 2022-01-09 NOTE — PLAN OF CARE
Problem: Falls - Risk of:  Goal: Will remain free from falls  Description: Will remain free from falls  1/9/2022 1610 by Monik Vora RN  Outcome: Ongoing     Problem: Pain:  Goal: Pain level will decrease  Description: Pain level will decrease  1/9/2022 1610 by Monik Vora RN  Outcome: Ongoing     Problem: Musculor/Skeletal Functional Status  Goal: Highest potential functional level  1/9/2022 1610 by Monik Vora RN  Outcome: Ongoing     Problem: Safety:  Goal: Ability to demonstrate self-control will improve  Description: Ability to demonstrate self-control will improve  1/9/2022 1610 by Monik Vora RN  Outcome: Ongoing

## 2022-01-09 NOTE — CARE COORDINATION
ONGOING DISCHARGE PLAN:    Writer reviewed LSW notes, and discharge plan is to [facility] to [facility].     Awaiting Pre-Cert.       Will continue to follow for additional discharge needs.     Electronically signed by Jacky Segovia RN on 1/9/2022 at 8:21 AM

## 2022-01-10 ENCOUNTER — APPOINTMENT (OUTPATIENT)
Dept: ULTRASOUND IMAGING | Age: 57
DRG: 882 | End: 2022-01-10
Payer: MEDICARE

## 2022-01-10 LAB
CULTURE: NO GROWTH
Lab: NORMAL
SPECIMEN DESCRIPTION: NORMAL

## 2022-01-10 PROCEDURE — 6370000000 HC RX 637 (ALT 250 FOR IP): Performed by: FAMILY MEDICINE

## 2022-01-10 PROCEDURE — 1200000000 HC SEMI PRIVATE

## 2022-01-10 PROCEDURE — 76770 US EXAM ABDO BACK WALL COMP: CPT

## 2022-01-10 RX ORDER — ALPRAZOLAM 0.25 MG/1
0.25 TABLET ORAL ONCE
Status: COMPLETED | OUTPATIENT
Start: 2022-01-10 | End: 2022-01-11

## 2022-01-10 RX ORDER — MORPHINE SULFATE 2 MG/ML
0.5 INJECTION, SOLUTION INTRAMUSCULAR; INTRAVENOUS ONCE
Status: COMPLETED | OUTPATIENT
Start: 2022-01-10 | End: 2022-01-11

## 2022-01-10 RX ADMIN — CLOPIDOGREL 75 MG: 75 TABLET, FILM COATED ORAL at 08:45

## 2022-01-10 RX ADMIN — DIPHENHYDRAMINE HCL 50 MG: 25 TABLET ORAL at 08:45

## 2022-01-10 RX ADMIN — APIXABAN 5 MG: 5 TABLET, FILM COATED ORAL at 22:42

## 2022-01-10 RX ADMIN — APIXABAN 5 MG: 5 TABLET, FILM COATED ORAL at 08:45

## 2022-01-10 RX ADMIN — DIPHENHYDRAMINE HCL 50 MG: 25 TABLET ORAL at 22:42

## 2022-01-10 RX ADMIN — CARVEDILOL 12.5 MG: 12.5 TABLET, FILM COATED ORAL at 08:45

## 2022-01-10 RX ADMIN — CARVEDILOL 12.5 MG: 12.5 TABLET, FILM COATED ORAL at 17:28

## 2022-01-10 ASSESSMENT — PAIN SCALES - GENERAL: PAINLEVEL_OUTOF10: 0

## 2022-01-10 NOTE — PLAN OF CARE
Problem: Falls - Risk of:  Goal: Will remain free from falls  Description: Will remain free from falls  1/10/2022 1646 by Servando Beard RN  Outcome: Ongoing  1/10/2022 0254 by Sammie Osler, RN  Outcome: Ongoing  Goal: Absence of physical injury  Description: Absence of physical injury  1/10/2022 1646 by Servando Beard RN  Outcome: Ongoing  1/10/2022 0254 by Sammie Osler, RN  Outcome: Ongoing     Problem: Pain:  Goal: Pain level will decrease  Description: Pain level will decrease  1/10/2022 1646 by Servando Beard RN  Outcome: Ongoing  1/10/2022 0254 by Sammie Osler, RN  Outcome: Ongoing  Goal: Control of acute pain  Description: Control of acute pain  1/10/2022 1646 by Servando Beard RN  Outcome: Ongoing  1/10/2022 0254 by Sammie Osler, RN  Outcome: Ongoing  Goal: Control of chronic pain  Description: Control of chronic pain  1/10/2022 1646 by Servando Beard RN  Outcome: Ongoing  1/10/2022 0254 by Sammie Osler, RN  Outcome: Ongoing     Problem: Musculor/Skeletal Functional Status  Goal: Highest potential functional level  1/10/2022 1646 by Servando Beard RN  Outcome: Ongoing  1/10/2022 0254 by Sammie Osler, RN  Outcome: Ongoing  Goal: Absence of falls  1/10/2022 1646 by Servando Beard RN  Outcome: Ongoing  1/10/2022 0254 by Sammie Osler, RN  Outcome: Ongoing     Problem: Coping:  Goal: Ability to verbalize frustrations and anger appropriately will improve  Description: Ability to verbalize frustrations and anger appropriately will improve  1/10/2022 1646 by Servando Beard RN  Outcome: Ongoing  1/10/2022 0254 by Sammie Osler, RN  Outcome: Ongoing     Problem: Health Behavior:  Goal: Ability to verbalize precipitating factors for violent behavior will improve  Description: Ability to verbalize precipitating factors for violent behavior will improve  1/10/2022 1646 by Servando Beard RN  Outcome: Ongoing  1/10/2022 0254 by Sammie Osler, RN  Outcome: Ongoing     Problem: Safety:  Goal: Ability to demonstrate self-control will improve  Description: Ability to demonstrate self-control will improve  1/10/2022 1646 by Servando Beard RN  Outcome: Ongoing  1/10/2022 0254 by Sammie Osler, RN  Outcome: Ongoing  Goal: Ability to implement measures to prevent violent behavior in the future will improve  Description: Ability to implement measures to prevent violent behavior in the future will improve  1/10/2022 1646 by Servando Beard RN  Outcome: Ongoing  1/10/2022 0254 by Sammie Osler, RN  Outcome: Ongoing  Goal: Ability to redirect hostility and anger into socially appropriate behaviors will improve  Description: Ability to redirect hostility and anger into socially appropriate behaviors will improve  1/10/2022 1646 by Servando Beard RN  Outcome: Ongoing  1/10/2022 0254 by Sammie Osler, RN  Outcome: Ongoing

## 2022-01-10 NOTE — PROGRESS NOTES
Date:   1/10/2022  Patient name: Jack Bonds  Date of admission:  12/30/2021  8:15 PM  MRN:   [id number]  YOB: 1965  PCP: Ernie Suazo MD    Reason for Admission: NSTEMI (non-ST elevated myocardial infarction) [facility]) [I21.4]  Other congestive heart failure ([facility] [address] [I50.9]  Congestive heart failure of unknown etiology ([facility] [I50.9]  COVID-19 [U07.1]    Cardiology follow-up: Chest pain, multivessel CAD, ischemic cardiomyopathy, CHF systolic and diastolic       Impression     Chest pain  Borderline abnormal high-sensitivity troponin, type II, demand/supply imbalance  Ischemic cardiomyopathy, dilated LV 6.5 cm, ejection fraction 30 to 35%  Grade 3 LV diastolic dysfunction  2D echo 12/27/2021 severely dilated LA, severe mitral regurgitation, moderate tricuspid regurgitation, mild to moderate AR, ICD leads, RVSP 64 mmHg  Moderate to severe pulmonary hypertension, right ventricular systolic pressure 65 mmHg  Atrial fibrillation, ventricular paced rhythm  Status post AICD  Cardiac cath 12/28/2021: Multivessel CAD, patent LIMA to LAD and SVG to diagonal 1, patent stent in the OM and distal RCA  Renal insufficiency, creatinine 2.35  CVA, right-sided weakness        History of present illness     53-year-old male with a past medical history of ischemic cardiomyopathy, systolic and diastolic heart failure got admitted with severe retrosternal chest pain like a heaviness.  His electrocardiogram showed A. fib, ventricular paced rhythm and high-sensitivity troponins were borderline abnormal, trending downward.  He recently had a cardiac cath and it showed patent grafts no coronary intervention required.  He continues to complain of constant chest pain.  He is ambulating in the room without any significant distress.  He has been very emotional.  He told me that nobody listening to him and they do not believe about his chest pain.  No AICD discharge.  According to the nurses reported he has been demanding morphine all the time. Current evaluation  Patient seen and examined , discussed with the patient nurse  He was resting with 1 pillow  Not appear in any distress  Per nurse report he is taking medication  Afebrile hemodynamically stable heart rate 70    Medications:   Scheduled Meds:   ciprofloxacin  500 mg Oral 2 times per day    haloperidol lactate  5 mg IntraMUSCular Once    isosorbide dinitrate  20 mg Oral TID    diphenhydrAMINE  50 mg Oral BID    apixaban  5 mg Oral BID    carvedilol  12.5 mg Oral BID WC    clopidogrel  75 mg Oral Daily    lisinopril  10 mg Oral Daily    clonazePAM  1 mg Oral Daily    amLODIPine  5 mg Oral Daily     Continuous Infusions:  CBC: No results for input(s): WBC, HGB, PLT in the last 72 hours. BMP:    Recent Labs     01/09/22  0712      K 4.9      CO2 24   BUN 28*   CREATININE 2.09*   GLUCOSE 88     Hepatic: No results for input(s): AST, ALT, ALB, BILITOT, ALKPHOS in the last 72 hours. Troponin: No results for input(s): TROPONINI in the last 72 hours. BNP: No results for input(s): BNP in the last 72 hours. Lipids: No results for input(s): CHOL, HDL in the last 72 hours. Invalid input(s): LDLCALCU  INR: No results for input(s): INR in the last 72 hours. Objective:   Vitals: /87   Pulse 70   Temp 98.6 °F (37 °C) (Oral)   Resp 16   Ht 5' 9\" (1.753 m)   Wt 166 lb 7.2 oz (75.5 kg)   SpO2 95%   BMI 24.58 kg/m²   General appearance: alert and cooperative with exam  HEENT: Head: Normal, normocephalic, atraumatic. Neck: no JVD and supple, symmetrical, trachea midline  Lungs: diminished breath sounds bibasilar  Heart: Heart sounds normal apical systolic murmur  Abdomen: soft, non-tender; bowel sounds normal; no masses,  no organomegaly  Extremities: Homans sign is negative, no sign of DVT  Neurologic: Mental status: Follow verbal commands    EKG: A. fib ventricular paced. ECHO: reviewed.    Ejection fraction: 30-35%, dilated LV 6.7 cm, severely dilated LA and RA, severe MR, RVSP 60, 2D echo 12/27/2021      Assessment / Acute Cardiac Problems:     Constant retrosternal chest pain like a heaviness, difficult to assess etiology of the pain because patient is hemodynamically stable, no diaphoresis, no tachypnea, oxygen saturation 99%  Borderline abnormal high-sensitivity troponin trending downward most likely type II, demand/supply in bowel  Ischemic cardiomyopathy, multivessel coronary artery disease, CABG  Recent cath showed patent LIMA to LAD, patent stent in the OM, patent SVG to diagonal 1  CKD  Status post AICD, ventricle paced rhythm underlying rhythm is A.  Fib  CVA, right-sided weakness     1/10/2021 CHF is compensated,       Patient Active Problem List:     JACKSON (acute kidney injury) ([address]     Atypical chest pain     AICD (automatic cardioverter/defibrillator) present     History of MI (myocardial infarction)     Elevated brain natriuretic peptide (BNP) level     Elevated troponin     Chronic systolic heart failure (HCC)     Hx of CABG     Stage 3 chronic kidney disease ([address]     COVID-19 virus infection     Hydronephrosis of left kidney     Acute urinary retention     Hepatitis C     Coronary artery disease involving native coronary artery of native heart with angina pectoris ([address]     Solitary kidney, congenital     Homeless single person     Acute right-sided weakness     Acute CVA (cerebrovascular accident) ([address]     Acute chest pain     Congestive heart failure of unknown etiology ([address]     History of CVA with residual deficit     Longstanding persistent atrial fibrillation ([address]     Essential hypertension     Pulmonary emphysema ([address]      Plan of Treatment:   Medications reviewed  Continue current cardiac medication  Waiting for replacement    Electronically signed by Sasha Mcintosh MD on 1/10/2022 at 2:59 PM

## 2022-01-10 NOTE — PROGRESS NOTES
Physical Therapy  DATE: 1/10/2022    NAME: Melvin Lopes  MRN: [id number]   : 1965    Patient not seen this date for Physical Therapy due to:      [] Cancel by RN or physician due to:    [] Hemodialysis    [] Critical Lab Value Level     [] Blood transfusion in progress    [] Acute or unstable cardiovascular status   _MAP < 55 or more than >115  _HR < 40 or > 130    [] Acute or unstable pulmonary status   -FiO2 > 60%   _RR < 5 or >40    _O2 sats < 85%    [] Strict Bedrest    [] Off Unit for surgery or procedure    [] Off Unit for testing       [] Pending imaging to R/O fracture    [x] Refusal by Patient; checked with patient @ [phone]. Patient stated \"Im hurting right now\". Patient also stated he was having 8/10 flank pain. Educated patient in PT POC and patient continued to refuse. Will continue to follow. [] Other      [] PT being discontinued at this time. Patient independent. No further needs. [] PT being discontinued at this time as the patient has been transferred to hospice care. No further needs.       Electronically signed by Beena Haynes PTA on 1/10/22 at 1:35 PM EST

## 2022-01-10 NOTE — PROGRESS NOTES
[facility]    OCCUPATIONAL THERAPY MISSED TREATMENT NOTE   INPATIENT   Date: 1/10/22  Patient Name: Emmanuel Brown       Room:   MRN: [id number]   Account #: [de-identified]    : 1965  (61 y.o.)  Gender: male   Referring Practitioner: Loli Chaney MD             REASON FOR MISSED TREATMENT:  PATEL attempts X2 with pt resting and refusing both times. Will continue to follow as appropriate.      PatriciaAMANDA Martinez/L

## 2022-01-10 NOTE — PLAN OF CARE
Problem: Falls - Risk of:  Goal: Will remain free from falls  Description: Will remain free from falls  1/10/2022 0254 by Nettie Mcmullen RN  Outcome: Ongoing  1/9/2022 1610 by Duarte Gallagher RN  Outcome: Ongoing  Goal: Absence of physical injury  Description: Absence of physical injury  1/10/2022 0254 by Nettie Mcmullen RN  Outcome: Ongoing  1/9/2022 1610 by Duarte Gallagher RN  Outcome: Ongoing     Problem: Pain:  Goal: Pain level will decrease  Description: Pain level will decrease  1/10/2022 0254 by Nettie Mcmullen RN  Outcome: Ongoing  1/9/2022 1610 by Duarte Gallagher RN  Outcome: Ongoing  Goal: Control of acute pain  Description: Control of acute pain  1/10/2022 0254 by Nettie Mcmullen RN  Outcome: Ongoing  1/9/2022 1610 by Duarte Gallagher RN  Outcome: Ongoing  Goal: Control of chronic pain  Description: Control of chronic pain  1/10/2022 0254 by Nettie Mcmullen RN  Outcome: Ongoing  1/9/2022 1610 by Duarte Gallagher RN  Outcome: Ongoing     Problem: Musculor/Skeletal Functional Status  Goal: Highest potential functional level  1/10/2022 0254 by Nettie Mcmullen RN  Outcome: Ongoing  1/9/2022 1610 by Duarte Gallagher RN  Outcome: Ongoing  Goal: Absence of falls  1/10/2022 0254 by Nettie Mcmullen RN  Outcome: Ongoing  1/9/2022 1610 by Duarte Gallagher RN  Outcome: Ongoing     Problem: Coping:  Goal: Ability to verbalize frustrations and anger appropriately will improve  Description: Ability to verbalize frustrations and anger appropriately will improve  1/10/2022 0254 by Nettie Mcmullen RN  Outcome: Ongoing  1/9/2022 1610 by Duarte Gallagher RN  Outcome: Ongoing     Problem: Health Behavior:  Goal: Ability to verbalize precipitating factors for violent behavior will improve  Description: Ability to verbalize precipitating factors for violent behavior will improve  1/10/2022 0254 by Nettie Mcmullen RN  Outcome: Ongoing  1/9/2022 1610 by Duarte Gallagher RN  Outcome: Ongoing     Problem: Safety:  Goal: Ability to demonstrate self-control will improve  Description: Ability to demonstrate self-control will improve  1/10/2022 0254 by Georges Hernandez RN  Outcome: Ongoing  1/9/2022 1610 by Yoni Hong RN  Outcome: Ongoing  Goal: Ability to implement measures to prevent violent behavior in the future will improve  Description: Ability to implement measures to prevent violent behavior in the future will improve  1/10/2022 0254 by Georges Hernandez RN  Outcome: Ongoing  1/9/2022 1610 by Yoni Hong RN  Outcome: Ongoing  Goal: Ability to redirect hostility and anger into socially appropriate behaviors will improve  Description: Ability to redirect hostility and anger into socially appropriate behaviors will improve  1/10/2022 0254 by Georges Hernandez RN  Outcome: Ongoing  1/9/2022 1610 by Yoni Hong RN  Outcome: Ongoing     Problem: Non-Violent Restraints  Goal: Removal from restraints as soon as assessed to be safe  1/10/2022 0254 by Georges Hernandez RN  Outcome: Ongoing  1/9/2022 1610 by Yoni Hong RN  Outcome: Ongoing  Goal: No harm/injury to patient while restraints in use  1/10/2022 0254 by Georges Hernandez RN  Outcome: Ongoing  1/9/2022 1610 by Yoni Hong RN  Outcome: Ongoing  Goal: Patient's dignity will be maintained  1/10/2022 0254 by Georges Hernandez RN  Outcome: Ongoing  1/9/2022 1610 by Yoni Hong RN  Outcome: Ongoing

## 2022-01-11 PROCEDURE — 1200000000 HC SEMI PRIVATE

## 2022-01-11 PROCEDURE — 6360000002 HC RX W HCPCS: Performed by: INTERNAL MEDICINE

## 2022-01-11 PROCEDURE — 6370000000 HC RX 637 (ALT 250 FOR IP): Performed by: INTERNAL MEDICINE

## 2022-01-11 PROCEDURE — 6370000000 HC RX 637 (ALT 250 FOR IP): Performed by: FAMILY MEDICINE

## 2022-01-11 RX ORDER — OXYCODONE HYDROCHLORIDE AND ACETAMINOPHEN 5; 325 MG/1; MG/1
1 TABLET ORAL ONCE
Status: COMPLETED | OUTPATIENT
Start: 2022-01-11 | End: 2022-01-11

## 2022-01-11 RX ADMIN — CLOPIDOGREL 75 MG: 75 TABLET, FILM COATED ORAL at 15:42

## 2022-01-11 RX ADMIN — ALPRAZOLAM 0.25 MG: 0.25 TABLET ORAL at 00:17

## 2022-01-11 RX ADMIN — MORPHINE SULFATE 0.5 MG: 2 INJECTION, SOLUTION INTRAMUSCULAR; INTRAVENOUS at 00:18

## 2022-01-11 RX ADMIN — APIXABAN 5 MG: 5 TABLET, FILM COATED ORAL at 15:41

## 2022-01-11 RX ADMIN — CARVEDILOL 12.5 MG: 12.5 TABLET, FILM COATED ORAL at 22:16

## 2022-01-11 RX ADMIN — TRAMADOL HYDROCHLORIDE 100 MG: 50 TABLET, COATED ORAL at 21:59

## 2022-01-11 RX ADMIN — DIPHENHYDRAMINE HCL 50 MG: 25 TABLET ORAL at 21:59

## 2022-01-11 RX ADMIN — CLONAZEPAM 1 MG: 1 TABLET ORAL at 15:42

## 2022-01-11 RX ADMIN — DIPHENHYDRAMINE HCL 50 MG: 25 TABLET ORAL at 15:42

## 2022-01-11 RX ADMIN — TRAMADOL HYDROCHLORIDE 100 MG: 50 TABLET, COATED ORAL at 15:42

## 2022-01-11 RX ADMIN — OXYCODONE HYDROCHLORIDE AND ACETAMINOPHEN 1 TABLET: 5; 325 TABLET ORAL at 20:37

## 2022-01-11 RX ADMIN — APIXABAN 5 MG: 5 TABLET, FILM COATED ORAL at 21:59

## 2022-01-11 ASSESSMENT — PAIN SCALES - GENERAL
PAINLEVEL_OUTOF10: 8
PAINLEVEL_OUTOF10: 8
PAINLEVEL_OUTOF10: 7
PAINLEVEL_OUTOF10: 8
PAINLEVEL_OUTOF10: 10

## 2022-01-11 NOTE — PROGRESS NOTES
Physical Therapy  DATE: 2022    NAME: Kyle Cano  MRN: [id number]   : 1965    Patient not seen this date for Physical Therapy due to:      [] Cancel by RN or physician due to:    [] Hemodialysis    [] Critical Lab Value Level     [] Blood transfusion in progress    [] Acute or unstable cardiovascular status   _MAP < 55 or more than >115  _HR < 40 or > 130    [] Acute or unstable pulmonary status   -FiO2 > 60%   _RR < 5 or >40    _O2 sats < 85%    [] Strict Bedrest    [] Off Unit for surgery or procedure    [] Off Unit for testing       [] Pending imaging to R/O fracture    [] Refusal by Patient      [x] Other; checked with RN in AM. RN reports patient being extremely agitated this date. Defer TX this date. Will continue to follow. [] PT being discontinued at this time. Patient independent. No further needs. [] PT being discontinued at this time as the patient has been transferred to hospice care. No further needs.       Electronically signed by Jeffrey Arias PTA on 22 at 3:03 PM EST

## 2022-01-11 NOTE — PLAN OF CARE
Problem: Falls - Risk of:  Goal: Will remain free from falls  Description: Will remain free from falls  Outcome: Ongoing  Goal: Absence of physical injury  Description: Absence of physical injury  Outcome: Ongoing     Problem: Pain:  Goal: Pain level will decrease  Description: Pain level will decrease  Outcome: Ongoing  Goal: Control of acute pain  Description: Control of acute pain  Outcome: Ongoing  Goal: Control of chronic pain  Description: Control of chronic pain  Outcome: Ongoing     Problem: Musculor/Skeletal Functional Status  Goal: Highest potential functional level  Outcome: Ongoing  Goal: Absence of falls  Outcome: Ongoing     Problem: Coping:  Goal: Ability to verbalize frustrations and anger appropriately will improve  Description: Ability to verbalize frustrations and anger appropriately will improve  Outcome: Ongoing     Problem: Health Behavior:  Goal: Ability to verbalize precipitating factors for violent behavior will improve  Description: Ability to verbalize precipitating factors for violent behavior will improve  Outcome: Ongoing     Problem: Safety:  Goal: Ability to demonstrate self-control will improve  Description: Ability to demonstrate self-control will improve  Outcome: Ongoing  Goal: Ability to implement measures to prevent violent behavior in the future will improve  Description: Ability to implement measures to prevent violent behavior in the future will improve  Outcome: Ongoing  Goal: Ability to redirect hostility and anger into socially appropriate behaviors will improve  Description: Ability to redirect hostility and anger into socially appropriate behaviors will improve  Outcome: Ongoing

## 2022-01-11 NOTE — PROGRESS NOTES
Dr. Choudhury Hopes notified via secure message that [facility] is requesting a note be in stating that pt is medically cleared to admit to the I unit. Awaiting response.

## 2022-01-11 NOTE — PROGRESS NOTES
Hospitalist Progress Note  1/10/2022 8:01 PM  Subjective:   Admit Date: 12/30/2021  PCP: Vivek Sorenson MD     Full Code      C/c:  Chief Complaint   Patient presents with    Chest Pain         Interval History: pt resting quietly/chart reviewed    Diet: ADULT DIET; Regular; Low Fat/Low Chol/High Fiber/2 gm Na                                ip days:10  Medications:   Scheduled Meds:   ciprofloxacin  500 mg Oral 2 times per day    haloperidol lactate  5 mg IntraMUSCular Once    isosorbide dinitrate  20 mg Oral TID    diphenhydrAMINE  50 mg Oral BID    apixaban  5 mg Oral BID    carvedilol  12.5 mg Oral BID WC    clopidogrel  75 mg Oral Daily    lisinopril  10 mg Oral Daily    clonazePAM  1 mg Oral Daily    amLODIPine  5 mg Oral Daily     Continuous Infusions:  PRN Meds:.traMADol, traMADol, acetaminophen, ondansetron **OR** [DISCONTINUED] ondansetron     CBC: No results for input(s): WBC, HGB, PLT in the last 72 hours. BMP:    Recent Labs     01/09/22  0712      K 4.9      CO2 24   BUN 28*   CREATININE 2.09*   GLUCOSE 88     Hepatic: No results for input(s): AST, ALT, ALB, BILITOT, ALKPHOS in the last 72 hours. Troponin: No results for input(s): TROPONINI in the last 72 hours. BNP: No results for input(s): BNP in the last 72 hours. Lipids: No results for input(s): CHOL, HDL in the last 72 hours. Invalid input(s): LDLCALCU  INR: No results for input(s): INR in the last 72 hours.     Objective:   Vitals: /87   Pulse 70   Temp 98.6 °F (37 °C) (Oral)   Resp 16   Ht 5' 9\" (1.753 m)   Wt 166 lb 7.2 oz (75.5 kg)   SpO2 95%   BMI 24.58 kg/m²   General appearance: alert, appears stated age and cooperative  Skin: Skin color, texture, turgor normal. No rashes or lesions  Lungs: clear to auscultation bilaterally  Heart: regular rate and rhythm, S1, S2 normal, no murmur, click, rub or gallop  Abdomen: soft, non-tender; bowel sounds normal; no masses,  no organomegaly  Extremities: extremities normal, atraumatic, no cyanosis or edema  Neurologic: Mental status: Alert, oriented, thought content appropriate    Prophylaxis:   DVT with  [] lovenox        [] heparin        [] Scd        [x] apixaban     Radiology:  NM LUNG SCAN PERFUSION ONLY    Result Date: 12/31/2021  EXAMINATION: NUCLEAR MEDICINE PERFUSION SCAN. 12/31/2021 TECHNIQUE: Ventilation not performed as part of COVID-19 safety precautions. 6.0 millicuries of Tc 24I MAA was administered intravenously prior to planar imaging of the lungs in multiple projections. COMPARISON: Chest radiograph dated 12/28/2021. HISTORY: ORDERING SYSTEM PROVIDED HISTORY: SOB, elev DD TECHNOLOGIST PROVIDED HISTORY: SOB, elev DD Decision Support Exception - unselect if not a suspected or confirmed emergency medical condition->Emergency Medical Condition (MA) Reason for Exam: SOB, elevated D-Dimer Additional signs and symptoms: SOB while laying down, stroke 2 weeks ago, has had 3 strokes, CP, heart feels like its beating out of his chest, coughing, was coughing up black one week ago, No hx of lung dx, No hx of blood clots, smokes 1 pack/3 days for 15-17 years, D-Dimer 12/30/21: 2.37 FINDINGS: PERFUSION: Distribution of radiotracer is homogeneous. No segmental defects identified. CHEST RADIOGRAPH: No focal areas of consolidation or significant effusions on recent chest radiograph. Low Probability for Pulmonary Embolus. Assessment :   1. Chf/systolic heart failure/stable  2. A fib/rvr/rate controled/continue apixaban     Plan:   1. Awaiting placement  2.   See order    Patient Active Problem List:     JACKSON (acute kidney injury) ([address]     Atypical chest pain     AICD (automatic cardioverter/defibrillator) present     History of MI (myocardial infarction)     Elevated brain natriuretic peptide (BNP) level     Elevated troponin     Chronic systolic heart failure (HCC)     Hx of CABG     Stage 3 chronic kidney disease ([address]     COVID-19 virus infection Hydronephrosis of left kidney     Acute urinary retention     Hepatitis C     Coronary artery disease involving native coronary artery of native heart with angina pectoris (HCC)     Solitary kidney, congenital     Homeless single person     Acute right-sided weakness     Acute CVA (cerebrovascular accident) ([address]     Acute chest pain     Congestive heart failure of unknown etiology ([address]     History of CVA with residual deficit     Longstanding persistent atrial fibrillation ([address]     Essential hypertension     Pulmonary emphysema ([address]      Anticipated Disposition upon discharge: [] Home                                                                         [] Home with Home Health                                                                         [] [facility]                                                                         [] [facility] [address]      Patient is admitted as inpatient status because of co-morbidities listed above, severity of signs and symptoms as outlined, requirement for current medical therapies and most importantly because of direct risk to patient if care not provided in a hospital setting.           Helio Franklin MD, MD  Rounding Hospitalist

## 2022-01-12 ENCOUNTER — APPOINTMENT (OUTPATIENT)
Dept: CT IMAGING | Age: 57
DRG: 882 | End: 2022-01-12
Payer: MEDICARE

## 2022-01-12 ENCOUNTER — APPOINTMENT (OUTPATIENT)
Dept: GENERAL RADIOLOGY | Age: 57
DRG: 882 | End: 2022-01-12
Payer: MEDICARE

## 2022-01-12 LAB
-: NORMAL
GFR NON-AFRICAN AMERICAN: 28 ML/MIN
GFR SERPL CREATININE-BSD FRML MDRD: 34 ML/MIN
GFR SERPL CREATININE-BSD FRML MDRD: ABNORMAL ML/MIN/{1.73_M2}
GLUCOSE BLD-MCNC: 91 MG/DL (ref 75–110)
HCT VFR BLD CALC: 28.5 % (ref 41–53)
HEMOGLOBIN: 9.5 G/DL (ref 13.5–17.5)
MCH RBC QN AUTO: 32 PG (ref 26–34)
MCHC RBC AUTO-ENTMCNC: 33.3 G/DL (ref 31–37)
MCV RBC AUTO: 96.1 FL (ref 80–100)
NRBC AUTOMATED: ABNORMAL PER 100 WBC
PARTIAL THROMBOPLASTIN TIME: 34.8 SEC (ref 24–36)
PDW BLD-RTO: 19.5 % (ref 11.5–14.9)
PLATELET # BLD: 258 K/UL (ref 150–450)
PMV BLD AUTO: 6.9 FL (ref 6–12)
POC CREATININE: 2.37 MG/DL (ref 0.51–1.19)
RBC # BLD: 2.97 M/UL (ref 4.5–5.9)
REASON FOR REJECTION: NORMAL
TROPONIN INTERP: ABNORMAL
TROPONIN T: ABNORMAL NG/ML
TROPONIN, HIGH SENSITIVITY: 41 NG/L (ref 0–22)
WBC # BLD: 3.7 K/UL (ref 3.5–11)
ZZ NTE CLEAN UP: ORDERED TEST: NORMAL
ZZ NTE WITH NAME CLEAN UP: SPECIMEN SOURCE: NORMAL

## 2022-01-12 PROCEDURE — 1200000000 HC SEMI PRIVATE

## 2022-01-12 PROCEDURE — 97168 OT RE-EVAL EST PLAN CARE: CPT

## 2022-01-12 PROCEDURE — 92610 EVALUATE SWALLOWING FUNCTION: CPT

## 2022-01-12 PROCEDURE — 70450 CT HEAD/BRAIN W/O DYE: CPT

## 2022-01-12 PROCEDURE — 92611 MOTION FLUOROSCOPY/SWALLOW: CPT

## 2022-01-12 PROCEDURE — 2580000003 HC RX 258: Performed by: PSYCHIATRY & NEUROLOGY

## 2022-01-12 PROCEDURE — 74230 X-RAY XM SWLNG FUNCJ C+: CPT

## 2022-01-12 PROCEDURE — 82565 ASSAY OF CREATININE: CPT

## 2022-01-12 PROCEDURE — 85730 THROMBOPLASTIN TIME PARTIAL: CPT

## 2022-01-12 PROCEDURE — 99231 SBSQ HOSP IP/OBS SF/LOW 25: CPT | Performed by: PSYCHIATRY & NEUROLOGY

## 2022-01-12 PROCEDURE — 85027 COMPLETE CBC AUTOMATED: CPT

## 2022-01-12 PROCEDURE — 84484 ASSAY OF TROPONIN QUANT: CPT

## 2022-01-12 PROCEDURE — 82947 ASSAY GLUCOSE BLOOD QUANT: CPT

## 2022-01-12 PROCEDURE — 36415 COLL VENOUS BLD VENIPUNCTURE: CPT

## 2022-01-12 PROCEDURE — G0426 INPT/ED TELECONSULT50: HCPCS | Performed by: PSYCHIATRY & NEUROLOGY

## 2022-01-12 PROCEDURE — 6360000004 HC RX CONTRAST MEDICATION: Performed by: PSYCHIATRY & NEUROLOGY

## 2022-01-12 PROCEDURE — 70498 CT ANGIOGRAPHY NECK: CPT

## 2022-01-12 RX ORDER — HEPARIN SODIUM 1000 [USP'U]/ML
80 INJECTION, SOLUTION INTRAVENOUS; SUBCUTANEOUS PRN
Status: DISCONTINUED | OUTPATIENT
Start: 2022-01-12 | End: 2022-01-13

## 2022-01-12 RX ORDER — 0.9 % SODIUM CHLORIDE 0.9 %
80 INTRAVENOUS SOLUTION INTRAVENOUS ONCE
Status: COMPLETED | OUTPATIENT
Start: 2022-01-12 | End: 2022-01-12

## 2022-01-12 RX ORDER — SODIUM CHLORIDE 0.9 % (FLUSH) 0.9 %
10 SYRINGE (ML) INJECTION PRN
Status: DISCONTINUED | OUTPATIENT
Start: 2022-01-12 | End: 2022-01-14 | Stop reason: HOSPADM

## 2022-01-12 RX ORDER — LORAZEPAM 0.5 MG/1
0.5 TABLET ORAL ONCE
Status: DISCONTINUED | OUTPATIENT
Start: 2022-01-12 | End: 2022-01-14 | Stop reason: HOSPADM

## 2022-01-12 RX ORDER — HEPARIN SODIUM 1000 [USP'U]/ML
40 INJECTION, SOLUTION INTRAVENOUS; SUBCUTANEOUS PRN
Status: DISCONTINUED | OUTPATIENT
Start: 2022-01-12 | End: 2022-01-13

## 2022-01-12 RX ADMIN — SODIUM CHLORIDE 80 ML: 9 INJECTION, SOLUTION INTRAVENOUS at 04:13

## 2022-01-12 RX ADMIN — IOPAMIDOL 75 ML: 755 INJECTION, SOLUTION INTRAVENOUS at 04:12

## 2022-01-12 RX ADMIN — SODIUM CHLORIDE, PRESERVATIVE FREE 10 ML: 5 INJECTION INTRAVENOUS at 04:13

## 2022-01-12 ASSESSMENT — PAIN DESCRIPTION - PAIN TYPE: TYPE: ACUTE PAIN

## 2022-01-12 ASSESSMENT — PAIN SCALES - WONG BAKER: WONGBAKER_NUMERICALRESPONSE: 6

## 2022-01-12 ASSESSMENT — PAIN DESCRIPTION - LOCATION: LOCATION: SHOULDER

## 2022-01-12 ASSESSMENT — PAIN DESCRIPTION - ORIENTATION: ORIENTATION: LEFT

## 2022-01-12 NOTE — PROGRESS NOTES
Pt came to CT department after stroke alert was called. POC GFR and creatinine were performed due to need for CTA head and neck. GFR was 28, creatinine was 2.37. Radiologist was called due to pt's GFR being below acceptable level to give contrast and pt's need for emergent exam. Writer spoke with Dr. Edward Washington who stated the the decision to give contrast or not would be up to the ordering provider. Jarred Amin RN then called ordering physician, Dr. Jessica Queen.  Dr. Alex Quintero was informed of patients POC GFR and advised to proceed with CTA head and neck with contrast.

## 2022-01-12 NOTE — PROGRESS NOTES
[address]    Occupational Therapy Re-Evaluation  Date: 22  Patient Name: Beth Brooks       Room:   MRN: [id number]  Account: [de-identified]   : 1965  (61 y.o.) Gender: male     Discharge Recommendations:  Further Occupational Therapy is recommended upon facility discharge. Referring Practitioner: Norris Mcpherson MD  Diagnosis: NSTEMI       Treatment Diagnosis: Impaired self-care status. Past Medical History:  has a past medical history of AICD (automatic cardioverter/defibrillator) present, CAD (coronary artery disease), CKD (chronic kidney disease), stage III ([address] COVID-19, Myocardial infarction ([facility] [address] PAF (paroxysmal atrial fibrillation) ([facility] [address] S/P CABG (coronary artery bypass graft), and Solitary kidney, congenital.  Past Surgical History:   has a past surgical history that includes Coronary artery bypass graft (); Coronary artery bypass graft (); pacemaker placement (2020); Cardiac catheterization (2021); and Coronary angioplasty with stent. Restrictions  Restrictions/Precautions: Fall Risk (1:1 present; NPO for swallow study this PM)  Implants present? : Metal implants (AICD)  Required Braces or Orthoses?: No     Vitals  Temp:  (pt redused vitals)  Pulse: 74  Resp: 18  BP: (!) 123/94  Height: 5' 9\" (175.3 cm)  Weight: 166 lb 7.2 oz (75.5 kg)  BMI (Calculated): 24.6  Oxygen Therapy  SpO2: 96 %  Pulse Oximeter Device Mode: Intermittent  Pulse Oximeter Device Location: Finger  O2 Device: None (Room air)  Skin Assessment: Clean, dry, & intact  O2 Flow Rate (L/min): 0 L/min  Level of Consciousness: Alert (0)    Subjective  Subjective: \"It don't matter\" patient demonstrates speech significantly dysarthric this date. Patient's speech difficult to understand, however states \"It don't matter\" at one point. RN notified of patient's speech.   Comments: MERYL Spivey notified of differences in OT assessment this date compared to initial evaluation on 1/5. NO movement L shoulder, weakness L elbow/hand. Patient's MMT was 4+/5 L UE on 1/5 as R UE was impaired due to previous CVA. Patient demonstrates significantly dysarthritic speech this date as well. RN aware of changes compared to 1/5/21 OT evaluation. Stroke alert called 1/11/21 overnight. Re-evaluation this date due to L sided weakness, new compared to evaluation on 1/5/21. Vision  Vision: Within Functional Limits  Vision Exceptions:  (reports difficulty seeing to his right since CVA)  Hearing  Hearing: Within functional limits  Social/Functional History  Type of Home: Homeless  ADL Assistance: Independent  Homemaking Assistance: Independent  Homemaking Responsibilities: Yes  Ambulation Assistance: Independent  Transfer Assistance: Independent  Additional Comments: Limited prior level of function questions due to patient's agitation as well as communication deficits this date. Pain Assessment  Pain Assessment: Faces  Pain Level: 7  Beckford-Baker Pain Rating: Hurts even more  Pain Type: Acute pain  Pain Location: Shoulder  Pain Orientation: Left  Pain Descriptors:  (unable to describe)  Pain Frequency:  (with PROM)    Objective  Vision - Basic Assessment  Prior Vision:  (Reports right sided visual deficits - unclear what deficits)   Cognition  Overall Cognitive Status: Impaired  Arousal/Alertness: Delayed responses to stimuli  Attention Span: Attends with cues to redirect  Memory: Decreased short term memory,Decreased recall of recent events  Following Commands:  Follows one step commands with increased time  Safety Judgement: Decreased awareness of need for safety  Awareness of Errors: Decreased awareness of errors  Insights: Decreased awareness of deficits  Sequencing and Organization: Assistance required to implement solutions,Assistance required to generate solutions,Assistance required to identify errors made   Perception  Overall Perceptual Status: Impaired  Unilateral Attention: Cues to attend right visual field  Initiation: Cues to initiate tasks  Motor Planning: Cues to use objects appropriately  Sensation  Overall Sensation Status: Impaired (reports numbness R forearm/hand and foot)   ADL  Feeding: NPO (swallow study this PM)  Grooming: Moderate assistance (to wash face and comb hair seated on EOB)  UE Bathing: Maximum assistance  LE Bathing: Maximum assistance  UE Dressing: Maximum assistance  LE Dressing: Maximum assistance  Toileting: Maximum assistance  Additional Comments: OT facilitated patient engagement in grooming tasks of face washing and hair combing this date with Moderate assist. Patient demonstrates increased difficulty with self-care this date due to impaired AROM at L shoulder as well as significantly impaired AROM of R UE. ADL scores based on skilled observations and clinical reasoning unless otherwise noted. UE Function  Hand Dominance  Hand Dominance: Right        LUE Strength  Gross LUE Strength: WFL  L Hand General: 3+/5  LUE Strength Comment: Shoulder 0/5, elbow 3+/5     LUE Tone: Hypertonic  LUE PROM (degrees)  LUE PROM: Exceptions  LUE General PROM: Shoulder PROM ~50% due to pain. Elbow WFL  LUE AROM (degrees)  LUE AROM : Exceptions  LUE General AROM: no AROM of Shoulder. Elbow AROM WFL  Left Hand PROM (degrees)  Left Hand PROM: WFL  Left Hand AROM (degrees)  Left Hand AROM: WFL  RUE Strength  Gross RUE Strength: Exceptions to [facility]  R Hand General: 2-/5  RUE Strength Comment: Shoulder 0/5, elbow 1/5      RUE Tone: Hypertonic  RUE PROM (degrees)  RUE PROM: Exceptions  RUE General PROM: Shoulder PROM significantly limited due to hypertonicity. Shoulder Flexion ~0-30 degrees, shoulder Abduction ~0-30 degrees. Elbow flexion ~60% PROM. RUE AROM (degrees)  RUE AROM : Exceptions  RUE General AROM: No movement noted at shoulder. Trace movement at elbow.  ~5% AROM of supination/pronation  Right Hand PROM (degrees)  Right Hand PROM: Exceptions  Right Hand General PROM: Unable to fully extend MCPs with PROM. Thumb in full flexion - unable to passively range thumb due to tone. Right Hand AROM (degrees)  Right Hand AROM: Exceptions  Right Hand General AROM: ~5% composite grasp/release noted    Fine Motor Skills  Coordination  Movements Are Fluid And Coordinated: No  Coordination and Movement description: Right UE,Fine motor impairments,Gross motor impairments,Left UE (minimal movement R UE & L shoulder)                           Mobility  Supine to Sit: Minimal assistance (assist at R LE)  Sit to Supine: Stand by assistance (patient self-assisted R LE)       Balance  Sitting Balance: Stand by assistance  Standing Balance: Contact guard assistance  Standing Balance  Time: 30 seconds  Activity: L UE support on bed rail  Functional Mobility  Functional - Mobility Device:  (hand-in-hand assist)  Activity:  (1-2 steps with L LE - unable to advance R LE)  Assist Level:  (Minimal assist x 2)  Bed mobility  Comment: patient seated on EOB upon entry - refuses to lay down. 1:1 present     Transfers  Sit to stand: Minimal assistance  Stand to sit: Minimal assistance  Transfer Comments: Patient adamently refused gait belt this date despite therapists' education regarding necessity of use for safety/fall prevention. Patient agreeable to therapist holding onto his jeans at the waist for safety  Functional Activity Tolerance  Functional Activity Tolerance:  Tolerates < 10 Min exercise, no significant change in vital signs  Additional Comments: due to significantly impaired frustration tolerance     Assessment  Assessment  Performance deficits / Impairments: Decreased functional mobility ,Decreased ADL status,Decreased strength,Decreased ROM,Decreased safe awareness,Decreased cognition,Decreased endurance,Decreased sensation,Decreased balance,Decreased vision/visual deficit,Decreased high-level IADLs,Decreased fine motor control,Decreased coordination,Decreased posture  Treatment Diagnosis: Impaired self-care status. Prognosis: Fair,Good  Decision Making: Medium Complexity  REQUIRES OT FOLLOW UP: Yes  Discharge Recommendations: Patient would benefit from continued therapy after discharge  Activity Tolerance: Patient Tolerated treatment well         Functional Outcome Measures  AM-PAC Daily Activity Inpatient   How much help for putting on and taking off regular lower body clothing?: A Lot  How much help for Bathing?: A Lot  How much help for Toileting?: A Lot  How much help for putting on and taking off regular upper body clothing?: A Lot  How much help for taking care of personal grooming?: A Lot  How much help for eating meals?: A Little (NPO - would require SBA if diet upgraded)  -PAC Inpatient Daily Activity Raw Score: 13  AM-PAC Inpatient ADL T-Scale Score : 32.03  ADL Inpatient CMS 0-100% Score: 63.03  ADL Inpatient CMS G-Code Modifier : CL       Goals  Patient Goals   Patient goals : To discharge to facility for therapy  Short term goals  Time Frame for Short term goals: By discharge  Short term goal 1: Patient will perform upper body bathing/dressing with Min A and use of AE/modified technique PRN. Short term goal 2: Patient will perform lower body bathing/dressing and toileting tasks with Moderate assist and use of AE/modified techniques PRN. Short term goal 3: Patient will verbalize/demonstrate Good understanding of assistive equipment/durable medical equipment/modified techniques for increased safety and IND with self-care and mobility. Short term goal 4: Patient will perform stand pivot transfer to bedside commode/bedside chair with Minimal assist x 2 and Fair safety. Short term goal 5: Patient will actively participate in 15-25 minutes of therapeutic exercise/functional activities to promote increased IND with self-care and mobility. Plan  Safety Devices  Safety Devices in place: Yes  Type of devices:  All fall risk precautions in place,Call light within reach,Patient at risk for falls,Left in

## 2022-01-12 NOTE — PLAN OF CARE
Problem: Falls - Risk of:  Goal: Will remain free from falls  Outcome: Ongoing  Goal: Absence of physical injury  Outcome: Ongoing     Problem: Pain:  Goal: Pain level will decrease  Outcome: Ongoing  Goal: Control of acute pain  Outcome: Ongoing  Goal: Control of chronic pain  Outcome: Ongoing     Problem: Musculor/Skeletal Functional Status  Goal: Highest potential functional level  Outcome: Ongoing  Goal: Absence of falls  Outcome: Ongoing     Problem: Coping:  Goal: Ability to verbalize frustrations and anger appropriately will improve  Outcome: Ongoing     Problem: Health Behavior:  Goal: Ability to verbalize precipitating factors for violent behavior will improve  Outcome: Ongoing     Problem: Safety:  Goal: Ability to demonstrate self-control will improve  Outcome: Ongoing  Goal: Ability to implement measures to prevent violent behavior in the future will improve  Outcome: Ongoing  Goal: Ability to redirect hostility and anger into socially appropriate behaviors will improve  Outcome: Ongoing

## 2022-01-12 NOTE — PROGRESS NOTES
Phlebotomist informs of multiple unsuccessful attempts to draw patient's blood. Will have another phlebotomist attempt when another is available.

## 2022-01-12 NOTE — PROGRESS NOTES
Speech Language Pathology  Facility/Department: [address] [place] [address]   CLINICAL BEDSIDE SWALLOW EVALUATION    NAME: Eliot Youssef  : 1965  MRN: [id number]    ADMISSION DATE: 2021  ADMITTING DIAGNOSIS: has JACKSON (acute kidney injury) ([address] Atypical chest pain; AICD (automatic cardioverter/defibrillator) present; History of MI (myocardial infarction); Elevated brain natriuretic peptide (BNP) level; Elevated troponin; Chronic systolic heart failure ([address] Hx of CABG; Stage 3 chronic kidney disease ([address] COVID-19 virus infection; Hydronephrosis of left kidney; Acute urinary retention; Hepatitis C; Coronary artery disease involving native coronary artery of native heart with angina pectoris ([address] Solitary kidney, congenital; Homeless single person; Acute right-sided weakness; Acute CVA (cerebrovascular accident) ([address] Acute chest pain; Congestive heart failure of unknown etiology ([address] History of CVA with residual deficit; Longstanding persistent atrial fibrillation ([address] Essential hypertension; and Pulmonary emphysema ([address] on their problem list.    Recent Chest Xray/CT of Chest:  - CXR-   1. Nonspecific pulmonary infiltrates can be seen with atypical/viral   pneumonia.  Small left pleural effusion evident.  Appearance is very similar   to prior exam 2021.   2. Calcific atherosclerosis aorta. 3. Cardiomegaly. Date of Eval: 2022  Evaluating Therapist: TAMARA Palmer    Current Diet level:  Current Diet : NPO (was previously on regular diet, then began to have new onset stroke like symptoms yesterday)  Current Liquid Diet : NPO (was previously on a regular diet, then began to have stroke like symptoms yesterday, was made NPO)      Primary Complaint   Pt. Has h/o R sided facial droop c R sided weakness from previous stroke. Stoke alert called on pt. This am d/t weaker hand grasp of L hand, garbled speech and significantly changed facial droop.      Pain:  Pain Assessment  Pain Assessment:  (pt. gestures that pain elevated, unable to verbalize rating)    Reason for Referral  Vaibhav Barrios was referred for a bedside swallow evaluation to assess the efficiency of his swallow function, identify signs and symptoms of aspiration and make recommendations regarding safe dietary consistencies, effective compensatory strategies, and safe eating environment. Impression  Dysphagia Diagnosis: Suspected needs further assessment  Dysphagia Impression : Unable to r/o or confirm aspiration at bedside, recommend video swallow study to assess oropharyngeal swallow. Dysphagia Outcome Severity Scale: Level 1: Severe dysphagia- NPO. Unable to tolerate any PO safely     Treatment Plan  Requires SLP Intervention: Yes        Referral To: Speech Evaluation (speech eval recommended as pt. c severe dysarthria, 0% intelligiblity noted.)    Recommended Diet and Intervention  Diet Solids Recommendation: NPO  Liquid Consistency Recommendation: NPO     Recommendations: Modified barium swallow study         General  Behavior/Cognition: Alert; Cooperative  Respiratory Status: Room air  Breath Sounds: Clear  O2 Device: None (Room air)  Communication Observation: Dysarthria;Aphasia  Follows Directions: Simple  Dentition:  (unable to assess)  Patient Positioning: Upright in bed  Baseline Vocal Quality: Normal  Consistencies Administered: Pudding - teaspoon           Vision/Hearing  Vision  Vision: Within Functional Limits  Hearing  Hearing: Within functional limits    Oral Motor Deficits  Oral/Motor  Oral Motor: Exceptions to [facility]  Labial ROM: Reduced right;Reduced left  Labial Symmetry: Abnormal symmetry right  Labial Strength: Reduced  Labial Sensation: Reduced  Lingual ROM: Reduced left; Reduced right  Lingual Symmetry: Abnormal symmetry left; Abnormal symmetry right  Lingual Strength: Reduced  Lingual Sensation: Reduced  Lingual Coordination: Reduced    Oral Phase Dysfunction  Oral Phase  Oral Phase: WNL (for bite of pudding x2)     Indicators of Pharyngeal Phase Dysfunction   Pharyngeal Phase  Pharyngeal Phase: Exceptions  Pharyngeal Phase   Pharyngeal: immediate, overt s/s aspiration (cough) following trial of pudding. Test discontinued. Education  Patient Education Response: Demonstrated understanding             Therapy Time  SLP Individual Minutes  Time In: 8540  Time Out: 9441  Minutes: 8        Rosendo CHO A.CCC/SLP    1/12/2022 12:04 PM

## 2022-01-12 NOTE — PROCEDURES
INSTRUMENTAL SWALLOW REPORT  MODIFIED BARIUM SWALLOW    NAME: Jeremy Finch   : 1965  MRN: [id number]       Date of Eval: 2022              Referring Diagnosis(es):  dysphagia    Past Medical History:  has a past medical history of AICD (automatic cardioverter/defibrillator) present, CAD (coronary artery disease), CKD (chronic kidney disease), stage III ([address] COVID-19, Myocardial infarction ([facility] [address] PAF (paroxysmal atrial fibrillation) ([facility] [address] S/P CABG (coronary artery bypass graft), and Solitary kidney, congenital.  Past Surgical History:  has a past surgical history that includes Coronary artery bypass graft (); Coronary artery bypass graft (); pacemaker placement (2020); Cardiac catheterization (2021); and Coronary angioplasty with stent. Current Diet Solid Consistency: NPO  Current Diet Liquid Consistency: NPO       Type of Study: Initial MBS       Recent CXR/CT of Chest: Date n/a    Patient Complaints/Reason for Referral:  Jeremy Finch was referred for a MBS to assess the efficiency of his/her swallow function, assess for aspiration, and to make recommendations regarding safe dietary consistencies, effective compensatory strategies, and safe eating environment. Onset of problem:    Pt. Demonstrated overt s/s aspiration during bedside swallow assessment this am.  Unable to r/o or confirm aspiration at bedside. Pt. Not responding to questions ST asking, verbally or nonverbally. Pt. Able to attempt simple oral motor exercises of lingual protrusion, very reduced lingual/labial ROM and strength demonstrated. Behavior/Cognition/Vision/Hearing:  Behavior/Cognition: Alert; Cooperative  Vision: Within Functional Limits  Hearing: Within functional limits    Impressions:     Patient Position: Lateral     Consistencies Administered: Pudding teaspoon;Nectar cup;Honey teaspoon              Oral Phase: Pt. demonstrates slow A-P transit, premature vallecular spillage with pyriform sinus cavity spillover with all consistencies    Pharyngeal: nectar thick liquids:   min vallecular and pyriform sinus cavity residue, deep penetration. Honey thick and pudding:  min vallecular and pyriform sinus cavity residue. Dysphagia Outcome Severity Scale: Level 3: Moderate dysphagia- Total assisstance, supervision or strategies. Two or more diet consistencies restricted  Penetration-Aspiration Scale (PAS): 3 - Material enters the airway, remains above the vocal folds, and is not ejected from airway    Recommended Diet:  Solid consistency: Dysphagia Pureed (Dysphagia I)  Liquid consistency: Moderately Thick (Honey)            Safe Swallow Protocol:     Compensatory Swallowing Strategies: Eat/Feed slowly; Small bites/sips              Recommendations/Treatment  Requires SLP Intervention: Yes                  Recommended Exercises:    Therapeutic Interventions: Oral motor exercises    Referral To: Speech Evaluation    Education: results and recommendations were reviewed with pt.  following this exam.      Patient Education Response: No evidence of learning           Goals:    Long Term:    diet at least restrictive consistency           Short Term:     Goal 1: Pt. will tolerate recommended diet without observed s/s aspiration  Goal 2: Oral motor exercises to improve lingual/labial strength and ROM                       Oral Preparation / Oral Phase  Oral Phase: Impaired        Pharyngeal Phase  Pharyngeal Phase: Impaired      Esophageal Phase  Esophageal Screen: WNL        Pain   Patient Currently in Pain:  (Pt. not responding, verbally or nonverbally)        Therapy Time:   Individual Concurrent Group Co-treatment   Time In 1330         Time Out 1345         Minutes [facility]. A.CCC/SLP     1/12/2022, 2:40 PM

## 2022-01-12 NOTE — PROGRESS NOTES
Patient has history of right sided facial droop with right sided weakness from previous stroke history. Pt is currently displaying symptoms of a significantly weaker grasp of left hand, garbled speech, and significantly changed facial droop. Stroke alert called.

## 2022-01-12 NOTE — VIRTUAL HEALTH
Consults  Patient Location:  [address] [facility]    Provider Location ([facility]): [place], [place]    This virtual visit was conducted via interactive/real-time audio/video. [facility] Stroke [facility] Vascular Neurology Consult for  Gregorio Stroke Alert through [address] @ 3:07am  1/12/2022 3:50 AM  Pt Name: Jeremy Finch  MRN: [id number]  YOB: 1965  Date of evaluation: 1/12/2022  Primary Care Physician: Nolberto Vela MD  Reason for Evaluation: Stroke Evaluation with Discussion with Ed or primary team with Telemedicine and stroke evaluation with Review of imaging and labs    Jeremy Finch is a 61 y.o. male with afib on eliquis, CAD on plavix, has AICD not compatible with MRI, pt was seen by telestroke and neurology team in [facility] for strokes and strokes like symptoms multiple times. Pt was having COVID19 in 12/7/2021, and had right hemiparesis, it is believe patient had a stroke back then. CTA were unremarkable, and CT head showed old right temportal occipital stroke and left parietal stroke. Pt was admitted this time for chest pain, however workup was unremarkable and patient is waiting for placement    It was reported pt has been difficult to care of and been abusive to staff members    BOLAW: lilian  NIH:  18    Allergies  is allergic to nsaids, aspirin, adhesive tape, ceftriaxone, ketorolac tromethamine, and other. Medications  Prior to Admission medications    Medication Sig Start Date End Date Taking?  Authorizing Provider   lisinopril (PRINIVIL;ZESTRIL) 10 MG tablet Take 1 tablet by mouth daily 1/5/22  Yes Jairon Singh MD   carvedilol (COREG) 12.5 MG tablet Take 1 tablet by mouth 2 times daily (with meals) 1/2/22  Yes Ena Muhammad MD   pantoprazole (PROTONIX) 40 MG tablet Take 1 tablet by mouth every morning (before breakfast) 12/18/21  Yes Jasmin Singh MD   clopidogrel (PLAVIX) 75 MG tablet Take 1 tablet by mouth daily   Yes Historical Provider, MD   atorvastatin (LIPITOR) 40 MG tablet Take 1 tablet by mouth daily   Yes Historical Provider, MD   tamsulosin (FLOMAX) 0.4 MG capsule Take 0.4 mg by mouth daily   Yes Historical Provider, MD   apixaban (ELIQUIS) 5 MG TABS tablet Take 5 mg by mouth 2 times daily   Yes Historical Provider, MD   isosorbide mononitrate (IMDUR) 30 MG extended release tablet Take 1 tablet by mouth daily 12/28/21   Misti Moralez MD   nitroGLYCERIN (NITROSTAT) 0.4 MG SL tablet up to max of 3 total doses. If no relief after 1 dose, call 911. 12/27/21   Misti Moralez MD   sodium bicarbonate 650 MG tablet Take 1 tablet by mouth 2 times daily 12/27/21   Misti Moralez MD   bumetanide (BUMEX) 2 MG tablet Take 1 tablet by mouth 2 times daily 12/17/21   Misti Moralez MD   gabapentin (NEURONTIN) 100 MG capsule Take 1 capsule by mouth 3 times daily for 30 days.  12/17/21 1/16/22  Misti Moralez MD    Scheduled Meds:   LORazepam  0.5 mg Oral Once    ciprofloxacin  500 mg Oral 2 times per day    haloperidol lactate  5 mg IntraMUSCular Once    isosorbide dinitrate  20 mg Oral TID    diphenhydrAMINE  50 mg Oral BID    apixaban  5 mg Oral BID    carvedilol  12.5 mg Oral BID WC    clopidogrel  75 mg Oral Daily    lisinopril  10 mg Oral Daily    clonazePAM  1 mg Oral Daily    amLODIPine  5 mg Oral Daily     Continuous Infusions:  PRN Meds:.traMADol, traMADol, acetaminophen, ondansetron **OR** [DISCONTINUED] ondansetron  Past Medical History   has a past medical history of AICD (automatic cardioverter/defibrillator) present, CAD (coronary artery disease), CKD (chronic kidney disease), stage III ([address] COVID-19, Myocardial infarction ([facility] PAF (paroxysmal atrial fibrillation) ([facility] [address] S/P CABG (coronary artery bypass graft), and Solitary kidney, congenital.  Social History  Social History     Socioeconomic History    Marital status: Single     Spouse name: Not on file    Number of children: Not on file    Years of education: Not on file    Highest education level: Not on file   Occupational History    Not on file   Tobacco Use    Smoking status: Current Every Day Smoker     Packs/day: 0.25    Smokeless tobacco: Never Used   Substance and Sexual Activity    Alcohol use: Not Currently     Comment: rarely    Drug use: Not Currently     Comment: not in over 27 years    Sexual activity: Not on file   Other Topics Concern    Not on file   Social History Narrative    Not on file     Social Determinants of Health     Financial Resource Strain:     Difficulty of Paying Living Expenses: Not on file   Food Insecurity:     Worried About Running Out of Food in the Last Year: Not on file    [place] of Food in the Last Year: Not on file   Transportation Needs:     Lack of Transportation (Medical): Not on file    Lack of Transportation (Non-Medical):  Not on file   Physical Activity:     Days of Exercise per Week: Not on file    Minutes of Exercise per Session: Not on file   Stress:     Feeling of Stress : Not on file   Social Connections:     Frequency of Communication with Friends and Family: Not on file    Frequency of Social Gatherings with Friends and Family: Not on file    Attends Yazidi Services: Not on file    Active Member of [address] [place] [address] [employer] or Organizations: Not on file    Attends Club or Organization Meetings: Not on file    Marital Status: Not on file   Intimate Partner Violence:     Fear of Current or Ex-Partner: Not on file    Emotionally Abused: Not on file    Physically Abused: Not on file    Sexually Abused: Not on file   Housing Stability:     Unable to Pay for Housing in the Last Year: Not on file    Number of [place] in the Last Year: Not on file    Unstable Housing in the Last Year: Not on file     Family History      Problem Relation Age of Onset    Cancer Mother     Diabetes Father     Heart Disease Father     Diabetes Paternal Grandmother        OBJECTIVE  /73   Pulse 70   Temp 98.1 °F (36.7 °C)   Resp 18   Ht 5' 9\" (1.753 m)   Wt 166 lb 7.2 oz (75.5 kg)   SpO2 96%   BMI 24.58 kg/m²     NIH Stroke Scale  Interval: Reassessment  Level of Consciousness (1a. ): Alert  LOC Questions (1b. ): Answers neither question correctly  LOC Commands (1c. ): Performs both tasks correctly  Best Gaze (2. ): Normal  Visual (3. ): (!) Partial hemianopia  Facial Palsy (4. ): (!) Partial paralysis  Motor Arm, Left (5a. ): Some effort against gravity  Motor Arm, Right (5b. ): No effort against gravity, limb falls  Motor Leg, Left (6a. ): No drift  Motor Leg, Right (6b. ): No effort against gravity, limb falls  Limb Ataxia (7. ): (!) Present in one limb  Sensory (8. ): (!) Mild to Moderate  Best Language (9. ): No aphasia  Dysarthria (10. ): Severe, unintelligible slurring or mute  Extinction and Inattention (11): (!) Visual, tactile, auditory, spatial, or personal inattention  Total: 18  Pre-Morbid mRS: 2    Imaging:  Images were personally reviewed with ADENIKE TREVIÑO used to review images including:  CT brain without contrast: old right temporal occipital stroke and left parietal stroke  CTA imaging: no LVO    Assessment      61year old man with afib on eliquis, CAD on plavix, stroke with baseline right hemiparesis, nursing staff reported left side weakness. Recommendations:  1. NIH 18  2. Recommend Outpatient Neurology Consult for further assessment and evaluation   3.  consider . [id number]  4. Not a tPA candidate, pt is taking eliquis and plavix  5. On exam, pt denies left side weakness now, but c/o chest pain, his exam is no different from previous neurological documentation and pt denies any new neuro deficit  6. Pt is comfort on the bed, c/o left fingers tingling and chest pain  7. con't eliquis and plavix  8. Given multiple repeated history of code stroke in the last 1-2 months, I don't think pt is having a new stroke now.  Pt has AICD that is not compatible with MRI, however, clinically he is no different from a month ago after his COVID  9. Pt should f/u with neurologist as outpatient        Discussed with Stroke team    At least 55 min of Telemedicine and time in conversation directly with ED staff and physician for the patient who is in imminent and life threatening deterioration without further treatment and evaluation. This Virtual Visit was conducted with patient's (and/or legal guardian's) consent, to provide telestroke consultation and necessary medical care.   Time spent examining patient, reviewing the images personally, reviewing the chart, perform high complexity decision making and speaking with the nursing staff regarding recommendations        Particgomez Croft MD,  Stroke, Neurocritical Care And/or [facility] Stroke [address]  Electronically signed 1/12/2022 at 3:50 AM

## 2022-01-12 NOTE — CONSULTS
NEUROLOGY CONSULT NOTE      Requesting Physician: Klaus Helton MD    Reason for Consult:  Evaluate for stroke symptoms      History of Present Illness:  Lizzie Osborn is a 61 y.o. male admitted to [address] on 12/30/2021. Lizzie Osborn is a 61 y.o. male with afib on eliquis, CAD on plavix, has AICD not compatible with MRI, pt was seen by telestroke and neurology team in [place] for strokes and strokes like symptoms multiple times.     Pt was having [address] in 12/7/2021, and had right hemiparesis, it is believe patient had a stroke back then. CTA were unremarkable, and CT head showed old right temportal occipital stroke and left parietal stroke.      Pt was admitted this time for chest pain, however workup was unremarkable and patient is waiting for placement     It was reported pt has been difficult to care of and been abusive to staff members     BOLAW: lilian  NIH:  18       Past Medical History:        Diagnosis Date    AICD (automatic cardioverter/defibrillator) present     CAD (coronary artery disease)     CKD (chronic kidney disease), stage III ([address]     COVID-19     Myocardial infarction ([address]     PAF (paroxysmal atrial fibrillation) ([address]     S/P CABG (coronary artery bypass graft)     x2 separate occasions    Solitary kidney, congenital            Procedure Laterality Date    CARDIAC CATHETERIZATION  12/27/2021    CORONARY ANGIOPLASTY WITH STENT PLACEMENT      Hx of CAD with multiple stents and CABG twice (2010 & [facility]    CORONARY ARTERY BYPASS GRAFT  2010    CORONARY ARTERY BYPASS GRAFT  2018    PACEMAKER PLACEMENT  09/11/2020    Medtronic ICD [id number] RV lead 6935-m55, LV lead 4598-88. NOT MRI CONDITIONAL       Allergies:     Allergies   Allergen Reactions    Nsaids Swelling and Other (See Comments)    Aspirin Swelling and Rash     Other reaction(s): Facial Swelling, Swelling of Lip/Tongue/Throat    Adhesive Tape     Ceftriaxone     Ketorolac Tromethamine     Other      Other reaction(s): Difficulty Breathing        Current Medications:   LORazepam (ATIVAN) tablet 0.5 mg, Once  sodium chloride flush 0.9 % injection 10 mL, PRN  ciprofloxacin (CIPRO) tablet 500 mg, 2 times per day  haloperidol lactate (HALDOL) injection 5 mg, Once  isosorbide dinitrate (ISORDIL) tablet 20 mg, TID  traMADol (ULTRAM) tablet 50 mg, Q6H PRN  traMADol (ULTRAM) tablet 100 mg, Q6H PRN  acetaminophen (TYLENOL) tablet 650 mg, Q4H PRN  ondansetron (ZOFRAN-ODT) disintegrating tablet 4 mg, Q8H PRN  diphenhydrAMINE (BENADRYL) tablet 50 mg, BID  apixaban (ELIQUIS) tablet 5 mg, BID  carvedilol (COREG) tablet 12.5 mg, BID WC  clopidogrel (PLAVIX) tablet 75 mg, Daily  lisinopril (PRINIVIL;ZESTRIL) tablet 10 mg, Daily  clonazePAM (KLONOPIN) tablet 1 mg, Daily  amLODIPine (NORVASC) tablet 5 mg, Daily         Social History:  Social History     Tobacco Use   Smoking Status Current Every Day Smoker    Packs/day: 0.25   Smokeless Tobacco Never Used     Social History     Substance and Sexual Activity   Alcohol Use Not Currently    Comment: rarely     Social History     Substance and Sexual Activity   Drug Use Not Currently    Comment: not in over 30 years     Single    Family History:       Problem Relation Age of Onset    Cancer Mother     Diabetes Father     Heart Disease Father     Diabetes Paternal Grandmother        Review of Systems:  All systems reviewed and are all negative except what is mentioned in history of present illness. I reviewed the patient PMH,medications, family history, social history. Physical Exam:  BP (!) 123/94   Pulse 74   Temp 98.1 °F (36.7 °C) (Oral)   Resp 18   Ht 5' 9\" (1.753 m)   Wt 166 lb 7.2 oz (75.5 kg)   SpO2 96%   BMI 24.58 kg/m²  I Body mass index is 24.58 kg/m².  I   Wt Readings from Last 1 Encounters:   01/10/22 166 lb 7.2 oz (75.5 kg)           General appearance - alert, in no distress, oriented to person, place, and time and weight  Mental status -Level of Alertness: awake  Attention/Concentration: normal  Language: abnormal - very dysarthric. Mood is Anxious  Neck - supple, no neck adenopathy, carotids upstroke normal bilaterally, no carotid bruits. There is no LAP in the neck. There is no thyroid enlargement. Neurological -   Cranial Izdzzx-AU-ZLC:   Cranial nerve II: Normal. There is full visual fields  Cranial nerve III: Pupils: equal, round, reactive to light   Cranial nerves III, IV, VI: Extraocular Movements: intact   Cranial nerve V: Facial sensation: intact   Cranial nerve VII:Facial strength: right face droop  Cranial nerve VIII: Hearing: intact   Cranial nerve IX: Palate Elevation intact bilaterally  Cranial nerve XI: Shoulder shrug intact bilaterally  Cranial nerve XII: Tongue midline   Motor exam is right side plegic, left side 4+/5      Sensory is intact but decreased on the right. Coordination: not tested  Gait and station not tested,  guarded    Abnormal movement none. Long tracts are intact    Skin - no rashes or lesions, warm and dry to touch  Superficial temporal artery pulses are normal.   Musculoskeletal: Has no hand arthritis, no limitation of ROM in any of the four extremities. no joint tenderness, deformity or swelling. There is no leg edema.   =     Labs:    CBC: No results for input(s): WBC, HGB, PLT, MCV, MCH, MCHC, RDW in the last 72 hours. Invalid input(s): MPV3  CMP:  Recent Labs     01/12/22  0351   CREATININE 2.37*   LABGLOM 28*     Liver: No results for input(s): AST, ALT, ALKPHOS, PROT, LABALBU, BILITOT in the last 72 hours. Invalid input(s): BILDIR  MRI BRAIN:  No results found for this or any previous visit. No results found for this or any previous visit. No results found for this or any previous visit. No results found for this or any previous visit. No results found for this or any previous visit. No results found for this or any previous visit.     No results found for this or any previous visit. Results for orders placed during the hospital encounter of 12/30/21    CT HEAD WO CONTRAST    Addendum 1/12/2022  3:47 AM  ADDENDUM:  Critical results were called by Dr. Hali Rudd to Dr. Melo Rich on 1/12/2022  at 03:45. Narrative  EXAMINATION:  CT OF THE HEAD WITHOUT CONTRAST  1/12/2022 3:18 am    TECHNIQUE:  CT of the head was performed without the administration of intravenous  contrast. Dose modulation, iterative reconstruction, and/or weight based  adjustment of the mA/kV was utilized to reduce the radiation dose to as low  as reasonably achievable. COMPARISON:  None. HISTORY:  ORDERING SYSTEM PROVIDED HISTORY: stroke alert  TECHNOLOGIST PROVIDED HISTORY:  stroke alert    Reason for Exam: stroke alert  Additional signs and symptoms: garbled speech, right sided weakness. Relevant Medical/Surgical History: H/O previous CVA with right sided defecit. FINDINGS:  BRAIN/VENTRICLES: Small to moderate size area of encephalomalacia involving  the right temporal occipital region and a small area of encephalomalacia  involving the medial left parietal lobe, these are indicative of old insults. There is no acute intracranial hemorrhage, mass effect or midline shift. No  abnormal extra-axial fluid collection. The gray-white differentiation is  maintained without evidence of an acute infarct. There is no evidence of  hydrocephalus. ORBITS: The visualized portion of the orbits demonstrate no acute abnormality. SINUSES: The visualized paranasal sinuses and mastoid air cells demonstrate  no acute abnormality. SOFT TISSUES/SKULL:  No acute abnormality of the visualized skull or soft  tissues. Impression  No acute intracranial abnormality. Old infarcts in the right temporal occipital and medial left parietal regions.     RECOMMENDATIONS:  Unavailable        We reviewed the patient records and available information in the EHR       Impression:    Active Problems:    Hx of CABG    AICD (automatic cardioverter/defibrillator) present    Congestive heart failure of unknown etiology ([address]    History of CVA with residual deficit    Longstanding persistent atrial fibrillation (HCC)    Essential hypertension    Pulmonary emphysema ([address]  Resolved Problems:    * No resolved hospital problems. *    57 year old man with afib on eliquis, CAD on plavix, stroke with baseline right hemiparesis, nursing staff reported left side weakness. And was seen earlier by me on telestroke      Recommendations:    - it could be a possible stroke again, however, pt is already on eliquis and plavix  - CTA are normal, therefore even if there is a stroke it is small  - pt cannot get MRI due to AICD  - con't eliquis, but pt can't swallow well now, hence start heparin gtt without bolus goal PTT 50-70, resume eliquis whenever he can  Swallow and d/c heparin  - otherwise outpatient follow up  - con't plavix  - SLP. PT and OT                                            1. Discussed with primary service. 2. Please call if any questions. Time spent was at least 60 min of time evaluating patient, discussion with staff,  planning for treatment and evaluation. The time was spent examining patient, reviewing the images personally, reviewing the chart, perform high complexity decision making and speaking with the nursing staff regarding recommendations.      Electronically signed by Sebas Croft MD on 1/12/2022 at 2:04 Neo Martines MD  Attending Neurologist/Neurointensivist

## 2022-01-12 NOTE — FLOWSHEET NOTE
Patient was sitting on the edge of his bed and did not engage in conversation;     01/12/22 1436   Encounter Summary   Services provided to: Patient   Referral/Consult From: [facility]   Continue Visiting   (1/12/22)   Complexity of Encounter Moderate   Length of Encounter 15 minutes   Spiritual Assessment Completed Yes   Spiritual/Yazdanism   Type Spiritual support   Assessment Approachable   Intervention Prayer   Outcome Did not respond

## 2022-01-12 NOTE — PROGRESS NOTES
Writer attempts to do nursing swallow test on patient as he is requesting pop and ice cream. Small sip of water given to patient. Patient immediately begins to cough. Liquids and food removed from patient's room and NPO sign placed on door.

## 2022-01-12 NOTE — PROGRESS NOTES
Writer attempts to give patient one time dose of Ativan. Patient questions if it is a pill and when writer verifies it is a pill, patient states I don't want that, just take it back. Writer also informs of pt speaking with NP regarding pain medication being ineffective; writer states NP gave option between continuing on oral Tramadol as ordered or switching to one Percocet every six hours as needed. Patient states \"I'm not taking either of that shit anymore, I want IV pain medication that's the only thing that works\". Writer states these pills are our only options at present time. Patient then states \"I am in so much pain right now, I just want my chest to blow up and I'll die tonight. If I don't die tonight, I am going to kill myself\". Writer questions if patient has a plan. Patient confirms he has a plan to kill himself but will not tell writer the plan. Writer then informs patient he will need to have a staff member sitting in the room with him due to this statement. Patient states \"if someone comes and sits in my room, I will hit them and cuss at them until they leave\". Writer informs house supervisor of this who states the patient needs to have someone sit in the room due to these statements.

## 2022-01-12 NOTE — PROGRESS NOTES
Patient now able to grasp writer's arm with left hand with the same strength as shift assessment. Facial droop and garbled speech continue.

## 2022-01-12 NOTE — PROGRESS NOTES
Pt has not received pre-cert on this date. SW called in the afternoon again and left [facility] for admissions.

## 2022-01-12 NOTE — PROGRESS NOTES
Physician Progress Note      Luis Asencio  CSN #:                  [id number]  :                       1965  ADMIT DATE:       2021 8:15 PM  [address] [place] DATE:  RESPONDING  PROVIDER #:        Elizabeth Gardiner MD          QUERY TEXT:    Pt admitted with chest pain and has CHF documented. If possible, please   document in progress notes and discharge summary further specificity regarding   the type and acuity of CHF:    The medical record reflects the following:  Risk Factors: 55yo, Hx HTN CAD CHF CKD  Clinical Indicators: echo from --> calculated ejection fraction is 40%. Visually estimated EF 30-35%, Grade III (severe) left ventricular diastolic   dysfunction; BNP 29395; CXR--> nonspecific pulmonary infiltrates can be seen   with atypical/viral PNA, small L pleural effusion, cardiomegaly;  Treatment: IV Lasix x2, Cardio consult, CXR, BNP level, monitoring, hospital   admission  Options provided:  -- Acute on Chronic Systolic CHF/HFrEF  -- Acute on Chronic Systolic and Diastolic CHF  -- Chronic Systolic CHF/HFrEF  -- Chronic Systolic and Diastolic CHF  -- Other - I will add my own diagnosis  -- Disagree - Not applicable / Not valid  -- Disagree - Clinically unable to determine / Unknown  -- Refer to Clinical Documentation Reviewer    PROVIDER RESPONSE TEXT:    This patient has chronic systolic and diastolic CHF.     Query created by: Lili Sullivan on 1/3/2022 10:07 AM      Electronically signed by:  Elizabeth Gardiner MD 2022 10:08 PM

## 2022-01-12 NOTE — PROGRESS NOTES
Writer notifies Heber Lanier NP regarding events which transpired overnight. Including patient claiming suicidal ideation, and stroke alert being called, and patient coughing following RN giving him small sip of water. New orders to consult neuro and psych. Make pt NPO for the time being.

## 2022-01-13 VITALS
HEIGHT: 69 IN | SYSTOLIC BLOOD PRESSURE: 138 MMHG | DIASTOLIC BLOOD PRESSURE: 107 MMHG | RESPIRATION RATE: 18 BRPM | WEIGHT: 166.45 LBS | HEART RATE: 120 BPM | TEMPERATURE: 98.1 F | OXYGEN SATURATION: 96 % | BODY MASS INDEX: 24.65 KG/M2

## 2022-01-13 PROCEDURE — 6370000000 HC RX 637 (ALT 250 FOR IP): Performed by: FAMILY MEDICINE

## 2022-01-13 PROCEDURE — 6360000002 HC RX W HCPCS: Performed by: FAMILY MEDICINE

## 2022-01-13 PROCEDURE — 1200000000 HC SEMI PRIVATE

## 2022-01-13 PROCEDURE — 99232 SBSQ HOSP IP/OBS MODERATE 35: CPT | Performed by: PSYCHIATRY & NEUROLOGY

## 2022-01-13 PROCEDURE — 99233 SBSQ HOSP IP/OBS HIGH 50: CPT | Performed by: PSYCHIATRY & NEUROLOGY

## 2022-01-13 RX ORDER — ATORVASTATIN CALCIUM 10 MG/1
10 TABLET, FILM COATED ORAL NIGHTLY
Qty: 30 TABLET | Refills: 3 | Status: SHIPPED | OUTPATIENT
Start: 2022-01-13

## 2022-01-13 RX ORDER — MORPHINE SULFATE 2 MG/ML
0.5 INJECTION, SOLUTION INTRAMUSCULAR; INTRAVENOUS
Status: COMPLETED | OUTPATIENT
Start: 2022-01-13 | End: 2022-01-13

## 2022-01-13 RX ORDER — ATORVASTATIN CALCIUM 10 MG/1
10 TABLET, FILM COATED ORAL NIGHTLY
Status: DISCONTINUED | OUTPATIENT
Start: 2022-01-13 | End: 2022-01-14 | Stop reason: HOSPADM

## 2022-01-13 RX ADMIN — MORPHINE SULFATE 0.5 MG: 2 INJECTION, SOLUTION INTRAMUSCULAR; INTRAVENOUS at 22:58

## 2022-01-13 RX ADMIN — CARVEDILOL 12.5 MG: 12.5 TABLET, FILM COATED ORAL at 16:53

## 2022-01-13 RX ADMIN — CLOPIDOGREL 75 MG: 75 TABLET, FILM COATED ORAL at 08:10

## 2022-01-13 RX ADMIN — CARVEDILOL 12.5 MG: 12.5 TABLET, FILM COATED ORAL at 08:09

## 2022-01-13 RX ADMIN — CLONAZEPAM 1 MG: 1 TABLET ORAL at 08:10

## 2022-01-13 RX ADMIN — DIPHENHYDRAMINE HCL 50 MG: 25 TABLET ORAL at 08:10

## 2022-01-13 RX ADMIN — DIPHENHYDRAMINE HCL 50 MG: 25 TABLET ORAL at 20:41

## 2022-01-13 ASSESSMENT — PAIN SCALES - GENERAL: PAINLEVEL_OUTOF10: 10

## 2022-01-13 NOTE — PROGRESS NOTES
DERIAN informed by Silvia Mcdermott case manger RN that [facility] obtained authorization and pt can be accepted tomorrow. 1:1 was discontinued, so DERIAN scheduled transport for 3:00PM tomorrow with [facility]. Pt was from [facility], [facility] will have to obtain 7000 from the facility. Please make any updates or changes to MAJO.     Number for report: [phone]

## 2022-01-13 NOTE — PROGRESS NOTES
[facility]   OCCUPATIONAL THERAPY MISSED TREATMENT NOTE   INPATIENT   Date: 22  Patient Name: Keyla Mueller       Room:   MRN: [id number]   Account #: [de-identified]    : 1965  (61 y.o.)  Gender: male   Referring Practitioner: Junaid Kramer MD  Diagnosis: NSTEMI             REASON FOR MISSED TREATMENT:  Patient refusal   -    Refusal by Patient Pt resting in bed opens eyes upon PATEL/L arrival then refuses to acknowledge therapy further. Will continue to follow.      AMANDA Barboza/DULCE MARIA

## 2022-01-13 NOTE — PROGRESS NOTES
Physician Progress Note      Nava Burciaga  CSN #:                  [id number]  :                       1965  ADMIT DATE:       2021 8:15 PM  [facility] DATE:  RESPONDING  PROVIDER #:        Mili De Paz MD          QUERY TEXT:    Patient admitted with chest pain. If possible, please document in the   progress notes and discharge summary if you are evaluating and/or treating any   of the following: The medical record reflects the following:  Risk Factors: 57yo, Hx CAD, HTN CKD3, afib; recent cardiac cath  Clinical Indicators: troponin 69-->51-->48; per 1st cardio consult--> atypical   CP, chronically elevated tropes; per 2nd cardiology consult--> borderline HS   troponin trending downward most likely type 2 demand/supply imbalance; EKG   x2--> V-paced;  Treatment: troponin levels, cardio consult x2, EKG x2, monitoring, hospital   admission  Options provided:  -- NSTEMI  -- Type 2 MI  -- Demand Ischemia with MI  -- Demand Ischemia only, no MI  -- Unstable Angina  -- Other - I will add my own diagnosis  -- Disagree - Not applicable / Not valid  -- Disagree - Clinically unable to determine / Unknown  -- Refer to Clinical Documentation Reviewer    PROVIDER RESPONSE TEXT:    This patient has demand ischemia with an MI.     Query created by: David Dumont on 2022 8:23 AM      Electronically signed by:  Mili De Paz MD 2022 9:19 PM

## 2022-01-13 NOTE — PROGRESS NOTES
Hospitalist Progress Note  1/13/2022 4:48 PM  Subjective:   Admit Date: 12/30/2021  PCP: Mare Navas MD     Full Code      C/c:  Chief Complaint   Patient presents with    Chest Pain         Interval History: refusing care,does not want to be disturbed, swallow study shows pt can swallow,long history of a fib/on eliquis and plavix, some weakness rt side/refused heparin drip/on eliquis/asking for pain meds    Diet: ADULT DIET; Dysphagia - Minced and Moist; Moderately Thick (Honey)                                ip days:13  Medications:   Scheduled Meds:   apixaban  5 mg Oral BID    atorvastatin  10 mg Oral Nightly    LORazepam  0.5 mg Oral Once    haloperidol lactate  5 mg IntraMUSCular Once    isosorbide dinitrate  20 mg Oral TID    diphenhydrAMINE  50 mg Oral BID    carvedilol  12.5 mg Oral BID WC    clopidogrel  75 mg Oral Daily    lisinopril  10 mg Oral Daily    clonazePAM  1 mg Oral Daily    amLODIPine  5 mg Oral Daily     Continuous Infusions:  PRN Meds:.morphine, sodium chloride flush, traMADol, traMADol, acetaminophen, ondansetron **OR** [DISCONTINUED] ondansetron     CBC:   Recent Labs     01/12/22  1649   WBC 3.7   HGB 9.5*        BMP:    Recent Labs     01/12/22  0351   CREATININE 2.37*     Hepatic: No results for input(s): AST, ALT, ALB, BILITOT, ALKPHOS in the last 72 hours. Troponin: No results for input(s): TROPONINI in the last 72 hours. BNP: No results for input(s): BNP in the last 72 hours. Lipids: No results for input(s): CHOL, HDL in the last 72 hours. Invalid input(s): LDLCALCU  INR: No results for input(s): INR in the last 72 hours.     Objective:   Vitals: BP (!) 138/107   Pulse 120   Temp 98.1 °F (36.7 °C) (Oral)   Resp 18   Ht 5' 9\" (1.753 m)   Wt 166 lb 7.2 oz (75.5 kg)   SpO2 96%   BMI 24.58 kg/m²   General appear status: Alert, oriented, thought content appropriate    Prophylaxis:   DVT with  [] lovenox        [] heparin        [] Scd        [x] eliquis    Radiology:  NM LUNG SCAN PERFUSION ONLY    Result Date: 12/31/2021  EXAMINATION: NUCLEAR MEDICINE PERFUSION SCAN. 12/31/2021 TECHNIQUE: Ventilation not performed as part of COVID-19 safety precautions. 6.0 millicuries of Tc 79M MAA was administered intravenously prior to planar imaging of the lungs in multiple projections. COMPARISON: Chest radiograph dated 12/28/2021. HISTORY: ORDERING SYSTEM PROVIDED HISTORY: SOB, elev DD TECHNOLOGIST PROVIDED HISTORY: SOB, elev DD Decision Support Exception - unselect if not a suspected or confirmed emergency medical condition->Emergency Medical Condition (MA) Reason for Exam: SOB, elevated D-Dimer Additional signs and symptoms: SOB while laying down, stroke 2 weeks ago, has had 3 strokes, CP, heart feels like its beating out of his chest, coughing, was coughing up black one week ago, No hx of lung dx, No hx of blood clots, smokes 1 pack/3 days for 15-17 years, D-Dimer 12/30/21: 2.37 FINDINGS: PERFUSION: Distribution of radiotracer is homogeneous. No segmental defects identified. CHEST RADIOGRAPH: No focal areas of consolidation or significant effusions on recent chest radiograph. Low Probability for Pulmonary Embolus. Assessment :   1. Lt side weakness/better/able to swallow  2. On eliquis and plavix     Plan:   1.   D/c tomorrow to rehab        Patient Active Problem List:     JACKSON (acute kidney injury) ([address]     Atypical chest pain     AICD (automatic cardioverter/defibrillator) present     History of MI (myocardial infarction)     Elevated brain natriuretic peptide (BNP) level     Elevated troponin     Chronic systolic heart failure (HCC)     Hx of CABG     Stage 3 chronic kidney disease ([address]     COVID-19 virus infection     Hydronephrosis of left kidney     Acute urinary retention     Hepatitis C     Coronary artery disease involving native coronary artery of native heart with angina pectoris ([address]     Solitary kidney, congenital     Homeless single person     Acute right-sided weakness     Acute CVA (cerebrovascular accident) ([address]     Acute chest pain     Congestive heart failure of unknown etiology ([address]     History of CVA with residual deficit     Longstanding persistent atrial fibrillation ([address]     Essential hypertension     Pulmonary emphysema ([facility]      Anticipated Disposition upon discharge: [] Home                                                                         [] Home with Home Health                                                                         [] [facility]                                                                         [] [address] [facility] [address]      Patient is admitted as inpatient status because of co-morbidities listed above, severity of signs and symptoms as outlined, requirement for current medical therapies and most importantly because of direct risk to patient if care not provided in a hospital setting.           Loli Chaney MD, MD  Rounding Hospitalist

## 2022-01-13 NOTE — PLAN OF CARE
Problem: Falls - Risk of:  Goal: Will remain free from falls  Description: Will remain free from falls  1/13/2022 0422 by Eden Narayan RN  Outcome: Ongoing  Note: Patient remains free of falls and injuries throughout shift. Bed remains in the lowest position, wheels locked, call light and bedside table are within reach. 1/12/2022 1503 by Rocio Yo RN  Outcome: Ongoing  Goal: Absence of physical injury  Description: Absence of physical injury  1/13/2022 0422 by Eedn Narayan RN  Outcome: Ongoing  1/12/2022 1503 by Rocio Yo RN  Outcome: Ongoing     Problem: Pain:  Goal: Pain level will decrease  Description: Pain level will decrease  1/13/2022 0422 by Eden Narayan RN  Outcome: Ongoing  1/12/2022 1503 by Rocio Yo RN  Outcome: Ongoing  Goal: Control of acute pain  Description: Control of acute pain  1/13/2022 0422 by Eden Narayan RN  Outcome: Ongoing  Note: Assess the location, characteristics, onset, duration, frequency, quality, and severity of pain. Encourage immediate report of pain. Use appropriate pain scale to rate pain. Manage pain using nonpharmacologic/pharmacologic interventions.    1/12/2022 1503 by Rocio Yo RN  Outcome: Ongoing  Goal: Control of chronic pain  Description: Control of chronic pain  1/13/2022 0422 by Eden Narayan RN  Outcome: Ongoing  1/12/2022 1503 by Rocio Yo RN  Outcome: Ongoing     Problem: Musculor/Skeletal Functional Status  Goal: Highest potential functional level  1/13/2022 0422 by Eden Narayan RN  Outcome: Ongoing  1/12/2022 1503 by Rocio Yo RN  Outcome: Ongoing  Goal: Absence of falls  1/13/2022 0422 by Eden Narayan RN  Outcome: Ongoing  1/12/2022 1503 by Rocio Yo RN  Outcome: Ongoing     Problem: Coping:  Goal: Ability to verbalize frustrations and anger appropriately will improve  Description: Ability to verbalize frustrations and anger appropriately will improve  1/13/2022 0422 by Eden Narayan RN  Outcome: Ongoing  1/12/2022 1503 by Rocio Yo RN  Outcome: Ongoing     Problem: Health Behavior:  Goal: Ability to verbalize precipitating factors for violent behavior will improve  Description: Ability to verbalize precipitating factors for violent behavior will improve  1/13/2022 0422 by Raquel Cade RN  Outcome: Ongoing  1/12/2022 1503 by Dave Carlson RN  Outcome: Ongoing     Problem: Safety:  Goal: Ability to demonstrate self-control will improve  Description: Ability to demonstrate self-control will improve  1/13/2022 0422 by Raquel Cade RN  Outcome: Ongoing  1/12/2022 1503 by Dave Carlson RN  Outcome: Ongoing  Goal: Ability to implement measures to prevent violent behavior in the future will improve  Description: Ability to implement measures to prevent violent behavior in the future will improve  1/13/2022 0422 by Raquel Cade RN  Outcome: Ongoing  1/12/2022 1503 by Dave Carlson RN  Outcome: Ongoing  Goal: Ability to redirect hostility and anger into socially appropriate behaviors will improve  Description: Ability to redirect hostility and anger into socially appropriate behaviors will improve  1/13/2022 0422 by Raquel Cade RN  Outcome: Ongoing  1/12/2022 1503 by Dave Carlson RN  Outcome: Ongoing

## 2022-01-13 NOTE — PROGRESS NOTES
Patient refuses treatment at this time, including vital signs, head-to-toe assessment, and stroke assessment. Patient continues to refuse heparin drip.

## 2022-01-13 NOTE — CONSULTS
Department of Psychiatry  Behavioral Health Consult    REASON FOR CONSULT: Suicidal ideation    CONSULTING PHYSICIAN: Dr. Doe Moss    History obtained from: Patient and chart  HISTORY OF PRESENT ILLNESS:    The patient is a 61 y.o. male history of covid 19 over a month ago and stroke leading to right-sided hemiparesis and with significant past psychiatric history of mood disorder who was seen by Dr. Abigail Scott on 1/4/2022 who was referred for suicidal ideation. I have noted that telemetry stroke were consulted on 1/12/2021. The patient was seen at bedside sitter was present. The patient has difficulty answering orientation questions about time. He is oriented to place and situation. The patient has difficulty communicating because of his hemiparesis. The patient denies suicidal ideation but says that he does not like how he has been treated by the doctors in the hospital.  The patient had difficulty elaborating on why he was unhappy with the care he has received. The patient appeared to be suspicious of his treatment but had no clear well-formed delusional system. The patient is irritable in his mood. On exploring his mental status, the patient stated that he is feeling fine because he tries to deal with everything. The patient denies any auditory or visual hallucinations or other psychotic phenomena. The patient is refusing to try any psychotropic medications saying that they make his head feel funny. I have noted that the patient was prescribed clonazepam when she daily    The patient is not currently receiving care for the above psychiatric illness.       Psychiatric Review of Systems        ·    Obsessions and Compulsions: Denies    ·    Brenda or Hypomania: Denies  ·    Hallucinations: Denies  ·    Panic Attacks:  Denies  ·    Delusions:  Denies  ·    Phobias:  Denies  ·    Trauma: Denies      Substance Abuse History:  The patient refuses to talk about any substance use      Past Psychiatric for 30 days. [DISCONTINUED] metoprolol succinate (TOPROL XL) 50 MG extended release tablet, Take 1 tablet by mouth daily    Allergies:  Nsaids, Aspirin, Adhesive tape, Ceftriaxone, Ketorolac tromethamine, and Other    FAMILY/SOCIAL HISTORY:  Family History   Problem Relation Age of Onset    Cancer Mother     Diabetes Father     Heart Disease Father     Diabetes Paternal Grandmother      Social History     Socioeconomic History    Marital status: Single     Spouse name: Not on file    Number of children: Not on file    Years of education: Not on file    Highest education level: Not on file   Occupational History    Not on file   Tobacco Use    Smoking status: Current Every Day Smoker     Packs/day: 0.25    Smokeless tobacco: Never Used   Substance and Sexual Activity    Alcohol use: Not Currently     Comment: rarely    Drug use: Not Currently     Comment: not in over 27 years    Sexual activity: Not on file   Other Topics Concern    Not on file   Social History Narrative    Not on file     Social Determinants of Health     Financial Resource Strain:     Difficulty of Paying Living Expenses: Not on file   Food Insecurity:     Worried About Running Out of Food in the Last Year: Not on file    [place] of Food in the Last Year: Not on file   Transportation Needs:     Lack of Transportation (Medical): Not on file    Lack of Transportation (Non-Medical):  Not on file   Physical Activity:     Days of Exercise per Week: Not on file    Minutes of Exercise per Session: Not on file   Stress:     Feeling of Stress : Not on file   Social Connections:     Frequency of Communication with Friends and Family: Not on file    Frequency of Social Gatherings with Friends and Family: Not on file    Attends Mu-ism Services: Not on file    Active Member of Clubs or Organizations: Not on file    Attends Club or Organization Meetings: Not on file    Marital Status: Not on file   Intimate Partner Violence:     denies any perceptual disturbance  Cognition:  disoriented   Concentration intact  Memory impaired recent memory  Insight poor   Judgement poor   Fund of Knowledge limited        LABS: REVIEWED TODAY:  Recent Labs     01/12/22  1649   WBC 3.7   HGB 9.5*        Recent Labs     01/12/22  0351   CREATININE 2.37*     No results for input(s): BILITOT, ALKPHOS, AST, ALT in the last 72 hours. No results found for: Santiago Doe, LABBENZ, [address] [place], [place] [address] [place] [address]. [address], PHENCYCLIDINESCREENURINE, PPXUR, ETOH  No results found for: TSH, FREET4  No results found for: LITHIUM  No results found for: VALPROATE, CBMZ  No results found for: LITHIUM, VALPROATE    FURTHER LABS ORDERED :      Radiology   ECHO Complete 2D W Doppler W Color    Result Date: 12/27/2021  Transthoracic Echocardiography Report (TTE)  Patient Name Shital Rojas Date of Study               12/27/2021   Date of      1965  Gender                      Male  Birth   Age          61 year(s)  Race                           Room Number  0108        Height:                     69 inch, 175.26 cm   Corporate ID [id number]    Weight:                     170 pounds, 77.1 kg  #   Patient Acct [de-identified]   BSA:          1.93 m^2      BMI:      25.1 kg/m^2  #   MR #         [id number]     Sonographer                 Stacey Diamond   Accession #  [id number]  Interpreting Physician      Hi Arce                   Referring Nurse                           Practitioner   Interpreting             Referring Physician         Hi Rosa  Type of Study   TTE procedure:2D Echocardiogram, M-Mode, Doppler, Color Doppler. Procedure Date Date: 12/27/2021 Start: 09:44 AM Study Location: [facility] Technical Quality: Adequate visualization Indications:Non-STEMI. History / Tech. Comments: Procedure explained to patient. Echo completed at the bedside.  PMHx: Smoker Chronic Kidney Disease (stage 3) Coronary artery disease, s/p CABG x2 S/P Pacemaker/ICD. Patient Status: Inpatient Height: 69 inches Weight: 170 pounds BSA: 1.93 m^2 BMI: 25.1 kg/m^2 HR: 70 bpm Allergies   - *Unlisted:(nsaid tape asa toradol ceftiaxone). CONCLUSIONS Summary Left ventricle is moderately enlarged, global left ventricular systolic function is moderate to severely reduced, calculated ejection fraction is 40%. Visually estimated EF 30-35%. Calculated EF via Marques's method % with global L. strain of -7.6%. Grade III (severe) left ventricular diastolic dysfunction. Injection of agitated saline attempted twice, unable to be obtained at this time. Pacing lead seen in the right atrium. Severe mitral regurgitation. MR radius 1.0cm, MR EOA 0.42cm2, MR Volume 71mL. Mild to moderate aortic insufficiency. Pacemaker / ICD lead seen in right ventricle. Moderate tricuspid regurgitation. Estimated right ventricular systolic pressure is 64 mmHg, suggesting pulmonary HTN. Mild pulmonic insufficiency. Signature ----------------------------------------------------------------------------  Electronically signed by Rebekah Cain(Sonographer) on 12/27/2021 10:46 AM ---------------------------------------------------------------------------- ----------------------------------------------------------------------------  Electronically signed by Hi Arce(Interpreting physician) on  12/28/2021 02:16 PM ---------------------------------------------------------------------------- FINDINGS Left Atrium Left atrium is severely dilated. Inter-atrial septum is intact with no evidence for an atrial septal defect, by color doppler. Injection of agitated saline attempted twice, unable to be obtained at this time. Left Ventricle Left ventricle is moderately enlarged, global left ventricular systolic function is moderate to severely reduced, calculated ejection fraction is 40%. Visually estimated EF 30-35%. Calculated EF via Marques's method % with global L. strain of -7.6%.  Grade III (severe) left ventricular diastolic dysfunction. Right Atrium Right atrial dilatation. Pacing lead seen in the right atrium. Right Ventricle Right ventricular dilatation with reduced systolic function. Pacemaker / ICD lead seen in right ventricle. Mitral Valve Thickened mitral valve leaflets. No mitral stenosis. Severe mitral regurgitation. MR radius 1.0cm, MR EOA 0.42cm2, MR Volume 71mL. Aortic Valve Aortic valve is trileaflet (post repair.) No aortic stenosis. Mild to moderate aortic insufficiency. Tricuspid Valve Normal tricuspid valve leaflets. Moderate tricuspid regurgitation. Estimated right ventricular systolic pressure is 64 mmHg, suggesting pulmonary HTN. Pulmonic Valve Pulmonic valve not well visualized but Doppler velocities are normal. Mild pulmonic insufficiency. Pericardial Effusion No significant pericardial effusion is seen. Miscellaneous Normal aortic root dimension. E/E' average = 13.6. IVC Increased diameter and impaired or no inspiratory variation indicating elevated RA filling pressure (i.e. CVP) .  M-mode / 2D Measurements & Calculations:   LVIDd:6.5 cm(3.7 - 5.6 cm)        Diastolic BYOGTZ:411 ml  ZYTZ:1.4 cm(0.6 - 1.1 cm)         Systolic DNWCBA:486 ml  YJRFP:7.0 cm(0.6 - 1.1 cm)        Aortic Root:3.6 cm(2.0 - 3.7 cm)                                    LA Dimension: 6.5 cm(1.9 - 4.0 cm)  Calculated LVEF (%): 40.61 %      LA volume/Index: 187.73 ml /97m^2                                    LVOT:2.2 cm                                    RVDd:5.3 cm   Mitral:                                 Aortic   Valve Area (P1/2-Time): 4.49 cm^2       Peak Velocity: 1.92 m/s  Peak E-Wave: 0.93 m/s                   Mean Velocity: 1.33 m/s  Peak A-Wave: 0.48 m/s                   Peak Gradient: 14.75 mmHg  E/A Ratio: 1.91                         Mean Gradient: 8 mmHg  Peak Gradient: 3.42 mmHg  Mean Gradient: 2 mmHg  Deceleration Time: 161 msec             Area (continuity): 2.36 cm^2  P1/2t: 49 msec AV VTI: 32.5 cm   MR Velocity: 5.47 m/s  BARBARA Volumetric: 0.42 cm^2  Area (continuity): 2.79 cm^2  Mean Velocity: 0.65 m/s  MR VTI: 170 cm   Tricuspid:                              Pulmonic:   Estimated RVSP: 64 mmHg                 Peak Velocity: 1.18 m/s  Peak TR Velocity: 3.53 m/s              Peak Gradient: 5.57 mmHg  Peak TR Gradient: 49.8436 mmHg  Diastology / Tissue Doppler Septal Wall E' velocity:0.08 m/s Septal Wall E/E':12.3 Lateral Wall E' velocity:0.06 m/s Lateral Wall E/E':14.9    XR ABDOMEN (KUB) (SINGLE AP VIEW)    Result Date: 12/27/2021  EXAMINATION: ONE SUPINE XRAY VIEW(S) OF THE ABDOMEN 12/27/2021 6:28 pm COMPARISON: None. HISTORY: ORDERING SYSTEM PROVIDED HISTORY: MRI clearance TECHNOLOGIST PROVIDED HISTORY: MRI clearance FINDINGS: There is oral contrast in the colon. There is also contrast in the urinary bladder. Bowel gas pattern is nonobstructive. No definite pneumoperitoneum. No radiopaque nephrolithiasis. Cardiac pacing device is noted. No unexpected metallic foreign object identified. Multilevel degenerative changes in the lumbar spine. 1.  No acute abdominal abnormality by radiograph. 2.  Cardiac pacing device is partially visualized. CT HEAD WO CONTRAST    Addendum Date: 1/12/2022    ADDENDUM: Critical results were called by Dr. Steph Dobbins to Dr. Carmencita Jo on 1/12/2022 at 03:45. Result Date: 1/12/2022  EXAMINATION: CT OF THE HEAD WITHOUT CONTRAST  1/12/2022 3:18 am TECHNIQUE: CT of the head was performed without the administration of intravenous contrast. Dose modulation, iterative reconstruction, and/or weight based adjustment of the mA/kV was utilized to reduce the radiation dose to as low as reasonably achievable. COMPARISON: None. HISTORY: ORDERING SYSTEM PROVIDED HISTORY: stroke alert TECHNOLOGIST PROVIDED HISTORY: stroke alert Reason for Exam: stroke alert Additional signs and symptoms: garbled speech, right sided weakness.  Relevant Medical/Surgical History: H/O previous CVA with right sided defecit. FINDINGS: BRAIN/VENTRICLES: Small to moderate size area of encephalomalacia involving the right temporal occipital region and a small area of encephalomalacia involving the medial left parietal lobe, these are indicative of old insults. There is no acute intracranial hemorrhage, mass effect or midline shift. No abnormal extra-axial fluid collection. The gray-white differentiation is maintained without evidence of an acute infarct. There is no evidence of hydrocephalus. ORBITS: The visualized portion of the orbits demonstrate no acute abnormality. SINUSES: The visualized paranasal sinuses and mastoid air cells demonstrate no acute abnormality. SOFT TISSUES/SKULL:  No acute abnormality of the visualized skull or soft tissues. No acute intracranial abnormality. Old infarcts in the right temporal occipital and medial left parietal regions. RECOMMENDATIONS: Unavailable     CT HEAD WO CONTRAST    Result Date: 12/24/2021  EXAMINATION: CT OF THE HEAD WITHOUT CONTRAST  12/24/2021 12:45 pm TECHNIQUE: CT of the head was performed without the administration of intravenous contrast. Dose modulation, iterative reconstruction, and/or weight based adjustment of the mA/kV was utilized to reduce the radiation dose to as low as reasonably achievable.  COMPARISON: CT head December 23, 2021, December 14, 2021 HISTORY: ORDERING SYSTEM PROVIDED HISTORY: repeat due to concern for left cortical acute stroke, significantly worsening right arm, face leg weakness, dysarthria and right visual field deficit TECHNOLOGIST PROVIDED HISTORY: repeat due to concern for left cortical acute stroke, significantly worsening right arm, face leg weakness, dysarthria and right visual field deficit Reason for Exam: repeat concern for left cortical acute stroke significantly worsening right arm face leg weakness FINDINGS: BRAIN/VENTRICLES: There are old infarctions in the left frontotemporal lobes, bilateral occipital lobes and minimally in the left cerebellar hemisphere, stable. No evidence of acute territorial infarction. There is no acute intracranial hemorrhage, mass effect or midline shift. No abnormal extra-axial fluid collection. There is no evidence of hydrocephalus. ORBITS: There is old fracture defect of the left lamina papyracea. The visualized portion of the orbits demonstrate no acute abnormality. SINUSES: The visualized paranasal sinuses and mastoid air cells demonstrate no acute abnormality. SOFT TISSUES/SKULL:  No acute abnormality of the visualized skull or soft tissues. No acute intracranial abnormality. 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 CT HEAD WO CONTRAST    Result Date: 12/23/2021  EXAMINATION: CT OF THE HEAD WITHOUT CONTRAST  12/23/2021 9:04 pm TECHNIQUE: CT of the head was performed without the administration of intravenous contrast. Dose modulation, iterative reconstruction, and/or weight based adjustment of the mA/kV was utilized to reduce the radiation dose to as low as reasonably achievable. COMPARISON: 12/16/2021. HISTORY: ORDERING SYSTEM PROVIDED HISTORY: right sided deficits last known well moments ago TECHNOLOGIST PROVIDED HISTORY: right sided deficits last known well moments ago FINDINGS: Unchanged chronic encephalomalacia of the right medial occipital lobe and unchanged chronic encephalomalacia of left inferior parietal lobule. There is no acute infarction, intracranial hemorrhage or intracranial mass lesion. No mass effect, midline shift or extra-axial collection is noted. There are mild nonspecific foci of periventricular and subcortical cerebral white matter hypodensity, most likely representing chronic microangiopathic disease in this age group. The brain parenchyma is otherwise normal. The cerebellar tonsils are in normal position.  The ventricles, sulci, and cisterns are mildly prominent suggestive of mild generalized volume loss. The globes and orbits are within normal limits. The visualized extracranial structures including paranasal sinuses and mastoid air cells are unremarkable. No fracture is identified. Unchanged chronic encephalomalacia of the right medial occipital lobe and unchanged chronic encephalomalacia of left inferior parietal lobule. No acute territorial infarction, intracranial hemorrhage or mass lesion. Mild chronic microangiopathic ischemic disease. Mild generalized volume loss. RECOMMENDATIONS: Unavailable     CT HEAD WO CONTRAST    Result Date: 12/16/2021  EXAMINATION: CT OF THE HEAD WITHOUT CONTRAST  12/16/2021 12:06 am TECHNIQUE: CT of the head was performed without the administration of intravenous contrast. Dose modulation, iterative reconstruction, and/or weight based adjustment of the mA/kV was utilized to reduce the radiation dose to as low as reasonably achievable. COMPARISON: 12/14/2021. HISTORY: ORDERING SYSTEM PROVIDED HISTORY: repeat CT of head r/o CVA TECHNOLOGIST PROVIDED HISTORY: repeat CT of head r/o CVA Reason for Exam: Repeat CT of head rule out CVA Relevant Medical/Surgical History: Covid + Slurred speech. Right-sided weakness. Initial evaluation. FINDINGS: BRAIN/VENTRICLES: Areas of encephalomalacia are again seen involving the occipital lobes bilaterally. There is also encephalomalacia involving the left temporal lobe. Otherwise, the gray-white differentiation appears maintained. No evidence of an acute intracranial hemorrhage. There is no mass effect or midline shift. There are areas of hypoattenuation in the periventricular and subcortical white matter, which is nonspecific, but may represent chronic microvasvular ischemic change. There is mild global parenchymal volume loss. Minimal scattered atherosclerosis of the intracranial vasculature.  ORBITS: There is a chronic defect involving the medial wall the left orbit. No acute abnormality seen of the orbits. SINUSES: Scattered mucosal thickening is seen of the ethmoid sinuses. The mastoid air cells demonstrate no acute abnormality. SOFT TISSUES/SKULL:  No acute abnormality of the visualized skull or soft tissues. 1. Overall, no significant change in the appearance of the brain compared to the prior CT. 2. Areas of encephalomalacia involving the bilateral occipital and left temporal lobes. 3. Mild global parenchymal volume loss with chronic microvascular ischemic changes. RECOMMENDATIONS: Unavailable     CT HEAD WO CONTRAST    Result Date: 12/14/2021  EXAMINATION: CT OF THE HEAD WITHOUT CONTRAST  12/14/2021 10:42 pm TECHNIQUE: CT of the head was performed without the administration of intravenous contrast. Dose modulation, iterative reconstruction, and/or weight based adjustment of the mA/kV was utilized to reduce the radiation dose to as low as reasonably achievable. COMPARISON: None. HISTORY: ORDERING SYSTEM PROVIDED HISTORY: stroke alert TECHNOLOGIST PROVIDED HISTORY: stroke alert Reason for Exam: Slurred speech, right sided weakness, previous stroke 3 months ago FINDINGS: BRAIN/VENTRICLES: There is no acute intracranial hemorrhage, mass effect or midline shift. No abnormal extra-axial fluid collection. The gray-white differentiation is maintained without evidence of an acute infarct. There is no evidence of hydrocephalus. Periventricular and deep subcortical white matter hypoattenuation, consistent microangiopathic change. There is mild parenchymal volume loss. There is right occipital encephalomalacia. There is mild prominence of the cisterna magna. There is also encephalomalacia in the left occipital lobe. ORBITS: The visualized portion of the orbits demonstrate no acute abnormality. SINUSES: Scattered mucosal thickening in the paranasal sinuses. SOFT TISSUES/SKULL:  No acute abnormality of the visualized skull or soft tissues.      1. No acute intracranial abnormality. 2. Bilateral occipital encephalomalacia, suggestive of prior ischemia. 3. Microangiopathic change. Findings were discussed with Sudheer Rodriguez CNP at 11:02 pm on 12/14/2021. RECOMMENDATIONS: Unavailable     NM LUNG SCAN PERFUSION ONLY    Result Date: 12/31/2021  EXAMINATION: NUCLEAR MEDICINE PERFUSION SCAN. 12/31/2021 TECHNIQUE: Ventilation not performed as part of COVID-19 safety precautions. 6.0 millicuries of Tc 73W MAA was administered intravenously prior to planar imaging of the lungs in multiple projections. COMPARISON: Chest radiograph dated 12/28/2021. HISTORY: ORDERING SYSTEM PROVIDED HISTORY: SOB, elev DD TECHNOLOGIST PROVIDED HISTORY: SOB, elev DD Decision Support Exception - unselect if not a suspected or confirmed emergency medical condition->Emergency Medical Condition (MA) Reason for Exam: SOB, elevated D-Dimer Additional signs and symptoms: SOB while laying down, stroke 2 weeks ago, has had 3 strokes, CP, heart feels like its beating out of his chest, coughing, was coughing up black one week ago, No hx of lung dx, No hx of blood clots, smokes 1 pack/3 days for 15-17 years, D-Dimer 12/30/21: 2.37 FINDINGS: PERFUSION: Distribution of radiotracer is homogeneous. No segmental defects identified. CHEST RADIOGRAPH: No focal areas of consolidation or significant effusions on recent chest radiograph. Low Probability for Pulmonary Embolus. XR CHEST PORTABLE    Result Date: 1/1/2022  EXAMINATION: ONE XRAY VIEW OF THE CHEST 1/1/2022 11:39 am COMPARISON: Chest 12/28/2021 HISTORY: ORDERING SYSTEM PROVIDED HISTORY: SOB, COVID FINDINGS: The cardiac silhouette is enlarged. Calcifications involving the aorta reflect atherosclerosis. The mediastinal and hilar silhouettes appear unremarkable. Scattered pulmonary opacities bilateral mid and lower lungs. Possible left pleural effusion. No pneumothorax is seen. No acute osseous abnormality is identified. Sequela from an ICD and CABG.      1. Nonspecific pulmonary infiltrates can be seen with atypical/viral pneumonia. Small left pleural effusion evident. Appearance is very similar to prior exam 12/28/2021. 2. Calcific atherosclerosis aorta. 3. Cardiomegaly. XR CHEST PORTABLE    Result Date: 12/28/2021  EXAMINATION: ONE XRAY VIEW OF THE CHEST 12/28/2021 8:19 pm COMPARISON: 12/23/2021 HISTORY: ORDERING SYSTEM PROVIDED HISTORY: fluid over load? TECHNOLOGIST PROVIDED HISTORY: fluid over load? Reason for Exam: upr FINDINGS: Prior sternotomy. Cardiomediastinal silhouette is enlarged, unchanged. Left subclavian cardiac pacing device is unchanged in position. Suspected small bilateral pleural effusions. No pneumothorax. Diffuse bilateral pulmonary opacities are slightly increased in the interval.     Interval increase in the bilateral pulmonary opacities, concerning for worsening pneumonia or possibly superimposed edema. Cardiomegaly. XR CHEST PORTABLE    Result Date: 12/23/2021  EXAMINATION: ONE XRAY VIEW OF THE CHEST 12/23/2021 3:28 pm COMPARISON: CT December 14, 2021 and chest radiograph December 7, 2021 HISTORY: ORDERING SYSTEM PROVIDED HISTORY: Chest pain TECHNOLOGIST PROVIDED HISTORY: cp Reason for Exam: upr FINDINGS: Moderate cardiomegaly appears unchanged. Biventricular pacer leads in satisfactory position in the AP view. Relatively diffuse multifocal bilateral airspace disease has progressed significantly since prior radiograph. No definite effusion. No pneumothorax or subdiaphragmatic free air. No acute osseous abnormality identified. Relatively diffuse multifocal bilateral airspace disease most likely representing COVID-19 pneumonia. Pulmonary edema felt less likely.      VL DUP CAROTID BILATERAL    Result Date: 12/24/2021    [facility]  Vascular Carotid Procedure   Patient Name    Kathya Guerra Date of Study             12/24/2021   Date of Birth   1965  Gender                    Male   Age             61 right common      Intimal thickening left common  carotid artery. carotid artery. The carotid bulb has an irregular    The carotid bulb has an irregular  heterogeneous plaque causing a <50%  heterogeneous plaque causing a <50%  stenosis. stenosis. The internal carotid artery has a    The internal carotid artery has a  smooth heterogeneous plaque causing  smooth homogeneous plaque causing a  a <50% stenosis based on velocities. <50% stenosis based on velocities. The external carotid artery has a    The external carotid artery has a  smooth homogeneous plaque causing a  smooth homogeneous plaque causing a  <50% stenosis. <50% stenosis. The vertebral artery is patent with  The vertebral artery is patent with  antegrade flow. antegrade flow. Risk Factors   - Current - Every day. Velocities are measured in cm/s ; Diameters are measured in cm Carotid Right Measurements +------------+-------+-------+--------+-------+------------+---------------+ ! Location    ! PSV    ! EDV    ! Angle   ! RI     !%Stenosis   ! Tortuosity     ! +------------+-------+-------+--------+-------+------------+---------------+ ! Prox CCA    !67.1   !11.2   !60      !0.83   !            !               ! +------------+-------+-------+--------+-------+------------+---------------+ ! Mid CCA     !77.7   !13.7   !60      !0.82   !            !               ! +------------+-------+-------+--------+-------+------------+---------------+ ! Dist CCA    !80.8   !22.4   !60      !0.72   !            !               ! +------------+-------+-------+--------+-------+------------+---------------+ ! Bulb        !61.5   !16.8   !60      !0.73   !            !               ! +------------+-------+-------+--------+-------+------------+---------------+ ! Prox ICA    !51.6   !18.4   !22      !0.64   !            !               ! is limited to analysis of imaging data and should not be used in-lieu of full patient evaluation or relied upon to make or confirm diagnosis. 3D reconstructed images were performed on a separate workstation and provided for review. COMPARISON: 12/14/2021. HISTORY: ORDERING SYSTEM PROVIDED HISTORY: stroke alert TECHNOLOGIST PROVIDED HISTORY: stroke alert Reason for Exam: stroke alert Additional signs and symptoms: garbled speech with right sided weakness Relevant Medical/Surgical History: h/o previous cva with right sided defecit Initial evaluation. FINDINGS: CTA NECK: AORTIC ARCH/ARCH VESSELS: Incidentally noted is a common origin of the innominate and right common carotid arteries, which is a normal variant. No focal stenosis seen of the innominate or subclavian arteries. CAROTID ARTERIES: No focal stenosis of the common carotid arteries. There is atherosclerosis of the carotid bulbs and proximal internal carotid arteries bilaterally. No flow limiting stenosis of the internal carotid arteries by NASCET criteria. There is a retropharyngeal course of the right internal carotid artery. There is tortuosity of the left internal carotid artery. VERTEBRAL ARTERIES: No significant stenosis seen of the vertebral arteries. SOFT TISSUES: There is minimal reticular opacities within the lungs bilaterally. This appears somewhat improved from the prior exam.  Mild mediastinal adenopathy, which appears slightly improved. BONES: No acute osseous abnormality. CTA HEAD: ANTERIOR CIRCULATION: No significant stenosis of the intracranial internal carotid, anterior cerebral, or middle cerebral arteries. No aneurysm. POSTERIOR CIRCULATION: No significant stenosis of the vertebral, basilar, or posterior cerebral arteries. No aneurysm. OTHER: No dural venous sinus thrombosis on this non-dedicated study. BRAIN: No evidence of mass effect or midline shift.      1. No convincing flow limiting stenosis of the cervical carotid/vertebral arteries. 2. No significant stenosis of the [id number]. 3. Minimal somewhat reticular opacities in the lungs bilaterally, which appears somewhat improved from the prior exam. 4. Mediastinal adenopathy is seen, which also appears slightly improved. CTA HEAD NECK W CONTRAST    Result Date: 12/15/2021  EXAMINATION: CTA OF THE HEAD AND NECK WITH CONTRAST 12/14/2021 11:53 pm: TECHNIQUE: CTA of the head and neck was performed with the administration of intravenous contrast. Multiplanar reformatted images are provided for review. MIP images are provided for review. Stenosis of the internal carotid arteries measured using NASCET criteria. Dose modulation, iterative reconstruction, and/or weight based adjustment of the mA/kV was utilized to reduce the radiation dose to as low as reasonably achievable. COMPARISON: None. HISTORY: ORDERING SYSTEM PROVIDED HISTORY: r/o stroke TECHNOLOGIST PROVIDED HISTORY: r/o stroke Reason for Exam: Slurred speech, right sided weakness FINDINGS: CTA NECK: AORTIC ARCH/ARCH VESSELS: No dissection or arterial injury. No significant stenosis of the brachiocephalic or subclavian arteries. CAROTID ARTERIES: Atherosclerosis in carotid bifurcations. No dissection, arterial injury, or hemodynamically significant stenosis by NASCET criteria. VERTEBRAL ARTERIES: No dissection, arterial injury, or significant stenosis. SOFT TISSUES: Patchy ground-glass infiltrates in the visualized lungs. There is mediastinal lymphadenopathy. The larynx and pharynx are unremarkable. No acute abnormality of the salivary and thyroid glands. BONES: No acute osseous abnormality. CTA HEAD: ANTERIOR CIRCULATION: No significant stenosis of the intracranial internal carotid, anterior cerebral, or middle cerebral arteries. No aneurysm. POSTERIOR CIRCULATION: No significant stenosis of the vertebral, basilar, or posterior cerebral arteries. No aneurysm.  OTHER: No dural venous sinus thrombosis on this non-dedicated study. BRAIN: No mass effect or midline shift. No extra-axial fluid collection. The gray-white differentiation is maintained. No flow-limiting arterial stenosis in the head and neck. Patchy ground-glass infiltrates in the visualized lungs. Mediastinal lymphadenopathy. RECOMMENDATIONS: Unavailable     US RETROPERITONEAL LIMITED    Result Date: 12/24/2021  EXAMINATION: ULTRASOUND OF THE KIDNEYS 12/24/2021 12:13 pm COMPARISON: 12/08/2021, CT 12/09/2020 HISTORY: ORDERING SYSTEM PROVIDED HISTORY: FOLLOW UP ON LEFT HYDRONEPHROSIS TECHNOLOGIST PROVIDED HISTORY: Bilateral Renal Ultrasound FOLLOW UP ON LEFT HYDRONEPHROSIS Absent right kidney FINDINGS: The right kidney is not visualized. The left kidney measures 13.3 cm in length. Mild-to-moderate left hydronephrosis, similar to the prior exams. Cortical echogenicity appears intact. Renal contours remain somewhat lobular. No kidney stones by ultrasound. No perirenal fluid collections. Similar mild-to-moderate left hydronephrosis. Absent right kidney RECOMMENDATIONS: Unavailable     FL MODIFIED BARIUM SWALLOW W VIDEO    Result Date: 1/12/2022  EXAMINATION: MODIFIED BARIUM SWALLOW WAS PERFORMED IN CONJUNCTION WITH SPEECH PATHOLOGY SERVICES TECHNIQUE: Fluoroscopic evaluation of the swallowing mechanism was performed using cineradiography with multiple consistency of barium product in conjunction with speech pathology services. FLUOROSCOPY DOSE AND TYPE OR TIME AND EXPOSURES: Fluoro time 3.09 mins .3 dgy/cm2 AIR KERMA 24.3 COMPARISON: None HISTORY: Reason for Exam: Poss stroke FINDINGS: Premature vallecular spillage as well as minimal vallecular and piriform stasis with all consistencies. No evidence of aspiration. Deep penetration with nectar thick liquid consistencies. No aspiration. Deep penetration with nectar thick liquid consistency. Please see separate speech pathology report for full discussion of findings and recommendations.      FL MODIFIED BARIUM SWALLOW W VIDEO    Result Date: 12/27/2021  EXAMINATION: MODIFIED BARIUM SWALLOW WAS PERFORMED IN CONJUNCTION WITH SPEECH PATHOLOGY SERVICES TECHNIQUE: Fluoroscopic evaluation of the swallowing mechanism was performed using cineradiography with multiple consistency of barium product in conjunction with speech pathology services. FLUOROSCOPY DOSE AND TYPE OR TIME AND EXPOSURES: 1 minute DAP 5.906mGy COMPARISON: None HISTORY: ORDERING SYSTEM PROVIDED HISTORY: dysphagia FINDINGS: Premature vallecular spillage with all consistencies. No evidence of laryngeal penetration or aspiration. Minimal vallecular residual with pudding and thin liquid consistencies. No evidence of aspiration or significant penetration. Please see separate speech pathology report for full discussion of findings and recommendations. DIAGNOSIS:    Mood disorder, unspecified        RISK ASSESSMENT: Moderate risk of accidental self-harm. No risk of suicide    RECOMMENDATIONS    Risk Management:  1:1 sitter    Medications: Patient declines psychotropic medications  Discussed with the treating physician/ team about the patient and treatment plan  Reviewed the chart    We will follow    Discussed with the patient risk, benefit, alternative and common side effects for the  proposed medication treatment. Patient is consenting to the treatment. Thanks for the consult. Please call me if needed. Electronically signed by Dena Castellanos MD on 1/12/2022 at 9:43 PM    Please note that this chart was generated using voice recognition Dragon dictation software. Although every effort was made to ensure the accuracy of this automated transcription, some errors in transcription may have occurred.

## 2022-01-13 NOTE — PROGRESS NOTES
Physical Therapy        Physical Therapy Cancel Note      DATE: 2022    NAME: Magdalena Shin  MRN: [id number]   : 1965      Patient not seen this date for Physical Therapy due to:    Patient Declined: 21: Needs REASSESSMENT.  Pt politely declines today to rest.       Electronically signed by Radha Warren PT on 2022 at 2:40 PM

## 2022-01-13 NOTE — PROGRESS NOTES
NEUROLOGY INPATIENT PROGRESS NOTE    Sonja Joiner    MRN -  [id number]   Acct # - [de-identified]      - 1965    61 y.o. Subjective: The patient is seen as follow up for sleeping and does not want to be disturbed. Patient is alert at this time. Pt clearly improved and is able to eat today. Objective:   BP (!) 138/107   Pulse 120   Temp 98.1 °F (36.7 °C) (Oral)   Resp 18   Ht 5' 9\" (1.753 m)   Wt 166 lb 7.2 oz (75.5 kg)   SpO2 96%   BMI 24.58 kg/m²   No intake or output data in the 24 hours ending 22 1226    Physical Exam:  General:  Awake, sleepy arousable, laying in bed,  not  in distress. Language is intact but is mildly dysarthric, much better than yesterday  HEENT: pink conjunctiva, unicteric sclera, moist oral mucosa. There is no neck lymphadenopathy. Neck: There is no carotid bruits. The Neck is supple. Neuro: . Right side plegia, left side 4+/5  Osteo: There is no LROM of any of the 4 extremity joints, no joint tenderness. Leg edema none  Skin: no lesions, no rash, warm and moist to touch. Abdomen is soft intact bowel sounds. ROS:    Cardiac: no chest pain. No palpitations. Renal : no flank pain, no hematuria  Skin: no rash    Medications:     apixaban  5 mg Oral BID    LORazepam  0.5 mg Oral Once    haloperidol lactate  5 mg IntraMUSCular Once    isosorbide dinitrate  20 mg Oral TID    diphenhydrAMINE  50 mg Oral BID    carvedilol  12.5 mg Oral BID     clopidogrel  75 mg Oral Daily    lisinopril  10 mg Oral Daily    clonazePAM  1 mg Oral Daily    amLODIPine  5 mg Oral Daily       Data:   CBC:   Recent Labs     22  1649   WBC 3.7   HGB 9.5*        BMP:    Recent Labs     22  0351   CREATININE 2.37*         No results found for this or any previous visit. No results found for this or any previous visit. No results found for this or any previous visit.     No results found for this or any previous regions. RECOMMENDATIONS:  Unavailable        Assessment:  Active Problems:    Hx of CABG    AICD (automatic cardioverter/defibrillator) present    Congestive heart failure of unknown etiology ([facility] [address]    History of CVA with residual deficit    Longstanding persistent atrial fibrillation (HCC)    Essential hypertension    Pulmonary emphysema ([facility] [address]  Resolved Problems:    * No resolved hospital problems. *    57 year old man with afib on eliquis, CAD on plavix, strokes with baseline right hemiparesis, nursing staff reported left side weakness on the 1/12/2022. And was seen earlier by me on telestroke    Plan:    1. Left side weakness improved and pt is now able to swallow  2. D/c heparin and pt can go back on eliquis  3. It is possible that pt may have a small strokes again, however, given his medical issue and he is already optimized, stroke can still happen even while under anticoagulant, however, if stroke happen under anticoagulation, stroke outcome will also likely improve compare to when not on it  4. con't eliquis 5mg BID  5. con't plavix  6. PT/OT   7. Pt is not cooeperative most of the time  8. Outpatient neurology f/u in 3 months  9.  Will sign off for now as there is noting to add from neurology standpoint at this moment      Sebas Croft MD 1/13/2022 12:26 PM       Julienne Thapa MD  Attending Neurologist/Neurointensivist

## 2022-01-13 NOTE — PROGRESS NOTES
Hospitalist Progress Note  1/12/2022 9:22 PM  Subjective:   Admit Date: 12/30/2021  PCP: Nolberto Vela MD     Full Code      C/c:  Chief Complaint   Patient presents with    Chest Pain         Interval History: pt had change in status, had rt sided weakness, pt non co operative refusing rx,apparently pain and ativan, wanting no intervention , mra of neck shows no blockage,seen by neurology, heparin advised pt refuses, pt could not hv mri of brain due to pacer    Diet: Diet NPO                                ip days:12  Medications:   Scheduled Meds:   LORazepam  0.5 mg Oral Once    haloperidol lactate  5 mg IntraMUSCular Once    isosorbide dinitrate  20 mg Oral TID    diphenhydrAMINE  50 mg Oral BID    [Held by provider] apixaban  5 mg Oral BID    carvedilol  12.5 mg Oral BID WC    clopidogrel  75 mg Oral Daily    lisinopril  10 mg Oral Daily    clonazePAM  1 mg Oral Daily    amLODIPine  5 mg Oral Daily     Continuous Infusions:   heparin (PORCINE) Infusion       PRN Meds:.sodium chloride flush, heparin (porcine), heparin (porcine), traMADol, traMADol, acetaminophen, ondansetron **OR** [DISCONTINUED] ondansetron     CBC:   Recent Labs     01/12/22  1649   WBC 3.7   HGB 9.5*        BMP:    Recent Labs     01/12/22  0351   CREATININE 2.37*     Hepatic: No results for input(s): AST, ALT, ALB, BILITOT, ALKPHOS in the last 72 hours. Troponin: No results for input(s): TROPONINI in the last 72 hours. BNP: No results for input(s): BNP in the last 72 hours. Lipids: No results for input(s): CHOL, HDL in the last 72 hours. Invalid input(s): LDLCALCU  INR: No results for input(s): INR in the last 72 hours.     Objective:   Vitals: BP (!) 123/94   Pulse 74   Temp 98.1 °F (36.7 °C) (Oral)   Resp 18   Ht 5' 9\" (1.753 m)   Wt 166 lb 7.2 oz (75.5 kg)   SpO2 96%   BMI 24.58 kg/m²   General appearance: alert, appears stated age and cooperative  Skin: Skin color, texture, turgor normal. No rashes or lesions  Lungs: clear to auscultation bilaterally  Heart: regular rate and rhythm, S1, S2 normal, no murmur, click, rub or gallop  Abdomen: soft, non-tender; bowel sounds normal; no masses,  no organomegaly  Extremities: extremities normal, atraumatic, no cyanosis or edema  Neurologic: Mental status: Alert, oriented, thought content appropriate weakness rt side    Prophylaxis:   DVT with  [] lovenox        [x] heparin pt refuses        [] Scd        [] none:     Radiology:  NM LUNG SCAN PERFUSION ONLY    Result Date: 12/31/2021  EXAMINATION: NUCLEAR MEDICINE PERFUSION SCAN. 12/31/2021 TECHNIQUE: Ventilation not performed as part of COVID-19 safety precautions. 6.0 millicuries of Tc 55T MAA was administered intravenously prior to planar imaging of the lungs in multiple projections. COMPARISON: Chest radiograph dated 12/28/2021. HISTORY: ORDERING SYSTEM PROVIDED HISTORY: SOB, elev DD TECHNOLOGIST PROVIDED HISTORY: SOB, elev DD Decision Support Exception - unselect if not a suspected or confirmed emergency medical condition->Emergency Medical Condition (MA) Reason for Exam: SOB, elevated D-Dimer Additional signs and symptoms: SOB while laying down, stroke 2 weeks ago, has had 3 strokes, CP, heart feels like its beating out of his chest, coughing, was coughing up black one week ago, No hx of lung dx, No hx of blood clots, smokes 1 pack/3 days for 15-17 years, D-Dimer 12/30/21: 2.37 FINDINGS: PERFUSION: Distribution of radiotracer is homogeneous. No segmental defects identified. CHEST RADIOGRAPH: No focal areas of consolidation or significant effusions on recent chest radiograph. Low Probability for Pulmonary Embolus. Assessment :   1. Cva?/ct shows no bleed/mri could not be done due to pt refusing rx  2. Drug seeking/borderline personality  3. Cad/s/p cabg  4. cta neck no stenosis  5. homeless     Plan:   1. Neurology on board  2.   See order    Patient Active Problem List:     JACKSON (acute kidney injury) ([facility] [address]     Atypical chest pain     AICD (automatic cardioverter/defibrillator) present     History of MI (myocardial infarction)     Elevated brain natriuretic peptide (BNP) level     Elevated troponin     Chronic systolic heart failure (HCC)     Hx of CABG     Stage 3 chronic kidney disease ([address]     COVID-19 virus infection     Hydronephrosis of left kidney     Acute urinary retention     Hepatitis C     Coronary artery disease involving native coronary artery of native heart with angina pectoris (HCC)     Solitary kidney, congenital     Homeless single person     Acute right-sided weakness     Acute CVA (cerebrovascular accident) ([facility] [address]     Acute chest pain     Congestive heart failure of unknown etiology ([address]     History of CVA with residual deficit     Longstanding persistent atrial fibrillation ([facility] [address]     Essential hypertension     Pulmonary emphysema ([address]      Anticipated Disposition upon discharge: [] Home                                                                         [] Home with Home Health                                                                         [] [facility]                                                                         [] [address]      Patient is admitted as inpatient status because of co-morbidities listed above, severity of signs and symptoms as outlined, requirement for current medical therapies and most importantly because of direct risk to patient if care not provided in a hospital setting.           Gustavo Jensen MD, MD  Rounding Hospitalist

## 2022-01-14 RX ORDER — TRAMADOL HYDROCHLORIDE 50 MG/1
100 TABLET ORAL 2 TIMES DAILY PRN
Qty: 6 TABLET | Refills: 0 | Status: SHIPPED | OUTPATIENT
Start: 2022-01-14 | End: 2022-01-17

## 2022-01-14 NOTE — PROGRESS NOTES
Orders faxed to [facility]. Number for report: [phone]. Transport remains at 1500.

## 2022-01-14 NOTE — DISCHARGE INSTR - DIET

## 2022-01-14 NOTE — CONSULTS
Department of Psychiatry  Behavioral Health Consult    REASON FOR CONSULT: Suicidal ideation    CONSULTING PHYSICIAN: Dr. Ronald Burton    History obtained from: Patient and chart    Interim history. The patient continues to refuse treatment. He continues to express dissatisfaction at the treatment he has received without offering any explanation for it. He does not want any psychotropic medications and does not believe anything wrong with him. He is limited in his cooperation with the interview. The patient denies any auditory or visual hallucination. He denies psychotic phenomena. He denies suicidal ideation. HISTORY OF PRESENT ILLNESS:    The patient is a 61 y.o. male history of covid 19 over a month ago and stroke leading to right-sided hemiparesis and with significant past psychiatric history of mood disorder who was seen by Dr. René Cantu on 1/4/2022 who was referred for suicidal ideation. I have noted that telemetry stroke were consulted on 1/12/2021. The patient was seen at bedside sitter was present. The patient has difficulty answering orientation questions about time. He is oriented to place and situation. The patient has difficulty communicating because of his hemiparesis. The patient denies suicidal ideation but says that he does not like how he has been treated by the doctors in the hospital.  The patient had difficulty elaborating on why he was unhappy with the care he has received. The patient appeared to be suspicious of his treatment but had no clear well-formed delusional system. The patient is irritable in his mood. On exploring his mental status, the patient stated that he is feeling fine because he tries to deal with everything. The patient denies any auditory or visual hallucinations or other psychotic phenomena. The patient is refusing to try any psychotropic medications saying that they make his head feel funny.     I have noted that the patient was prescribed clonazepam when she daily    The patient is not currently receiving care for the above psychiatric illness. Psychiatric Review of Systems        ·    Obsessions and Compulsions: Denies    ·    Brenda or Hypomania: Denies  ·    Hallucinations: Denies  ·    Panic Attacks:  Denies  ·    Delusions:  Denies  ·    Phobias:  Denies  ·    Trauma: Denies      Substance Abuse History:  The patient refuses to talk about any substance use      Past Psychiatric History:  The patient denies any past psychiatric history    Personal History: I have noted that the patient was homeless prior to admission. He is unwilling to discuss any support that he might have. Past Medical History:        Diagnosis Date    AICD (automatic cardioverter/defibrillator) present     CAD (coronary artery disease)     CKD [facility]ronic [facility] disease), [facility] III [facility]     COVID-19     Myocardial infarction ([facility] [address] [facility]     [facility]ox[facility]     S/P CABG (coronary artery bypass graft)     x2 separate occasions    Solitary kidney, congenital        Past Surgical History:        Procedure Laterality Date    CARDIAC CATHETERIZATION  12/27/2021    CORONARY ANGIOPLASTY WITH STENT PLACEMENT      Hx of CAD with multiple stents and CABG twice (2010 & 2018 [facility])    CORONARY ARTERY BYPASS GRAFT  2010    CORONARY ARTERY BYPASS GRAFT  2018    PACEMAKER PLACEMENT  09/11/2020    [employer] ICD [id number] RV lead 6935-m55, LV lead 4598-88.  NOT MRI CONDITIONAL         Medications Prior to Admission:   Medications Prior to Admission: pantoprazole (PROTONIX) 40 MG tablet, Take 1 tablet by mouth every morning (before breakfast)  clopidogrel (PLAVIX) 75 MG tablet, Take 1 tablet by mouth daily  tamsulosin (FLOMAX) 0.4 MG capsule, Take 0.4 mg by mouth daily  apixaban (ELIQUIS) 5 MG TABS tablet, Take 5 mg by mouth 2 times daily  [DISCONTINUED] carvedilol (COREG) 6.25 MG tablet, Take 6.25 mg by mouth 2 times daily  isosorbide mononitrate (IMDUR) 30 MG extended release tablet, Take 1 tablet by mouth daily  nitroGLYCERIN (NITROSTAT) 0.4 MG SL tablet, up to max of 3 total doses. If no relief after 1 dose, call 911. sodium bicarbonate 650 MG tablet, Take 1 tablet by mouth 2 times daily  bumetanide (BUMEX) 2 MG tablet, Take 1 tablet by mouth 2 times daily  gabapentin (NEURONTIN) 100 MG capsule, Take 1 capsule by mouth 3 times daily for 30 days. [DISCONTINUED] metoprolol succinate (TOPROL XL) 50 MG extended release tablet, Take 1 tablet by mouth daily  [DISCONTINUED] atorvastatin (LIPITOR) 40 MG tablet, Take 1 tablet by mouth daily    Allergies:  Nsaids, Aspirin, Adhesive tape, Ceftriaxone, Ketorolac tromethamine, and Other    FAMILY/SOCIAL HISTORY:  Family History   Problem Relation Age of Onset    Cancer Mother     Diabetes Father     Heart Disease Father     Diabetes Paternal Grandmother      Social History     Socioeconomic History    Marital status: Single     Spouse name: Not on file    Number of children: Not on file    Years of education: Not on file    Highest education level: Not on file   Occupational History    Not on file   Tobacco Use    Smoking status: Current Every Day Smoker     Packs/day: 0.25    Smokeless tobacco: Never Used   Substance and Sexual Activity    Alcohol use: Not Currently     Comment: rarely    Drug use: Not Currently     Comment: not in over 27 years    Sexual activity: Not on file   Other Topics Concern    Not on file   Social History Narrative    Not on file     Social Determinants of Health     Financial Resource Strain:     Difficulty of Paying Living Expenses: Not on file   Food Insecurity:     Worried About Running Out of Food in the Last Year: Not on file    [place] of Food in the Last Year: Not on file   Transportation Needs:     Lack of Transportation (Medical): Not on file    Lack of Transportation (Non-Medical):  Not on file   Physical Activity:     Days of Exercise per Week: Not on file    Minutes of Exercise per Session: Not on file   Stress:     Feeling of Stress : Not on file   Social Connections:     Frequency of Communication with Friends and Family: Not on file    Frequency of Social Gatherings with Friends and Family: Not on file    Attends Muslim Services: Not on file    Active Member of Clubs or Organizations: Not on file    Attends Club or Organization Meetings: Not on file    Marital Status: Not on file   Intimate Partner Violence:     Fear of Current or Ex-Partner: Not on file    Emotionally Abused: Not on file    Physically Abused: Not on file    Sexually Abused: Not on file   Housing Stability:     Unable to Pay for Housing in the Last Year: Not on file    Number of [place] in the Last Year: Not on file    Unstable Housing in the Last Year: Not on file       REVIEW OF SYSTEMS    Constitutional: [] fever  [] chills  [] weight loss  []weakness [] Other:  Eyes:  [] photophobia  [] discharge [] acuity change   [] Diplopia   [] Other:  HENT:  [] sore throat  [] ear pain [] Tinnitus   [] Other  Respiratory:  [] Cough  [] Shortness of breath   [] Sputum   [] Other:   Cardiac: []Chest pain   []Palpitations []Edema  []PND  [] Other:  GI:  []Abdominal pain   []Nausea  []Vomiting  []Diarrhea  [] Other:  :  [] Dysuria   []Frequency  []Hematuria  []Discharge  [] Other:  Possible Pregnancy: []Yes   []No   LMP:   Musculoskeletal:  []Back pain  []Neck pain  []Recent Injury   Skin:  []Rash  [] Itching  [] Other:  Neurologic:  [] Headache  [] Focal weakness  [] Sensory changes []Other:  Endocrine:  [] Polyuria  [] Polydipsia  [] Hair Loss  [] Other:  Lymphatic:   [] Swollen glands   Psychiatric:  As per HPI      All other systems negative except as marked or mentioned/indicated in the HPI. Sonya Sun      PHYSICAL EXAM:  Vitals:  BP (!) 138/107   Pulse 120   Temp 98.1 °F (36.7 °C) (Oral)   Resp 18   Ht 5' 9\" (1.753 m)   Wt 166 lb 7.2 oz (75.5 kg)   SpO2 96%   BMI 24.58 kg/m²      Neuro Exam: ---------------------------------------------------------------------------- FINDINGS Left Atrium Left atrium is severely dilated. Inter-atrial septum is intact with no evidence for an atrial septal defect, by color doppler. Injection of agitated saline attempted twice, unable to be obtained at this time. Left Ventricle Left ventricle is moderately enlarged, global left ventricular systolic function is moderate to severely reduced, calculated ejection fraction is 40%. Visually estimated EF 30-35%. Calculated EF via Marques's method % with global L. strain of -7.6%. Grade III (severe) left ventricular diastolic dysfunction. Right Atrium Right atrial dilatation. Pacing lead seen in the right atrium. Right Ventricle Right ventricular dilatation with reduced systolic function. Pacemaker / ICD lead seen in right ventricle. Mitral Valve Thickened mitral valve leaflets. No mitral stenosis. Severe mitral regurgitation. MR radius 1.0cm, MR EOA 0.42cm2, MR Volume 71mL. Aortic Valve Aortic valve is trileaflet (post repair.) No aortic stenosis. Mild to moderate aortic insufficiency. Tricuspid Valve Normal tricuspid valve leaflets. Moderate tricuspid regurgitation. Estimated right ventricular systolic pressure is 64 mmHg, suggesting pulmonary HTN. Pulmonic Valve Pulmonic valve not well visualized but Doppler velocities are normal. Mild pulmonic insufficiency. Pericardial Effusion No significant pericardial effusion is seen. Miscellaneous Normal aortic root dimension. E/E' average = 13.6. IVC Increased diameter and impaired or no inspiratory variation indicating elevated RA filling pressure (i.e. CVP) .  M-mode / 2D Measurements & Calculations:   LVIDd:6.5 cm(3.7 - 5.6 cm)        Diastolic SGRMYT:794 ml  YQLU:5.8 cm(0.6 - 1.1 cm)         Systolic KECFNK:092 ml  JUGGU:5.0 cm(0.6 - 1.1 cm)        Aortic Root:3.6 cm(2.0 - 3.7 cm)                                    LA Dimension: 6.5 cm(1.9 - 4.0 cm)  Calculated LVEF (%): 40.61 % LA volume/Index: 187.73 ml /97m^2                                    LVOT:2.2 cm                                    RVDd:5.3 cm   Mitral:                                 Aortic   Valve Area (P1/2-Time): 4.49 cm^2       Peak Velocity: 1.92 m/s  Peak E-Wave: 0.93 m/s                   Mean Velocity: 1.33 m/s  Peak A-Wave: 0.48 m/s                   Peak Gradient: 14.75 mmHg  E/A Ratio: 1.91                         Mean Gradient: 8 mmHg  Peak Gradient: 3.42 mmHg  Mean Gradient: 2 mmHg  Deceleration Time: 161 msec             Area (continuity): 2.36 cm^2  P1/2t: 49 msec                          AV VTI: 32.5 cm   MR Velocity: 5.47 m/s  BARBARA Volumetric: 0.42 cm^2  Area (continuity): 2.79 cm^2  Mean Velocity: 0.65 m/s  MR VTI: 170 cm   Tricuspid:                              Pulmonic:   Estimated RVSP: 64 mmHg                 Peak Velocity: 1.18 m/s  Peak TR Velocity: 3.53 m/s              Peak Gradient: 5.57 mmHg  Peak TR Gradient: 49.8436 mmHg  Diastology / Tissue Doppler Septal Wall E' velocity:0.08 m/s Septal Wall E/E':12.3 Lateral Wall E' velocity:0.06 m/s Lateral Wall E/E':14.9    XR ABDOMEN (KUB) (SINGLE AP VIEW)    Result Date: 12/27/2021  EXAMINATION: ONE SUPINE XRAY VIEW(S) OF THE ABDOMEN 12/27/2021 6:28 pm COMPARISON: None. HISTORY: ORDERING SYSTEM PROVIDED HISTORY: MRI clearance TECHNOLOGIST PROVIDED HISTORY: MRI clearance FINDINGS: There is oral contrast in the colon. There is also contrast in the urinary bladder. Bowel gas pattern is nonobstructive. No definite pneumoperitoneum. No radiopaque nephrolithiasis. Cardiac pacing device is noted. No unexpected metallic foreign object identified. Multilevel degenerative changes in the lumbar spine. 1.  No acute abdominal abnormality by radiograph. 2.  Cardiac pacing device is partially visualized. CT HEAD WO CONTRAST    Addendum Date: 1/12/2022    ADDENDUM: Critical results were called by Dr. Rebel Hickey to Dr. Yanni Raza on 1/12/2022 at 03:45. Result Date: 1/12/2022  EXAMINATION: CT OF THE HEAD WITHOUT CONTRAST  1/12/2022 3:18 am TECHNIQUE: CT of the head was performed without the administration of intravenous contrast. Dose modulation, iterative reconstruction, and/or weight based adjustment of the mA/kV was utilized to reduce the radiation dose to as low as reasonably achievable. COMPARISON: None. HISTORY: ORDERING SYSTEM PROVIDED HISTORY: stroke alert TECHNOLOGIST PROVIDED HISTORY: stroke alert Reason for Exam: stroke alert Additional signs and symptoms: garbled speech, right sided weakness. Relevant Medical/Surgical History: H/O previous CVA with right sided defecit. FINDINGS: BRAIN/VENTRICLES: Small to moderate size area of encephalomalacia involving the right temporal occipital region and a small area of encephalomalacia involving the medial left parietal lobe, these are indicative of old insults. There is no acute intracranial hemorrhage, mass effect or midline shift. No abnormal extra-axial fluid collection. The gray-white differentiation is maintained without evidence of an acute infarct. There is no evidence of hydrocephalus. ORBITS: The visualized portion of the orbits demonstrate no acute abnormality. SINUSES: The visualized paranasal sinuses and mastoid air cells demonstrate no acute abnormality. SOFT TISSUES/SKULL:  No acute abnormality of the visualized skull or soft tissues. No acute intracranial abnormality. Old infarcts in the right temporal occipital and medial left parietal regions. RECOMMENDATIONS: Unavailable     CT HEAD WO CONTRAST    Result Date: 12/24/2021  EXAMINATION: CT OF THE HEAD WITHOUT CONTRAST  12/24/2021 12:45 pm TECHNIQUE: CT of the head was performed without the administration of intravenous contrast. Dose modulation, iterative reconstruction, and/or weight based adjustment of the mA/kV was utilized to reduce the radiation dose to as low as reasonably achievable.  COMPARISON: CT head December 23, 2021, December 14, 2021 HISTORY: ORDERING SYSTEM PROVIDED HISTORY: repeat due to concern for left cortical acute stroke, significantly worsening right arm, face leg weakness, dysarthria and right visual field deficit TECHNOLOGIST PROVIDED HISTORY: repeat due to concern for left cortical acute stroke, significantly worsening right arm, face leg weakness, dysarthria and right visual field deficit Reason for Exam: repeat concern for left cortical acute stroke significantly worsening right arm face leg weakness FINDINGS: BRAIN/VENTRICLES: There are old infarctions in the left frontotemporal lobes, bilateral occipital lobes and minimally in the left cerebellar hemisphere, stable. No evidence of acute territorial infarction. There is no acute intracranial hemorrhage, mass effect or midline shift. No abnormal extra-axial fluid collection. There is no evidence of hydrocephalus. ORBITS: There is old fracture defect of the left lamina papyracea. The visualized portion of the orbits demonstrate no acute abnormality. SINUSES: The visualized paranasal sinuses and mastoid air cells demonstrate no acute abnormality. SOFT TISSUES/SKULL:  No acute abnormality of the visualized skull or soft tissues. No acute intracranial abnormality. 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 CT HEAD WO CONTRAST    Result Date: 12/23/2021  EXAMINATION: CT OF THE HEAD WITHOUT CONTRAST  12/23/2021 9:04 pm TECHNIQUE: CT of the head was performed without the administration of intravenous contrast. Dose modulation, iterative reconstruction, and/or weight based adjustment of the mA/kV was utilized to reduce the radiation dose to as low as reasonably achievable. COMPARISON: 12/16/2021.  HISTORY: ORDERING SYSTEM PROVIDED HISTORY: right sided deficits last known well moments ago TECHNOLOGIST PROVIDED HISTORY: right sided deficits last known well moments ago FINDINGS: Unchanged chronic encephalomalacia of the right medial occipital lobe and unchanged chronic encephalomalacia of left inferior parietal lobule. There is no acute infarction, intracranial hemorrhage or intracranial mass lesion. No mass effect, midline shift or extra-axial collection is noted. There are mild nonspecific foci of periventricular and subcortical cerebral white matter hypodensity, most likely representing chronic microangiopathic disease in this age group. The brain parenchyma is otherwise normal. The cerebellar tonsils are in normal position. The ventricles, sulci, and cisterns are mildly prominent suggestive of mild generalized volume loss. The globes and orbits are within normal limits. The visualized extracranial structures including paranasal sinuses and mastoid air cells are unremarkable. No fracture is identified. Unchanged chronic encephalomalacia of the right medial occipital lobe and unchanged chronic encephalomalacia of left inferior parietal lobule. No acute territorial infarction, intracranial hemorrhage or mass lesion. Mild chronic microangiopathic ischemic disease. Mild generalized volume loss. RECOMMENDATIONS: Unavailable     CT HEAD WO CONTRAST    Result Date: 12/16/2021  EXAMINATION: CT OF THE HEAD WITHOUT CONTRAST  12/16/2021 12:06 am TECHNIQUE: CT of the head was performed without the administration of intravenous contrast. Dose modulation, iterative reconstruction, and/or weight based adjustment of the mA/kV was utilized to reduce the radiation dose to as low as reasonably achievable. COMPARISON: 12/14/2021. HISTORY: ORDERING SYSTEM PROVIDED HISTORY: repeat CT of head r/o CVA TECHNOLOGIST PROVIDED HISTORY: repeat CT of head r/o CVA Reason for Exam: Repeat CT of head rule out CVA Relevant Medical/Surgical History: Covid + Slurred speech. Right-sided weakness. Initial evaluation.  FINDINGS: BRAIN/VENTRICLES: Areas of encephalomalacia are again seen involving the occipital lobes bilaterally. There is also encephalomalacia involving the left temporal lobe. Otherwise, the gray-white differentiation appears maintained. No evidence of an acute intracranial hemorrhage. There is no mass effect or midline shift. There are areas of hypoattenuation in the periventricular and subcortical white matter, which is nonspecific, but may represent chronic microvasvular ischemic change. There is mild global parenchymal volume loss. Minimal scattered atherosclerosis of the intracranial vasculature. ORBITS: There is a chronic defect involving the medial wall the left orbit. No acute abnormality seen of the orbits. SINUSES: Scattered mucosal thickening is seen of the ethmoid sinuses. The mastoid air cells demonstrate no acute abnormality. SOFT TISSUES/SKULL:  No acute abnormality of the visualized skull or soft tissues. 1. Overall, no significant change in the appearance of the brain compared to the prior CT. 2. Areas of encephalomalacia involving the bilateral occipital and left temporal lobes. 3. Mild global parenchymal volume loss with chronic microvascular ischemic changes. RECOMMENDATIONS: Unavailable     CT HEAD WO CONTRAST    Result Date: 12/14/2021  EXAMINATION: CT OF THE HEAD WITHOUT CONTRAST  12/14/2021 10:42 pm TECHNIQUE: CT of the head was performed without the administration of intravenous contrast. Dose modulation, iterative reconstruction, and/or weight based adjustment of the mA/kV was utilized to reduce the radiation dose to as low as reasonably achievable. COMPARISON: None. HISTORY: ORDERING SYSTEM PROVIDED HISTORY: stroke alert TECHNOLOGIST PROVIDED HISTORY: stroke alert Reason for Exam: Slurred speech, right sided weakness, previous stroke 3 months ago FINDINGS: BRAIN/VENTRICLES: There is no acute intracranial hemorrhage, mass effect or midline shift. No abnormal extra-axial fluid collection.   The gray-white differentiation is maintained without evidence of an acute infarct. There is no evidence of hydrocephalus. Periventricular and deep subcortical white matter hypoattenuation, consistent microangiopathic change. There is mild parenchymal volume loss. There is right occipital encephalomalacia. There is mild prominence of the cisterna magna. There is also encephalomalacia in the left occipital lobe. ORBITS: The visualized portion of the orbits demonstrate no acute abnormality. SINUSES: Scattered mucosal thickening in the paranasal sinuses. SOFT TISSUES/SKULL:  No acute abnormality of the visualized skull or soft tissues. 1. No acute intracranial abnormality. 2. Bilateral occipital encephalomalacia, suggestive of prior ischemia. 3. Microangiopathic change. Findings were discussed with Danya Luu CNP at 11:02 pm on 12/14/2021. RECOMMENDATIONS: Unavailable     NM LUNG SCAN PERFUSION ONLY    Result Date: 12/31/2021  EXAMINATION: NUCLEAR MEDICINE PERFUSION SCAN. 12/31/2021 TECHNIQUE: Ventilation not performed as part of COVID-19 safety precautions. 6.0 millicuries of Tc 10M MAA was administered intravenously prior to planar imaging of the lungs in multiple projections. COMPARISON: Chest radiograph dated 12/28/2021. HISTORY: ORDERING SYSTEM PROVIDED HISTORY: SOB, elev DD TECHNOLOGIST PROVIDED HISTORY: SOB, elev DD Decision Support Exception - unselect if not a suspected or confirmed emergency medical condition->Emergency Medical Condition (MA) Reason for Exam: SOB, elevated D-Dimer Additional signs and symptoms: SOB while laying down, stroke 2 weeks ago, has had 3 strokes, CP, heart feels like its beating out of his chest, coughing, was coughing up black one week ago, No hx of lung dx, No hx of blood clots, smokes 1 pack/3 days for 15-17 years, D-Dimer 12/30/21: 2.37 FINDINGS: PERFUSION: Distribution of radiotracer is homogeneous. No segmental defects identified. CHEST RADIOGRAPH: No focal areas of consolidation or significant effusions on recent chest radiograph. Low Probability for Pulmonary Embolus. XR CHEST PORTABLE    Result Date: 1/1/2022  EXAMINATION: ONE XRAY VIEW OF THE CHEST 1/1/2022 11:39 am COMPARISON: Chest 12/28/2021 HISTORY: ORDERING SYSTEM PROVIDED HISTORY: SOB, COVID FINDINGS: The cardiac silhouette is enlarged. Calcifications involving the aorta reflect atherosclerosis. The mediastinal and hilar silhouettes appear unremarkable. Scattered pulmonary opacities bilateral mid and lower lungs. Possible left pleural effusion. No pneumothorax is seen. No acute osseous abnormality is identified. Sequela from an ICD and CABG. 1. Nonspecific pulmonary infiltrates can be seen with atypical/viral pneumonia. Small left pleural effusion evident. Appearance is very similar to prior exam 12/28/2021. 2. Calcific atherosclerosis aorta. 3. Cardiomegaly. XR CHEST PORTABLE    Result Date: 12/28/2021  EXAMINATION: ONE XRAY VIEW OF THE CHEST 12/28/2021 8:19 pm COMPARISON: 12/23/2021 HISTORY: ORDERING SYSTEM PROVIDED HISTORY: fluid over load? TECHNOLOGIST PROVIDED HISTORY: fluid over load? Reason for Exam: upr FINDINGS: Prior sternotomy. Cardiomediastinal silhouette is enlarged, unchanged. Left subclavian cardiac pacing device is unchanged in position. Suspected small bilateral pleural effusions. No pneumothorax. Diffuse bilateral pulmonary opacities are slightly increased in the interval.     Interval increase in the bilateral pulmonary opacities, concerning for worsening pneumonia or possibly superimposed edema. Cardiomegaly. XR CHEST PORTABLE    Result Date: 12/23/2021  EXAMINATION: ONE XRAY VIEW OF THE CHEST 12/23/2021 3:28 pm COMPARISON: CT December 14, 2021 and chest radiograph December 7, 2021 HISTORY: ORDERING SYSTEM PROVIDED HISTORY: Chest pain TECHNOLOGIST PROVIDED HISTORY: cp Reason for Exam: upr FINDINGS: Moderate cardiomegaly appears unchanged. Biventricular pacer leads in satisfactory position in the AP view.   Relatively diffuse multifocal bilateral airspace disease has progressed significantly since prior radiograph. No definite effusion. No pneumothorax or subdiaphragmatic free air. No acute osseous abnormality identified. Relatively diffuse multifocal bilateral airspace disease most likely representing COVID-19 pneumonia. Pulmonary edema felt less likely. VL DUP CAROTID BILATERAL    Result Date: 12/24/2021    [facility]  Vascular Carotid Procedure   Patient Name    Catherine Craig Date of Study             12/24/2021   Date of Birth   1965  Gender                    Male   Age             61 year(s)  Race                         Room Number     7407   Corporate ID #  [id number]   Patient Acct #  [de-identified]   MR #            [id number]     Sonographer               Nadege Hampton, [facility]   Accession #     [id number]  Interpreting Physician    Ning Rush   Referring Nurse             Referring Physician       Kami Huynh  Practitioner  Procedure Type of Study:   Cerebral: Carotid, Carotid Scan Bilateral.  Indications for Study:CVA. Patient Status:ER. Technical Quality:Limited visualization. Limitation reason:patient IV in right jugular vein with bandage. Comments:Basic Classification of ICA Stenosis: PSV - Peak Systolic Velocity Normal: No plaque or calcification identified, no elevation of PSV Mild: <50% spectral broadening without increased PSV Moderate: 50 - 69% PSV >125 - <230 cm/sec Severe: 70 - 99% PSV >230 cm/sec Critical: 80 - 99% PSV >230cm/sec and/or End Diastolic Velocities >643DX/NSY. Conclusions   Summary   Simultaneous real time imaging utilizing B-Mode, color flow doppler and  spectral waveform analysis was performed on the bilateral extracerebral  vascular system. The study demonstrates:   Right:  Internal carotid artery has a mild, <50% stenosis based on velocities. The vertebral artery is patent with antegrade flow.    Left:  Internal carotid artery has a mild, <50% stenosis based on velocities. The vertebral artery is patent with antegrade flow. Signature   ----------------------------------------------------------------  Electronically signed by Andrea Allan RVT(Sonographer) on  12/24/2021 09:26 AM  ----------------------------------------------------------------   ----------------------------------------------------------------  Electronically signed by Hi Mishra(Interpreting  physician) on 12/24/2021 04:40 PM  ----------------------------------------------------------------  Findings:   Right Impression:                    Left Impression:  Intimal thickening right common      Intimal thickening left common  carotid artery. carotid artery. The carotid bulb has an irregular    The carotid bulb has an irregular  heterogeneous plaque causing a <50%  heterogeneous plaque causing a <50%  stenosis. stenosis. The internal carotid artery has a    The internal carotid artery has a  smooth heterogeneous plaque causing  smooth homogeneous plaque causing a  a <50% stenosis based on velocities. <50% stenosis based on velocities. The external carotid artery has a    The external carotid artery has a  smooth homogeneous plaque causing a  smooth homogeneous plaque causing a  <50% stenosis. <50% stenosis. The vertebral artery is patent with  The vertebral artery is patent with  antegrade flow. antegrade flow. Risk Factors   - Current - Every day. Velocities are measured in cm/s ; Diameters are measured in cm Carotid Right Measurements +------------+-------+-------+--------+-------+------------+---------------+ ! Location    ! PSV    ! EDV    ! Angle   ! RI     !%Stenosis   ! Tortuosity     ! +------------+-------+-------+--------+-------+------------+---------------+ ! Prox CCA    !67.1   !11.2   !60      !0.83   !            !               ! +------------+-------+-------+--------+-------+------------+---------------+ ! Mid CCA     !77.7   !13.7   !60      !0.82   !            !               ! +------------+-------+-------+--------+-------+------------+---------------+ ! Dist CCA    !80.8   !22.4   !60      !0.72   !            !               ! +------------+-------+-------+--------+-------+------------+---------------+ ! Bulb        !61.5   !16.8   !60      !0.73   !            !               ! +------------+-------+-------+--------+-------+------------+---------------+ ! Prox ICA    !51.6   !18.4   !22      !0.64   !            !               ! +------------+-------+-------+--------+-------+------------+---------------+ ! Mid ICA     !33.5   !12.3   !50      !0.63   !            !               ! +------------+-------+-------+--------+-------+------------+---------------+ ! Dist ICA    !47     !14.9   !60      !0.68   !            !               ! +------------+-------+-------+--------+-------+------------+---------------+ ! Prox ECA    !44.3   !1.18   !60      !0.97   !            !               ! +------------+-------+-------+--------+-------+------------+---------------+ ! Vertebral   !29.5   !13.5   !60      !0.54   !            !               ! +------------+-------+-------+--------+-------+------------+---------------+   - Additional Measurements:ICAPSV/CCAPSV 0.77. ICAEDV/CCAEDV 1.64. Carotid Left Measurements +------------+-------+-------+--------+-------+------------+---------------+ ! Location    ! PSV    ! EDV    ! Angle   ! RI     !%Stenosis   ! Tortuosity     ! +------------+-------+-------+--------+-------+------------+---------------+ ! Prox CCA    !81.8   !16.5   !60      !0.8    ! !               ! +------------+-------+-------+--------+-------+------------+---------------+ ! Mid CCA     ! 80.7   !14.8   !60      !0.82   !            !               ! +------------+-------+-------+--------+-------+------------+---------------+ ! Dist CCA    !79.6   !12.1   ! 60      !0.85   !            !               ! +------------+-------+-------+--------+-------+------------+---------------+ ! Bulb        !61.5   !18.7   ! 60      !0.7    ! !               ! +------------+-------+-------+--------+-------+------------+---------------+ ! Prox ICA    ! 65.9   !10.4   !60      !0.84   !            !               ! +------------+-------+-------+--------+-------+------------+---------------+ ! Mid ICA     !63.1   ! 17     !60      !0.73   !            !               ! +------------+-------+-------+--------+-------+------------+---------------+ ! Dist ICA    !41.9   !15.7   !60      !0.63   !            !               ! +------------+-------+-------+--------+-------+------------+---------------+ ! Prox ECA    !58.8   ! 7.45   !60      !0.87   !            !               ! +------------+-------+-------+--------+-------+------------+---------------+ ! Vertebral   !27.9   !10.3   ! 60      !0.63   !            !               ! +------------+-------+-------+--------+-------+------------+---------------+   - Additional Measurements:ICAPSV/CCAPSV 0.81. ICAEDV/CCAEDV 1.03.    US RETROPERITONEAL COMPLETE    Result Date: 1/10/2022  EXAMINATION: RETROPERITONEAL ULTRASOUND OF THE KIDNEYS AND URINARY BLADDER 1/10/2022 COMPARISON: December 24, 2021 HISTORY: ORDERING SYSTEM PROVIDED HISTORY: bilateral flank pain, UTI TECHNOLOGIST PROVIDED HISTORY: bilateral flank pain, UTI FINDINGS: Kidneys: The right kidney is not visualized. Left kidney measures 12 cm in length with similar appearance of moderate left-sided hydronephrosis. Cortical echogenicity unremarkable. No nephrolithiasis or source of obstruction demonstrated. No perinephric fluid collections. Absent right kidney. Similar moderate left-sided hydronephrosis without source of obstruction demonstrated.  RECOMMENDATIONS: Unavailable     CTA HEAD NECK W CONTRAST    Result Date: 1/12/2022  EXAMINATION: CTA OF THE appears somewhat improved from the prior exam.  Mild mediastinal adenopathy, which appears slightly improved. BONES: No acute osseous abnormality. CTA HEAD: ANTERIOR CIRCULATION: No significant stenosis of the intracranial internal carotid, anterior cerebral, or middle cerebral arteries. No aneurysm. POSTERIOR CIRCULATION: No significant stenosis of the vertebral, basilar, or posterior cerebral arteries. No aneurysm. OTHER: No dural venous sinus thrombosis on this non-dedicated study. BRAIN: No evidence of mass effect or midline shift. 1. No convincing flow limiting stenosis of the cervical carotid/vertebral arteries. 2. No significant stenosis of the bglypo-fa-Ewgqyv. 3. Minimal somewhat reticular opacities in the lungs bilaterally, which appears somewhat improved from the prior exam. 4. Mediastinal adenopathy is seen, which also appears slightly improved. CTA HEAD NECK W CONTRAST    Result Date: 12/15/2021  EXAMINATION: CTA OF THE HEAD AND NECK WITH CONTRAST 12/14/2021 11:53 pm: TECHNIQUE: CTA of the head and neck was performed with the administration of intravenous contrast. Multiplanar reformatted images are provided for review. MIP images are provided for review. Stenosis of the internal carotid arteries measured using NASCET criteria. Dose modulation, iterative reconstruction, and/or weight based adjustment of the mA/kV was utilized to reduce the radiation dose to as low as reasonably achievable. COMPARISON: None. HISTORY: ORDERING SYSTEM PROVIDED HISTORY: r/o stroke TECHNOLOGIST PROVIDED HISTORY: r/o stroke Reason for Exam: Slurred speech, right sided weakness FINDINGS: CTA NECK: AORTIC ARCH/ARCH VESSELS: No dissection or arterial injury. No significant stenosis of the brachiocephalic or subclavian arteries. CAROTID ARTERIES: Atherosclerosis in carotid bifurcations. No dissection, arterial injury, or hemodynamically significant stenosis by NASCET criteria.  VERTEBRAL ARTERIES: No dissection, arterial injury, or significant stenosis. SOFT TISSUES: Patchy ground-glass infiltrates in the visualized lungs. There is mediastinal lymphadenopathy. The larynx and pharynx are unremarkable. No acute abnormality of the salivary and thyroid glands. BONES: No acute osseous abnormality. CTA HEAD: ANTERIOR CIRCULATION: No significant stenosis of the intracranial internal carotid, anterior cerebral, or middle cerebral arteries. No aneurysm. POSTERIOR CIRCULATION: No significant stenosis of the vertebral, basilar, or posterior cerebral arteries. No aneurysm. OTHER: No dural venous sinus thrombosis on this non-dedicated study. BRAIN: No mass effect or midline shift. No extra-axial fluid collection. The gray-white differentiation is maintained. No flow-limiting arterial stenosis in the head and neck. Patchy ground-glass infiltrates in the visualized lungs. Mediastinal lymphadenopathy. RECOMMENDATIONS: Unavailable     US RETROPERITONEAL LIMITED    Result Date: 12/24/2021  EXAMINATION: ULTRASOUND OF THE KIDNEYS 12/24/2021 12:13 pm COMPARISON: 12/08/2021, CT 12/09/2020 HISTORY: ORDERING SYSTEM PROVIDED HISTORY: FOLLOW UP ON LEFT HYDRONEPHROSIS TECHNOLOGIST PROVIDED HISTORY: Bilateral Renal Ultrasound FOLLOW UP ON LEFT HYDRONEPHROSIS Absent right kidney FINDINGS: The right kidney is not visualized. The left kidney measures 13.3 cm in length. Mild-to-moderate left hydronephrosis, similar to the prior exams. Cortical echogenicity appears intact. Renal contours remain somewhat lobular. No kidney stones by ultrasound. No perirenal fluid collections. Similar mild-to-moderate left hydronephrosis.  Absent right kidney RECOMMENDATIONS: Unavailable     FL MODIFIED BARIUM SWALLOW W VIDEO    Result Date: 1/12/2022  EXAMINATION: MODIFIED BARIUM SWALLOW WAS PERFORMED IN CONJUNCTION WITH SPEECH PATHOLOGY SERVICES TECHNIQUE: Fluoroscopic evaluation of the swallowing mechanism was performed using cineradiography with multiple consistency of barium product in conjunction with speech pathology services. FLUOROSCOPY DOSE AND TYPE OR TIME AND EXPOSURES: Fluoro time 3.09 mins .3 dgy/cm2 AIR KERMA 24.3 COMPARISON: None HISTORY: Reason for Exam: Poss stroke FINDINGS: Premature vallecular spillage as well as minimal vallecular and piriform stasis with all consistencies. No evidence of aspiration. Deep penetration with nectar thick liquid consistencies. No aspiration. Deep penetration with nectar thick liquid consistency. Please see separate speech pathology report for full discussion of findings and recommendations. FL MODIFIED BARIUM SWALLOW W VIDEO    Result Date: 12/27/2021  EXAMINATION: MODIFIED BARIUM SWALLOW WAS PERFORMED IN CONJUNCTION WITH SPEECH PATHOLOGY SERVICES TECHNIQUE: Fluoroscopic evaluation of the swallowing mechanism was performed using cineradiography with multiple consistency of barium product in conjunction with speech pathology services. FLUOROSCOPY DOSE AND TYPE OR TIME AND EXPOSURES: 1 minute DAP 5.906mGy COMPARISON: None HISTORY: ORDERING SYSTEM PROVIDED HISTORY: dysphagia FINDINGS: Premature vallecular spillage with all consistencies. No evidence of laryngeal penetration or aspiration. Minimal vallecular residual with pudding and thin liquid consistencies. No evidence of aspiration or significant penetration. Please see separate speech pathology report for full discussion of findings and recommendations. DIAGNOSIS:    Mood disorder, unspecified        RISK ASSESSMENT: Moderate risk of accidental self-harm. No risk of suicide    RECOMMENDATIONS    Risk Management:  1:1 sitter    Medications: Patient declines psychotropic medications  Discussed with the treating physician/ team about the patient and treatment plan  Reviewed the chart    We will follow    Discussed with the patient risk, benefit, alternative and common side effects for the  proposed medication treatment.  Patient is consenting to the treatment. Thanks for the consult. Please call me if needed. Electronically signed by Jasper Apodaca MD on 1/13/2022 at 8:16 PM    Please note that this chart was generated using voice recognition Dragon dictation software. Although every effort was made to ensure the accuracy of this automated transcription, some errors in transcription may have occurred.

## 2022-01-14 NOTE — PLAN OF CARE
Problem: Falls - Risk of:  Goal: Will remain free from falls  Outcome: Ongoing  Goal: Absence of physical injury  Outcome: Ongoing     Problem: Pain:  Goal: Pain level will decrease  Outcome: Ongoing  Goal: Control of acute pain  Outcome: Ongoing  Goal: Control of chronic pain  Outcome: Ongoing     Problem: Musculor/Skeletal Functional Status  Goal: Highest potential functional level  Outcome: Ongoing  Goal: Absence of falls  Outcome: Ongoing     Problem: Health Behavior:  Goal: Ability to verbalize precipitating factors for violent behavior will improve  Outcome: Ongoing     Problem: Coping:  Goal: Ability to verbalize frustrations and anger appropriately will improve  Outcome: Ongoing     Problem: Safety:  Goal: Ability to demonstrate self-control will improve  Outcome: Ongoing  Goal: Ability to implement measures to prevent violent behavior in the future will improve  Outcome: Ongoing  Goal: Ability to redirect hostility and anger into socially appropriate behaviors will improve  Outcome: Ongoing

## 2022-01-14 NOTE — PLAN OF CARE
Problem: Falls - Risk of:  Goal: Will remain free from falls  Description: Will remain free from falls  1/14/2022 1610 by Burgess Azeb RN  Outcome: Completed  1/14/2022 0311 by Abbie Batista RN  Outcome: Ongoing  Goal: Absence of physical injury  Description: Absence of physical injury  1/14/2022 1610 by Burgess Azeb RN  Outcome: Completed  Note: Pt impulsive non-compliant  may find him standing in room ,bed alarm on.  1/14/2022 0311 by Abbie Batista RN  Outcome: Ongoing     Problem: Pain:  Description: Pain management should include both nonpharmacologic and pharmacologic interventions.   Goal: Pain level will decrease  Description: Pain level will decrease  1/14/2022 1610 by Burgess Azeb RN  Outcome: Completed  1/14/2022 0311 by Abbie Batista RN  Outcome: Ongoing  Goal: Control of acute pain  Description: Control of acute pain  1/14/2022 1610 by Burgess Azeb RN  Outcome: Completed  1/14/2022 0311 by Abbie Batista RN  Outcome: Ongoing  Goal: Control of chronic pain  Description: Control of chronic pain  1/14/2022 1610 by Burgess Azeb RN  Outcome: Completed  Note: Pt refuses pain medication orderred  1/14/2022 0311 by Abbie Batista RN  Outcome: Ongoing     Problem: Musculor/Skeletal Functional Status  Goal: Highest potential functional level  1/14/2022 1610 by Burgess Azeb RN  Outcome: Completed  1/14/2022 0311 by Abbie Batista RN  Outcome: Ongoing  Goal: Absence of falls  1/14/2022 1610 by Burgess Azeb RN  Outcome: Completed  1/14/2022 0311 by Abbie Batista RN  Outcome: Ongoing     Problem: Coping:  Goal: Ability to verbalize frustrations and anger appropriately will improve  Description: Ability to verbalize frustrations and anger appropriately will improve  1/14/2022 1610 by Burgess Azeb RN  Outcome: Completed  1/14/2022 0311 by Abbie Batista RN  Outcome: Ongoing  Intervention: Explore coping skills and behavior  Note: Pt refusing meds angry,yelling and cursing at staff     Problem: Health Behavior:  Goal: Ability to verbalize precipitating factors for violent behavior will improve  Description: Ability to verbalize precipitating factors for violent behavior will improve  1/14/2022 1610 by Burgess Azeb RN  Outcome: Completed  1/14/2022 0311 by Abbie Batista RN  Outcome: Ongoing     Problem: Safety:  Goal: Ability to demonstrate self-control will improve  Description: Ability to demonstrate self-control will improve  1/14/2022 1610 by Burgess Azeb RN  Outcome: Completed  1/14/2022 0311 by Abbie Batista RN  Outcome: Ongoing  Intervention: Monitor medication effects  Note: Refusing meds but alert and oriented  Goal: Ability to implement measures to prevent violent behavior in the future will improve  Description: Ability to implement measures to prevent violent behavior in the future will improve  1/14/2022 1610 by Burgess Azeb RN  Outcome: Completed  1/14/2022 0311 by Abbie Batista RN  Outcome: Ongoing  Goal: Ability to redirect hostility and anger into socially appropriate behaviors will improve  Description: Ability to redirect hostility and anger into socially appropriate behaviors will improve  1/14/2022 1610 by Burgess Azeb RN  Outcome: Completed  1/14/2022 0311 by Abbie Batista RN  Outcome: Ongoing     Problem: Skin Integrity:  Goal: Will show no infection signs and symptoms  Description: Will show no infection signs and symptoms  Outcome: Completed  Goal: Absence of new skin breakdown  Description: Absence of new skin breakdown  Outcome: Completed  Note: Pt has several bruised areas noted

## 2022-01-15 NOTE — PROGRESS NOTES
Physician Progress Note      PATIENT:               Alondra Lowe  CSN #:                  [id number]  :                       1965  ADMIT DATE:       2021 8:15 PM  [address] DATE:        2022 3:51 PM  RESPONDING  PROVIDER #:        Ashley Gray MD          QUERY TEXT:    Pt admitted with chest pain. Pt noted to have mood disorder and borderline   personality disorder along with elevated troponins. If possible, please   document in progress notes and discharge summary the cause of chest pain    The medical record reflects the following:  Risk Factors: 57yo, homeless, mood disorder, borderline personality,   drug-seeking, Hx MI, CAD  Clinical Indicators: troponin 69-->51-->48-->41, lower than prior admission;   per cardio note-->Borderline abnormal high-sensitivity troponin, type II,   demand/supply imbalance;  per psych consult--> pt is irritable in mood,   refusing any psychotropic meds, pt appears to be suspicious of treatment, dx   of unspecified mood disorder; per IM progress note--> drug seeking/ borderline   personality; EKG--> V-paced rhythm, no evidence of new ischemic changes  Treatment: Cardio consult, troponin levels, EKG x3, psych consult x2, pt   refusing psychotropic meds  Options provided:  -- Chest pain due to somatization disorder from underlying psychiatric   disorders. -- Chest pain due to, Please document cause. -- Chest pain due to type 2 MI with underlying cause of, Please document   underlying cause. -- Other - I will add my own diagnosis  -- Disagree - Not applicable / Not valid  -- Disagree - Clinically unable to determine / Unknown  -- Refer to Clinical Documentation Reviewer    PROVIDER RESPONSE TEXT:    This patient has chest pain due to somatization disorder from underlying   psychiatric disorders.     Query created by: Tonya Smith on 2022 12:33 PM      Electronically signed by:  Ashley Gray MD 2022 8:12 PM

## 2022-01-22 PROBLEM — R77.8 ELEVATED TROPONIN: Status: RESOLVED | Noted: 2021-12-08 | Resolved: 2022-01-22

## 2022-08-29 NOTE — CARE COORDINATION
DISCHARGE PLANNING NOTE:    Received call from Torito from [facility] stating they obtained pre-cert and can accept patient tomorrow. She would like [phone] completed and would like to be notified of time of discharge at [phone].     Electronically signed by Valora Dubin, RN on 1/13/2022 at 2:10 PM 82
